# Patient Record
Sex: FEMALE | Race: WHITE | NOT HISPANIC OR LATINO | Employment: OTHER | URBAN - METROPOLITAN AREA
[De-identification: names, ages, dates, MRNs, and addresses within clinical notes are randomized per-mention and may not be internally consistent; named-entity substitution may affect disease eponyms.]

---

## 2023-06-03 ENCOUNTER — HOSPITAL ENCOUNTER (OUTPATIENT)
Dept: DATA CONVERSION | Facility: HOSPITAL | Age: 78
Discharge: HOME | End: 2023-06-03
Attending: STUDENT IN AN ORGANIZED HEALTH CARE EDUCATION/TRAINING PROGRAM

## 2023-06-03 DIAGNOSIS — S09.90XA UNSPECIFIED INJURY OF HEAD, INITIAL ENCOUNTER: Primary | ICD-10-CM

## 2023-06-03 LAB
ALBUMIN SERPL-MCNC: 4.4 GM/DL (ref 3.5–5)
ALBUMIN/GLOB SERPL: 1.5 RATIO (ref 1.5–3)
ALP BLD-CCNC: 99 U/L (ref 35–125)
ALT SERPL-CCNC: 6 U/L (ref 5–40)
ANION GAP SERPL CALCULATED.3IONS-SCNC: 12 MMOL/L (ref 0–19)
ANTICOAGULANT: NORMAL
ANTICOAGULANT: NORMAL
APTT PPP: 30.1 SEC (ref 22–32.5)
AST SERPL-CCNC: 11 U/L (ref 5–40)
BASOPHILS # BLD AUTO: 0.06 K/UL (ref 0–0.22)
BASOPHILS NFR BLD AUTO: 0.7 % (ref 0–1)
BILIRUB SERPL-MCNC: 0.2 MG/DL (ref 0.1–1.2)
BUN SERPL-MCNC: 18 MG/DL (ref 8–25)
BUN/CREAT SERPL: 13.8 RATIO (ref 8–21)
CALCIUM SERPL-MCNC: 8.9 MG/DL (ref 8.5–10.4)
CHLORIDE SERPL-SCNC: 101 MMOL/L (ref 97–107)
CO2 SERPL-SCNC: 24 MMOL/L (ref 24–31)
CREAT SERPL-MCNC: 1.3 MG/DL (ref 0.4–1.6)
DEPRECATED RDW RBC AUTO: 45.7 FL (ref 37–54)
DIFFERENTIAL METHOD BLD: ABNORMAL
EOSINOPHIL # BLD AUTO: 0.27 K/UL (ref 0–0.45)
EOSINOPHIL NFR BLD: 3.1 % (ref 0–3)
ERYTHROCYTE [DISTWIDTH] IN BLOOD BY AUTOMATED COUNT: 14 % (ref 11.7–15)
ETHANOL SERPL-MCNC: <0.01 GM/DL (ref 0–0.01)
GFR SERPL CREATININE-BSD FRML MDRD: 42 ML/MIN/1.73 M2
GLOBULIN SER-MCNC: 2.9 G/DL (ref 1.9–3.7)
GLUCOSE SERPL-MCNC: 105 MG/DL (ref 65–99)
HCT VFR BLD AUTO: 35.8 % (ref 36–44)
HGB BLD-MCNC: 11.6 GM/DL (ref 12–15)
HS TROPONIN T DELTA: ABNORMAL (ref 0–4)
IMM GRANULOCYTES # BLD AUTO: 0.04 K/UL (ref 0–0.1)
INR PPP: 1 (ref 0.86–1.16)
LYMPHOCYTES # BLD AUTO: 1.96 K/UL (ref 1.2–3.2)
LYMPHOCYTES NFR BLD MANUAL: 22.3 % (ref 20–40)
MCH RBC QN AUTO: 29.2 PG (ref 26–34)
MCHC RBC AUTO-ENTMCNC: 32.4 % (ref 31–37)
MCV RBC AUTO: 90.2 FL (ref 80–100)
MONOCYTES # BLD AUTO: 0.78 K/UL (ref 0–0.8)
MONOCYTES NFR BLD MANUAL: 8.9 % (ref 0–8)
NEUTROPHILS # BLD AUTO: 5.67 K/UL
NEUTROPHILS # BLD AUTO: 5.67 K/UL (ref 1.8–7.7)
NEUTROPHILS.IMMATURE NFR BLD: 0.5 % (ref 0–1)
NEUTS SEG NFR BLD: 64.5 % (ref 50–70)
NRBC BLD-RTO: 0 /100 WBC
PLATELET # BLD AUTO: 323 K/UL (ref 150–450)
PMV BLD AUTO: 9.7 CU (ref 7–12.6)
POTASSIUM SERPL-SCNC: 4.3 MMOL/L (ref 3.4–5.1)
PROT SERPL-MCNC: 7.3 G/DL (ref 5.9–7.9)
PROTHROMBIN TIME: 10.8 SEC (ref 9.3–12.7)
RBC # BLD AUTO: 3.97 M/UL (ref 4–4.9)
SODIUM SERPL-SCNC: 137 MMOL/L (ref 133–145)
TROPONIN T SERPL-MCNC: 18 NG/L
WBC # BLD AUTO: 8.8 K/UL (ref 4.5–11)

## 2023-07-26 ENCOUNTER — HOSPITAL ENCOUNTER (OUTPATIENT)
Dept: DATA CONVERSION | Facility: HOSPITAL | Age: 78
Discharge: HOME | End: 2023-07-26
Attending: STUDENT IN AN ORGANIZED HEALTH CARE EDUCATION/TRAINING PROGRAM

## 2023-07-26 DIAGNOSIS — K27.9 PEPTIC ULCER, SITE UNSPECIFIED, UNSPECIFIED AS ACUTE OR CHRONIC, WITHOUT HEMORRHAGE OR PERFORATION: ICD-10-CM

## 2023-07-26 DIAGNOSIS — Z91.041 RADIOGRAPHIC DYE ALLERGY STATUS: ICD-10-CM

## 2023-07-26 DIAGNOSIS — Z79.899 OTHER LONG TERM (CURRENT) DRUG THERAPY: ICD-10-CM

## 2023-07-26 DIAGNOSIS — Z88.2 ALLERGY STATUS TO SULFONAMIDES: ICD-10-CM

## 2023-07-26 DIAGNOSIS — Z88.0 ALLERGY STATUS TO PENICILLIN: ICD-10-CM

## 2023-07-26 DIAGNOSIS — R10.13 EPIGASTRIC PAIN: Primary | ICD-10-CM

## 2023-07-26 DIAGNOSIS — R11.0 NAUSEA: ICD-10-CM

## 2023-07-26 LAB
ALBUMIN SERPL-MCNC: 4.3 GM/DL (ref 3.5–5)
ALBUMIN/GLOB SERPL: 1.3 RATIO (ref 1.5–3)
ALP BLD-CCNC: 94 U/L (ref 35–125)
ALT SERPL-CCNC: 5 U/L (ref 5–40)
ANION GAP SERPL CALCULATED.3IONS-SCNC: 13 MMOL/L (ref 0–19)
AST SERPL-CCNC: 14 U/L (ref 5–40)
BACTERIA UR QL AUTO: NEGATIVE
BASOPHILS # BLD AUTO: 0.06 K/UL (ref 0–0.22)
BASOPHILS NFR BLD AUTO: 0.8 % (ref 0–1)
BILIRUB SERPL-MCNC: 0.2 MG/DL (ref 0.1–1.2)
BILIRUB UR QL STRIP.AUTO: NEGATIVE
BUN SERPL-MCNC: 15 MG/DL (ref 8–25)
BUN/CREAT SERPL: 10.7 RATIO (ref 8–21)
CALCIUM SERPL-MCNC: 9.6 MG/DL (ref 8.5–10.4)
CHLORIDE SERPL-SCNC: 98 MMOL/L (ref 97–107)
CLARITY UR: CLEAR
CO2 SERPL-SCNC: 21 MMOL/L (ref 24–31)
COLOR UR: ABNORMAL
CREAT SERPL-MCNC: 1.4 MG/DL (ref 0.4–1.6)
DEPRECATED RDW RBC AUTO: 43.5 FL (ref 37–54)
DIFFERENTIAL METHOD BLD: ABNORMAL
EOSINOPHIL # BLD AUTO: 0.38 K/UL (ref 0–0.45)
EOSINOPHIL NFR BLD: 4.9 % (ref 0–3)
ERYTHROCYTE [DISTWIDTH] IN BLOOD BY AUTOMATED COUNT: 13.2 % (ref 11.7–15)
GFR SERPL CREATININE-BSD FRML MDRD: 38 ML/MIN/1.73 M2
GLOBULIN SER-MCNC: 3.3 G/DL (ref 1.9–3.7)
GLUCOSE SERPL-MCNC: 100 MG/DL (ref 65–99)
GLUCOSE UR STRIP.AUTO-MCNC: NEGATIVE MG/DL
HCT VFR BLD AUTO: 35.4 % (ref 36–44)
HGB BLD-MCNC: 11.4 GM/DL (ref 12–15)
HGB UR QL STRIP.AUTO: 1 /HPF (ref 0–3)
HGB UR QL: NEGATIVE
HS TROPONIN T DELTA: 1 (ref 0–4)
HS TROPONIN T DELTA: ABNORMAL (ref 0–4)
HYALINE CASTS UR QL AUTO: 6 /LPF
IMM GRANULOCYTES # BLD AUTO: 0.02 K/UL (ref 0–0.1)
KETONES UR QL STRIP.AUTO: NEGATIVE
LEUKOCYTE ESTERASE UR QL STRIP.AUTO: NEGATIVE
LIPASE SERPL-CCNC: 19 U/L (ref 16–63)
LYMPHOCYTES # BLD AUTO: 2 K/UL (ref 1.2–3.2)
LYMPHOCYTES NFR BLD MANUAL: 25.5 % (ref 20–40)
MCH RBC QN AUTO: 28.4 PG (ref 26–34)
MCHC RBC AUTO-ENTMCNC: 32.2 % (ref 31–37)
MCV RBC AUTO: 88.3 FL (ref 80–100)
MICROSCOPIC (UA): ABNORMAL
MONOCYTES # BLD AUTO: 0.59 K/UL (ref 0–0.8)
MONOCYTES NFR BLD MANUAL: 7.5 % (ref 0–8)
NEUTROPHILS # BLD AUTO: 4.78 K/UL
NEUTROPHILS # BLD AUTO: 4.78 K/UL (ref 1.8–7.7)
NEUTROPHILS.IMMATURE NFR BLD: 0.3 % (ref 0–1)
NEUTS SEG NFR BLD: 61 % (ref 50–70)
NITRITE UR QL STRIP.AUTO: NEGATIVE
NRBC BLD-RTO: 0 /100 WBC
PH UR STRIP.AUTO: 5 [PH] (ref 4.6–8)
PLATELET # BLD AUTO: 369 K/UL (ref 150–450)
PMV BLD AUTO: 9.8 CU (ref 7–12.6)
POTASSIUM SERPL-SCNC: 4.6 MMOL/L (ref 3.4–5.1)
PROT SERPL-MCNC: 7.6 G/DL (ref 5.9–7.9)
PROT UR STRIP.AUTO-MCNC: ABNORMAL MG/DL
RBC # BLD AUTO: 4.01 M/UL (ref 4–4.9)
SODIUM SERPL-SCNC: 132 MMOL/L (ref 133–145)
SP GR UR STRIP.AUTO: 1.01 (ref 1–1.03)
SQUAMOUS UR QL AUTO: ABNORMAL /HPF
TROPONIN T SERPL-MCNC: 19 NG/L
TROPONIN T SERPL-MCNC: 20 NG/L
URINE CULTURE: ABNORMAL
UROBILINOGEN UR QL STRIP.AUTO: NORMAL MG/DL (ref 0–1)
WBC # BLD AUTO: 7.8 K/UL (ref 4.5–11)
WBC #/AREA URNS AUTO: 1 /HPF (ref 0–3)

## 2023-10-30 ENCOUNTER — APPOINTMENT (OUTPATIENT)
Dept: RADIOLOGY | Facility: HOSPITAL | Age: 78
End: 2023-10-30
Payer: MEDICARE

## 2023-10-31 ENCOUNTER — HOSPITAL ENCOUNTER (EMERGENCY)
Facility: HOSPITAL | Age: 78
Discharge: HOME | End: 2023-10-31
Attending: EMERGENCY MEDICINE
Payer: MEDICARE

## 2023-10-31 ENCOUNTER — APPOINTMENT (OUTPATIENT)
Dept: RADIOLOGY | Facility: HOSPITAL | Age: 78
End: 2023-10-31
Payer: MEDICARE

## 2023-10-31 VITALS
BODY MASS INDEX: 26.31 KG/M2 | HEIGHT: 60 IN | TEMPERATURE: 97.7 F | DIASTOLIC BLOOD PRESSURE: 101 MMHG | RESPIRATION RATE: 16 BRPM | OXYGEN SATURATION: 92 % | SYSTOLIC BLOOD PRESSURE: 123 MMHG | WEIGHT: 134 LBS | HEART RATE: 66 BPM

## 2023-10-31 DIAGNOSIS — N28.1 RENAL CYST: ICD-10-CM

## 2023-10-31 DIAGNOSIS — R10.84 GENERALIZED ABDOMINAL PAIN: Primary | ICD-10-CM

## 2023-10-31 DIAGNOSIS — N83.9 LESION OF OVARY: ICD-10-CM

## 2023-10-31 LAB
ALBUMIN SERPL-MCNC: 4.6 G/DL (ref 3.5–5)
ALP BLD-CCNC: 89 U/L (ref 35–125)
ALT SERPL-CCNC: 6 U/L (ref 5–40)
ANION GAP SERPL CALC-SCNC: 15 MMOL/L
AST SERPL-CCNC: 13 U/L (ref 5–40)
BILIRUB SERPL-MCNC: <0.2 MG/DL (ref 0.1–1.2)
BUN SERPL-MCNC: 14 MG/DL (ref 8–25)
CALCIUM SERPL-MCNC: 9.8 MG/DL (ref 8.5–10.4)
CHLORIDE SERPL-SCNC: 96 MMOL/L (ref 97–107)
CO2 SERPL-SCNC: 24 MMOL/L (ref 24–31)
CREAT SERPL-MCNC: 1.2 MG/DL (ref 0.4–1.6)
ERYTHROCYTE [DISTWIDTH] IN BLOOD BY AUTOMATED COUNT: 13.9 % (ref 11.5–14.5)
GFR SERPL CREATININE-BSD FRML MDRD: 46 ML/MIN/1.73M*2
GLUCOSE SERPL-MCNC: 106 MG/DL (ref 65–99)
HCT VFR BLD AUTO: 36.8 % (ref 36–46)
HGB BLD-MCNC: 11.6 G/DL (ref 12–16)
MCH RBC QN AUTO: 28 PG (ref 26–34)
MCHC RBC AUTO-ENTMCNC: 31.5 G/DL (ref 32–36)
MCV RBC AUTO: 89 FL (ref 80–100)
NRBC BLD-RTO: 0 /100 WBCS (ref 0–0)
PLATELET # BLD AUTO: 355 X10*3/UL (ref 150–450)
PMV BLD AUTO: 9.4 FL (ref 7.5–11.5)
POTASSIUM SERPL-SCNC: 4.3 MMOL/L (ref 3.4–5.1)
PROT SERPL-MCNC: 7.7 G/DL (ref 5.9–7.9)
RBC # BLD AUTO: 4.14 X10*6/UL (ref 4–5.2)
SODIUM SERPL-SCNC: 135 MMOL/L (ref 133–145)
TROPONIN T SERPL-MCNC: 15 NG/L
TROPONIN T SERPL-MCNC: 16 NG/L
WBC # BLD AUTO: 6.8 X10*3/UL (ref 4.4–11.3)

## 2023-10-31 PROCEDURE — 74176 CT ABD & PELVIS W/O CONTRAST: CPT | Mod: MG

## 2023-10-31 PROCEDURE — 36415 COLL VENOUS BLD VENIPUNCTURE: CPT | Performed by: NURSE PRACTITIONER

## 2023-10-31 PROCEDURE — 2500000001 HC RX 250 WO HCPCS SELF ADMINISTERED DRUGS (ALT 637 FOR MEDICARE OP): Performed by: EMERGENCY MEDICINE

## 2023-10-31 PROCEDURE — 84484 ASSAY OF TROPONIN QUANT: CPT | Performed by: NURSE PRACTITIONER

## 2023-10-31 PROCEDURE — 2500000001 HC RX 250 WO HCPCS SELF ADMINISTERED DRUGS (ALT 637 FOR MEDICARE OP): Performed by: NURSE PRACTITIONER

## 2023-10-31 PROCEDURE — 99285 EMERGENCY DEPT VISIT HI MDM: CPT | Mod: 25

## 2023-10-31 PROCEDURE — 99284 EMERGENCY DEPT VISIT MOD MDM: CPT | Mod: 25 | Performed by: EMERGENCY MEDICINE

## 2023-10-31 PROCEDURE — 80053 COMPREHEN METABOLIC PANEL: CPT | Performed by: NURSE PRACTITIONER

## 2023-10-31 PROCEDURE — 96360 HYDRATION IV INFUSION INIT: CPT

## 2023-10-31 PROCEDURE — 85027 COMPLETE CBC AUTOMATED: CPT | Performed by: NURSE PRACTITIONER

## 2023-10-31 PROCEDURE — 2500000004 HC RX 250 GENERAL PHARMACY W/ HCPCS (ALT 636 FOR OP/ED): Performed by: NURSE PRACTITIONER

## 2023-10-31 RX ORDER — OXYCODONE AND ACETAMINOPHEN 5; 325 MG/1; MG/1
1 TABLET ORAL ONCE
Status: COMPLETED | OUTPATIENT
Start: 2023-10-31 | End: 2023-10-31

## 2023-10-31 RX ORDER — DICYCLOMINE HYDROCHLORIDE 10 MG/1
20 CAPSULE ORAL ONCE
Status: COMPLETED | OUTPATIENT
Start: 2023-10-31 | End: 2023-10-31

## 2023-10-31 RX ADMIN — OXYCODONE AND ACETAMINOPHEN 1 TABLET: 5; 325 TABLET ORAL at 19:32

## 2023-10-31 RX ADMIN — DICYCLOMINE HYDROCHLORIDE 20 MG: 10 CAPSULE ORAL at 14:39

## 2023-10-31 RX ADMIN — SODIUM CHLORIDE 500 ML: 900 INJECTION, SOLUTION INTRAVENOUS at 14:31

## 2023-10-31 ASSESSMENT — PAIN SCALES - GENERAL
PAINLEVEL_OUTOF10: 10 - WORST POSSIBLE PAIN
PAINLEVEL_OUTOF10: 7

## 2023-10-31 ASSESSMENT — PAIN - FUNCTIONAL ASSESSMENT: PAIN_FUNCTIONAL_ASSESSMENT: 0-10

## 2023-10-31 ASSESSMENT — COLUMBIA-SUICIDE SEVERITY RATING SCALE - C-SSRS
2. HAVE YOU ACTUALLY HAD ANY THOUGHTS OF KILLING YOURSELF?: NO
1. IN THE PAST MONTH, HAVE YOU WISHED YOU WERE DEAD OR WISHED YOU COULD GO TO SLEEP AND NOT WAKE UP?: NO
6. HAVE YOU EVER DONE ANYTHING, STARTED TO DO ANYTHING, OR PREPARED TO DO ANYTHING TO END YOUR LIFE?: NO

## 2023-10-31 ASSESSMENT — LIFESTYLE VARIABLES
HAVE PEOPLE ANNOYED YOU BY CRITICIZING YOUR DRINKING: NO
REASON UNABLE TO ASSESS: NO
HAVE YOU EVER FELT YOU SHOULD CUT DOWN ON YOUR DRINKING: NO
EVER FELT BAD OR GUILTY ABOUT YOUR DRINKING: NO
EVER HAD A DRINK FIRST THING IN THE MORNING TO STEADY YOUR NERVES TO GET RID OF A HANGOVER: NO

## 2023-10-31 ASSESSMENT — PAIN DESCRIPTION - LOCATION: LOCATION: ABDOMEN

## 2023-10-31 ASSESSMENT — PAIN DESCRIPTION - PAIN TYPE: TYPE: ACUTE PAIN

## 2023-10-31 ASSESSMENT — PAIN DESCRIPTION - FREQUENCY: FREQUENCY: CONSTANT/CONTINUOUS

## 2023-10-31 ASSESSMENT — PAIN DESCRIPTION - DESCRIPTORS
DESCRIPTORS: SHARP
DESCRIPTORS: SHARP

## 2023-10-31 NOTE — ED PROVIDER NOTES
HPI   No chief complaint on file.      HPI  See my MDM                  No data recorded                Patient History   No past medical history on file.  No past surgical history on file.  No family history on file.  Social History     Tobacco Use    Smoking status: Not on file    Smokeless tobacco: Not on file   Substance Use Topics    Alcohol use: Not on file    Drug use: Not on file       Physical Exam   ED Triage Vitals   Temp Pulse Resp BP   -- -- -- --      SpO2 Temp src Heart Rate Source Patient Position   -- -- -- --      BP Location FiO2 (%)     -- --       Physical Exam  CONSTITUTIONAL: Vital signs reviewed as charted, well-developed and in no distress  Eyes: Extraocular muscles are intact. Pupils equal round and reactive to light. Conjunctiva are pink.    ENT: Mucous membranes are moist. Tongue in the midline. Pharynx was without erythema or exudates, uvula midline  LUNGS: Breath sounds equal and clear to auscultation. Good air exchange, no wheezes rales or retractions, pulse oximetry is charted.  HEART: Regular rate and rhythm without murmur thrill or rub, strong tones, auscultation is normal.  ABDOMEN: Tenderness throughout the abdomen no rebound or guarding noted.  Abdomen is soft.  Neuro: The patient is awake, alert and oriented ×3. Moving all 4 extremities and answering questions appropriately.   MUSCULOSKELETAL: The calves are nontender to palpation. Full gross active range of motion.   PSYCH: Awake alert oriented, normal mood and affect.  Skin:  Dry, normal color, warm to the touch, no rash present.      ED Course & MDM   Diagnoses as of 11/13/23 1516   Generalized abdominal pain   Renal cyst   Lesion of ovary       Medical Decision Making  History obtained from: patient    Vital signs, nursing notes, current medications, past medical history, Surgical history, allergies, social history, family History were reviewed.         HPI:  Patient is a 78-year-old female presenting emergency room today  complaining of upper abdominal pain ongoing for the last 2 or 3 days.  States she has had intermittent diarrhea and constipation.  No vomiting.  Has had some nausea.  States she has been told that her pancreas is shrinking.  She denies any cough or congestion.  Denies dizziness, chest pain, shortness of breath or lower extremity edema.  Denies urinary complaints.      10 point ROS was reviewed and negative except Noted above in HPI.  DDX: as listed above    CT scan of the abdomen pelvis interpreted by the radiologist showed:  Impression:    1.  No obstructive uropathy. No nephrolithiasis  2. Cysts in the left kidney. However, there is asymmetric density in  the superior pole left kidney measuring 1.5 cm incompletely  characterized may reflect underlying cyst. However, confirmation with  ultrasound recommended.  3. Right adnexal cystic hypodensity measuring 4.7 x 3.1 cm intervally  increased in size in the prior exam measured 4.1 cm. Correlate with  prior workup including ultrasound which is scheduled for 11/03/2023.  4. Cholelithiasis demonstrated.              Medications administered during this visit (name and route): Oral Bentyl, IV saline      MDM Summary/considerations:  I estimate there is a low risk for acute appendicitis, bowel extraction, cystitis, diverticulitis, incarcerated hernia, mesenteric ischemia, pancreatitis, or perforated bowel or ulcer, thus I considered the discharge disposition reasonable. There is no evidence of peritonitis, sepsis, or toxicity. I have discussed the diagnosis and risks, and we agreed with discharging home to follow-up with the primary care doctor. We also discussed returning to the emergency department immediately if new or worsening symptoms occur. Symptoms of most concern that we discussed are bloody stool, fever, changing or worsening pain, or vomiting that necessitates immediate return.      Patient's work-up here in the ED shows no acute pathology.  CT scan does show  some adnexal cystic lesions that were discussed with the patient.  All CT scan findings were discussed.  Patient with nonischemic EKG, troponin 1 above normal.  Was discharged home in stable condition to follow PCP 1 to 2 days for reevaluation.  Was discharged home in stable condition.        I saw this patient in conjunction with Dr. Charles, please see his supervision note.    All of the patient's questions were answered to the best of my ability.  Patient states understanding that they have been screened for an emergency today and we have not found any etiology of symptoms that requires emergent treatment or admission to the hospital at this point. They understand that they have not had definitive care day and require follow-up for treatment of their condition. They also state understanding that they may have an emergent condition that may potentially have not of detected at this visit and they must return to the emergency department if they develop any worsening of symptoms or new complaints.    Not warranted at this time prescriptions provided include: none    This chart was completed using voice recognition transcription software. Please excuse any errors of transcription including grammatical, punctuation, syntax and spelling errors.  Please contact me with any questions regarding this chart.    Procedure  Procedures     Kyle Jose, KINGS-VIVEK  11/13/23 1522

## 2023-11-01 NOTE — PROGRESS NOTES
IN BRIEF   78-year-old female who presents with complaint of upper abdominal pain for the past 2 to 3 days with intermittent bowel changes.  Some slight nausea but no vomiting.  She does have a do think like this previously and has had specifically no chest pain shortness of breath.    General: Appears well, no acute distress, alert  Head: Head atraumatic; normocephalic  Eyes: normal inspection; no icterus  ENT: mucosa moist without lesion  Neck: Normal inspection, no meningeal signs  Resp: Normal breath sounds, no wheeze or crackles; No respiratory distress  Chest Wall: no tenderness or deformity  Heart: Heart rate and rhythm regular; No Murmurs  Abdomen: Soft, minimal diffuse tenderness; No distention, guarding, rigidity, or rebound  MSK: Normal appearance; Moves all extremities; No Pedal edema  Neuro: Alert; no focal deficits, moves all extremities  Psych: Mood and Affect normal  Skin: Color appropriate; warm; Dry     EKG: Sinus rhythm with first-degree AV block ventricular rate 72  normal axis and intervals no acute ischemic changes    ED COURSE   Patient presents with complaints of nonspecific diffuse abdominal pain.  She does have minimal nonspecific tenderness on exam but there is no rebound or specific concerning findings for surgical abdomen.  Labwork initially showed a troponin of one-point above normal, this is nonspecific for a patient without dyspnea or chest pain and repeat troponin returned normal.  At the time evaluation the patient is requesting discharge and states that she is feeling greatly improved.  They are advised that there were no specific findings to be the cause of their pain though the renal cyst and ovarian lesion are discussed with him.  They are aware of the ovarian lesion and have a follow-up ultrasound this week.  They will discuss the renal cyst with their primary care.  They are agreeable with plan of discharge but advised that there was no cause for symptoms found and they  are to return should they have any change or any other concerns.  Discharged in stable condition.    I completed a structured, evidence-based clinical evaluation and testing for abdominal pain in this patient aged 60+. The workup included a CT unless the patient met a set of criteria or the patient declined the CT. The evidence indicates that the patient is very low risk for any acute abdominal emergency, and this is consistent with my clinical intuition. The risk of further imaging or hospitalization is likely higher than the risk of the patient having an acute emergent condition related to today’s symptoms. It is, therefore, in the patient’s best interest not to do additional emergent testing or be hospitalized.    I have discussed with the patient my clinical impression and the result of an evidence-based clinical evaluation to screen for an acute abdominal emergency, as well as the risk of further testing and hospitalization. The evidence shows that the risk for an acute condition related to today’s symptoms and signs is extremely low. Although the risk of progression or new symptoms cannot be excluded, the risks of further testing or hospitalization likely exceed the benefit, and the patient declines further emergent evaluation or hospitalization for evaluation of the abdominal pain.     I personally saw the patient and performed a substantive portion of the visit including all aspects of the medical decision making.   Parts of this chart were completed with dictation software, please excuse any errors in transcription.     Michelle Charles, DO  10:40 PM

## 2023-11-10 ENCOUNTER — HOSPITAL ENCOUNTER (OUTPATIENT)
Dept: RADIOLOGY | Facility: HOSPITAL | Age: 78
End: 2023-11-10
Payer: MEDICARE

## 2023-11-14 ENCOUNTER — APPOINTMENT (OUTPATIENT)
Dept: RADIOLOGY | Facility: HOSPITAL | Age: 78
End: 2023-11-14
Payer: MEDICARE

## 2023-11-17 ENCOUNTER — APPOINTMENT (OUTPATIENT)
Dept: RADIOLOGY | Facility: HOSPITAL | Age: 78
End: 2023-11-17
Payer: MEDICARE

## 2023-11-21 ENCOUNTER — APPOINTMENT (OUTPATIENT)
Dept: RADIOLOGY | Facility: HOSPITAL | Age: 78
End: 2023-11-21
Payer: MEDICARE

## 2023-12-27 ENCOUNTER — APPOINTMENT (OUTPATIENT)
Dept: CARDIOLOGY | Facility: HOSPITAL | Age: 78
DRG: 444 | End: 2023-12-27
Payer: MEDICARE

## 2023-12-27 ENCOUNTER — HOSPITAL ENCOUNTER (EMERGENCY)
Facility: HOSPITAL | Age: 78
Discharge: HOME | DRG: 444 | End: 2023-12-27
Attending: STUDENT IN AN ORGANIZED HEALTH CARE EDUCATION/TRAINING PROGRAM
Payer: MEDICARE

## 2023-12-27 ENCOUNTER — APPOINTMENT (OUTPATIENT)
Dept: RADIOLOGY | Facility: HOSPITAL | Age: 78
DRG: 444 | End: 2023-12-27
Payer: MEDICARE

## 2023-12-27 VITALS
RESPIRATION RATE: 20 BRPM | WEIGHT: 135 LBS | DIASTOLIC BLOOD PRESSURE: 78 MMHG | TEMPERATURE: 98.2 F | BODY MASS INDEX: 26.5 KG/M2 | OXYGEN SATURATION: 94 % | HEART RATE: 76 BPM | SYSTOLIC BLOOD PRESSURE: 165 MMHG | HEIGHT: 60 IN

## 2023-12-27 DIAGNOSIS — R10.9 ABDOMINAL PAIN, UNSPECIFIED ABDOMINAL LOCATION: ICD-10-CM

## 2023-12-27 DIAGNOSIS — K80.50 BILIARY COLIC: Primary | ICD-10-CM

## 2023-12-27 LAB
ALBUMIN SERPL-MCNC: 4.4 G/DL (ref 3.5–5)
ALP BLD-CCNC: 104 U/L (ref 35–125)
ALT SERPL-CCNC: 6 U/L (ref 5–40)
ANION GAP SERPL CALC-SCNC: 16 MMOL/L
APPEARANCE UR: CLEAR
AST SERPL-CCNC: 11 U/L (ref 5–40)
BILIRUB SERPL-MCNC: 0.3 MG/DL (ref 0.1–1.2)
BILIRUB UR STRIP.AUTO-MCNC: NEGATIVE MG/DL
BUN SERPL-MCNC: 18 MG/DL (ref 8–25)
CALCIUM SERPL-MCNC: 9.6 MG/DL (ref 8.5–10.4)
CHLORIDE SERPL-SCNC: 96 MMOL/L (ref 97–107)
CO2 SERPL-SCNC: 22 MMOL/L (ref 24–31)
COLOR UR: COLORLESS
CREAT SERPL-MCNC: 1.1 MG/DL (ref 0.4–1.6)
ERYTHROCYTE [DISTWIDTH] IN BLOOD BY AUTOMATED COUNT: 13.5 % (ref 11.5–14.5)
GFR SERPL CREATININE-BSD FRML MDRD: 51 ML/MIN/1.73M*2
GLUCOSE SERPL-MCNC: 116 MG/DL (ref 65–99)
GLUCOSE UR STRIP.AUTO-MCNC: NORMAL MG/DL
HCT VFR BLD AUTO: 33.5 % (ref 36–46)
HGB BLD-MCNC: 10.6 G/DL (ref 12–16)
KETONES UR STRIP.AUTO-MCNC: NEGATIVE MG/DL
LEUKOCYTE ESTERASE UR QL STRIP.AUTO: NEGATIVE
LIPASE SERPL-CCNC: 16 U/L (ref 16–63)
MCH RBC QN AUTO: 28 PG (ref 26–34)
MCHC RBC AUTO-ENTMCNC: 31.6 G/DL (ref 32–36)
MCV RBC AUTO: 88 FL (ref 80–100)
NITRITE UR QL STRIP.AUTO: NEGATIVE
NRBC BLD-RTO: 0 /100 WBCS (ref 0–0)
PH UR STRIP.AUTO: 6 [PH]
PLATELET # BLD AUTO: 345 X10*3/UL (ref 150–450)
POTASSIUM SERPL-SCNC: 4.7 MMOL/L (ref 3.4–5.1)
PROT SERPL-MCNC: 8 G/DL (ref 5.9–7.9)
PROT UR STRIP.AUTO-MCNC: ABNORMAL MG/DL
RBC # BLD AUTO: 3.79 X10*6/UL (ref 4–5.2)
RBC # UR STRIP.AUTO: ABNORMAL /UL
RBC #/AREA URNS AUTO: NORMAL /HPF
SODIUM SERPL-SCNC: 134 MMOL/L (ref 133–145)
SP GR UR STRIP.AUTO: 1.01
TROPONIN T SERPL-MCNC: 12 NG/L
UROBILINOGEN UR STRIP.AUTO-MCNC: NORMAL MG/DL
WBC # BLD AUTO: 9 X10*3/UL (ref 4.4–11.3)
WBC #/AREA URNS AUTO: NORMAL /HPF

## 2023-12-27 PROCEDURE — 36415 COLL VENOUS BLD VENIPUNCTURE: CPT | Performed by: STUDENT IN AN ORGANIZED HEALTH CARE EDUCATION/TRAINING PROGRAM

## 2023-12-27 PROCEDURE — 2500000004 HC RX 250 GENERAL PHARMACY W/ HCPCS (ALT 636 FOR OP/ED): Performed by: STUDENT IN AN ORGANIZED HEALTH CARE EDUCATION/TRAINING PROGRAM

## 2023-12-27 PROCEDURE — 99285 EMERGENCY DEPT VISIT HI MDM: CPT | Mod: 25

## 2023-12-27 PROCEDURE — 96374 THER/PROPH/DIAG INJ IV PUSH: CPT

## 2023-12-27 PROCEDURE — 74176 CT ABD & PELVIS W/O CONTRAST: CPT

## 2023-12-27 PROCEDURE — 93005 ELECTROCARDIOGRAM TRACING: CPT

## 2023-12-27 PROCEDURE — 2500000001 HC RX 250 WO HCPCS SELF ADMINISTERED DRUGS (ALT 637 FOR MEDICARE OP): Performed by: STUDENT IN AN ORGANIZED HEALTH CARE EDUCATION/TRAINING PROGRAM

## 2023-12-27 PROCEDURE — 83690 ASSAY OF LIPASE: CPT | Performed by: STUDENT IN AN ORGANIZED HEALTH CARE EDUCATION/TRAINING PROGRAM

## 2023-12-27 PROCEDURE — 84484 ASSAY OF TROPONIN QUANT: CPT | Performed by: STUDENT IN AN ORGANIZED HEALTH CARE EDUCATION/TRAINING PROGRAM

## 2023-12-27 PROCEDURE — 99284 EMERGENCY DEPT VISIT MOD MDM: CPT | Performed by: STUDENT IN AN ORGANIZED HEALTH CARE EDUCATION/TRAINING PROGRAM

## 2023-12-27 PROCEDURE — 80053 COMPREHEN METABOLIC PANEL: CPT | Performed by: STUDENT IN AN ORGANIZED HEALTH CARE EDUCATION/TRAINING PROGRAM

## 2023-12-27 PROCEDURE — 85027 COMPLETE CBC AUTOMATED: CPT | Performed by: STUDENT IN AN ORGANIZED HEALTH CARE EDUCATION/TRAINING PROGRAM

## 2023-12-27 PROCEDURE — 81001 URINALYSIS AUTO W/SCOPE: CPT | Performed by: STUDENT IN AN ORGANIZED HEALTH CARE EDUCATION/TRAINING PROGRAM

## 2023-12-27 PROCEDURE — 96375 TX/PRO/DX INJ NEW DRUG ADDON: CPT

## 2023-12-27 RX ORDER — FAMOTIDINE 10 MG/ML
20 INJECTION INTRAVENOUS ONCE
Status: COMPLETED | OUTPATIENT
Start: 2023-12-27 | End: 2023-12-27

## 2023-12-27 RX ORDER — ONDANSETRON HYDROCHLORIDE 2 MG/ML
4 INJECTION, SOLUTION INTRAVENOUS ONCE
Status: COMPLETED | OUTPATIENT
Start: 2023-12-27 | End: 2023-12-27

## 2023-12-27 RX ORDER — TRAMADOL HYDROCHLORIDE 50 MG/1
50 TABLET ORAL ONCE
Status: COMPLETED | OUTPATIENT
Start: 2023-12-27 | End: 2023-12-27

## 2023-12-27 RX ADMIN — FAMOTIDINE 20 MG: 10 INJECTION INTRAVENOUS at 10:58

## 2023-12-27 RX ADMIN — ONDANSETRON 4 MG: 2 INJECTION INTRAMUSCULAR; INTRAVENOUS at 10:58

## 2023-12-27 RX ADMIN — TRAMADOL HYDROCHLORIDE 50 MG: 50 TABLET ORAL at 13:32

## 2023-12-27 ASSESSMENT — PAIN SCALES - GENERAL
PAINLEVEL_OUTOF10: 10 - WORST POSSIBLE PAIN
PAINLEVEL_OUTOF10: 10 - WORST POSSIBLE PAIN

## 2023-12-27 ASSESSMENT — COLUMBIA-SUICIDE SEVERITY RATING SCALE - C-SSRS
1. IN THE PAST MONTH, HAVE YOU WISHED YOU WERE DEAD OR WISHED YOU COULD GO TO SLEEP AND NOT WAKE UP?: NO
2. HAVE YOU ACTUALLY HAD ANY THOUGHTS OF KILLING YOURSELF?: NO
6. HAVE YOU EVER DONE ANYTHING, STARTED TO DO ANYTHING, OR PREPARED TO DO ANYTHING TO END YOUR LIFE?: NO

## 2023-12-27 ASSESSMENT — LIFESTYLE VARIABLES
REASON UNABLE TO ASSESS: NO
HAVE PEOPLE ANNOYED YOU BY CRITICIZING YOUR DRINKING: NO
HAVE YOU EVER FELT YOU SHOULD CUT DOWN ON YOUR DRINKING: NO
EVER FELT BAD OR GUILTY ABOUT YOUR DRINKING: NO
EVER HAD A DRINK FIRST THING IN THE MORNING TO STEADY YOUR NERVES TO GET RID OF A HANGOVER: NO

## 2023-12-27 ASSESSMENT — PAIN DESCRIPTION - LOCATION: LOCATION: ABDOMEN

## 2023-12-27 ASSESSMENT — PAIN - FUNCTIONAL ASSESSMENT: PAIN_FUNCTIONAL_ASSESSMENT: 0-10

## 2023-12-27 ASSESSMENT — PAIN DESCRIPTION - ORIENTATION: ORIENTATION: LEFT

## 2023-12-27 NOTE — ED PROVIDER NOTES
HPI   Chief Complaint   Patient presents with    Abdominal Pain     Since 0100, epigastric and radiates left, abd is soft but tender, has nausea without vomiting and two regular bm's today       Patient presents with epigastric abdominal pain which started at about 1:00 today.  It is associated with nausea although she has not vomited.  She did have several bowel movements today already, not diarrhea however.  She took a Percocet and some Zofran at home already for the pain and nausea.                          [place] Coma Scale Score: 15                  Patient History   Past Medical History:   Diagnosis Date    Arthritis     Diabetes mellitus (CMS/HCC)     Hypertension      History reviewed. No pertinent surgical history.  No family history on file.  Social History     Tobacco Use    Smoking status: Never     Passive exposure: Never    Smokeless tobacco: Never   Substance Use Topics    Alcohol use: Not Currently    Drug use: Not on file       Physical Exam   ED Triage Vitals [12/27/23 1038]   Temp Heart Rate Resp BP   36.9 °C (98.4 °F) 89 15 (!) 195/98      SpO2 Temp src Heart Rate Source Patient Position   95 % -- -- --      BP Location FiO2 (%)     -- --       Physical Exam  HENT:      Head: Normocephalic.   Eyes:      General: No scleral icterus.  Cardiovascular:      Rate and Rhythm: Normal rate.   Pulmonary:      Effort: Pulmonary effort is normal.   Abdominal:      Comments: Diffuse tenderness to palpation, worst in the epigastric region.  No guarding.   Skin:     General: Skin is warm.   Neurological:      General: No focal deficit present.      Mental Status: She is alert.         ED Course & MDM   ED Course as of 12/27/23 1342   Wed Dec 27, 2023   1057 EKG interpreted by me: Normal sinus rhythm, rate 82.  Normal axis.  No ST or T wave abnormalities. [ML]      ED Course User Index  [ML] Deondre Garcia MD         Diagnoses as of 12/27/23 1342   Abdominal pain, unspecified abdominal location   Biliary colic        Medical Decision Making  Patient presents with epigastric pain.  Laboratory studies are unremarkable.  Urinalysis not consistent with urinary tract infection.  CAT scan reveals gallstones.  I have some suspicion that patient's symptoms are consistent with biliary colic in the setting, patient was advised to avoid fatty meals or large meals and to follow-up with general surgery.  No signs of cholecystitis seen on CAT scan.  Patient advised to follow-up with primary care physician and given return precautions for any worsening symptoms.  My suspicion for bowel obstruction, bowel ischemia, AAA, aortic dissection, perforation, appendicitis as etiology of symptoms is very low.  Parts of this chart were completed with dictation software, please excuse any errors in transcription.        Procedure  Procedures     Deondre Garcia MD  12/27/23 6475

## 2023-12-27 NOTE — ED NOTES
Abdominal Pain       Since 0100, epigastric and radiates left, abd is soft but tender, has nausea without vomiting and two regular bm's today          PMHX:  Past Medical History:   Diagnosis Date    Arthritis     Diabetes mellitus (CMS/HCC)     Hypertension         Allergies   Allergen Reactions    Gabapentin Anaphylaxis and Unknown    Levofloxacin Shortness of breath    Sulfa (Sulfonamide Antibiotics) Rash    Duloxetine Nausea And Vomiting    Metronidazole GI Upset and Nausea And Vomiting     Nausea and diarrhea    Pregabalin GI Upset    Protonix [Pantoprazole] Unknown    Adhesive Tape-Silicones Rash    Morphine Hives and Rash         LABS:   Latest Reference Range & Units 12/27/23 10:42   GLUCOSE 65 - 99 mg/dL 116 (H)   SODIUM 133 - 145 mmol/L 134   POTASSIUM 3.4 - 5.1 mmol/L 4.7   CHLORIDE 97 - 107 mmol/L 96 (L)   Bicarbonate 24 - 31 mmol/L 22 (L)   Anion Gap <=19 mmol/L 16   Blood Urea Nitrogen 8 - 25 mg/dL 18   Creatinine 0.40 - 1.60 mg/dL 1.10   EGFR >60 mL/min/1.73m*2 51 (L)   Calcium 8.5 - 10.4 mg/dL 9.6   Albumin 3.5 - 5.0 g/dL 4.4   Alkaline Phosphatase 35 - 125 U/L 104   ALT 5 - 40 U/L 6   AST 5 - 40 U/L 11   Bilirubin Total 0.1 - 1.2 mg/dL 0.3   (H): Data is abnormally high  (L): Data is abnormally low     Latest Reference Range & Units 12/27/23 12:53   WBC 4.4 - 11.3 x10*3/uL 9.0   nRBC 0.0 - 0.0 /100 WBCs 0.0   RBC 4.00 - 5.20 x10*6/uL 3.79 (L)   HEMOGLOBIN 12.0 - 16.0 g/dL 10.6 (L)   HEMATOCRIT 36.0 - 46.0 % 33.5 (L)   MCV 80 - 100 fL 88   MCH 26.0 - 34.0 pg 28.0   MCHC 32.0 - 36.0 g/dL 31.6 (L)   RED CELL DISTRIBUTION WIDTH 11.5 - 14.5 % 13.5   Platelets 150 - 450 x10*3/uL 345   (L): Data is abnormally low   Latest Reference Range & Units 12/27/23 10:43   Color, Urine Light-Yellow, Yellow, Dark-Yellow  Colorless !   Appearance, Urine Clear  Clear   Specific Gravity, Urine 1.005 - 1.035  1.012   pH, Urine 5.0, 5.5, 6.0, 6.5, 7.0, 7.5, 8.0  6.0   Protein, Urine NEGATIVE, 10 (TRACE), 20 (TRACE)  mg/dL 50 (1+) !   Glucose, Urine Normal mg/dL Normal   Blood, Urine NEGATIVE  0.03 (TRACE) !   Ketones, Urine NEGATIVE mg/dL NEGATIVE   Bilirubin, Urine NEGATIVE  NEGATIVE   Urobilinogen, Urine Normal mg/dL Normal   Nitrite, Urine NEGATIVE  NEGATIVE   Leukocyte Esterase, Urine NEGATIVE  NEGATIVE   !: Data is abnormal          PLAN:  Discharge, follow up with pcp                 Socorro Valencia, RN  12/27/23 5430

## 2023-12-27 NOTE — DISCHARGE INSTRUCTIONS
Your CAT scan revealed that you have gallstones.  You will need to follow-up with a surgeon regarding this finding.  You should try to avoid eating large meals or fatty meals as this may trigger your symptoms.  Return to the emergency department with any worsening symptoms.  It is important to remember that your care does not end here and you must continue to monitor your condition closely. Please return to the emergency department for any worsening or concerning signs or symptoms as directed by our conversations and the discharge instructions. If you do not have a doctor please contact the referral number on your discharge instructions. Please contact any physician specialists provided in your discharge notes as it is very important to follow up with them regarding your condition. If you are unable to reach the physicians provided, please come back to the Emergency Department at any time.    Return to emergency room without delay for ANY new or worsening pains or for any other symptoms or concerns.  Return with worsening pains, nausea, vomiting, trouble breathing, palpitations, shortness of breath, inability to pass stool or urine, loss of control of stool or urine, any numbness or tingling (that is not normal for you), uncontrolled fevers, the passing of blood or other material in stool or urine, rashes, pains or for any other symptoms or concerns you may have.  You are always welcome to return to the ER at any time for any reason or for any other concerns you may have.

## 2023-12-28 ENCOUNTER — APPOINTMENT (OUTPATIENT)
Dept: RADIOLOGY | Facility: HOSPITAL | Age: 78
DRG: 444 | End: 2023-12-28
Payer: MEDICARE

## 2023-12-28 ENCOUNTER — HOSPITAL ENCOUNTER (INPATIENT)
Facility: HOSPITAL | Age: 78
LOS: 13 days | Discharge: HOME | DRG: 444 | End: 2024-01-10
Attending: STUDENT IN AN ORGANIZED HEALTH CARE EDUCATION/TRAINING PROGRAM | Admitting: INTERNAL MEDICINE
Payer: MEDICARE

## 2023-12-28 DIAGNOSIS — R10.9 ABDOMINAL PAIN, UNSPECIFIED ABDOMINAL LOCATION: ICD-10-CM

## 2023-12-28 DIAGNOSIS — K80.10 CHRONIC CHOLECYSTITIS WITH CALCULUS: ICD-10-CM

## 2023-12-28 DIAGNOSIS — R11.10 ACUTE VOMITING: Primary | ICD-10-CM

## 2023-12-28 LAB
ALBUMIN SERPL-MCNC: 4.5 G/DL (ref 3.5–5)
ALP BLD-CCNC: 100 U/L (ref 35–125)
ALT SERPL-CCNC: <5 U/L (ref 5–40)
ANION GAP SERPL CALC-SCNC: 13 MMOL/L
AST SERPL-CCNC: 20 U/L (ref 5–40)
ATRIAL RATE: 82 BPM
BASOPHILS # BLD AUTO: 0.05 X10*3/UL (ref 0–0.1)
BASOPHILS NFR BLD AUTO: 0.4 %
BILIRUB SERPL-MCNC: 0.3 MG/DL (ref 0.1–1.2)
BUN SERPL-MCNC: 15 MG/DL (ref 8–25)
CALCIUM SERPL-MCNC: 9.5 MG/DL (ref 8.5–10.4)
CHLORIDE SERPL-SCNC: 92 MMOL/L (ref 97–107)
CO2 SERPL-SCNC: 25 MMOL/L (ref 24–31)
CREAT SERPL-MCNC: 1.2 MG/DL (ref 0.4–1.6)
EOSINOPHIL # BLD AUTO: 0.06 X10*3/UL (ref 0–0.4)
EOSINOPHIL NFR BLD AUTO: 0.5 %
ERYTHROCYTE [DISTWIDTH] IN BLOOD BY AUTOMATED COUNT: 13.5 % (ref 11.5–14.5)
GFR SERPL CREATININE-BSD FRML MDRD: 46 ML/MIN/1.73M*2
GLUCOSE SERPL-MCNC: 134 MG/DL (ref 65–99)
HCT VFR BLD AUTO: 36.8 % (ref 36–46)
HGB BLD-MCNC: 11.6 G/DL (ref 12–16)
HOLD SPECIMEN: NORMAL
IMM GRANULOCYTES # BLD AUTO: 0.03 X10*3/UL (ref 0–0.5)
IMM GRANULOCYTES NFR BLD AUTO: 0.3 % (ref 0–0.9)
LIPASE SERPL-CCNC: 16 U/L (ref 16–63)
LYMPHOCYTES # BLD AUTO: 1.86 X10*3/UL (ref 0.8–3)
LYMPHOCYTES NFR BLD AUTO: 15.7 %
MAGNESIUM SERPL-MCNC: 1.7 MG/DL (ref 1.6–3.1)
MCH RBC QN AUTO: 28.1 PG (ref 26–34)
MCHC RBC AUTO-ENTMCNC: 31.5 G/DL (ref 32–36)
MCV RBC AUTO: 89 FL (ref 80–100)
MONOCYTES # BLD AUTO: 0.93 X10*3/UL (ref 0.05–0.8)
MONOCYTES NFR BLD AUTO: 7.8 %
NEUTROPHILS # BLD AUTO: 8.92 X10*3/UL (ref 1.6–5.5)
NEUTROPHILS NFR BLD AUTO: 75.3 %
NRBC BLD-RTO: 0 /100 WBCS (ref 0–0)
P AXIS: 60 DEGREES
P OFFSET: 180 MS
P ONSET: 119 MS
PLATELET # BLD AUTO: 401 X10*3/UL (ref 150–450)
POTASSIUM SERPL-SCNC: 4.4 MMOL/L (ref 3.4–5.1)
PR INTERVAL: 206 MS
PROT SERPL-MCNC: 8 G/DL (ref 5.9–7.9)
Q ONSET: 222 MS
QRS COUNT: 14 BEATS
QRS DURATION: 90 MS
QT INTERVAL: 400 MS
QTC CALCULATION(BAZETT): 467 MS
QTC FREDERICIA: 444 MS
R AXIS: 10 DEGREES
RBC # BLD AUTO: 4.13 X10*6/UL (ref 4–5.2)
SODIUM SERPL-SCNC: 130 MMOL/L (ref 133–145)
T AXIS: 80 DEGREES
T OFFSET: 422 MS
VENTRICULAR RATE: 82 BPM
WBC # BLD AUTO: 11.9 X10*3/UL (ref 4.4–11.3)

## 2023-12-28 PROCEDURE — 1200000002 HC GENERAL ROOM WITH TELEMETRY DAILY

## 2023-12-28 PROCEDURE — 36415 COLL VENOUS BLD VENIPUNCTURE: CPT | Performed by: PHYSICIAN ASSISTANT

## 2023-12-28 PROCEDURE — 84075 ASSAY ALKALINE PHOSPHATASE: CPT | Performed by: PHYSICIAN ASSISTANT

## 2023-12-28 PROCEDURE — 83690 ASSAY OF LIPASE: CPT | Performed by: PHYSICIAN ASSISTANT

## 2023-12-28 PROCEDURE — 2500000004 HC RX 250 GENERAL PHARMACY W/ HCPCS (ALT 636 FOR OP/ED): Performed by: INTERNAL MEDICINE

## 2023-12-28 PROCEDURE — 2500000004 HC RX 250 GENERAL PHARMACY W/ HCPCS (ALT 636 FOR OP/ED): Performed by: PHYSICIAN ASSISTANT

## 2023-12-28 PROCEDURE — 85025 COMPLETE CBC W/AUTO DIFF WBC: CPT | Performed by: PHYSICIAN ASSISTANT

## 2023-12-28 PROCEDURE — 76705 ECHO EXAM OF ABDOMEN: CPT

## 2023-12-28 PROCEDURE — 99285 EMERGENCY DEPT VISIT HI MDM: CPT | Performed by: STUDENT IN AN ORGANIZED HEALTH CARE EDUCATION/TRAINING PROGRAM

## 2023-12-28 PROCEDURE — 83735 ASSAY OF MAGNESIUM: CPT | Performed by: PHYSICIAN ASSISTANT

## 2023-12-28 PROCEDURE — 96375 TX/PRO/DX INJ NEW DRUG ADDON: CPT

## 2023-12-28 PROCEDURE — 96374 THER/PROPH/DIAG INJ IV PUSH: CPT

## 2023-12-28 RX ORDER — SODIUM CHLORIDE 9 MG/ML
75 INJECTION, SOLUTION INTRAVENOUS CONTINUOUS
Status: DISCONTINUED | OUTPATIENT
Start: 2023-12-28 | End: 2024-01-03

## 2023-12-28 RX ORDER — OXYCODONE AND ACETAMINOPHEN 5; 325 MG/1; MG/1
1 TABLET ORAL EVERY 6 HOURS PRN
COMMUNITY

## 2023-12-28 RX ORDER — METOPROLOL TARTRATE 50 MG/1
50 TABLET ORAL 2 TIMES DAILY
COMMUNITY
Start: 2013-09-17

## 2023-12-28 RX ORDER — DEXTROSE MONOHYDRATE 100 MG/ML
0.3 INJECTION, SOLUTION INTRAVENOUS ONCE AS NEEDED
Status: DISCONTINUED | OUTPATIENT
Start: 2023-12-28 | End: 2024-01-10 | Stop reason: HOSPADM

## 2023-12-28 RX ORDER — ONDANSETRON HYDROCHLORIDE 2 MG/ML
4 INJECTION, SOLUTION INTRAVENOUS ONCE
Status: COMPLETED | OUTPATIENT
Start: 2023-12-28 | End: 2023-12-28

## 2023-12-28 RX ORDER — FENTANYL CITRATE 50 UG/ML
25 INJECTION, SOLUTION INTRAMUSCULAR; INTRAVENOUS ONCE
Status: COMPLETED | OUTPATIENT
Start: 2023-12-28 | End: 2023-12-28

## 2023-12-28 RX ORDER — DIPHENHYDRAMINE HCL 25 MG
25 TABLET ORAL 2 TIMES DAILY PRN
Status: DISCONTINUED | OUTPATIENT
Start: 2023-12-28 | End: 2024-01-10 | Stop reason: HOSPADM

## 2023-12-28 RX ORDER — DEXTROSE 50 % IN WATER (D50W) INTRAVENOUS SYRINGE
25
Status: DISCONTINUED | OUTPATIENT
Start: 2023-12-28 | End: 2024-01-10 | Stop reason: HOSPADM

## 2023-12-28 RX ORDER — HYDROMORPHONE HYDROCHLORIDE 1 MG/ML
1 INJECTION, SOLUTION INTRAMUSCULAR; INTRAVENOUS; SUBCUTANEOUS
Status: DISCONTINUED | OUTPATIENT
Start: 2023-12-28 | End: 2023-12-29

## 2023-12-28 RX ORDER — SERTRALINE HYDROCHLORIDE 50 MG/1
50 TABLET, FILM COATED ORAL DAILY
COMMUNITY
Start: 2022-03-24

## 2023-12-28 RX ORDER — TALC
6 POWDER (GRAM) TOPICAL NIGHTLY PRN
Status: DISCONTINUED | OUTPATIENT
Start: 2023-12-28 | End: 2024-01-10 | Stop reason: HOSPADM

## 2023-12-28 RX ORDER — POLYETHYLENE GLYCOL 3350 17 G/17G
17 POWDER, FOR SOLUTION ORAL DAILY
Status: DISCONTINUED | OUTPATIENT
Start: 2023-12-29 | End: 2024-01-10 | Stop reason: HOSPADM

## 2023-12-28 RX ORDER — METFORMIN HYDROCHLORIDE 500 MG/1
500 TABLET ORAL 2 TIMES DAILY
COMMUNITY
Start: 2022-03-24

## 2023-12-28 RX ORDER — INSULIN LISPRO 100 [IU]/ML
0-5 INJECTION, SOLUTION INTRAVENOUS; SUBCUTANEOUS
Status: DISCONTINUED | OUTPATIENT
Start: 2023-12-29 | End: 2024-01-04

## 2023-12-28 RX ORDER — ENOXAPARIN SODIUM 100 MG/ML
40 INJECTION SUBCUTANEOUS NIGHTLY
Status: DISCONTINUED | OUTPATIENT
Start: 2023-12-28 | End: 2024-01-10 | Stop reason: HOSPADM

## 2023-12-28 RX ORDER — ONDANSETRON HYDROCHLORIDE 2 MG/ML
4 INJECTION, SOLUTION INTRAVENOUS EVERY 4 HOURS PRN
Status: DISCONTINUED | OUTPATIENT
Start: 2023-12-28 | End: 2024-01-10 | Stop reason: HOSPADM

## 2023-12-28 RX ORDER — KETOROLAC TROMETHAMINE 30 MG/ML
15 INJECTION, SOLUTION INTRAMUSCULAR; INTRAVENOUS ONCE
Status: COMPLETED | OUTPATIENT
Start: 2023-12-28 | End: 2023-12-28

## 2023-12-28 RX ORDER — ALPRAZOLAM 0.5 MG/1
0.5 TABLET ORAL 2 TIMES DAILY PRN
Status: DISCONTINUED | OUTPATIENT
Start: 2023-12-28 | End: 2024-01-10 | Stop reason: HOSPADM

## 2023-12-28 RX ORDER — ACETAMINOPHEN 325 MG/1
650 TABLET ORAL EVERY 6 HOURS PRN
Status: DISCONTINUED | OUTPATIENT
Start: 2023-12-28 | End: 2024-01-10 | Stop reason: HOSPADM

## 2023-12-28 RX ORDER — GUAIFENESIN/DEXTROMETHORPHAN 100-10MG/5
5 SYRUP ORAL EVERY 4 HOURS PRN
Status: DISCONTINUED | OUTPATIENT
Start: 2023-12-28 | End: 2024-01-10 | Stop reason: HOSPADM

## 2023-12-28 RX ORDER — BISACODYL 5 MG
10 TABLET, DELAYED RELEASE (ENTERIC COATED) ORAL DAILY PRN
Status: DISCONTINUED | OUTPATIENT
Start: 2023-12-28 | End: 2024-01-10 | Stop reason: HOSPADM

## 2023-12-28 RX ORDER — IPRATROPIUM BROMIDE AND ALBUTEROL SULFATE 2.5; .5 MG/3ML; MG/3ML
3 SOLUTION RESPIRATORY (INHALATION)
Status: DISCONTINUED | OUTPATIENT
Start: 2023-12-29 | End: 2023-12-29

## 2023-12-28 RX ORDER — OMEPRAZOLE 20 MG/1
20 CAPSULE, DELAYED RELEASE ORAL
COMMUNITY
End: 2024-06-04 | Stop reason: ENTERED-IN-ERROR

## 2023-12-28 RX ADMIN — KETOROLAC TROMETHAMINE 15 MG: 30 INJECTION, SOLUTION INTRAMUSCULAR; INTRAVENOUS at 21:42

## 2023-12-28 RX ADMIN — ONDANSETRON 4 MG: 2 INJECTION INTRAMUSCULAR; INTRAVENOUS at 21:42

## 2023-12-28 RX ADMIN — SODIUM CHLORIDE 1000 ML: 900 INJECTION, SOLUTION INTRAVENOUS at 21:42

## 2023-12-28 RX ADMIN — HYDROMORPHONE HYDROCHLORIDE 1 MG: 1 INJECTION, SOLUTION INTRAMUSCULAR; INTRAVENOUS; SUBCUTANEOUS at 23:47

## 2023-12-28 RX ADMIN — FENTANYL CITRATE 25 MCG: 0.05 INJECTION, SOLUTION INTRAMUSCULAR; INTRAVENOUS at 22:53

## 2023-12-28 ASSESSMENT — LIFESTYLE VARIABLES
HAVE YOU EVER FELT YOU SHOULD CUT DOWN ON YOUR DRINKING: NO
EVER HAD A DRINK FIRST THING IN THE MORNING TO STEADY YOUR NERVES TO GET RID OF A HANGOVER: NO
REASON UNABLE TO ASSESS: NO
HAVE PEOPLE ANNOYED YOU BY CRITICIZING YOUR DRINKING: NO
EVER FELT BAD OR GUILTY ABOUT YOUR DRINKING: NO

## 2023-12-28 ASSESSMENT — PAIN - FUNCTIONAL ASSESSMENT
PAIN_FUNCTIONAL_ASSESSMENT: 0-10
PAIN_FUNCTIONAL_ASSESSMENT: 0-10

## 2023-12-28 ASSESSMENT — PAIN SCALES - GENERAL
PAINLEVEL_OUTOF10: 5 - MODERATE PAIN
PAINLEVEL_OUTOF10: 10 - WORST POSSIBLE PAIN

## 2023-12-28 ASSESSMENT — PAIN DESCRIPTION - LOCATION: LOCATION: ABDOMEN

## 2023-12-29 ENCOUNTER — APPOINTMENT (OUTPATIENT)
Dept: RADIOLOGY | Facility: HOSPITAL | Age: 78
DRG: 444 | End: 2023-12-29
Payer: MEDICARE

## 2023-12-29 LAB
ALBUMIN SERPL-MCNC: 3.9 G/DL (ref 3.5–5)
ALP BLD-CCNC: 86 U/L (ref 35–125)
ALT SERPL-CCNC: 6 U/L (ref 5–40)
ANION GAP SERPL CALC-SCNC: 12 MMOL/L
AST SERPL-CCNC: 12 U/L (ref 5–40)
BILIRUB SERPL-MCNC: 0.3 MG/DL (ref 0.1–1.2)
BUN SERPL-MCNC: 15 MG/DL (ref 8–25)
CALCIUM SERPL-MCNC: 8.8 MG/DL (ref 8.5–10.4)
CHLORIDE SERPL-SCNC: 98 MMOL/L (ref 97–107)
CO2 SERPL-SCNC: 23 MMOL/L (ref 24–31)
CREAT SERPL-MCNC: 1.1 MG/DL (ref 0.4–1.6)
ERYTHROCYTE [DISTWIDTH] IN BLOOD BY AUTOMATED COUNT: 13.4 % (ref 11.5–14.5)
FLUAV RNA RESP QL NAA+PROBE: NOT DETECTED
FLUBV RNA RESP QL NAA+PROBE: NOT DETECTED
GFR SERPL CREATININE-BSD FRML MDRD: 51 ML/MIN/1.73M*2
GLUCOSE BLD MANUAL STRIP-MCNC: 105 MG/DL (ref 74–99)
GLUCOSE SERPL-MCNC: 110 MG/DL (ref 65–99)
HCT VFR BLD AUTO: 33.5 % (ref 36–46)
HGB BLD-MCNC: 10.6 G/DL (ref 12–16)
MCH RBC QN AUTO: 28 PG (ref 26–34)
MCHC RBC AUTO-ENTMCNC: 31.6 G/DL (ref 32–36)
MCV RBC AUTO: 88 FL (ref 80–100)
NRBC BLD-RTO: 0 /100 WBCS (ref 0–0)
PLATELET # BLD AUTO: 344 X10*3/UL (ref 150–450)
POTASSIUM SERPL-SCNC: 3.9 MMOL/L (ref 3.4–5.1)
PROT SERPL-MCNC: 6.8 G/DL (ref 5.9–7.9)
RBC # BLD AUTO: 3.79 X10*6/UL (ref 4–5.2)
SARS-COV-2 RNA RESP QL NAA+PROBE: NOT DETECTED
SODIUM SERPL-SCNC: 133 MMOL/L (ref 133–145)
WBC # BLD AUTO: 10.5 X10*3/UL (ref 4.4–11.3)

## 2023-12-29 PROCEDURE — 1200000002 HC GENERAL ROOM WITH TELEMETRY DAILY

## 2023-12-29 PROCEDURE — 80053 COMPREHEN METABOLIC PANEL: CPT | Performed by: INTERNAL MEDICINE

## 2023-12-29 PROCEDURE — 2500000001 HC RX 250 WO HCPCS SELF ADMINISTERED DRUGS (ALT 637 FOR MEDICARE OP): Performed by: INTERNAL MEDICINE

## 2023-12-29 PROCEDURE — 87636 SARSCOV2 & INF A&B AMP PRB: CPT | Performed by: INTERNAL MEDICINE

## 2023-12-29 PROCEDURE — A9537 TC99M MEBROFENIN: HCPCS | Performed by: INTERNAL MEDICINE

## 2023-12-29 PROCEDURE — 2500000004 HC RX 250 GENERAL PHARMACY W/ HCPCS (ALT 636 FOR OP/ED): Performed by: INTERNAL MEDICINE

## 2023-12-29 PROCEDURE — 85027 COMPLETE CBC AUTOMATED: CPT | Performed by: INTERNAL MEDICINE

## 2023-12-29 PROCEDURE — 96372 THER/PROPH/DIAG INJ SC/IM: CPT | Performed by: INTERNAL MEDICINE

## 2023-12-29 PROCEDURE — 99223 1ST HOSP IP/OBS HIGH 75: CPT | Performed by: SURGERY

## 2023-12-29 PROCEDURE — 82947 ASSAY GLUCOSE BLOOD QUANT: CPT

## 2023-12-29 PROCEDURE — 36415 COLL VENOUS BLD VENIPUNCTURE: CPT | Performed by: INTERNAL MEDICINE

## 2023-12-29 PROCEDURE — 3430000001 HC RX 343 DIAGNOSTIC RADIOPHARMACEUTICALS: Performed by: INTERNAL MEDICINE

## 2023-12-29 PROCEDURE — 78227 HEPATOBIL SYST IMAGE W/DRUG: CPT

## 2023-12-29 RX ORDER — IPRATROPIUM BROMIDE AND ALBUTEROL SULFATE 2.5; .5 MG/3ML; MG/3ML
3 SOLUTION RESPIRATORY (INHALATION) EVERY 2 HOUR PRN
Status: DISCONTINUED | OUTPATIENT
Start: 2023-12-29 | End: 2024-01-10 | Stop reason: HOSPADM

## 2023-12-29 RX ORDER — SINCALIDE 5 UG/5ML
1.2 INJECTION, POWDER, LYOPHILIZED, FOR SOLUTION INTRAVENOUS
Status: COMPLETED | OUTPATIENT
Start: 2023-12-29 | End: 2023-12-29

## 2023-12-29 RX ORDER — HYDROMORPHONE HYDROCHLORIDE 1 MG/ML
0.6 INJECTION, SOLUTION INTRAMUSCULAR; INTRAVENOUS; SUBCUTANEOUS
Status: DISCONTINUED | OUTPATIENT
Start: 2023-12-29 | End: 2024-01-10 | Stop reason: HOSPADM

## 2023-12-29 RX ORDER — KIT FOR THE PREPARATION OF TECHNETIUM TC 99M MEBROFENIN 45 MG/10ML
5 INJECTION, POWDER, LYOPHILIZED, FOR SOLUTION INTRAVENOUS
Status: COMPLETED | OUTPATIENT
Start: 2023-12-29 | End: 2023-12-29

## 2023-12-29 RX ORDER — HYDRALAZINE HYDROCHLORIDE 20 MG/ML
10 INJECTION INTRAMUSCULAR; INTRAVENOUS EVERY 4 HOURS PRN
Status: DISCONTINUED | OUTPATIENT
Start: 2023-12-29 | End: 2024-01-10 | Stop reason: HOSPADM

## 2023-12-29 RX ORDER — OXYCODONE HYDROCHLORIDE 5 MG/1
5 TABLET ORAL EVERY 6 HOURS PRN
Status: DISCONTINUED | OUTPATIENT
Start: 2023-12-29 | End: 2024-01-10 | Stop reason: HOSPADM

## 2023-12-29 RX ADMIN — HYDROMORPHONE HYDROCHLORIDE 1 MG: 1 INJECTION, SOLUTION INTRAMUSCULAR; INTRAVENOUS; SUBCUTANEOUS at 19:42

## 2023-12-29 RX ADMIN — ONDANSETRON 4 MG: 2 INJECTION INTRAMUSCULAR; INTRAVENOUS at 08:20

## 2023-12-29 RX ADMIN — KIT FOR THE PREPARATION OF TECHNETIUM TC 99M MEBROFENIN 5 MILLICURIE: 45 INJECTION, POWDER, LYOPHILIZED, FOR SOLUTION INTRAVENOUS at 13:05

## 2023-12-29 RX ADMIN — HYDRALAZINE HYDROCHLORIDE 10 MG: 20 INJECTION INTRAMUSCULAR; INTRAVENOUS at 20:12

## 2023-12-29 RX ADMIN — ONDANSETRON 4 MG: 2 INJECTION INTRAMUSCULAR; INTRAVENOUS at 12:24

## 2023-12-29 RX ADMIN — ONDANSETRON 4 MG: 2 INJECTION INTRAMUSCULAR; INTRAVENOUS at 01:45

## 2023-12-29 RX ADMIN — SINCALIDE 1.2 MCG: 5 INJECTION, POWDER, LYOPHILIZED, FOR SOLUTION INTRAVENOUS at 14:25

## 2023-12-29 RX ADMIN — ACETAMINOPHEN 650 MG: 325 TABLET ORAL at 08:22

## 2023-12-29 RX ADMIN — OXYCODONE HYDROCHLORIDE 5 MG: 5 TABLET ORAL at 23:52

## 2023-12-29 RX ADMIN — SODIUM CHLORIDE 75 ML/HR: 900 INJECTION, SOLUTION INTRAVENOUS at 01:36

## 2023-12-29 RX ADMIN — HYDROMORPHONE HYDROCHLORIDE 1 MG: 1 INJECTION, SOLUTION INTRAMUSCULAR; INTRAVENOUS; SUBCUTANEOUS at 03:39

## 2023-12-29 RX ADMIN — ENOXAPARIN SODIUM 40 MG: 40 INJECTION SUBCUTANEOUS at 21:47

## 2023-12-29 RX ADMIN — HYDROMORPHONE HYDROCHLORIDE 1 MG: 1 INJECTION, SOLUTION INTRAMUSCULAR; INTRAVENOUS; SUBCUTANEOUS at 08:21

## 2023-12-29 RX ADMIN — HYDROMORPHONE HYDROCHLORIDE 1 MG: 1 INJECTION, SOLUTION INTRAMUSCULAR; INTRAVENOUS; SUBCUTANEOUS at 12:23

## 2023-12-29 RX ADMIN — ACETAMINOPHEN 650 MG: 325 TABLET ORAL at 21:47

## 2023-12-29 RX ADMIN — Medication 6 MG: at 01:56

## 2023-12-29 RX ADMIN — ONDANSETRON 4 MG: 2 INJECTION INTRAMUSCULAR; INTRAVENOUS at 19:43

## 2023-12-29 RX ADMIN — ALPRAZOLAM 0.5 MG: 0.5 TABLET ORAL at 23:52

## 2023-12-29 SDOH — SOCIAL STABILITY: SOCIAL INSECURITY: DOES ANYONE TRY TO KEEP YOU FROM HAVING/CONTACTING OTHER FRIENDS OR DOING THINGS OUTSIDE YOUR HOME?: NO

## 2023-12-29 SDOH — SOCIAL STABILITY: SOCIAL INSECURITY: DO YOU FEEL UNSAFE GOING BACK TO THE PLACE WHERE YOU ARE LIVING?: NO

## 2023-12-29 SDOH — SOCIAL STABILITY: SOCIAL INSECURITY: ABUSE: ADULT

## 2023-12-29 SDOH — SOCIAL STABILITY: SOCIAL INSECURITY: HAVE YOU HAD THOUGHTS OF HARMING ANYONE ELSE?: NO

## 2023-12-29 SDOH — SOCIAL STABILITY: SOCIAL INSECURITY: WERE YOU ABLE TO COMPLETE ALL THE BEHAVIORAL HEALTH SCREENINGS?: YES

## 2023-12-29 SDOH — SOCIAL STABILITY: SOCIAL INSECURITY: DO YOU FEEL ANYONE HAS EXPLOITED OR TAKEN ADVANTAGE OF YOU FINANCIALLY OR OF YOUR PERSONAL PROPERTY?: NO

## 2023-12-29 SDOH — SOCIAL STABILITY: SOCIAL INSECURITY: ARE THERE ANY APPARENT SIGNS OF INJURIES/BEHAVIORS THAT COULD BE RELATED TO ABUSE/NEGLECT?: NO

## 2023-12-29 SDOH — SOCIAL STABILITY: SOCIAL INSECURITY: ARE YOU OR HAVE YOU BEEN THREATENED OR ABUSED PHYSICALLY, EMOTIONALLY, OR SEXUALLY BY ANYONE?: NO

## 2023-12-29 SDOH — SOCIAL STABILITY: SOCIAL INSECURITY: HAS ANYONE EVER THREATENED TO HURT YOUR FAMILY OR YOUR PETS?: NO

## 2023-12-29 ASSESSMENT — ACTIVITIES OF DAILY LIVING (ADL)
FEEDING YOURSELF: INDEPENDENT
DRESSING YOURSELF: INDEPENDENT
JUDGMENT_ADEQUATE_SAFELY_COMPLETE_DAILY_ACTIVITIES: YES
GROOMING: INDEPENDENT
HEARING - RIGHT EAR: FUNCTIONAL
ADEQUATE_TO_COMPLETE_ADL: YES
BATHING: INDEPENDENT
PATIENT'S MEMORY ADEQUATE TO SAFELY COMPLETE DAILY ACTIVITIES?: YES
LACK_OF_TRANSPORTATION: NO
WALKS IN HOME: INDEPENDENT
HEARING - LEFT EAR: FUNCTIONAL
TOILETING: INDEPENDENT

## 2023-12-29 ASSESSMENT — LIFESTYLE VARIABLES
AUDIT-C TOTAL SCORE: 0
HOW OFTEN DO YOU HAVE 6 OR MORE DRINKS ON ONE OCCASION: NEVER
HOW OFTEN DO YOU HAVE A DRINK CONTAINING ALCOHOL: NEVER
AUDIT-C TOTAL SCORE: 0
SKIP TO QUESTIONS 9-10: 1
HOW MANY STANDARD DRINKS CONTAINING ALCOHOL DO YOU HAVE ON A TYPICAL DAY: PATIENT DOES NOT DRINK

## 2023-12-29 ASSESSMENT — PAIN DESCRIPTION - ORIENTATION
ORIENTATION: RIGHT

## 2023-12-29 ASSESSMENT — ENCOUNTER SYMPTOMS
EYE REDNESS: 0
VOMITING: 0
NERVOUS/ANXIOUS: 0
BACK PAIN: 0
DIARRHEA: 0
NAUSEA: 1
LIGHT-HEADEDNESS: 0
WEAKNESS: 0
ABDOMINAL PAIN: 1
SPEECH DIFFICULTY: 0
COLOR CHANGE: 0
ADENOPATHY: 0
BLOOD IN STOOL: 0
SHORTNESS OF BREATH: 0
BRUISES/BLEEDS EASILY: 0
NECK PAIN: 0
CONFUSION: 0
CHILLS: 0
CHEST TIGHTNESS: 0
ABDOMINAL DISTENTION: 0
TROUBLE SWALLOWING: 0
FEVER: 0
CONSTIPATION: 0
DIFFICULTY URINATING: 0
ANOREXIA: 1
WOUND: 0
SORE THROAT: 0
FACIAL SWELLING: 0

## 2023-12-29 ASSESSMENT — COGNITIVE AND FUNCTIONAL STATUS - GENERAL
MOBILITY SCORE: 24
PATIENT BASELINE BEDBOUND: NO
MOBILITY SCORE: 24
DAILY ACTIVITIY SCORE: 24

## 2023-12-29 ASSESSMENT — PATIENT HEALTH QUESTIONNAIRE - PHQ9
1. LITTLE INTEREST OR PLEASURE IN DOING THINGS: NOT AT ALL
2. FEELING DOWN, DEPRESSED OR HOPELESS: NOT AT ALL
SUM OF ALL RESPONSES TO PHQ9 QUESTIONS 1 & 2: 0

## 2023-12-29 ASSESSMENT — PAIN - FUNCTIONAL ASSESSMENT
PAIN_FUNCTIONAL_ASSESSMENT: FLACC (FACE, LEGS, ACTIVITY, CRY, CONSOLABILITY)
PAIN_FUNCTIONAL_ASSESSMENT: 0-10

## 2023-12-29 ASSESSMENT — PAIN DESCRIPTION - LOCATION
LOCATION: FACE
LOCATION: ABDOMEN
LOCATION: FACE
LOCATION: ABDOMEN

## 2023-12-29 ASSESSMENT — PAIN SCALES - GENERAL
PAINLEVEL_OUTOF10: 10 - WORST POSSIBLE PAIN
PAINLEVEL_OUTOF10: 0 - NO PAIN
PAINLEVEL_OUTOF10: 8
PAINLEVEL_OUTOF10: 10 - WORST POSSIBLE PAIN
PAINLEVEL_OUTOF10: 10 - WORST POSSIBLE PAIN

## 2023-12-29 ASSESSMENT — COLUMBIA-SUICIDE SEVERITY RATING SCALE - C-SSRS
2. HAVE YOU ACTUALLY HAD ANY THOUGHTS OF KILLING YOURSELF?: NO
6. HAVE YOU EVER DONE ANYTHING, STARTED TO DO ANYTHING, OR PREPARED TO DO ANYTHING TO END YOUR LIFE?: NO
1. IN THE PAST MONTH, HAVE YOU WISHED YOU WERE DEAD OR WISHED YOU COULD GO TO SLEEP AND NOT WAKE UP?: NO

## 2023-12-29 NOTE — H&P (VIEW-ONLY)
Assessment/Plan     Inpatient consult to Acute Care Surgery  Consult performed by: Tyrell Casas MD  Consult ordered by: Cuca Mullen MD  Reason for consult: Right upper quadrant pain and nausea  Assessment/Recommendations: Patient has normal liver function tests, she states that she feels well today.  Her HIDA scan reviewed by me is suspicious for biliary dyskinesia, but shows uptake and does not show acute cholecystitis.  Advised patient to stay on a bland diet grilled chicken, steamed vegetables.  We can plan for elective cholecystectomy at a mutually agreeable time.        Subjective     Abdominal Pain  This is a recurrent problem. The current episode started yesterday. The onset quality is sudden. The problem occurs intermittently. The most recent episode lasted 1 hour. The pain is located in the RUQ. The pain is at a severity of 0/10. The pain is mild. The quality of the pain is colicky. The abdominal pain does not radiate. Associated symptoms include anorexia and nausea. Pertinent negatives include no constipation, diarrhea, fever or vomiting.       Review of Systems  Review of Systems   Constitutional:  Negative for chills and fever.   HENT:  Negative for facial swelling, sore throat and trouble swallowing.    Eyes:  Negative for redness and visual disturbance.   Respiratory:  Negative for chest tightness and shortness of breath.    Cardiovascular:  Negative for chest pain and leg swelling.   Gastrointestinal:  Positive for abdominal pain, anorexia and nausea. Negative for abdominal distention, blood in stool, constipation, diarrhea and vomiting.   Endocrine: Negative for cold intolerance and heat intolerance.   Genitourinary:  Negative for difficulty urinating and enuresis.   Musculoskeletal:  Negative for back pain, gait problem and neck pain.   Skin:  Negative for color change, rash and wound.   Allergic/Immunologic: Negative for immunocompromised state.   Neurological:  Negative for speech  difficulty, weakness and light-headedness.   Hematological:  Negative for adenopathy. Does not bruise/bleed easily.   Psychiatric/Behavioral:  Negative for confusion. The patient is not nervous/anxious.        Objective     Vital signs for last 24 hours:  Temp:  [36.8 °C (98.2 °F)-36.9 °C (98.4 °F)] 36.8 °C (98.2 °F)  Heart Rate:  [66-77] 71  Resp:  [15-18] 16  BP: (150-210)/(53-85) 173/71    Intake/Output this shift:  No intake/output data recorded.    Physical Exam  Physical Exam  Constitutional:       General: She is not in acute distress.     Appearance: She is not toxic-appearing.   HENT:      Head: Normocephalic and atraumatic.      Mouth/Throat:      Mouth: Mucous membranes are moist.      Pharynx: Oropharynx is clear.   Eyes:      General: No scleral icterus.     Extraocular Movements: Extraocular movements intact.      Pupils: Pupils are equal, round, and reactive to light.   Cardiovascular:      Rate and Rhythm: Normal rate and regular rhythm.   Pulmonary:      Effort: Pulmonary effort is normal.      Breath sounds: Normal breath sounds.   Abdominal:      General: There is no distension.      Palpations: Abdomen is soft. There is no mass.      Tenderness: There is no abdominal tenderness. There is no guarding.      Hernia: No hernia is present.   Musculoskeletal:         General: No swelling. Normal range of motion.      Cervical back: Normal range of motion.   Skin:     General: Skin is warm and dry.      Coloration: Skin is not jaundiced.   Neurological:      General: No focal deficit present.      Mental Status: She is alert and oriented to person, place, and time.   Psychiatric:         Mood and Affect: Mood normal.         Behavior: Behavior normal.         Labs  CBC:   Lab Results   Component Value Date    WBC 10.5 12/29/2023    RBC 3.79 (L) 12/29/2023     Results for orders placed or performed during the hospital encounter of 12/28/23 (from the past 24 hour(s))   CBC and Auto Differential   Result  Value Ref Range    WBC 11.9 (H) 4.4 - 11.3 x10*3/uL    nRBC 0.0 0.0 - 0.0 /100 WBCs    RBC 4.13 4.00 - 5.20 x10*6/uL    Hemoglobin 11.6 (L) 12.0 - 16.0 g/dL    Hematocrit 36.8 36.0 - 46.0 %    MCV 89 80 - 100 fL    MCH 28.1 26.0 - 34.0 pg    MCHC 31.5 (L) 32.0 - 36.0 g/dL    RDW 13.5 11.5 - 14.5 %    Platelets 401 150 - 450 x10*3/uL    Neutrophils % 75.3 40.0 - 80.0 %    Immature Granulocytes %, Automated 0.3 0.0 - 0.9 %    Lymphocytes % 15.7 13.0 - 44.0 %    Monocytes % 7.8 2.0 - 10.0 %    Eosinophils % 0.5 0.0 - 6.0 %    Basophils % 0.4 0.0 - 2.0 %    Neutrophils Absolute 8.92 (H) 1.60 - 5.50 x10*3/uL    Immature Granulocytes Absolute, Automated 0.03 0.00 - 0.50 x10*3/uL    Lymphocytes Absolute 1.86 0.80 - 3.00 x10*3/uL    Monocytes Absolute 0.93 (H) 0.05 - 0.80 x10*3/uL    Eosinophils Absolute 0.06 0.00 - 0.40 x10*3/uL    Basophils Absolute 0.05 0.00 - 0.10 x10*3/uL   Comprehensive Metabolic Panel   Result Value Ref Range    Glucose 134 (H) 65 - 99 mg/dL    Sodium 130 (L) 133 - 145 mmol/L    Potassium 4.4 3.4 - 5.1 mmol/L    Chloride 92 (L) 97 - 107 mmol/L    Bicarbonate 25 24 - 31 mmol/L    Urea Nitrogen 15 8 - 25 mg/dL    Creatinine 1.20 0.40 - 1.60 mg/dL    eGFR 46 (L) >60 mL/min/1.73m*2    Calcium 9.5 8.5 - 10.4 mg/dL    Albumin 4.5 3.5 - 5.0 g/dL    Alkaline Phosphatase 100 35 - 125 U/L    Total Protein 8.0 (H) 5.9 - 7.9 g/dL    AST 20 5 - 40 U/L    Bilirubin, Total 0.3 0.1 - 1.2 mg/dL    ALT <5 (L) 5 - 40 U/L    Anion Gap 13 <=19 mmol/L   Lipase   Result Value Ref Range    Lipase 16 16 - 63 U/L   Magnesium   Result Value Ref Range    Magnesium 1.70 1.60 - 3.10 mg/dL   CBC   Result Value Ref Range    WBC 10.5 4.4 - 11.3 x10*3/uL    nRBC 0.0 0.0 - 0.0 /100 WBCs    RBC 3.79 (L) 4.00 - 5.20 x10*6/uL    Hemoglobin 10.6 (L) 12.0 - 16.0 g/dL    Hematocrit 33.5 (L) 36.0 - 46.0 %    MCV 88 80 - 100 fL    MCH 28.0 26.0 - 34.0 pg    MCHC 31.6 (L) 32.0 - 36.0 g/dL    RDW 13.4 11.5 - 14.5 %    Platelets 344 150 - 450  x10*3/uL   Comprehensive Metabolic Panel   Result Value Ref Range    Glucose 110 (H) 65 - 99 mg/dL    Sodium 133 133 - 145 mmol/L    Potassium 3.9 3.4 - 5.1 mmol/L    Chloride 98 97 - 107 mmol/L    Bicarbonate 23 (L) 24 - 31 mmol/L    Urea Nitrogen 15 8 - 25 mg/dL    Creatinine 1.10 0.40 - 1.60 mg/dL    eGFR 51 (L) >60 mL/min/1.73m*2    Calcium 8.8 8.5 - 10.4 mg/dL    Albumin 3.9 3.5 - 5.0 g/dL    Alkaline Phosphatase 86 35 - 125 U/L    Total Protein 6.8 5.9 - 7.9 g/dL    AST 12 5 - 40 U/L    Bilirubin, Total 0.3 0.1 - 1.2 mg/dL    ALT 6 5 - 40 U/L    Anion Gap 12 <=19 mmol/L   POCT GLUCOSE   Result Value Ref Range    POCT Glucose 105 (H) 74 - 99 mg/dL

## 2023-12-29 NOTE — PROGRESS NOTES
Marylou Ward is a 78 y.o. female on day 1 of admission presenting with Acute vomiting.      TCC met with patient at bedside.  An explanation of discharge planning was provided. Assessment completed.  Patient resides in a ranch style home with her nephew.  Patient is independent with all ADL's.  She does not require any assistive devices. Patient does not require oxygen or cpap.  Patient is able to help with cooking and cleaning.  Her nephew does the shopping.  Patient does not smoke or drink alcohol.  PCP is Dr. Mullen, pharmacy of choice is [facility] [address] Patient does not have a POA.  POA paperwork provided to patient .  Patient is declining SNF and HHC.                            Carina Littlejohn RN

## 2023-12-29 NOTE — ED NOTES
Patients BP high while in the ED. Spoke with Dr. Mullen and verbal order for 10MG Hydralazine given over the phone. Pulled medication to give patient but BP was 158/70. No Hydralazine given at this time.        Mercedes Jack RN  12/29/23 0117

## 2023-12-29 NOTE — ED PROVIDER NOTES
HPI   Chief Complaint   Patient presents with    Abdominal Pain     Pt seen yesterday, here, for abd pain which turned into Gallstones.  Today, pt here for the same reason and htn. Pt C/O abd pain radiating to her back. Pt stated she has palpable pain and is nauseated.        HPI  78 year old female here for evaluation of abd pain.   patient was seen here yesterday for the same, had labs and CT that reflected finding of gallstones, the patient ultimately was discharged home, complaining of increased abdominal pain, further decreased p.o. intake, nausea, and generalized pain in the abdomen.                  [place] Coma Scale Score: 15                  Patient History   Past Medical History:   Diagnosis Date    Arthritis     Diabetes mellitus (CMS/HCC)     Hypertension      No past surgical history on file.  No family history on file.  Social History     Tobacco Use    Smoking status: Never     Passive exposure: Never    Smokeless tobacco: Never   Substance Use Topics    Alcohol use: Not Currently    Drug use: Not on file       Physical Exam   ED Triage Vitals [12/28/23 2100]   Temp Heart Rate Resp BP   36.9 °C (98.4 °F) 66 18 (!) 195/81      SpO2 Temp Source Heart Rate Source Patient Position   97 % Oral -- --      BP Location FiO2 (%)     -- --       Physical Exam    PHYSICAL EXAMINATION    GENERAL APPEARANCE: Awake and alert.     VITAL SIGNS: As per the nurses' triage record.     HEENT: Normocephalic, atraumatic. Extraocular muscles are intact. Pupils equal round and reactive to light. Conjunctiva are pink. Negative scleral icterus. Mucous membranes are moist. Tongue in the midline. Pharynx was without erythema or exudates, uvula midline    NECK: Soft Nontender and supple, full gross ROM, no meningeal signs.    CHEST: Nontender to palpation. Clear to auscultation bilaterally. No rales, rhonchi, or wheezing.     HEART: S1, S2. Regular rate and rhythm. No murmurs, gallops or rubs.  Strong and equal pulses in the  extremities.     ABDOMEN: Soft,   Generalized abdominal pain no guarding or rebound, nondistended, positive bowel sounds, no palpable masses.    MUSCULOSKELETAL: The calves are nontender to palpation. Full gross active range of motion. Ambulating on own with no acute difficulties     NEUROLOGICAL: Awake, alert and oriented x 3. Power intact in the upper and lower extremities. Sensation is intact to light touch in the upper and lower extremities.     IMMUNOLOGICAL: No lymphatic streaking noted     DERM: No petechiae, rashes, or ecchymoses.  ED Course & MDM   Diagnoses as of 12/28/23 2327   Abdominal pain, unspecified abdominal location   Acute vomiting       Medical Decision Making  Parts of this chart have been completed using voice recognition software. Please excuse any errors of transcription.  My thought process and reason for plan has been formulated from the time that I saw the patient until the time of disposition and is not specific to one specific moment during their visit and furthermore my MDM encompasses this entire chart and not only this text box.      HPI: Detailed above.    Exam: A medically appropriate exam performed, outlined above, given the known history and presentation.    History obtained from:  the patient    Social Determinants of Health considered during this visit:  lives at home    Medications given during visit:  Medications   melatonin tablet 6 mg (has no administration in time range)   diphenhydrAMINE (BENADryl) capsule 25 mg (has no administration in time range)   bisacodyl (Dulcolax) EC tablet 10 mg (has no administration in time range)   ALPRAZolam (Xanax) tablet 0.5 mg (has no administration in time range)   dextromethorphan-guaifenesin (Robitussin DM)  mg/5 mL oral liquid 5 mL (has no administration in time range)   HYDROmorphone (Dilaudid) injection 1 mg (has no administration in time range)   acetaminophen (Tylenol) tablet 650 mg (has no administration in time range)    ondansetron (Zofran) injection 4 mg (has no administration in time range)   dextrose 50 % injection 25 g (has no administration in time range)   glucagon (Glucagen) injection 1 mg (has no administration in time range)   dextrose 10 % in water (D10W) infusion (has no administration in time range)   insulin lispro (HumaLOG) injection 0-5 Units (has no administration in time range)   ipratropium-albuteroL (Duo-Neb) 0.5-2.5 mg/3 mL nebulizer solution 3 mL (has no administration in time range)   enoxaparin (Lovenox) syringe 40 mg (has no administration in time range)   polyethylene glycol (Glycolax, Miralax) packet 17 g (has no administration in time range)   sodium chloride 0.9% infusion (has no administration in time range)   sodium chloride 0.9 % bolus 1,000 mL (1,000 mL intravenous New Bag 12/28/23 2142)   ondansetron (Zofran) injection 4 mg (4 mg intravenous Given 12/28/23 2142)   ketorolac (Toradol) injection 15 mg (15 mg intravenous Given 12/28/23 2142)   fentaNYL PF (Sublimaze) injection 25 mcg (25 mcg intravenous Given 12/28/23 2253)        Diagnostic/tests  Labs Reviewed   CBC WITH AUTO DIFFERENTIAL - Abnormal       Result Value    WBC 11.9 (*)     nRBC 0.0      RBC 4.13      Hemoglobin 11.6 (*)     Hematocrit 36.8      MCV 89      MCH 28.1      MCHC 31.5 (*)     RDW 13.5      Platelets 401      Neutrophils % 75.3      Immature Granulocytes %, Automated 0.3      Lymphocytes % 15.7      Monocytes % 7.8      Eosinophils % 0.5      Basophils % 0.4      Neutrophils Absolute 8.92 (*)     Immature Granulocytes Absolute, Automated 0.03      Lymphocytes Absolute 1.86      Monocytes Absolute 0.93 (*)     Eosinophils Absolute 0.06      Basophils Absolute 0.05     COMPREHENSIVE METABOLIC PANEL - Abnormal    Glucose 134 (*)     Sodium 130 (*)     Potassium 4.4      Chloride 92 (*)     Bicarbonate 25      Urea Nitrogen 15      Creatinine 1.20      eGFR 46 (*)     Calcium 9.5      Albumin 4.5      Alkaline Phosphatase 100       Total Protein 8.0 (*)     AST 20      Bilirubin, Total 0.3      ALT <5 (*)     Anion Gap 13     LIPASE - Normal    Lipase 16     MAGNESIUM - Normal    Magnesium 1.70     CBC   CBC   COMPREHENSIVE METABOLIC PANEL   COMPREHENSIVE METABOLIC PANEL      US gallbladder   Final Result   1. Echogenic shadowing non mobile material in the gallbladder   possibly non mobile gallstones or tumefactive sludge.   2. No gallbladder wall thickening or pericholecystic fluid   3. No biliary distention             MACRO:   None.        Signed by: Jaimie Hernandez 12/28/2023 10:42 PM   Dictation workstation:   [id number]           Considerations/further MDM:  I spoke with Dr. briggs . We thoroughly discussed the history, physical exam, laboratory and imaging studies, as well as, emergency department course. Based upon that discussion, we've decided to admit for further observation and evaluation of their symptoms.  As I have deemed necessary from their history, physical and studies, I have considered and evaluated for the following diagnoses: Acute appendicitis, bowel obstruction, cholecystitis, diverticulitis, hernia, incarcerated/strangulated hernia, mesenteric ischemia, pancreatitis  Acute appendicitis, bowel obstruction, cholecystitis, diverticulitis, hernia, incarcerated/strangulated hernia, mesenteric ischemia, also considered perforated bowel, ulcer, acute abdomen, surgical abdomen, splenic injury or internal bleeding, pancreatitis, also considered perforated bowel, ulcer, acute abdomen, surgical abdomen, splenic injury or internal bleeding.    Based on some biliary colic like symptoms I do not feel that the patient has an acute surgical abdomen, vital signs reviewed, some hypertension appreciated, and labs reviewed as well as yesterday's ER visit and CT imaging from yesterday as well as the ultrasound from today.  Spoke to the patient's primary care provider who has agreed to hospitalize under his service with consultation to  surgery.  Procedure  Procedures     Ventura Haas PA-C  12/28/23 3083

## 2023-12-29 NOTE — NURSING NOTE
Received report from ED Nurse Mercedes.  Awaiting for the patient on the floor.    0117H:  Patient arrived on the floor per stretcher from ED.  No distress or discomfort noted.  Oriented to room.  All needs attended.  Safety measures ensured and call light in reach.

## 2023-12-29 NOTE — PROGRESS NOTES
Occupational Therapy                 Therapy Communication Note    Patient Name: Marylou Ward  MRN: [id number]  Today's Date: 12/29/2023     Discipline: Occupational Therapy    Missed Visit Reason: Missed Visit Reason:  (OT eval received and chart reviewed. Pt admitted with c/o persistent n/v and abdominal pain. Pt awaiting testing and surgical consult for cholelithiasis)    Missed Time: Cancel

## 2023-12-29 NOTE — PROGRESS NOTES
Physical Therapy                 Therapy Communication Note    Patient Name: Marylou Ward  MRN: [id number]  Today's Date: 12/29/2023     Discipline: Physical Therapy    Missed Visit Reason: Missed Visit Reason: Cancel (pt is awaiting further testing and a surgical consult for cholelithiasis. pt admitted with persistent nausea and vomiting.)    Missed Time: Cancel

## 2023-12-29 NOTE — ED PROVIDER NOTES
Patient was seen by both myself and advanced practitioner.  I performed substantive portion of the visit including all aspects of the medical decision making.  Please refer to advanced practitioner's note further workup, evaluation.    Patient is an 78-year-old female that presents emergency room for evaluation of abdominal pain, nausea, vomiting, decreased oral intake.  Symptoms have been going on for the last several days and patient was seen in the emergency department yesterday and had blood work and a CT scan performed which ultimately showed gallstones however no evidence of infection.  Patient was discharged home at that time however patient's son and patient note that she has had increasing nausea, multiple episodes of nonbloody, nonbilious emesis as well as progressively worsening of her abdominal pain.  They are concerned because she has been unable to keep anything down at home.    On exam patient uncomfortable appearing but in no obvious distress.  She is awake, alert and oriented.  She does have diffuse abdominal tenderness palpation worse in the upper abdomen.  There is no rigidity and no evidence of peritonitis on exam.  Lungs are clear to auscultation bilaterally.  Regular rate and rhythm on auscultation of the heart.  Blood work ordered including CBC, CMP, lipase, magnesium along with ultrasound of the right upper quadrant.  Blood work shows mild leukocytosis of 11.9 however patient does have normal electrolytes, normal kidney function with a creatinine 1.2.  Ultrasound shows findings consistent with gallstones however no evidence of cholecystitis.  Given her decreased oral intake, dehydration and nausea and vomiting patient will be admitted for further evaluation.  Case was discussed with patient's primary care physician who accepted patient for admission.     Andrew Rivera, DO  12/28/23 2627

## 2023-12-29 NOTE — H&P
HISTORY AND PHYSICAL EXAMINATION    PATIENT NAME: Marylou Ward    MRN: [id number]  SERVICE DATE: 12/29/2023       PRIMARY CARE PHYSICIAN: Cuca Mullen MD          ASSESSMENT AND PLAN     # Abdominal pain/nausea/vomiting  -Pain control  -IV fluids    # Cholelithiasis  -No CT evidence of cholecystitis  -HIDA scan ordered  -Surgery consulted    # Hypertension  -Continue home meds    # Diabetes  -Insulin sliding scale    # History of PE  -On Eliquis        Discussed with nurses/case management team and the specialists involved in this patient's care. Reviewed the EMR and documentation from other care-givers.    SUBJECTIVE  CHIEF COMPLAINT:      HPI: This is a 78 y.o. female who has had multiple ER visits for abdominal pain, nausea, vomiting, no diarrhea.  Patient has had chronic pain in the epigastric and right upper quadrant areas, has undergone EGD and multiple radiological investigations without a conclusive diagnosis.  She is also being followed for pain management.  She lives alone.  Her her nephew is her primary caregiver.    ED NOTE : Patient is an 78-year-old female that presents emergency room for evaluation of abdominal pain, nausea, vomiting, decreased oral intake.  Symptoms have been going on for the last several days and patient was seen in the emergency department yesterday and had blood work and a CT scan performed which ultimately showed gallstones however no evidence of infection.  Patient was discharged home at that time however patient's son and patient note that she has had increasing nausea, multiple episodes of nonbloody, nonbilious emesis as well as progressively worsening of her abdominal pain.  They are concerned because she has been unable to keep anything down at home.     On exam patient uncomfortable appearing but in no obvious distress.  She is awake, alert and oriented.  She does have diffuse abdominal tenderness palpation worse in the upper abdomen.  There is no rigidity and no  evidence of peritonitis on exam.  Lungs are clear to auscultation bilaterally.  Regular rate and rhythm on auscultation of the heart.  Blood work ordered including CBC, CMP, lipase, magnesium along with ultrasound of the right upper quadrant.  Blood work shows mild leukocytosis of 11.9 however patient does have normal electrolytes, normal kidney function with a creatinine 1.2.  Ultrasound shows findings consistent with gallstones however no evidence of cholecystitis.  Given her decreased oral intake, dehydration and nausea and vomiting patient will be admitted for further evaluation.  Case was discussed with patient's primary care physician who accepted patient for admission.       PAST MEDICAL HISTORY:   Past Medical History:   Diagnosis Date    Arthritis     Diabetes mellitus (CMS/HCC)     Hypertension      PAST SURGICAL HISTORY: No past surgical history on file.  FAMILY HISTORY: No family history on file.  SOCIAL HISTORY:   Social History     Tobacco Use    Smoking status: Never     Passive exposure: Never    Smokeless tobacco: Never   Substance Use Topics    Alcohol use: Not Currently       MEDICATIONS: Prior to Admission Medications  Medications Prior to Admission   Medication Sig Dispense Refill Last Dose    apixaban (Eliquis) 5 mg tablet Take 1 tablet (5 mg) by mouth 2 times a day.       metFORMIN (Glucophage) 500 mg tablet Take 1 tablet (500 mg) by mouth twice a day.       metoprolol tartrate (Lopressor) 25 mg tablet Take 2 tablets (50 mg) by mouth 2 times a day.       sertraline (Zoloft) 50 mg tablet Take 1 tablet (50 mg) by mouth once daily.       omeprazole (PriLOSEC) 20 mg DR capsule Take 1 capsule (20 mg) by mouth once daily in the morning. Take before meals.       oxyCODONE-acetaminophen (Percocet) 5-325 mg tablet Take 1 tablet by mouth every 6 hours if needed for severe pain (7 - 10).         CURRENT ALLERGIES:   Allergies   Allergen Reactions    Gabapentin Anaphylaxis and Unknown    Levofloxacin  Shortness of breath    Sulfa (Sulfonamide Antibiotics) Rash    Duloxetine Nausea And Vomiting    Metronidazole GI Upset and Nausea And Vomiting     Nausea and diarrhea    Pregabalin GI Upset    Protonix [Pantoprazole] Unknown    Adhesive Tape-Silicones Rash    Morphine Hives and Rash       COMPLETE REVIEW OF SYSTEMS:      GENERAL: No fever, appetite stable.  HEENT: No epistaxis, no mouth ulcers  NECK: no neck pain  RESPIRATORY: No new resp symptoms.  CARDIOVASCULAR: No cp, no leg edema, No orthopnea  GI: No NVD, no GI Bleed  : No hematuria, no dysuria  MUSCULOSKELETAL: No new jt pains or swelling  SKIN: No rashes, no ulcers  PSYCH: Denies feeling anxious or depressed.   HEMATOLOGY/LYMPHOLOGY: No bruising, no hx of VTE  ENDOCRINE: No hx of DM  NEURO: No hx of seizures or syncope, No hx of CVA      OBJECTIVE  PHYSICAL EXAM:   Heart Rate:  [66-77]   Temp:  [36.9 °C (98.4 °F)]   Resp:  [15-18]   BP: (150-210)/(53-85)   Height:  [152.4 cm (5')]   Weight:  [61.2 kg (135 lb)]   SpO2:  [90 %-97 %]     Body mass index is 26.37 kg/m².    GENERAL: awake, alert, Ox3, cooperative resting comfortably  SKIN: Skin turgor normal. No rashes  HEENT: no epistaxis, Moist mucosa.  NECK: supple  BACK: spine nontender to palpation, No CVAT.  LUNGS: Vesicular breath sounds, with no wheeze, no crepitations.   CARDIAC: REGULAR. S1 and S2; no rubs, no murmur  ABDOMEN: Abdomen soft, non-tender. BS+  EXTREMITIES: No edema, Good capillary refill.   NEURO: Insight GOOD. Motor and sensory exam wnl. No invol movements. No ataxia.  WOUND:   MUSCULOSKELETAL: No acute inflammation       .  Current Facility-Administered Medications:     acetaminophen (Tylenol) tablet 650 mg, 650 mg, oral, q6h PRN, Cuca Mullen MD, 650 mg at 12/29/23 0822    ALPRAZolam (Xanax) tablet 0.5 mg, 0.5 mg, oral, BID PRN, Cuca Mullen MD    bisacodyl (Dulcolax) EC tablet 10 mg, 10 mg, oral, Daily PRN, Cuca Mullen MD    dextromethorphan-guaifenesin  (Robitussin DM)  mg/5 mL oral liquid 5 mL, 5 mL, oral, q4h PRN, Cuca Mullen MD    dextrose 10 % in water (D10W) infusion, 0.3 g/kg/hr, intravenous, Once PRN, Cuca Mullen MD    dextrose 50 % injection 25 g, 25 g, intravenous, q15 min PRN, Cuca Mullen MD    diphenhydrAMINE (Sominex) tablet 25 mg, 25 mg, oral, BID PRN, Cuca Mullen MD    enoxaparin (Lovenox) syringe 40 mg, 40 mg, subcutaneous, Nightly, Cuca Mullen MD    glucagon (Glucagen) injection 1 mg, 1 mg, intramuscular, q15 min PRN, Cuca Mullen MD    hydrALAZINE (Apresoline) injection 10 mg, 10 mg, intravenous, q4h PRN, Cuca Mullen MD    HYDROmorphone (Dilaudid) injection 1 mg, 1 mg, intravenous, q3h PRN, Cuca Mullen MD, 1 mg at 12/29/23 0821    insulin lispro (HumaLOG) injection 0-5 Units, 0-5 Units, subcutaneous, TID with meals, Cuca Mullen MD    ipratropium-albuteroL (Duo-Neb) 0.5-2.5 mg/3 mL nebulizer solution 3 mL, 3 mL, nebulization, q2h PRN, Cuca Mullen MD    melatonin tablet 6 mg, 6 mg, oral, Nightly PRN, Cuca Mullen MD, 6 mg at 12/29/23 0156    ondansetron (Zofran) injection 4 mg, 4 mg, intravenous, q4h PRN, Cuca Mullen MD, 4 mg at 12/29/23 0820    polyethylene glycol (Glycolax, Miralax) packet 17 g, 17 g, oral, Daily, Cuca Mullen MD    sodium chloride 0.9% infusion, 75 mL/hr, intravenous, Continuous, Cuca Mullen MD, Last Rate: 75 mL/hr at 12/29/23 0136, 75 mL/hr at 12/29/23 0136    DATA:   Diagnostic tests reviewed for today's visit:    Most recent labs  Admission on 12/28/2023   Component Date Value Ref Range Status    WBC 12/28/2023 11.9 (H)  4.4 - 11.3 x10*3/uL Final    nRBC 12/28/2023 0.0  0.0 - 0.0 /100 WBCs Final    RBC 12/28/2023 4.13  4.00 - 5.20 x10*6/uL Final    Hemoglobin 12/28/2023 11.6 (L)  12.0 - 16.0 g/dL Final    Hematocrit 12/28/2023 36.8  36.0 - 46.0 % Final    MCV 12/28/2023 89  80 - 100 fL Final    MCH 12/28/2023 28.1   26.0 - 34.0 pg Final    MCHC 12/28/2023 31.5 (L)  32.0 - 36.0 g/dL Final    RDW 12/28/2023 13.5  11.5 - 14.5 % Final    Platelets 12/28/2023 401  150 - 450 x10*3/uL Final    Neutrophils % 12/28/2023 75.3  40.0 - 80.0 % Final    Immature Granulocytes %, Automated 12/28/2023 0.3  0.0 - 0.9 % Final    Immature Granulocyte Count (IG) includes promyelocytes, myelocytes and metamyelocytes but does not include bands. Percent differential counts (%) should be interpreted in the context of the absolute cell counts (cells/UL).    Lymphocytes % 12/28/2023 15.7  13.0 - 44.0 % Final    Monocytes % 12/28/2023 7.8  2.0 - 10.0 % Final    Eosinophils % 12/28/2023 0.5  0.0 - 6.0 % Final    Basophils % 12/28/2023 0.4  0.0 - 2.0 % Final    Neutrophils Absolute 12/28/2023 8.92 (H)  1.60 - 5.50 x10*3/uL Final    Percent differential counts (%) should be interpreted in the context of the absolute cell counts (cells/uL).    Immature Granulocytes Absolute, Au* 12/28/2023 0.03  0.00 - 0.50 x10*3/uL Final    Lymphocytes Absolute 12/28/2023 1.86  0.80 - 3.00 x10*3/uL Final    Monocytes Absolute 12/28/2023 0.93 (H)  0.05 - 0.80 x10*3/uL Final    Eosinophils Absolute 12/28/2023 0.06  0.00 - 0.40 x10*3/uL Final    Basophils Absolute 12/28/2023 0.05  0.00 - 0.10 x10*3/uL Final    Glucose 12/28/2023 134 (H)  65 - 99 mg/dL Final    Sodium 12/28/2023 130 (L)  133 - 145 mmol/L Final    Potassium 12/28/2023 4.4  3.4 - 5.1 mmol/L Final    Chloride 12/28/2023 92 (L)  97 - 107 mmol/L Final    Bicarbonate 12/28/2023 25  24 - 31 mmol/L Final    Urea Nitrogen 12/28/2023 15  8 - 25 mg/dL Final    Creatinine 12/28/2023 1.20  0.40 - 1.60 mg/dL Final    eGFR 12/28/2023 46 (L)  >60 mL/min/1.73m*2 Final    Calculations of estimated GFR are performed using the 2021 CKD-EPI Study Refit equation without the race variable for the IDMS-Traceable creatinine methods.  https://jasn.asnjournals.org/content/early/2021/09/22/ASN.3478189433    Calcium 12/28/2023 9.5  8.5 -  10.4 mg/dL Final    Albumin 12/28/2023 4.5  3.5 - 5.0 g/dL Final    Alkaline Phosphatase 12/28/2023 100  35 - 125 U/L Final    Total Protein 12/28/2023 8.0 (H)  5.9 - 7.9 g/dL Final    AST 12/28/2023 20  5 - 40 U/L Final    Bilirubin, Total 12/28/2023 0.3  0.1 - 1.2 mg/dL Final    ALT 12/28/2023 <5 (L)  5 - 40 U/L Final    Result rechecked    Anion Gap 12/28/2023 13  <=19 mmol/L Final    Lipase 12/28/2023 16  16 - 63 U/L Final    Magnesium 12/28/2023 1.70  1.60 - 3.10 mg/dL Final    WBC 12/29/2023 10.5  4.4 - 11.3 x10*3/uL Final    nRBC 12/29/2023 0.0  0.0 - 0.0 /100 WBCs Final    RBC 12/29/2023 3.79 (L)  4.00 - 5.20 x10*6/uL Final    Hemoglobin 12/29/2023 10.6 (L)  12.0 - 16.0 g/dL Final    Hematocrit 12/29/2023 33.5 (L)  36.0 - 46.0 % Final    MCV 12/29/2023 88  80 - 100 fL Final    MCH 12/29/2023 28.0  26.0 - 34.0 pg Final    MCHC 12/29/2023 31.6 (L)  32.0 - 36.0 g/dL Final    RDW 12/29/2023 13.4  11.5 - 14.5 % Final    Platelets 12/29/2023 344  150 - 450 x10*3/uL Final    Glucose 12/29/2023 110 (H)  65 - 99 mg/dL Final    Sodium 12/29/2023 133  133 - 145 mmol/L Final    Potassium 12/29/2023 3.9  3.4 - 5.1 mmol/L Final    Chloride 12/29/2023 98  97 - 107 mmol/L Final    Bicarbonate 12/29/2023 23 (L)  24 - 31 mmol/L Final    Urea Nitrogen 12/29/2023 15  8 - 25 mg/dL Final    Creatinine 12/29/2023 1.10  0.40 - 1.60 mg/dL Final    eGFR 12/29/2023 51 (L)  >60 mL/min/1.73m*2 Final    Calculations of estimated GFR are performed using the 2021 CKD-EPI Study Refit equation without the race variable for the IDMS-Traceable creatinine methods.  https://jasn.asnjournals.org/content/early/2021/09/22/ASN.0263323785    Calcium 12/29/2023 8.8  8.5 - 10.4 mg/dL Final    Albumin 12/29/2023 3.9  3.5 - 5.0 g/dL Final    Alkaline Phosphatase 12/29/2023 86  35 - 125 U/L Final    Total Protein 12/29/2023 6.8  5.9 - 7.9 g/dL Final    AST 12/29/2023 12  5 - 40 U/L Final    Bilirubin, Total 12/29/2023 0.3  0.1 - 1.2 mg/dL Final    ALT  12/29/2023 6  5 - 40 U/L Final    Anion Gap 12/29/2023 12  <=19 mmol/L Final    POCT Glucose 12/29/2023 105 (H)  74 - 99 mg/dL Final   Admission on 12/27/2023, Discharged on 12/27/2023   Component Date Value Ref Range Status    Glucose 12/27/2023 116 (H)  65 - 99 mg/dL Final    Sodium 12/27/2023 134  133 - 145 mmol/L Final    Potassium 12/27/2023 4.7  3.4 - 5.1 mmol/L Final    Chloride 12/27/2023 96 (L)  97 - 107 mmol/L Final    Bicarbonate 12/27/2023 22 (L)  24 - 31 mmol/L Final    Urea Nitrogen 12/27/2023 18  8 - 25 mg/dL Final    Creatinine 12/27/2023 1.10  0.40 - 1.60 mg/dL Final    eGFR 12/27/2023 51 (L)  >60 mL/min/1.73m*2 Final    Calculations of estimated GFR are performed using the 2021 CKD-EPI Study Refit equation without the race variable for the IDMS-Traceable creatinine methods.  https://jasn.asnjournals.org/content/early/2021/09/22/ASN.0380267281    Calcium 12/27/2023 9.6  8.5 - 10.4 mg/dL Final    Albumin 12/27/2023 4.4  3.5 - 5.0 g/dL Final    Alkaline Phosphatase 12/27/2023 104  35 - 125 U/L Final    Total Protein 12/27/2023 8.0 (H)  5.9 - 7.9 g/dL Final    AST 12/27/2023 11  5 - 40 U/L Final    Bilirubin, Total 12/27/2023 0.3  0.1 - 1.2 mg/dL Final    ALT 12/27/2023 6  5 - 40 U/L Final    Anion Gap 12/27/2023 16  <=19 mmol/L Final    Lipase 12/27/2023 16  16 - 63 U/L Final    Color, Urine 12/27/2023 Colorless (N)  Light-Yellow, Yellow, Dark-Yellow Final    Appearance, Urine 12/27/2023 Clear  Clear Final    Specific Gravity, Urine 12/27/2023 1.012  1.005 - 1.035 Final    pH, Urine 12/27/2023 6.0  5.0, 5.5, 6.0, 6.5, 7.0, 7.5, 8.0 Final    Protein, Urine 12/27/2023 50 (1+) (A)  NEGATIVE, 10 (TRACE), 20 (TRACE) mg/dL Final    Glucose, Urine 12/27/2023 Normal  Normal mg/dL Final    Blood, Urine 12/27/2023 0.03 (TRACE) (A)  NEGATIVE Final    Ketones, Urine 12/27/2023 NEGATIVE  NEGATIVE mg/dL Final    Bilirubin, Urine 12/27/2023 NEGATIVE  NEGATIVE Final    Urobilinogen, Urine 12/27/2023 Normal  Normal  mg/dL Final    Nitrite, Urine 12/27/2023 NEGATIVE  NEGATIVE Final    Leukocyte Esterase, Urine 12/27/2023 NEGATIVE  NEGATIVE Final    Extra Tube 12/27/2023 Hold for add-ons.   Final    Auto resulted.    Ventricular Rate 12/27/2023 82  BPM Final    Atrial Rate 12/27/2023 82  BPM Final    VT Interval 12/27/2023 206  ms Final    QRS Duration 12/27/2023 90  ms Final    QT Interval 12/27/2023 400  ms Final    QTC Calculation(Bazett) 12/27/2023 467  ms Final    P Axis 12/27/2023 60  degrees Final    R Axis 12/27/2023 10  degrees Final    T [place] 12/27/2023 80  degrees Final    QRS Count 12/27/2023 14  beats Final    Q Onset 12/27/2023 222  ms Final    P Onset 12/27/2023 119  ms Final    P Offset 12/27/2023 180  ms Final    T Offset 12/27/2023 422  ms Final    QTC Fredericia 12/27/2023 444  ms Final    Troponin T, High Sensitivity 12/27/2023 12  <=15 ng/L Final    Sex Specific Reference Range:   Male (0-22)  Female (0-14)  **Change(Delta) >=5 is significant**     Troponin Baseline and Serial elevation for significant change(delta)should be interpreted in conjunction with clinical presentation, history, signs and symptoms, ECG and biomarker concentrations.     Troponin elevation can be seen in several other non-infarct conditions. Chronic troponin elevations can be detected in clinically stable patients with heart failure, cardiomyopathy, renal failure, diabetes, etc. Elevated troponin can also occur in myocarditis, heart contusion, PE, drug induced cardiotoxicity, etc.      Samples should NOT be taken from patients receiving high dose Biotin (> 5mg) doses until 8 hours following last Biotin administration      WBC 12/27/2023 9.0  4.4 - 11.3 x10*3/uL Final    nRBC 12/27/2023 0.0  0.0 - 0.0 /100 WBCs Final    RBC 12/27/2023 3.79 (L)  4.00 - 5.20 x10*6/uL Final    Hemoglobin 12/27/2023 10.6 (L)  12.0 - 16.0 g/dL Final    Hematocrit 12/27/2023 33.5 (L)  36.0 - 46.0 % Final    MCV 12/27/2023 88  80 - 100 fL Final    MCH  "12/27/2023 28.0  26.0 - 34.0 pg Final    MCHC 12/27/2023 31.6 (L)  32.0 - 36.0 g/dL Final    RDW 12/27/2023 13.5  11.5 - 14.5 % Final    Platelets 12/27/2023 345  150 - 450 x10*3/uL Final    WBC, Urine 12/27/2023 1-5  1-5, NONE /HPF Final    RBC, Urine 12/27/2023 1-2  NONE, 1-2, 3-5 /HPF Final       No results found for: \"GLU\"     US gallbladder  Narrative: Interpreted By:  Jaimie Hernandez,   STUDY:  US GALLBLADDER; 10:38 pm      INDICATION:  Signs/Symptoms:abd pain hx gall stones.      COMPARISON:  Right upper quadrant ultrasound 05/06/2023      ACCESSION NUMBER(S):  [id number]      ORDERING CLINICIAN:  KAILEY ZELAYA      TECHNIQUE:  Limited abdominal ultrasound of the right upper quadrant was  performed utilizing gray scale imaging.      FINDINGS:  Liver: The liver has poor penetration likely due to fatty  infiltration. This may obscure hepatic lesions. No intrahepatic  biliary distention. Gallbladder: There is no gallbladder wall  thickening or pericholecystic fluid. Echogenic material is noted in  the gallbladder that does not definitely move. Shadowing is noted.  These may represent non mobile gallstones or tumefactive sludge.  Sonographic Coulter's sign: Positive Pancreas: The pancreatic head and  body appear unremarkable.  The distal pancreatic body and tail are  obscured by overlying bowel gas. CBD: 3.8mm  Right kidney shows no hydronephrosis      Impression: 1. Echogenic shadowing non mobile material in the gallbladder  possibly non mobile gallstones or tumefactive sludge.  2. No gallbladder wall thickening or pericholecystic fluid  3. No biliary distention          MACRO:  None.      Signed by: Jaimie Hernandez 12/28/2023 10:42 PM  Dictation workstation:   [id number]  ECG 12 lead  Normal sinus rhythm with 1st degree AV block  Normal ECG  When compared with ECG of 01-MAY-2022 21:31,  No significant change was found  Confirmed by Marty Lin ([id number]) on 12/28/2023 8:37:36 AM        EKG:   Tele: "     SIGNATURE: Cuca Mullen MD  DATE: December 29, 2023  TIME: 10:16 AM

## 2023-12-30 LAB
ALBUMIN SERPL-MCNC: 4 G/DL (ref 3.5–5)
ALP BLD-CCNC: 90 U/L (ref 35–125)
ALT SERPL-CCNC: 5 U/L (ref 5–40)
ANION GAP SERPL CALC-SCNC: 14 MMOL/L
AST SERPL-CCNC: 12 U/L (ref 5–40)
BILIRUB SERPL-MCNC: 0.4 MG/DL (ref 0.1–1.2)
BUN SERPL-MCNC: 11 MG/DL (ref 8–25)
CALCIUM SERPL-MCNC: 9 MG/DL (ref 8.5–10.4)
CHLORIDE SERPL-SCNC: 97 MMOL/L (ref 97–107)
CO2 SERPL-SCNC: 22 MMOL/L (ref 24–31)
CREAT SERPL-MCNC: 1 MG/DL (ref 0.4–1.6)
ERYTHROCYTE [DISTWIDTH] IN BLOOD BY AUTOMATED COUNT: 13.4 % (ref 11.5–14.5)
GFR SERPL CREATININE-BSD FRML MDRD: 57 ML/MIN/1.73M*2
GLUCOSE BLD MANUAL STRIP-MCNC: 103 MG/DL (ref 74–99)
GLUCOSE SERPL-MCNC: 110 MG/DL (ref 65–99)
HCT VFR BLD AUTO: 35.4 % (ref 36–46)
HGB BLD-MCNC: 11.3 G/DL (ref 12–16)
MCH RBC QN AUTO: 27.9 PG (ref 26–34)
MCHC RBC AUTO-ENTMCNC: 31.9 G/DL (ref 32–36)
MCV RBC AUTO: 87 FL (ref 80–100)
NRBC BLD-RTO: 0 /100 WBCS (ref 0–0)
PLATELET # BLD AUTO: 370 X10*3/UL (ref 150–450)
POTASSIUM SERPL-SCNC: 3.5 MMOL/L (ref 3.4–5.1)
PROT SERPL-MCNC: 6.9 G/DL (ref 5.9–7.9)
RBC # BLD AUTO: 4.05 X10*6/UL (ref 4–5.2)
SODIUM SERPL-SCNC: 133 MMOL/L (ref 133–145)
WBC # BLD AUTO: 10.9 X10*3/UL (ref 4.4–11.3)

## 2023-12-30 PROCEDURE — 36415 COLL VENOUS BLD VENIPUNCTURE: CPT | Performed by: INTERNAL MEDICINE

## 2023-12-30 PROCEDURE — 84075 ASSAY ALKALINE PHOSPHATASE: CPT | Performed by: INTERNAL MEDICINE

## 2023-12-30 PROCEDURE — 2500000001 HC RX 250 WO HCPCS SELF ADMINISTERED DRUGS (ALT 637 FOR MEDICARE OP): Performed by: INTERNAL MEDICINE

## 2023-12-30 PROCEDURE — 85027 COMPLETE CBC AUTOMATED: CPT | Performed by: INTERNAL MEDICINE

## 2023-12-30 PROCEDURE — 99232 SBSQ HOSP IP/OBS MODERATE 35: CPT | Performed by: SURGERY

## 2023-12-30 PROCEDURE — 1200000002 HC GENERAL ROOM WITH TELEMETRY DAILY

## 2023-12-30 PROCEDURE — 2500000004 HC RX 250 GENERAL PHARMACY W/ HCPCS (ALT 636 FOR OP/ED): Performed by: INTERNAL MEDICINE

## 2023-12-30 PROCEDURE — 96372 THER/PROPH/DIAG INJ SC/IM: CPT | Performed by: INTERNAL MEDICINE

## 2023-12-30 PROCEDURE — 82947 ASSAY GLUCOSE BLOOD QUANT: CPT

## 2023-12-30 RX ADMIN — ONDANSETRON 4 MG: 2 INJECTION INTRAMUSCULAR; INTRAVENOUS at 17:11

## 2023-12-30 RX ADMIN — ENOXAPARIN SODIUM 40 MG: 40 INJECTION SUBCUTANEOUS at 21:09

## 2023-12-30 RX ADMIN — HYDROMORPHONE HYDROCHLORIDE 0.6 MG: 1 INJECTION, SOLUTION INTRAMUSCULAR; INTRAVENOUS; SUBCUTANEOUS at 17:11

## 2023-12-30 RX ADMIN — HYDROMORPHONE HYDROCHLORIDE 0.6 MG: 1 INJECTION, SOLUTION INTRAMUSCULAR; INTRAVENOUS; SUBCUTANEOUS at 09:20

## 2023-12-30 RX ADMIN — HYDRALAZINE HYDROCHLORIDE 10 MG: 20 INJECTION INTRAMUSCULAR; INTRAVENOUS at 14:10

## 2023-12-30 RX ADMIN — HYDROMORPHONE HYDROCHLORIDE 0.6 MG: 1 INJECTION, SOLUTION INTRAMUSCULAR; INTRAVENOUS; SUBCUTANEOUS at 06:00

## 2023-12-30 RX ADMIN — ACETAMINOPHEN 650 MG: 325 TABLET ORAL at 23:52

## 2023-12-30 RX ADMIN — POLYETHYLENE GLYCOL 3350 17 G: 17 POWDER, FOR SOLUTION ORAL at 09:53

## 2023-12-30 RX ADMIN — ONDANSETRON 4 MG: 2 INJECTION INTRAMUSCULAR; INTRAVENOUS at 21:09

## 2023-12-30 RX ADMIN — OXYCODONE HYDROCHLORIDE 5 MG: 5 TABLET ORAL at 23:52

## 2023-12-30 RX ADMIN — HYDRALAZINE HYDROCHLORIDE 10 MG: 20 INJECTION INTRAMUSCULAR; INTRAVENOUS at 18:08

## 2023-12-30 RX ADMIN — HYDROMORPHONE HYDROCHLORIDE 0.6 MG: 1 INJECTION, SOLUTION INTRAMUSCULAR; INTRAVENOUS; SUBCUTANEOUS at 13:24

## 2023-12-30 RX ADMIN — ONDANSETRON 4 MG: 2 INJECTION INTRAMUSCULAR; INTRAVENOUS at 09:21

## 2023-12-30 ASSESSMENT — PAIN DESCRIPTION - LOCATION
LOCATION: ABDOMEN

## 2023-12-30 ASSESSMENT — PAIN SCALES - GENERAL
PAINLEVEL_OUTOF10: 9
PAINLEVEL_OUTOF10: 0 - NO PAIN
PAINLEVEL_OUTOF10: 0 - NO PAIN
PAINLEVEL_OUTOF10: 3
PAINLEVEL_OUTOF10: 0 - NO PAIN
PAINLEVEL_OUTOF10: 10 - WORST POSSIBLE PAIN
PAINLEVEL_OUTOF10: 0 - NO PAIN
PAINLEVEL_OUTOF10: 9
PAINLEVEL_OUTOF10: 10 - WORST POSSIBLE PAIN

## 2023-12-30 ASSESSMENT — COGNITIVE AND FUNCTIONAL STATUS - GENERAL
MOBILITY SCORE: 24
DAILY ACTIVITIY SCORE: 24

## 2023-12-30 ASSESSMENT — PAIN DESCRIPTION - ORIENTATION: ORIENTATION: LEFT;UPPER

## 2023-12-30 ASSESSMENT — PAIN - FUNCTIONAL ASSESSMENT
PAIN_FUNCTIONAL_ASSESSMENT: 0-10

## 2023-12-30 NOTE — PROGRESS NOTES
Physical Therapy                 Therapy Communication Note    Patient Name: Marylou Ward  MRN: [id number]  Today's Date: 12/30/2023     Discipline: Physical Therapy    Missed Visit Reason: Missed Visit Reason: Patient refused (pt declined therapy evaluation; education and encouragement provided.)    Missed Time: Attempt

## 2023-12-30 NOTE — PROGRESS NOTES
Patient is resting comfortably, she ate all of her breakfast including eggs.  She refused physical therapy yesterday.  Patient denies fevers or chills. Patient denies chest pain or shortness of breath. Patient denies nausea, vomiting, diarrhea, or constipation. Patient denies melena or hematochezia. Patient denies dysuria or hematuria. Patient denies back pain or joint pain.    Patient does not have any pertinent updates to past medical history, family history, or social history.  /63 (BP Location: Left arm, Patient Position: Lying)   Pulse 90   Temp 37 °C (98.6 °F) (Oral)   Resp 17   Ht 1.524 m (5')   Wt 61.2 kg (135 lb)   SpO2 91%   BMI 26.37 kg/m²   On physical exam patient is awake alert and oriented to person, place, and time. Non-focal exam.  Patient head is normocephalic and atraumatic. Mucous Membranes are moist.  Patient's extraocular motions are intact.   The trachea is midline and there is no jugular venous distention.   There is equal chest rise bilaterally and the heart rate is regular.   The abdomen is soft nontender nondistended without any rebound or guarding. Patient moves all extremities.   There is no clubbing, cyanosis, or edema.  No injury or deformity of the extremities.  Skin is clean, dry, intact.  A/P chronic cholecystitis  No further emesis, patient is tolerating a regular diet.  Recommend that she may be discharged with outpatient follow-up for laparoscopic cholecystectomy if her symptoms persist.

## 2023-12-30 NOTE — PROGRESS NOTES
Occupational Therapy                 Therapy Communication Note    Patient Name: Marylou Ward  MRN: [id number]  Today's Date: 12/30/2023     Discipline: Occupational Therapy    Missed Visit Reason: Patient refused (Pt declining to participate in both PT/OT evaluations regardless of education/encouragement.)

## 2023-12-30 NOTE — CARE PLAN
Problem: Safety  Goal: Patient will be injury free during hospitalization  Outcome: Progressing  Goal: I will remain free of falls  Outcome: Progressing     Problem: Skin  Goal: Participates in plan/prevention/treatment measures  Outcome: Progressing  Goal: Prevent/minimize sheer/friction injuries  Outcome: Progressing  Goal: Promote/optimize nutrition  Outcome: Progressing     Problem: Pain  Goal: Takes deep breaths with improved pain control throughout the shift  Outcome: Progressing  Goal: Turns in bed with improved pain control throughout the shift  Outcome: Progressing  Goal: Walks with improved pain control throughout the shift  Outcome: Progressing  Goal: Performs ADL's with improved pain control throughout shift  Outcome: Progressing  Goal: Participates in PT with improved pain control throughout the shift  Outcome: Progressing  Goal: Free from opioid side effects throughout the shift  Outcome: Progressing  Goal: Free from acute confusion related to pain meds throughout the shift  Outcome: Progressing   The patient's goals for the shift include      The clinical goals for the shift include No pain, adequate rest

## 2023-12-30 NOTE — PROGRESS NOTES
Spiritual Care Visit    Clinical Encounter Type  Visited With: Patient  Routine Visit: Introduction  Continue Visiting: Yes         Values/Beliefs  Spiritual Requests During Hospitalization: Anointed today    Sacramental Encounters  Sacrament of Sick-Anointing: Anointed     Gerard Bagley

## 2023-12-30 NOTE — NURSING NOTE
Blood Pressure elevated at this time 197/80. IV Hydralazine requested from pharmacy at this time as this nurse just became aware of patients high blood pressure. IV Hydralazine given to covering RN to push at this time.

## 2023-12-30 NOTE — NURSING NOTE
Assumed care of patient at this time. Patient asleep in bed during BSSR. No s/s pain or discomfort noted. Bed locked in lowest position, bed alarm on and functioning, call light within reach.

## 2023-12-30 NOTE — PROGRESS NOTES
Marylou Ward is a 78 y.o. female on day 2 of admission presenting with Acute vomiting.      Subjective   Patient still has nausea and vomiting  Per staff her blood pressure is little higher with dose episodes  Surgery input appreciated    Objective     Last Recorded Vitals  BP (!) 194/83 (BP Location: Left arm, Patient Position: Lying)   Pulse 93   Temp 36 °C (96.8 °F) (Oral)   Resp 17   Wt 61.2 kg (135 lb)   SpO2 100%   Intake/Output last 3 Shifts:    Intake/Output Summary (Last 24 hours) at 12/30/2023 1224  Last data filed at 12/29/2023 2353  Gross per 24 hour   Intake 220 ml   Output --   Net 220 ml       Admission Weight  Weight: 61.2 kg (135 lb) (12/28/23 2100)    Daily Weight  12/28/23 : 61.2 kg (135 lb)    Image Results  NM hepatobiliary w cholecystokinin  Narrative: Interpreted By:  Molly Loyola,   STUDY:  NM HEPATOBILIARY W CHOLECYSTOKININ;  12/29/2023 3:32 pm      INDICATION:  Signs/Symptoms:Ch abd pain, R UQ tenderness/pain, Nausea.      COMPARISON:  None.      ACCESSION NUMBER(S):  [id number]      ORDERING CLINICIAN:  LASHONDA MAY      TECHNIQUE:  DIVISION OF NUCLEAR MEDICINE  HEPATOBILIARY SCAN (HIDA), QUANTITATIVE      The patient received an intravenous dose of  5.0 mCi of Tc-99m  mebrofenin (Choletec).  Sequential images of the upper abdomen were  then acquired over the next 60 minutes.  An intravenous infusion of  the cholecystokinin (CCK) analogue, Sincalide; was then administered  followed by an additional period of imaging.  Computer quantification  of gallbladder emptying was then performed.      FINDINGS:  There is normal uptake of radiotracer by the liver with excretion  into the biliary system. Gallbladder activity is demonstrated  following 45 minutes. Small bowel activity is demonstrated following  90 minutes. There is persistent visualization of the common bile duct  throughout the examination. Correlate with concern for dysfunction at  the level of the sphincter of Oddi.       Gallbladder ejection fraction is calculated at 14% (normal greater  than 35%).          Impression: 1. Decreased gallbladder ejection fraction of 14%. Correlate with  patient's history including concern for chronic cholecystitis.      2. Mild delayed visualization of the small bowel activity  nonspecific. However, there is persistent visualization of activity  within the common bile duct. This may reflect component of  dysfunction at the level of the sphincter of Oddi.                      MACRO:  None      Signed by: Molly Loyola 12/29/2023 4:06 PM  Dictation workstation:   [id number]      Physical Exam  GENERAL: awake, alert, Ox3, cooperative resting comfortably  SKIN: Skin turgor normal. No rashes  HEENT: no epistaxis, Moist mucosa.  NECK: supple  BACK: spine nontender to palpation, No CVAT.  LUNGS: Vesicular breath sounds, with no wheeze, no crepitations.   CARDIAC: REGULAR. S1 and S2; no rubs, no murmur  ABDOMEN: Abdomen soft, right upper quadrant and epigastric tenderness  EXTREMITIES: No edema, Good capillary refill.   NEURO: Insight GOOD. Motor and sensory exam wnl. No invol movements. No ataxia.  WOUND:   MUSCULOSKELETAL: No acute inflammation    Relevant Results  Results for orders placed or performed during the hospital encounter of 12/28/23 (from the past 24 hour(s))   CBC   Result Value Ref Range    WBC 10.9 4.4 - 11.3 x10*3/uL    nRBC 0.0 0.0 - 0.0 /100 WBCs    RBC 4.05 4.00 - 5.20 x10*6/uL    Hemoglobin 11.3 (L) 12.0 - 16.0 g/dL    Hematocrit 35.4 (L) 36.0 - 46.0 %    MCV 87 80 - 100 fL    MCH 27.9 26.0 - 34.0 pg    MCHC 31.9 (L) 32.0 - 36.0 g/dL    RDW 13.4 11.5 - 14.5 %    Platelets 370 150 - 450 x10*3/uL   Comprehensive Metabolic Panel   Result Value Ref Range    Glucose 110 (H) 65 - 99 mg/dL    Sodium 133 133 - 145 mmol/L    Potassium 3.5 3.4 - 5.1 mmol/L    Chloride 97 97 - 107 mmol/L    Bicarbonate 22 (L) 24 - 31 mmol/L    Urea Nitrogen 11 8 - 25 mg/dL    Creatinine 1.00 0.40 - 1.60 mg/dL     eGFR 57 (L) >60 mL/min/1.73m*2    Calcium 9.0 8.5 - 10.4 mg/dL    Albumin 4.0 3.5 - 5.0 g/dL    Alkaline Phosphatase 90 35 - 125 U/L    Total Protein 6.9 5.9 - 7.9 g/dL    AST 12 5 - 40 U/L    Bilirubin, Total 0.4 0.1 - 1.2 mg/dL    ALT 5 5 - 40 U/L    Anion Gap 14 <=19 mmol/L   POCT GLUCOSE   Result Value Ref Range    POCT Glucose 103 (H) 74 - 99 mg/dL    Scheduled medications  enoxaparin, 40 mg, subcutaneous, Nightly  insulin lispro, 0-5 Units, subcutaneous, TID with meals  polyethylene glycol, 17 g, oral, Daily      Continuous medications  sodium chloride 0.9%, 75 mL/hr, Last Rate: 75 mL/hr (12/29/23 0136)      PRN medications  PRN medications: acetaminophen, ALPRAZolam, bisacodyl, dextromethorphan-guaifenesin, dextrose 10 % in water (D10W), dextrose, diphenhydrAMINE, glucagon, hydrALAZINE, HYDROmorphone, ipratropium-albuteroL, melatonin, ondansetron, oxyCODONE         Assessment/Plan   # Chronic cholecystitis   - abdominal pain/nausea/vomiting  -Pain control  -IV fluids  -Will need cholecystectomy, timing to be decided by general surgery     # Cholelithiasis  -No CT evidence of cholecystitis  -HIDA scan ordered  -Surgery consulted     # Hypertension  -Continue home meds     # Diabetes  -Insulin sliding scale     # History of PE  -On Eliquis       12/30-patient continues to have recurrent vomiting and abdominal pain surgery input discussed with patient and family.  They do not want patient discharged, prefer cholecystectomy to be done as inpatient, will communicate same   General surgery.    uCca Mullen MD

## 2023-12-31 PROBLEM — K80.10 CHRONIC CHOLECYSTITIS WITH CALCULUS: Status: ACTIVE | Noted: 2023-12-28

## 2023-12-31 LAB
ALBUMIN SERPL-MCNC: 3.8 G/DL (ref 3.5–5)
ALP BLD-CCNC: 83 U/L (ref 35–125)
ALT SERPL-CCNC: 5 U/L (ref 5–40)
ANION GAP SERPL CALC-SCNC: 14 MMOL/L
AST SERPL-CCNC: 12 U/L (ref 5–40)
BILIRUB SERPL-MCNC: 0.3 MG/DL (ref 0.1–1.2)
BUN SERPL-MCNC: 9 MG/DL (ref 8–25)
CALCIUM SERPL-MCNC: 9.1 MG/DL (ref 8.5–10.4)
CHLORIDE SERPL-SCNC: 95 MMOL/L (ref 97–107)
CO2 SERPL-SCNC: 23 MMOL/L (ref 24–31)
CREAT SERPL-MCNC: 0.9 MG/DL (ref 0.4–1.6)
ERYTHROCYTE [DISTWIDTH] IN BLOOD BY AUTOMATED COUNT: 13.4 % (ref 11.5–14.5)
GFR SERPL CREATININE-BSD FRML MDRD: 65 ML/MIN/1.73M*2
GLUCOSE BLD MANUAL STRIP-MCNC: 104 MG/DL (ref 74–99)
GLUCOSE BLD MANUAL STRIP-MCNC: 110 MG/DL (ref 74–99)
GLUCOSE BLD MANUAL STRIP-MCNC: 114 MG/DL (ref 74–99)
GLUCOSE BLD MANUAL STRIP-MCNC: 120 MG/DL (ref 74–99)
GLUCOSE SERPL-MCNC: 121 MG/DL (ref 65–99)
HCT VFR BLD AUTO: 33.2 % (ref 36–46)
HGB BLD-MCNC: 11 G/DL (ref 12–16)
MCH RBC QN AUTO: 27.6 PG (ref 26–34)
MCHC RBC AUTO-ENTMCNC: 33.1 G/DL (ref 32–36)
MCV RBC AUTO: 83 FL (ref 80–100)
NRBC BLD-RTO: 0 /100 WBCS (ref 0–0)
PLATELET # BLD AUTO: 374 X10*3/UL (ref 150–450)
POTASSIUM SERPL-SCNC: 3.8 MMOL/L (ref 3.4–5.1)
PROT SERPL-MCNC: 6.7 G/DL (ref 5.9–7.9)
RBC # BLD AUTO: 3.98 X10*6/UL (ref 4–5.2)
SODIUM SERPL-SCNC: 132 MMOL/L (ref 133–145)
WBC # BLD AUTO: 11.8 X10*3/UL (ref 4.4–11.3)

## 2023-12-31 PROCEDURE — 2500000004 HC RX 250 GENERAL PHARMACY W/ HCPCS (ALT 636 FOR OP/ED): Performed by: SURGERY

## 2023-12-31 PROCEDURE — 99233 SBSQ HOSP IP/OBS HIGH 50: CPT | Performed by: SURGERY

## 2023-12-31 PROCEDURE — 82947 ASSAY GLUCOSE BLOOD QUANT: CPT

## 2023-12-31 PROCEDURE — 85027 COMPLETE CBC AUTOMATED: CPT | Performed by: INTERNAL MEDICINE

## 2023-12-31 PROCEDURE — 97161 PT EVAL LOW COMPLEX 20 MIN: CPT | Mod: GP

## 2023-12-31 PROCEDURE — 1200000002 HC GENERAL ROOM WITH TELEMETRY DAILY

## 2023-12-31 PROCEDURE — 36415 COLL VENOUS BLD VENIPUNCTURE: CPT | Performed by: INTERNAL MEDICINE

## 2023-12-31 PROCEDURE — 96372 THER/PROPH/DIAG INJ SC/IM: CPT | Performed by: INTERNAL MEDICINE

## 2023-12-31 PROCEDURE — 2500000004 HC RX 250 GENERAL PHARMACY W/ HCPCS (ALT 636 FOR OP/ED): Performed by: INTERNAL MEDICINE

## 2023-12-31 PROCEDURE — 97116 GAIT TRAINING THERAPY: CPT | Mod: GP

## 2023-12-31 PROCEDURE — 2500000001 HC RX 250 WO HCPCS SELF ADMINISTERED DRUGS (ALT 637 FOR MEDICARE OP): Performed by: INTERNAL MEDICINE

## 2023-12-31 PROCEDURE — 80053 COMPREHEN METABOLIC PANEL: CPT | Performed by: INTERNAL MEDICINE

## 2023-12-31 RX ADMIN — SODIUM CHLORIDE 75 ML/HR: 900 INJECTION, SOLUTION INTRAVENOUS at 19:59

## 2023-12-31 RX ADMIN — OXYCODONE HYDROCHLORIDE 5 MG: 5 TABLET ORAL at 01:20

## 2023-12-31 RX ADMIN — ENOXAPARIN SODIUM 40 MG: 40 INJECTION SUBCUTANEOUS at 20:00

## 2023-12-31 RX ADMIN — ACETAMINOPHEN 650 MG: 325 TABLET ORAL at 20:01

## 2023-12-31 RX ADMIN — ALPRAZOLAM 0.5 MG: 0.5 TABLET ORAL at 01:20

## 2023-12-31 RX ADMIN — POLYETHYLENE GLYCOL 3350 17 G: 17 POWDER, FOR SOLUTION ORAL at 08:55

## 2023-12-31 RX ADMIN — OXYCODONE HYDROCHLORIDE 5 MG: 5 TABLET ORAL at 18:14

## 2023-12-31 RX ADMIN — SODIUM CHLORIDE 75 ML/HR: 900 INJECTION, SOLUTION INTRAVENOUS at 05:04

## 2023-12-31 RX ADMIN — CEFOXITIN 2 G: 2 INJECTION, POWDER, FOR SOLUTION INTRAVENOUS at 18:14

## 2023-12-31 RX ADMIN — ONDANSETRON 4 MG: 2 INJECTION INTRAMUSCULAR; INTRAVENOUS at 22:19

## 2023-12-31 RX ADMIN — CEFOXITIN 2 G: 2 INJECTION, POWDER, FOR SOLUTION INTRAVENOUS at 13:25

## 2023-12-31 RX ADMIN — OXYCODONE HYDROCHLORIDE 5 MG: 5 TABLET ORAL at 12:12

## 2023-12-31 ASSESSMENT — PAIN SCALES - GENERAL
PAINLEVEL_OUTOF10: 8
PAINLEVEL_OUTOF10: 8
PAINLEVEL_OUTOF10: 3
PAINLEVEL_OUTOF10: 7
PAINLEVEL_OUTOF10: 7
PAINLEVEL_OUTOF10: 5 - MODERATE PAIN
PAINLEVEL_OUTOF10: 3
PAINLEVEL_OUTOF10: 10 - WORST POSSIBLE PAIN
PAINLEVEL_OUTOF10: 8

## 2023-12-31 ASSESSMENT — COGNITIVE AND FUNCTIONAL STATUS - GENERAL
TURNING FROM BACK TO SIDE WHILE IN FLAT BAD: A LITTLE
DAILY ACTIVITIY SCORE: 24
WALKING IN HOSPITAL ROOM: A LITTLE
MOBILITY SCORE: 19
DAILY ACTIVITIY SCORE: 24
STANDING UP FROM CHAIR USING ARMS: A LITTLE
MOBILITY SCORE: 24
MOVING TO AND FROM BED TO CHAIR: A LITTLE
MOBILITY SCORE: 23
CLIMB 3 TO 5 STEPS WITH RAILING: A LITTLE
CLIMB 3 TO 5 STEPS WITH RAILING: A LITTLE

## 2023-12-31 ASSESSMENT — PAIN DESCRIPTION - ORIENTATION
ORIENTATION: LEFT
ORIENTATION: LEFT;UPPER
ORIENTATION: LEFT

## 2023-12-31 ASSESSMENT — PAIN - FUNCTIONAL ASSESSMENT
PAIN_FUNCTIONAL_ASSESSMENT: 0-10
PAIN_FUNCTIONAL_ASSESSMENT: FLACC (FACE, LEGS, ACTIVITY, CRY, CONSOLABILITY)

## 2023-12-31 ASSESSMENT — PAIN DESCRIPTION - LOCATION
LOCATION: OTHER (COMMENT)
LOCATION: ABDOMEN
LOCATION: OTHER (COMMENT)

## 2023-12-31 NOTE — PROGRESS NOTES
Patient still has nausea and abdominal pain.  But she states her abdominal pain is left-sided not right-sided.  Her HIDA scan showed biliary dyskinesia without acute cholecystitis.  However the patient is refusing to go home without surgery and her primary care doctor is complying with this.    /74 (BP Location: Left arm, Patient Position: Lying)   Pulse 102   Temp 37 °C (98.6 °F) (Oral)   Resp 17   Ht 1.524 m (5')   Wt 61.2 kg (135 lb)   SpO2 95%   BMI 26.37 kg/m²     On physical exam patient is awake alert and oriented to person, place, and time. Non-focal exam.  Patient head is normocephalic and atraumatic. Mucous Membranes are moist.  Patient's extraocular motions are intact.   The trachea is midline and there is no jugular venous distention.   There is equal chest rise bilaterally and the heart rate is regular.   The abdomen is soft nontender nondistended without any rebound or guarding. Patient moves all extremities.   There is no clubbing, cyanosis, or edema.  No injury or deformity of the extremities.  Skin is clean, dry, intact.    A/P Chronic Cholecystitis    Patient now has a slightly elevated WBC count, normal LFTs.    The procedure of laparoscopic cholecystectomy was discussed with the patient, including the traditional four-port laparoscopic approach with clipping of the cystic duct and artery, and removal of the gallbladder through the umbilicus.    Risks, benefits, and alternatives laparoscopic cholecystectomy with intraoperative cholangiography were described. Risks including but not limited to bleeding, infection, bile leak or retained stone, need for endoscopic retrograde cholangiopancreatography, common duct injury, need for secondary procedures and related procedures. We discussed the risks of bile leak, retained stone, and potential need for ERCP to be less than 2%. Patient understands risks and agrees to proceed.

## 2023-12-31 NOTE — CARE PLAN
The patient's goals for the shift include      The clinical goals for the shift include bowel movement      Problem: Safety  Goal: Patient will be injury free during hospitalization  Outcome: Progressing  Goal: I will remain free of falls  Outcome: Progressing     Problem: Skin  Goal: Participates in plan/prevention/treatment measures  Outcome: Progressing  Flowsheets (Taken 12/31/2023 1741)  Participates in plan/prevention/treatment measures:   Discuss with provider PT/OT consult   Increase activity/out of bed for meals   Elevate heels  Goal: Prevent/minimize sheer/friction injuries  Outcome: Progressing  Flowsheets (Taken 12/31/2023 1741)  Prevent/minimize sheer/friction injuries:   HOB 30 degrees or less   Complete micro-shifts as needed if patient unable. Adjust patient position to relieve pressure points, not a full turn   Increase activity/out of bed for meals   Turn/reposition every 2 hours/use positioning/transfer devices   Use pull sheet   Utilize specialty bed per algorithm  Goal: Promote/optimize nutrition  Outcome: Progressing  Flowsheets (Taken 12/31/2023 1741)  Promote/optimize nutrition:   Assist with feeding   Discuss with provider if NPO > 2 days   Offer water/supplements/favorite foods   Consume > 50% meals/supplements   Monitor/record intake including meals   Reassess MST if dietician not consulted     Problem: Pain  Goal: Takes deep breaths with improved pain control throughout the shift  Outcome: Progressing  Goal: Turns in bed with improved pain control throughout the shift  Outcome: Progressing  Goal: Walks with improved pain control throughout the shift  Outcome: Progressing  Goal: Performs ADL's with improved pain control throughout shift  Outcome: Progressing  Goal: Participates in PT with improved pain control throughout the shift  Outcome: Progressing  Goal: Free from opioid side effects throughout the shift  Outcome: Progressing  Goal: Free from acute confusion related to pain meds  throughout the shift  Outcome: Progressing     Problem: Pain - Adult  Goal: Verbalizes/displays adequate comfort level or baseline comfort level  Outcome: Progressing     Problem: Safety - Adult  Goal: Free from fall injury  Outcome: Progressing     Problem: Discharge Planning  Goal: Discharge to home or other facility with appropriate resources  Outcome: Progressing     Problem: Chronic Conditions and Co-morbidities  Goal: Patient's chronic conditions and co-morbidity symptoms are monitored and maintained or improved  Outcome: Progressing

## 2023-12-31 NOTE — NURSING NOTE
Attempted to call Dr. Mullen, but he didn't answer. Reassessed patient and vial signs were normal and patient woke up. Will continue to monitor.

## 2023-12-31 NOTE — PROGRESS NOTES
Marylou Ward is a 78 y.o. female on day 3 of admission presenting with Acute vomiting.      Subjective   Patient still has nausea and vomiting  Per staff her blood pressure is little higher with dose episodes  Surgery input appreciated    Objective     Last Recorded Vitals  BP (!) 184/79 (BP Location: Right arm, Patient Position: Lying)   Pulse 94   Temp 36.9 °C (98.4 °F) (Oral)   Resp 17   Wt 61.2 kg (135 lb)   SpO2 96%   Intake/Output last 3 Shifts:    Intake/Output Summary (Last 24 hours) at 12/31/2023 1756  Last data filed at 12/31/2023 1551  Gross per 24 hour   Intake 115 ml   Output --   Net 115 ml         Admission Weight  Weight: 61.2 kg (135 lb) (12/28/23 2100)    Daily Weight  12/28/23 : 61.2 kg (135 lb)    Image Results  NM hepatobiliary w cholecystokinin  Narrative: Interpreted By:  Molly Loyola,   STUDY:  NM HEPATOBILIARY W CHOLECYSTOKININ;  12/29/2023 3:32 pm      INDICATION:  Signs/Symptoms:Ch abd pain, R UQ tenderness/pain, Nausea.      COMPARISON:  None.      ACCESSION NUMBER(S):  [id number]      ORDERING CLINICIAN:  LASHONDA MAY      TECHNIQUE:  DIVISION OF NUCLEAR MEDICINE  HEPATOBILIARY SCAN (HIDA), QUANTITATIVE      The patient received an intravenous dose of  5.0 mCi of Tc-99m  mebrofenin (Choletec).  Sequential images of the upper abdomen were  then acquired over the next 60 minutes.  An intravenous infusion of  the cholecystokinin (CCK) analogue, Sincalide; was then administered  followed by an additional period of imaging.  Computer quantification  of gallbladder emptying was then performed.      FINDINGS:  There is normal uptake of radiotracer by the liver with excretion  into the biliary system. Gallbladder activity is demonstrated  following 45 minutes. Small bowel activity is demonstrated following  90 minutes. There is persistent visualization of the common bile duct  throughout the examination. Correlate with concern for dysfunction at  the level of the sphincter of  Oddi.      Gallbladder ejection fraction is calculated at 14% (normal greater  than 35%).          Impression: 1. Decreased gallbladder ejection fraction of 14%. Correlate with  patient's history including concern for chronic cholecystitis.      2. Mild delayed visualization of the small bowel activity  nonspecific. However, there is persistent visualization of activity  within the common bile duct. This may reflect component of  dysfunction at the level of the sphincter of Oddi.                      MACRO:  None      Signed by: Molly Loyola 12/29/2023 4:06 PM  Dictation workstation:   [id number]      Physical Exam  GENERAL: awake, alert, Ox3, cooperative resting comfortably  SKIN: Skin turgor normal. No rashes  HEENT: no epistaxis, Moist mucosa.  NECK: supple  BACK: spine nontender to palpation, No CVAT.  LUNGS: Vesicular breath sounds, with no wheeze, no crepitations.   CARDIAC: REGULAR. S1 and S2; no rubs, no murmur  ABDOMEN: Abdomen soft, right upper quadrant and epigastric tenderness  EXTREMITIES: No edema, Good capillary refill.   NEURO: [facility] GOOD. Motor and sensory exam wnl. No invol movements. No ataxia.  WOUND:   MUSCULOSKELETAL: No acute inflammation    Relevant Results  Results for orders placed or performed during the hospital encounter of 12/28/23 (from the past 24 hour(s))   CBC   Result Value Ref Range    WBC 11.8 (H) 4.4 - 11.3 x10*3/uL    nRBC 0.0 0.0 - 0.0 /100 WBCs    RBC 3.98 (L) 4.00 - 5.20 x10*6/uL    Hemoglobin 11.0 (L) 12.0 - 16.0 g/dL    Hematocrit 33.2 (L) 36.0 - 46.0 %    MCV 83 80 - 100 fL    MCH 27.6 26.0 - 34.0 pg    MCHC 33.1 32.0 - 36.0 g/dL    RDW 13.4 11.5 - 14.5 %    Platelets 374 150 - 450 x10*3/uL   Comprehensive Metabolic Panel   Result Value Ref Range    Glucose 121 (H) 65 - 99 mg/dL    Sodium 132 (L) 133 - 145 mmol/L    Potassium 3.8 3.4 - 5.1 mmol/L    Chloride 95 (L) 97 - 107 mmol/L    Bicarbonate 23 (L) 24 - 31 mmol/L    Urea Nitrogen 9 8 - 25 mg/dL    Creatinine  0.90 0.40 - 1.60 mg/dL    eGFR 65 >60 mL/min/1.73m*2    Calcium 9.1 8.5 - 10.4 mg/dL    Albumin 3.8 3.5 - 5.0 g/dL    Alkaline Phosphatase 83 35 - 125 U/L    Total Protein 6.7 5.9 - 7.9 g/dL    AST 12 5 - 40 U/L    Bilirubin, Total 0.3 0.1 - 1.2 mg/dL    ALT 5 5 - 40 U/L    Anion Gap 14 <=19 mmol/L   POCT GLUCOSE   Result Value Ref Range    POCT Glucose 114 (H) 74 - 99 mg/dL   POCT GLUCOSE   Result Value Ref Range    POCT Glucose 104 (H) 74 - 99 mg/dL   POCT GLUCOSE   Result Value Ref Range    POCT Glucose 120 (H) 74 - 99 mg/dL    Scheduled medications  cefOXitin, 2 g, intravenous, q6h  enoxaparin, 40 mg, subcutaneous, Nightly  insulin lispro, 0-5 Units, subcutaneous, TID with meals  polyethylene glycol, 17 g, oral, Daily      Continuous medications  sodium chloride 0.9%, 75 mL/hr, Last Rate: 75 mL/hr (12/31/23 0504)      PRN medications  PRN medications: acetaminophen, ALPRAZolam, bisacodyl, dextromethorphan-guaifenesin, dextrose 10 % in water (D10W), dextrose, diphenhydrAMINE, glucagon, hydrALAZINE, HYDROmorphone, ipratropium-albuteroL, melatonin, ondansetron, oxyCODONE         Assessment/Plan   # Chronic cholecystitis   - abdominal pain/nausea/vomiting  -Pain control  -IV fluids  -Will need cholecystectomy, timing to be decided by general surgery     # Cholelithiasis  -No CT evidence of cholecystitis  -HIDA scan ordered  -Surgery consulted     # Hypertension  -Continue home meds     # Diabetes  -Insulin sliding scale     # History of PE  -On Eliquis       12/30-patient continues to have recurrent vomiting and abdominal pain surgery input discussed with patient and family.  They do not want patient discharged, prefer cholecystectomy to be done as inpatient, will communicate same   General surgery.    12/31-surgery input noted, surgery being planned for early next week, discussed with patient's POA yesterday, he also prefers surgery to be done.  Patient continues to have pain, today her pain is left upper quadrant  also.  She has long history of chronic pain, is being followed by pain management.    Cuca Mullen MD

## 2023-12-31 NOTE — NURSING NOTE
Assumed care of patient. Patient is asleep in bed with NS running at 75ml/h. Call light is in place will continue to monitor.

## 2023-12-31 NOTE — NURSING NOTE
Attempted to call Dr. Ramirez at [phone] to notify him that prn oxycodone was given at 2352 and 0120. There was no answer and secured chat sent to notify him.

## 2023-12-31 NOTE — NURSING NOTE
Assumed care. Patient report given. Patient is awake visiting with grandson. Call light within reach. Bed alarm on.

## 2023-12-31 NOTE — CARE PLAN
Problem: Pain - Adult  Goal: Verbalizes/displays adequate comfort level or baseline comfort level  12/31/2023 1742 by Patricia Pritchard RN  Outcome: Progressing  12/31/2023 1741 by Patricia Pritchard RN  Outcome: Progressing     Problem: Safety - Adult  Goal: Free from fall injury  12/31/2023 1742 by Patricia Pritchard RN  Outcome: Progressing  12/31/2023 1741 by Patricia Pritchard RN  Outcome: Progressing     Problem: Discharge Planning  Goal: Discharge to home or other facility with appropriate resources  12/31/2023 1742 by Patricia Pritchard RN  Outcome: Progressing  12/31/2023 1741 by Patricia Pritchard RN  Outcome: Progressing     Problem: Chronic Conditions and Co-morbidities  Goal: Patient's chronic conditions and co-morbidity symptoms are monitored and maintained or improved  12/31/2023 1742 by Patricia Pritchard RN  Outcome: Progressing  12/31/2023 1741 by Patricia Pritchard RN  Outcome: Progressing   The patient's goals for the shift include      The clinical goals for the shift include bowel movement

## 2023-12-31 NOTE — PROGRESS NOTES
Physical Therapy    Physical Therapy Evaluation    Patient Name: Marylou Ward  MRN: [id number]  Today's Date: 12/31/2023   Time Calculation  Start Time: 0905  Stop Time: 0930  Time Calculation (min): 25 min    Assessment/Plan   PT Assessment  PT Assessment Results: Decreased strength, Decreased endurance, Impaired balance, Decreased mobility, Decreased coordination, Decreased cognition, Impaired judgement, Decreased safety awareness, Pain  Rehab Prognosis: Good  Evaluation/Treatment Tolerance: Patient tolerated treatment well  Medical Staff Made Aware: Yes  Barriers to Participation:  (Cognition)  End of Session Communication: Bedside nurse  Assessment Comment: Gil presents with impaired functional mobility/activity tolerance with mild general weakness.  End of Session Patient Position: Bed, 3 rail up, Alarm on  IP OR SWING BED PT PLAN  Inpatient or Swing Bed: Inpatient  PT Plan  Treatment/Interventions: Bed mobility, Transfer training, Gait training, Stair training, Balance training, Neuromuscular re-education, Neurodevelopmental intervention, Strengthening, Endurance training, Range of motion, Therapeutic exercise, Therapeutic activity, Positioning, Postural re-education  PT Plan: Skilled PT  PT Frequency: 4 times per week  PT Discharge Recommendations: Low intensity level of continued care  Equipment Recommended upon Discharge: Wheeled walker  PT Recommended Transfer Status: Assist x1, Contact guard  PT - OK to Discharge: Yes      Subjective   General Visit Information:  General  Reason for Referral: Impaired mobility  Referred By: Dr. Mullen  Past Medical History Relevant to Rehab: 79 y/o F admitted with cc of intractable nausea and vomiting. Hepatobiliary studies revealning decreased EF of gallbladder correlated with chronic cholecystitis/gallstones.  Past medical hx includes: PE, cholelisthiasis, HTN, DM  Family/Caregiver Present: No  Prior to Session Communication: Bedside nurse  Patient Position Received:  Bed, 3 rail up, Alarm on  General Comment: Patient agreeable with encouragement.  Pleasantly confused, alert to self.  Follows commands.  RN cleared for PT evaluation.  Home Living:  Home Living  Type of Home: House  Lives With: Other (Comment) (Patien reports she lives with nephew)  Home Adaptive Equipment: None  Home Layout: One level  Home Access: Stairs to enter with rails  Entrance Stairs-Rails: Right  Entrance Stairs-Number of Steps: 3  Bathroom Shower/Tub: Tub/shower unit (Sponge bathes)  Home Living Comments: Poor historian.  Question accuracy of provided informatoin  Prior Level of Function:  Prior Function Per Pt/Caregiver Report  Level of Marcella: Independent with ADLs and functional transfers, Independent with homemaking with ambulation  Receives Help From:  (Nephew for IADL's)  Ambulatory Assistance: Independent  Precautions:   Falls, may benefit from temporary use of 2WW  Vital Signs:   VSS    Objective   Pain:  Pain Assessment  Pain Assessment: 0-10  Pain Score: 5 - Moderate pain (L flank)  Pain Type: Acute pain  Pain Location: Abdomen  Pain Orientation: Left  Pain Interventions: Medication (See MAR) (RN to medicate after evaluation)  Cognition:  Cognition  Overall Cognitive Status: Impaired  Orientation Level: Other (Comment), Disoriented to time, Disoriented to situation    General Assessments:     Activity Tolerance  Endurance: Tolerates less than 10 min exercise with changes in vital signs  Activity Tolerance Comments: Limited by L abdominal pain    Sensation  Light Touch: No apparent deficits    Strength  Strength Comments: 3+ to 4-/5 B/L LE strength  Strength  Strength Comments: 3+ to 4-/5 B/L LE strength    Perception  Inattention/Neglect: Appears intact      Coordination  Movements are Fluid and Coordinated: Yes    Postural Control  Postural Control: Within Functional Limits  Trunk Control: WFL    Static Sitting Balance  Static Sitting-Level of Assistance: Independent    Static Standing  Balance  Static Standing-Level of Assistance: Close supervision  Dynamic Standing Balance  Dynamic Standing-Balance Support: No upper extremity supported  Dynamic Standing-Balance: Compliant surfaces  Dynamic Standing-Comments: CGA for ambulation, pt. unsteady  Functional Assessments:  Bed Mobility  Bed Mobility: Yes  Bed Mobility 1  Bed Mobility 1: Supine to sitting, Sitting to supine  Level of Assistance 1: Distant supervision  Bed Mobility Comments 1: Requires increased time    Transfers  Transfer: Yes  Transfer 1  Transfer From 1: Bed to, Sit to, Stand to, Toilet to  Transfer to 1: Bed  Technique 1: Sit to stand, Stand to sit  Transfer Level of Assistance 1: Contact guard, Close supervision  Trials/Comments 1: Patient in supine on arrival.  Distant S bed mobility.  CGA for sit to stand and ambulation to/form bathroom 15' x 2 after toileting.  CGA for transfer on/off commode.  IND with hygiene.  Returned to supine with bed alarm donned at end of session.    Ambulation/Gait Training  Ambulation/Gait Training Performed: Yes  Ambulation/Gait Training 1  Surface 1: Level tile  Device 1: No device  Assistance 1: Contact guard  Quality of Gait 1: Decreased step length (Bradykinetic)  Comments/Distance (ft) 1: To/from Bathroom 15' x 2 with shuffling steps.  Therapist kathryn IV.  Unsteady.  Mild falls risk.  Pleasantly confused.  Extremity/Trunk Assessments:  RLE   RLE : Within Functional Limits  LLE   LLE : Within Functional Limits  Outcome Measures:  [facility] Basic Mobility  Turning from your back to your side while in a flat bed without using bedrails: None  Moving from lying on your back to sitting on the side of a flat bed without using bedrails: A little  Moving to and from bed to chair (including a wheelchair): A little  Standing up from a chair using your arms (e.g. wheelchair or bedside chair): A little  To walk in hospital room: A little  Climbing 3-5 steps with railing: A little  Basic Mobility - Total Score:  19    Encounter Problems       Encounter Problems (Active)       Mobility       LTG - Patient will ambulate household distance of 60' x 1 at independent to mod I level with use of least restrictive device for improved mobility and reduced risk of falls.       Start:  12/31/23    Expected End:  01/14/24            LTG - Patient will navigate 4-6 steps with rails/device x 1 and close supervision for ability to safely enter/exit home.       Start:  12/31/23    Expected End:  01/14/24               Pain - Adult          Transfers       STG - Transfer from bed to chair at independent to modified independent level.        Start:  12/31/23    Expected End:  01/14/24            STG - Patient will perform bed mobility with complete independence.       Start:  12/31/23    Expected End:  01/14/24            STG - Patient will complete sit to stand transfers with independent to modified independence with use of L.R.D. for safe mobility within the hospital.        Start:  12/31/23    Expected End:  01/14/24                   Education Documentation  Precautions, taught by Vladimir Donald, PT at 12/31/2023  9:50 AM.  Learner: Patient  Readiness: Acceptance  Method: Explanation, Demonstration  Response: Needs Reinforcement    Body Mechanics, taught by Vladimir Donald, PT at 12/31/2023  9:50 AM.  Learner: Patient  Readiness: Acceptance  Method: Explanation, Demonstration  Response: Needs Reinforcement    Mobility Training, taught by Vladimir Donald, PT at 12/31/2023  9:50 AM.  Learner: Patient  Readiness: Acceptance  Method: Explanation, Demonstration  Response: Needs Reinforcement    Education Comments  No comments found.

## 2023-12-31 NOTE — CARE PLAN
The patient's goals for the shift include  have a bowel movement   The clinical goals for the shift include bowel improvement

## 2024-01-01 LAB
ALBUMIN SERPL-MCNC: 3.7 G/DL (ref 3.5–5)
ALP BLD-CCNC: 85 U/L (ref 35–125)
ALT SERPL-CCNC: 6 U/L (ref 5–40)
ANION GAP SERPL CALC-SCNC: 14 MMOL/L
AST SERPL-CCNC: 13 U/L (ref 5–40)
BILIRUB SERPL-MCNC: 0.4 MG/DL (ref 0.1–1.2)
BUN SERPL-MCNC: 9 MG/DL (ref 8–25)
CALCIUM SERPL-MCNC: 8.7 MG/DL (ref 8.5–10.4)
CHLORIDE SERPL-SCNC: 98 MMOL/L (ref 97–107)
CO2 SERPL-SCNC: 22 MMOL/L (ref 24–31)
CREAT SERPL-MCNC: 1 MG/DL (ref 0.4–1.6)
ERYTHROCYTE [DISTWIDTH] IN BLOOD BY AUTOMATED COUNT: 13.8 % (ref 11.5–14.5)
GFR SERPL CREATININE-BSD FRML MDRD: 57 ML/MIN/1.73M*2
GLUCOSE BLD MANUAL STRIP-MCNC: 100 MG/DL (ref 74–99)
GLUCOSE BLD MANUAL STRIP-MCNC: 112 MG/DL (ref 74–99)
GLUCOSE BLD MANUAL STRIP-MCNC: 172 MG/DL (ref 74–99)
GLUCOSE BLD MANUAL STRIP-MCNC: 99 MG/DL (ref 74–99)
GLUCOSE SERPL-MCNC: 109 MG/DL (ref 65–99)
HCT VFR BLD AUTO: 36.7 % (ref 36–46)
HGB BLD-MCNC: 11.4 G/DL (ref 12–16)
MCH RBC QN AUTO: 27.5 PG (ref 26–34)
MCHC RBC AUTO-ENTMCNC: 31.1 G/DL (ref 32–36)
MCV RBC AUTO: 89 FL (ref 80–100)
NRBC BLD-RTO: 0 /100 WBCS (ref 0–0)
PLATELET # BLD AUTO: 375 X10*3/UL (ref 150–450)
POTASSIUM SERPL-SCNC: 3 MMOL/L (ref 3.4–5.1)
PROT SERPL-MCNC: 6.3 G/DL (ref 5.9–7.9)
RBC # BLD AUTO: 4.14 X10*6/UL (ref 4–5.2)
SODIUM SERPL-SCNC: 134 MMOL/L (ref 133–145)
WBC # BLD AUTO: 10.6 X10*3/UL (ref 4.4–11.3)

## 2024-01-01 PROCEDURE — 2500000002 HC RX 250 W HCPCS SELF ADMINISTERED DRUGS (ALT 637 FOR MEDICARE OP, ALT 636 FOR OP/ED): Performed by: INTERNAL MEDICINE

## 2024-01-01 PROCEDURE — 2500000001 HC RX 250 WO HCPCS SELF ADMINISTERED DRUGS (ALT 637 FOR MEDICARE OP): Performed by: INTERNAL MEDICINE

## 2024-01-01 PROCEDURE — 36415 COLL VENOUS BLD VENIPUNCTURE: CPT | Performed by: INTERNAL MEDICINE

## 2024-01-01 PROCEDURE — 2500000004 HC RX 250 GENERAL PHARMACY W/ HCPCS (ALT 636 FOR OP/ED): Performed by: INTERNAL MEDICINE

## 2024-01-01 PROCEDURE — 2500000004 HC RX 250 GENERAL PHARMACY W/ HCPCS (ALT 636 FOR OP/ED): Performed by: SURGERY

## 2024-01-01 PROCEDURE — 85027 COMPLETE CBC AUTOMATED: CPT | Performed by: INTERNAL MEDICINE

## 2024-01-01 PROCEDURE — 99232 SBSQ HOSP IP/OBS MODERATE 35: CPT | Performed by: SURGERY

## 2024-01-01 PROCEDURE — 1200000002 HC GENERAL ROOM WITH TELEMETRY DAILY

## 2024-01-01 PROCEDURE — 80053 COMPREHEN METABOLIC PANEL: CPT | Performed by: INTERNAL MEDICINE

## 2024-01-01 PROCEDURE — 82947 ASSAY GLUCOSE BLOOD QUANT: CPT

## 2024-01-01 RX ORDER — POTASSIUM CHLORIDE 14.9 MG/ML
20 INJECTION INTRAVENOUS
Status: COMPLETED | OUTPATIENT
Start: 2024-01-01 | End: 2024-01-01

## 2024-01-01 RX ADMIN — OXYCODONE HYDROCHLORIDE 5 MG: 5 TABLET ORAL at 10:59

## 2024-01-01 RX ADMIN — IPRATROPIUM BROMIDE AND ALBUTEROL SULFATE 3 ML: 2.5; .5 SOLUTION RESPIRATORY (INHALATION) at 22:28

## 2024-01-01 RX ADMIN — POTASSIUM CHLORIDE 20 MEQ: 200 INJECTION, SOLUTION INTRAVENOUS at 15:34

## 2024-01-01 RX ADMIN — CEFOXITIN 2 G: 2 INJECTION, POWDER, FOR SOLUTION INTRAVENOUS at 12:00

## 2024-01-01 RX ADMIN — ACETAMINOPHEN 650 MG: 325 TABLET ORAL at 04:29

## 2024-01-01 RX ADMIN — ACETAMINOPHEN 650 MG: 325 TABLET ORAL at 16:18

## 2024-01-01 RX ADMIN — OXYCODONE HYDROCHLORIDE 5 MG: 5 TABLET ORAL at 18:00

## 2024-01-01 RX ADMIN — OXYCODONE HYDROCHLORIDE 5 MG: 5 TABLET ORAL at 00:53

## 2024-01-01 RX ADMIN — POTASSIUM CHLORIDE 20 MEQ: 200 INJECTION, SOLUTION INTRAVENOUS at 18:04

## 2024-01-01 RX ADMIN — CEFOXITIN 2 G: 2 INJECTION, POWDER, FOR SOLUTION INTRAVENOUS at 00:18

## 2024-01-01 RX ADMIN — HYDRALAZINE HYDROCHLORIDE 10 MG: 20 INJECTION INTRAMUSCULAR; INTRAVENOUS at 04:29

## 2024-01-01 RX ADMIN — ONDANSETRON 4 MG: 2 INJECTION INTRAMUSCULAR; INTRAVENOUS at 04:29

## 2024-01-01 RX ADMIN — CEFOXITIN 2 G: 2 INJECTION, POWDER, FOR SOLUTION INTRAVENOUS at 06:05

## 2024-01-01 ASSESSMENT — PAIN DESCRIPTION - ORIENTATION
ORIENTATION: LEFT;MID
ORIENTATION: UPPER

## 2024-01-01 ASSESSMENT — PAIN - FUNCTIONAL ASSESSMENT
PAIN_FUNCTIONAL_ASSESSMENT: 0-10

## 2024-01-01 ASSESSMENT — COGNITIVE AND FUNCTIONAL STATUS - GENERAL
WALKING IN HOSPITAL ROOM: A LITTLE
DAILY ACTIVITIY SCORE: 24
MOVING TO AND FROM BED TO CHAIR: A LITTLE
MOBILITY SCORE: 21
CLIMB 3 TO 5 STEPS WITH RAILING: A LITTLE

## 2024-01-01 ASSESSMENT — PAIN SCALES - GENERAL
PAINLEVEL_OUTOF10: 8
PAINLEVEL_OUTOF10: 8
PAINLEVEL_OUTOF10: 2
PAINLEVEL_OUTOF10: 0 - NO PAIN
PAINLEVEL_OUTOF10: 4

## 2024-01-01 ASSESSMENT — PAIN DESCRIPTION - LOCATION
LOCATION: ABDOMEN
LOCATION: ABDOMEN

## 2024-01-01 NOTE — CARE PLAN
The patient's goals for the shift include      The clinical goals for the shift include Pain management      Problem: Safety  Goal: Patient will be injury free during hospitalization  Outcome: Progressing  Goal: I will remain free of falls  Outcome: Progressing     Problem: Skin  Goal: Participates in plan/prevention/treatment measures  Outcome: Progressing  Goal: Prevent/minimize sheer/friction injuries  Outcome: Progressing  Goal: Promote/optimize nutrition  Outcome: Progressing     Problem: Pain  Goal: Takes deep breaths with improved pain control throughout the shift  Outcome: Progressing  Goal: Turns in bed with improved pain control throughout the shift  Outcome: Progressing  Goal: Walks with improved pain control throughout the shift  Outcome: Progressing  Goal: Performs ADL's with improved pain control throughout shift  Outcome: Progressing  Goal: Participates in PT with improved pain control throughout the shift  Outcome: Progressing  Goal: Free from opioid side effects throughout the shift  Outcome: Progressing  Goal: Free from acute confusion related to pain meds throughout the shift  Outcome: Progressing     Problem: Pain - Adult  Goal: Verbalizes/displays adequate comfort level or baseline comfort level  Outcome: Progressing     Problem: Safety - Adult  Goal: Free from fall injury  Outcome: Progressing     Problem: Discharge Planning  Goal: Discharge to home or other facility with appropriate resources  Outcome: Progressing     Problem: Chronic Conditions and Co-morbidities  Goal: Patient's chronic conditions and co-morbidity symptoms are monitored and maintained or improved  Outcome: Progressing     Problem: Diabetes  Goal: Achieve decreasing blood glucose levels by end of shift  Outcome: Progressing  Goal: Increase stability of blood glucose readings by end of shift  Outcome: Progressing  Goal: Decrease in ketones present in urine by end of shift  Outcome: Progressing  Goal: Maintain electrolyte  levels within acceptable range throughout shift  Outcome: Progressing  Goal: Maintain glucose levels >70mg/dl to <250mg/dl throughout shift  Outcome: Progressing  Goal: No changes in neurological exam by end of shift  Outcome: Progressing  Goal: Learn about and adhere to nutrition recommendations by end of shift  Outcome: Progressing  Goal: Vital signs within normal range for age by end of shift  Outcome: Progressing  Goal: Increase self care and/or family involovement by end of shift  Outcome: Progressing  Goal: Receive DSME education by end of shift  Outcome: Progressing

## 2024-01-01 NOTE — PROGRESS NOTES
Occupational Therapy                 Therapy Communication Note    Patient Name: Marylou Ward  MRN: [id number]  Today's Date: 1/1/2024     Discipline: Occupational Therapy    Missed Visit Reason: Patient refused (Pt reporting increased abdominal pain today and is scheduled for a juan alberto tomorrow.)    Missed Time: Attempt

## 2024-01-01 NOTE — CARE PLAN
The patient's goals for the shift include  pain control    The clinical goals for the shift include Pain management

## 2024-01-01 NOTE — PROGRESS NOTES
Today, patient is complaining of right upper quadrant and epigastric pain again, rather than left-sided pain that she is complaining of yesterday.  No nausea or vomiting, but no appetite either.    Patient denies fevers or chills. Patient denies chest pain or shortness of breath. Patient denies nausea, vomiting, diarrhea, or constipation. Patient denies melena or hematochezia. Patient denies dysuria or hematuria. Patient denies back pain or joint pain.    Patient does not have any pertinent updates to past medical history, family history, or social history.    /60 (BP Location: Left arm, Patient Position: Lying)   Pulse 97   Temp 36.6 °C (97.9 °F) (Oral)   Resp 18   Ht 1.524 m (5')   Wt 61.2 kg (135 lb)   SpO2 95%   BMI 26.37 kg/m²     On physical exam patient is awake alert and oriented to person, place, and time. Non-focal exam.  Patient head is normocephalic and atraumatic. Mucous Membranes are moist.  Patient's extraocular motions are intact.   The trachea is midline and there is no jugular venous distention.   There is equal chest rise bilaterally and the heart rate is regular.   The abdomen is soft nontender nondistended without any rebound or guarding. Patient moves all extremities.   There is no clubbing, cyanosis, or edema.  No injury or deformity of the extremities.  Skin is clean, dry, intact.    Results for orders placed or performed during the hospital encounter of 12/28/23 (from the past 24 hour(s))   POCT GLUCOSE   Result Value Ref Range    POCT Glucose 104 (H) 74 - 99 mg/dL   POCT GLUCOSE   Result Value Ref Range    POCT Glucose 120 (H) 74 - 99 mg/dL   POCT GLUCOSE   Result Value Ref Range    POCT Glucose 110 (H) 74 - 99 mg/dL   CBC   Result Value Ref Range    WBC 10.6 4.4 - 11.3 x10*3/uL    nRBC 0.0 0.0 - 0.0 /100 WBCs    RBC 4.14 4.00 - 5.20 x10*6/uL    Hemoglobin 11.4 (L) 12.0 - 16.0 g/dL    Hematocrit 36.7 36.0 - 46.0 %    MCV 89 80 - 100 fL    MCH 27.5 26.0 - 34.0 pg    MCHC 31.1  (L) 32.0 - 36.0 g/dL    RDW 13.8 11.5 - 14.5 %    Platelets 375 150 - 450 x10*3/uL   Comprehensive Metabolic Panel   Result Value Ref Range    Glucose 109 (H) 65 - 99 mg/dL    Sodium 134 133 - 145 mmol/L    Potassium 3.0 (L) 3.4 - 5.1 mmol/L    Chloride 98 97 - 107 mmol/L    Bicarbonate 22 (L) 24 - 31 mmol/L    Urea Nitrogen 9 8 - 25 mg/dL    Creatinine 1.00 0.40 - 1.60 mg/dL    eGFR 57 (L) >60 mL/min/1.73m*2    Calcium 8.7 8.5 - 10.4 mg/dL    Albumin 3.7 3.5 - 5.0 g/dL    Alkaline Phosphatase 85 35 - 125 U/L    Total Protein 6.3 5.9 - 7.9 g/dL    AST 13 5 - 40 U/L    Bilirubin, Total 0.4 0.1 - 1.2 mg/dL    ALT 6 5 - 40 U/L    Anion Gap 14 <=19 mmol/L   POCT GLUCOSE   Result Value Ref Range    POCT Glucose 100 (H) 74 - 99 mg/dL     A/P chronic cholecystitis    Patient's white blood cell count has normalized again today, however she and her nephew are insistent on having her gallbladder out prior to discharge.  I do not have a time yet for tomorrow, but I am hopeful for early afternoon.  Nothing to eat or drink after midnight tonight.

## 2024-01-01 NOTE — PROGRESS NOTES
Marylou Ward is a 78 y.o. female on day 4 of admission presenting with Acute vomiting.      Subjective   Patient still has nausea and vomiting  Per staff her blood pressure is little higher with dose episodes  Surgery input appreciated    Objective     Last Recorded Vitals  /60 (BP Location: Left arm, Patient Position: Lying)   Pulse 97   Temp 36.6 °C (97.9 °F) (Oral)   Resp 18   Wt 61.2 kg (135 lb)   SpO2 95%   Intake/Output last 3 Shifts:    Intake/Output Summary (Last 24 hours) at 1/1/2024 1527  Last data filed at 1/1/2024 0605  Gross per 24 hour   Intake 1215 ml   Output --   Net 1215 ml         Admission Weight  Weight: 61.2 kg (135 lb) (12/28/23 2100)    Daily Weight  12/28/23 : 61.2 kg (135 lb)    Image Results  NM hepatobiliary w cholecystokinin  Narrative: Interpreted By:  Molly Loyola,   STUDY:  NM HEPATOBILIARY W CHOLECYSTOKININ;  12/29/2023 3:32 pm      INDICATION:  Signs/Symptoms:Ch abd pain, R UQ tenderness/pain, Nausea.      COMPARISON:  None.      ACCESSION NUMBER(S):  [id number]      ORDERING CLINICIAN:  LASHONDA MAY      TECHNIQUE:  DIVISION OF NUCLEAR MEDICINE  HEPATOBILIARY SCAN (HIDA), QUANTITATIVE      The patient received an intravenous dose of  5.0 mCi of Tc-99m  mebrofenin (Choletec).  Sequential images of the upper abdomen were  then acquired over the next 60 minutes.  An intravenous infusion of  the cholecystokinin (CCK) analogue, Sincalide; was then administered  followed by an additional period of imaging.  Computer quantification  of gallbladder emptying was then performed.      FINDINGS:  There is normal uptake of radiotracer by the liver with excretion  into the biliary system. Gallbladder activity is demonstrated  following 45 minutes. Small bowel activity is demonstrated following  90 minutes. There is persistent visualization of the common bile duct  throughout the examination. Correlate with concern for dysfunction at  the level of the sphincter of Oddi.       Gallbladder ejection fraction is calculated at 14% (normal greater  than 35%).          Impression: 1. Decreased gallbladder ejection fraction of 14%. Correlate with  patient's history including concern for chronic cholecystitis.      2. Mild delayed visualization of the small bowel activity  nonspecific. However, there is persistent visualization of activity  within the common bile duct. This may reflect component of  dysfunction at the level of the sphincter of Oddi.                      MACRO:  None      Signed by: Molly Loyola 12/29/2023 4:06 PM  Dictation workstation:   [id number]      Physical Exam  GENERAL: awake, alert, Ox3, cooperative resting comfortably  SKIN: Skin turgor normal. No rashes  HEENT: no epistaxis, Moist mucosa.  NECK: supple  BACK: spine nontender to palpation, No CVAT.  LUNGS: Vesicular breath sounds, with no wheeze, no crepitations.   CARDIAC: REGULAR. S1 and S2; no rubs, no murmur  ABDOMEN: Abdomen soft, right upper quadrant and epigastric tenderness  EXTREMITIES: No edema, Good capillary refill.   NEURO: Insight GOOD. Motor and sensory exam wnl. No invol movements. No ataxia.  WOUND:   MUSCULOSKELETAL: No acute inflammation    Relevant Results  Results for orders placed or performed during the hospital encounter of 12/28/23 (from the past 24 hour(s))   POCT GLUCOSE   Result Value Ref Range    POCT Glucose 120 (H) 74 - 99 mg/dL   POCT GLUCOSE   Result Value Ref Range    POCT Glucose 110 (H) 74 - 99 mg/dL   CBC   Result Value Ref Range    WBC 10.6 4.4 - 11.3 x10*3/uL    nRBC 0.0 0.0 - 0.0 /100 WBCs    RBC 4.14 4.00 - 5.20 x10*6/uL    Hemoglobin 11.4 (L) 12.0 - 16.0 g/dL    Hematocrit 36.7 36.0 - 46.0 %    MCV 89 80 - 100 fL    MCH 27.5 26.0 - 34.0 pg    MCHC 31.1 (L) 32.0 - 36.0 g/dL    RDW 13.8 11.5 - 14.5 %    Platelets 375 150 - 450 x10*3/uL   Comprehensive Metabolic Panel   Result Value Ref Range    Glucose 109 (H) 65 - 99 mg/dL    Sodium 134 133 - 145 mmol/L    Potassium 3.0  (L) 3.4 - 5.1 mmol/L    Chloride 98 97 - 107 mmol/L    Bicarbonate 22 (L) 24 - 31 mmol/L    Urea Nitrogen 9 8 - 25 mg/dL    Creatinine 1.00 0.40 - 1.60 mg/dL    eGFR 57 (L) >60 mL/min/1.73m*2    Calcium 8.7 8.5 - 10.4 mg/dL    Albumin 3.7 3.5 - 5.0 g/dL    Alkaline Phosphatase 85 35 - 125 U/L    Total Protein 6.3 5.9 - 7.9 g/dL    AST 13 5 - 40 U/L    Bilirubin, Total 0.4 0.1 - 1.2 mg/dL    ALT 6 5 - 40 U/L    Anion Gap 14 <=19 mmol/L   POCT GLUCOSE   Result Value Ref Range    POCT Glucose 100 (H) 74 - 99 mg/dL   POCT GLUCOSE   Result Value Ref Range    POCT Glucose 112 (H) 74 - 99 mg/dL    Scheduled medications  cefOXitin, 2 g, intravenous, q6h  enoxaparin, 40 mg, subcutaneous, Nightly  insulin lispro, 0-5 Units, subcutaneous, TID with meals  polyethylene glycol, 17 g, oral, Daily      Continuous medications  sodium chloride 0.9%, 75 mL/hr, Last Rate: 75 mL/hr (12/31/23 2250)      PRN medications  PRN medications: acetaminophen, ALPRAZolam, bisacodyl, dextromethorphan-guaifenesin, dextrose 10 % in water (D10W), dextrose, diphenhydrAMINE, glucagon, hydrALAZINE, HYDROmorphone, ipratropium-albuteroL, melatonin, ondansetron, oxyCODONE         Assessment/Plan   # Chronic cholecystitis   - abdominal pain/nausea/vomiting  -Pain control  -IV fluids  -Will need cholecystectomy, timing to be decided by general surgery     # Cholelithiasis  -No CT evidence of cholecystitis  -HIDA scan ordered  -Surgery consulted     # Hypertension  -Continue home meds     # Diabetes  -Insulin sliding scale     # History of PE  -On Eliquis       12/30-patient continues to have recurrent vomiting and abdominal pain surgery input discussed with patient and family.  They do not want patient discharged, prefer cholecystectomy to be done as inpatient, will communicate same   General surgery.    12/31-surgery input noted, surgery being planned for early next week, discussed with patient's POA yesterday, he also prefers surgery to be done.  Patient  continues to have pain, today her pain is left upper quadrant also.  She has long history of chronic pain, is being followed by pain management.    1/1-discussed with patient and POA in the room, patient to get laparoscopic cholecystectomy tomorrow, will need to evaluate for the right adnexal mass which is known for the last few months as an outpatient through GYN, for which patient had a appointment but was not able to keep up.     Cuca Mullen MD

## 2024-01-02 ENCOUNTER — ANESTHESIA (OUTPATIENT)
Dept: OPERATING ROOM | Facility: HOSPITAL | Age: 79
DRG: 444 | End: 2024-01-02
Payer: MEDICARE

## 2024-01-02 ENCOUNTER — APPOINTMENT (OUTPATIENT)
Dept: RADIOLOGY | Facility: HOSPITAL | Age: 79
DRG: 444 | End: 2024-01-02
Payer: MEDICARE

## 2024-01-02 ENCOUNTER — ANESTHESIA EVENT (OUTPATIENT)
Dept: OPERATING ROOM | Facility: HOSPITAL | Age: 79
DRG: 444 | End: 2024-01-02
Payer: MEDICARE

## 2024-01-02 PROBLEM — K21.9 GASTROESOPHAGEAL REFLUX DISEASE: Status: ACTIVE | Noted: 2024-01-02

## 2024-01-02 PROBLEM — J44.9 CHRONIC OBSTRUCTIVE PULMONARY DISEASE (MULTI): Status: ACTIVE | Noted: 2024-01-02

## 2024-01-02 PROBLEM — I10 HTN (HYPERTENSION): Status: ACTIVE | Noted: 2024-01-02

## 2024-01-02 PROBLEM — E11.9 DIABETES MELLITUS, TYPE 2 (MULTI): Status: ACTIVE | Noted: 2024-01-02

## 2024-01-02 LAB
ALBUMIN SERPL-MCNC: 3.6 G/DL (ref 3.5–5)
ALP BLD-CCNC: 132 U/L (ref 35–125)
ALT SERPL-CCNC: 70 U/L (ref 5–40)
ANION GAP SERPL CALC-SCNC: 13 MMOL/L
AST SERPL-CCNC: 92 U/L (ref 5–40)
BILIRUB SERPL-MCNC: 0.4 MG/DL (ref 0.1–1.2)
BUN SERPL-MCNC: 8 MG/DL (ref 8–25)
CALCIUM SERPL-MCNC: 8.7 MG/DL (ref 8.5–10.4)
CHLORIDE SERPL-SCNC: 99 MMOL/L (ref 97–107)
CO2 SERPL-SCNC: 22 MMOL/L (ref 24–31)
CREAT SERPL-MCNC: 1 MG/DL (ref 0.4–1.6)
ERYTHROCYTE [DISTWIDTH] IN BLOOD BY AUTOMATED COUNT: 13.8 % (ref 11.5–14.5)
GFR SERPL CREATININE-BSD FRML MDRD: 57 ML/MIN/1.73M*2
GLUCOSE BLD MANUAL STRIP-MCNC: 110 MG/DL (ref 74–99)
GLUCOSE BLD MANUAL STRIP-MCNC: 116 MG/DL (ref 74–99)
GLUCOSE BLD MANUAL STRIP-MCNC: 159 MG/DL (ref 74–99)
GLUCOSE BLD MANUAL STRIP-MCNC: 171 MG/DL (ref 74–99)
GLUCOSE BLD MANUAL STRIP-MCNC: 184 MG/DL (ref 74–99)
GLUCOSE SERPL-MCNC: 118 MG/DL (ref 65–99)
HCT VFR BLD AUTO: 32.4 % (ref 36–46)
HGB BLD-MCNC: 10.5 G/DL (ref 12–16)
MCH RBC QN AUTO: 27.5 PG (ref 26–34)
MCHC RBC AUTO-ENTMCNC: 32.4 G/DL (ref 32–36)
MCV RBC AUTO: 85 FL (ref 80–100)
NRBC BLD-RTO: 0 /100 WBCS (ref 0–0)
PLATELET # BLD AUTO: 229 X10*3/UL (ref 150–450)
POTASSIUM SERPL-SCNC: 3.4 MMOL/L (ref 3.4–5.1)
PROT SERPL-MCNC: 6.2 G/DL (ref 5.9–7.9)
RBC # BLD AUTO: 3.82 X10*6/UL (ref 4–5.2)
SODIUM SERPL-SCNC: 134 MMOL/L (ref 133–145)
WBC # BLD AUTO: 10.9 X10*3/UL (ref 4.4–11.3)

## 2024-01-02 PROCEDURE — 7100000002 HC RECOVERY ROOM TIME - EACH INCREMENTAL 1 MINUTE: Performed by: SURGERY

## 2024-01-02 PROCEDURE — 99100 ANES PT EXTEME AGE<1 YR&>70: CPT | Performed by: ANESTHESIOLOGY

## 2024-01-02 PROCEDURE — 2500000004 HC RX 250 GENERAL PHARMACY W/ HCPCS (ALT 636 FOR OP/ED): Performed by: SURGERY

## 2024-01-02 PROCEDURE — 0CJS8ZZ INSPECTION OF LARYNX, VIA NATURAL OR ARTIFICIAL OPENING ENDOSCOPIC: ICD-10-PCS | Performed by: OTOLARYNGOLOGY

## 2024-01-02 PROCEDURE — 71045 X-RAY EXAM CHEST 1 VIEW: CPT

## 2024-01-02 PROCEDURE — 2500000004 HC RX 250 GENERAL PHARMACY W/ HCPCS (ALT 636 FOR OP/ED): Performed by: INTERNAL MEDICINE

## 2024-01-02 PROCEDURE — 2020000001 HC ICU ROOM DAILY

## 2024-01-02 PROCEDURE — 2500000004 HC RX 250 GENERAL PHARMACY W/ HCPCS (ALT 636 FOR OP/ED): Performed by: STUDENT IN AN ORGANIZED HEALTH CARE EDUCATION/TRAINING PROGRAM

## 2024-01-02 PROCEDURE — 2500000005 HC RX 250 GENERAL PHARMACY W/O HCPCS: Performed by: STUDENT IN AN ORGANIZED HEALTH CARE EDUCATION/TRAINING PROGRAM

## 2024-01-02 PROCEDURE — 2500000002 HC RX 250 W HCPCS SELF ADMINISTERED DRUGS (ALT 637 FOR MEDICARE OP, ALT 636 FOR OP/ED): Performed by: NURSE ANESTHETIST, CERTIFIED REGISTERED

## 2024-01-02 PROCEDURE — 97165 OT EVAL LOW COMPLEX 30 MIN: CPT | Mod: GO

## 2024-01-02 PROCEDURE — 3600000008 HC OR TIME - EACH INCREMENTAL 1 MINUTE - PROCEDURE LEVEL THREE: Performed by: SURGERY

## 2024-01-02 PROCEDURE — 80053 COMPREHEN METABOLIC PANEL: CPT | Performed by: INTERNAL MEDICINE

## 2024-01-02 PROCEDURE — A4550 SURGICAL TRAYS: HCPCS | Performed by: SURGERY

## 2024-01-02 PROCEDURE — 82947 ASSAY GLUCOSE BLOOD QUANT: CPT

## 2024-01-02 PROCEDURE — 3700000002 HC GENERAL ANESTHESIA TIME - EACH INCREMENTAL 1 MINUTE: Performed by: SURGERY

## 2024-01-02 PROCEDURE — 2720000007 HC OR 272 NO HCPCS: Performed by: SURGERY

## 2024-01-02 PROCEDURE — 85027 COMPLETE CBC AUTOMATED: CPT | Performed by: INTERNAL MEDICINE

## 2024-01-02 PROCEDURE — 2780000003 HC OR 278 NO HCPCS

## 2024-01-02 PROCEDURE — 7100000001 HC RECOVERY ROOM TIME - INITIAL BASE CHARGE: Performed by: SURGERY

## 2024-01-02 PROCEDURE — 36569 INSJ PICC 5 YR+ W/O IMAGING: CPT

## 2024-01-02 PROCEDURE — 94761 N-INVAS EAR/PLS OXIMETRY MLT: CPT | Performed by: NURSE ANESTHETIST, CERTIFIED REGISTERED

## 2024-01-02 PROCEDURE — 99221 1ST HOSP IP/OBS SF/LOW 40: CPT | Performed by: OTOLARYNGOLOGY

## 2024-01-02 PROCEDURE — 3600000003 HC OR TIME - INITIAL BASE CHARGE - PROCEDURE LEVEL THREE: Performed by: SURGERY

## 2024-01-02 PROCEDURE — 3700000001 HC GENERAL ANESTHESIA TIME - INITIAL BASE CHARGE: Performed by: SURGERY

## 2024-01-02 PROCEDURE — 2500000004 HC RX 250 GENERAL PHARMACY W/ HCPCS (ALT 636 FOR OP/ED): Performed by: NURSE ANESTHETIST, CERTIFIED REGISTERED

## 2024-01-02 PROCEDURE — 2500000005 HC RX 250 GENERAL PHARMACY W/O HCPCS: Performed by: ANESTHESIOLOGY

## 2024-01-02 PROCEDURE — A47562 PR LAP,CHOLECYSTECTOMY: Performed by: NURSE ANESTHETIST, CERTIFIED REGISTERED

## 2024-01-02 PROCEDURE — 47563 LAPARO CHOLECYSTECTOMY/GRAPH: CPT | Performed by: SURGERY

## 2024-01-02 PROCEDURE — 5A09557 ASSISTANCE WITH RESPIRATORY VENTILATION, GREATER THAN 96 CONSECUTIVE HOURS, CONTINUOUS POSITIVE AIRWAY PRESSURE: ICD-10-PCS | Performed by: INTERNAL MEDICINE

## 2024-01-02 PROCEDURE — C1751 CATH, INF, PER/CENT/MIDLINE: HCPCS

## 2024-01-02 PROCEDURE — 36415 COLL VENOUS BLD VENIPUNCTURE: CPT | Performed by: INTERNAL MEDICINE

## 2024-01-02 PROCEDURE — 2500000001 HC RX 250 WO HCPCS SELF ADMINISTERED DRUGS (ALT 637 FOR MEDICARE OP): Performed by: INTERNAL MEDICINE

## 2024-01-02 PROCEDURE — A47562 PR LAP,CHOLECYSTECTOMY: Performed by: ANESTHESIOLOGY

## 2024-01-02 PROCEDURE — 2500000005 HC RX 250 GENERAL PHARMACY W/O HCPCS: Performed by: INTERNAL MEDICINE

## 2024-01-02 PROCEDURE — 94660 CPAP INITIATION&MGMT: CPT

## 2024-01-02 PROCEDURE — 99223 1ST HOSP IP/OBS HIGH 75: CPT | Performed by: OTOLARYNGOLOGY

## 2024-01-02 PROCEDURE — 2500000005 HC RX 250 GENERAL PHARMACY W/O HCPCS: Performed by: NURSE ANESTHETIST, CERTIFIED REGISTERED

## 2024-01-02 PROCEDURE — 96372 THER/PROPH/DIAG INJ SC/IM: CPT | Performed by: SURGERY

## 2024-01-02 RX ORDER — PROPOFOL 10 MG/ML
INJECTION, EMULSION INTRAVENOUS AS NEEDED
Status: DISCONTINUED | OUTPATIENT
Start: 2024-01-02 | End: 2024-01-02

## 2024-01-02 RX ORDER — FLUMAZENIL 0.1 MG/ML
INJECTION INTRAVENOUS AS NEEDED
Status: DISCONTINUED | OUTPATIENT
Start: 2024-01-02 | End: 2024-01-02

## 2024-01-02 RX ORDER — ALBUTEROL SULFATE 0.83 MG/ML
2.5 SOLUTION RESPIRATORY (INHALATION) EVERY 30 MIN PRN
Status: DISCONTINUED | OUTPATIENT
Start: 2024-01-02 | End: 2024-01-02 | Stop reason: HOSPADM

## 2024-01-02 RX ORDER — DEXAMETHASONE SODIUM PHOSPHATE 4 MG/ML
INJECTION, SOLUTION INTRA-ARTICULAR; INTRALESIONAL; INTRAMUSCULAR; INTRAVENOUS; SOFT TISSUE AS NEEDED
Status: DISCONTINUED | OUTPATIENT
Start: 2024-01-02 | End: 2024-01-02

## 2024-01-02 RX ORDER — ONDANSETRON HYDROCHLORIDE 2 MG/ML
4 INJECTION, SOLUTION INTRAVENOUS ONCE AS NEEDED
Status: DISCONTINUED | OUTPATIENT
Start: 2024-01-02 | End: 2024-01-02 | Stop reason: HOSPADM

## 2024-01-02 RX ORDER — MIDAZOLAM HYDROCHLORIDE 1 MG/ML
INJECTION, SOLUTION INTRAMUSCULAR; INTRAVENOUS AS NEEDED
Status: DISCONTINUED | OUTPATIENT
Start: 2024-01-02 | End: 2024-01-02

## 2024-01-02 RX ORDER — LIDOCAINE HYDROCHLORIDE 20 MG/ML
INJECTION, SOLUTION INFILTRATION; PERINEURAL AS NEEDED
Status: DISCONTINUED | OUTPATIENT
Start: 2024-01-02 | End: 2024-01-02

## 2024-01-02 RX ORDER — LABETALOL HYDROCHLORIDE 5 MG/ML
INJECTION, SOLUTION INTRAVENOUS AS NEEDED
Status: DISCONTINUED | OUTPATIENT
Start: 2024-01-02 | End: 2024-01-02

## 2024-01-02 RX ORDER — NALOXONE HYDROCHLORIDE 0.4 MG/ML
INJECTION, SOLUTION INTRAMUSCULAR; INTRAVENOUS; SUBCUTANEOUS AS NEEDED
Status: DISCONTINUED | OUTPATIENT
Start: 2024-01-02 | End: 2024-01-02

## 2024-01-02 RX ORDER — FENTANYL CITRATE 50 UG/ML
25 INJECTION, SOLUTION INTRAMUSCULAR; INTRAVENOUS EVERY 5 MIN PRN
Status: DISCONTINUED | OUTPATIENT
Start: 2024-01-02 | End: 2024-01-02 | Stop reason: HOSPADM

## 2024-01-02 RX ORDER — FENTANYL CITRATE 50 UG/ML
50 INJECTION, SOLUTION INTRAMUSCULAR; INTRAVENOUS EVERY 5 MIN PRN
Status: DISCONTINUED | OUTPATIENT
Start: 2024-01-02 | End: 2024-01-02 | Stop reason: HOSPADM

## 2024-01-02 RX ORDER — ALBUTEROL SULFATE 90 UG/1
AEROSOL, METERED RESPIRATORY (INHALATION) AS NEEDED
Status: DISCONTINUED | OUTPATIENT
Start: 2024-01-02 | End: 2024-01-02

## 2024-01-02 RX ORDER — SODIUM CHLORIDE, SODIUM LACTATE, POTASSIUM CHLORIDE, CALCIUM CHLORIDE 600; 310; 30; 20 MG/100ML; MG/100ML; MG/100ML; MG/100ML
40 INJECTION, SOLUTION INTRAVENOUS CONTINUOUS
Status: DISCONTINUED | OUTPATIENT
Start: 2024-01-02 | End: 2024-01-02 | Stop reason: HOSPADM

## 2024-01-02 RX ORDER — IPRATROPIUM BROMIDE 0.5 MG/2.5ML
500 SOLUTION RESPIRATORY (INHALATION) EVERY 30 MIN PRN
Status: DISCONTINUED | OUTPATIENT
Start: 2024-01-02 | End: 2024-01-02 | Stop reason: HOSPADM

## 2024-01-02 RX ORDER — LABETALOL HYDROCHLORIDE 5 MG/ML
5 INJECTION, SOLUTION INTRAVENOUS EVERY 5 MIN PRN
Status: DISCONTINUED | OUTPATIENT
Start: 2024-01-02 | End: 2024-01-02 | Stop reason: HOSPADM

## 2024-01-02 RX ORDER — FENTANYL CITRATE 50 UG/ML
INJECTION, SOLUTION INTRAMUSCULAR; INTRAVENOUS AS NEEDED
Status: DISCONTINUED | OUTPATIENT
Start: 2024-01-02 | End: 2024-01-02

## 2024-01-02 RX ORDER — LIDOCAINE HYDROCHLORIDE 10 MG/ML
5 INJECTION INFILTRATION; PERINEURAL ONCE
Status: COMPLETED | OUTPATIENT
Start: 2024-01-02 | End: 2024-01-02

## 2024-01-02 RX ADMIN — Medication 6 MG: at 00:53

## 2024-01-02 RX ADMIN — ALBUTEROL SULFATE 2 PUFF: 90 AEROSOL, METERED RESPIRATORY (INHALATION) at 13:27

## 2024-01-02 RX ADMIN — HYDROMORPHONE HYDROCHLORIDE 0.6 MG: 1 INJECTION, SOLUTION INTRAMUSCULAR; INTRAVENOUS; SUBCUTANEOUS at 18:15

## 2024-01-02 RX ADMIN — LABETALOL HYDROCHLORIDE 5 MG: 5 INJECTION, SOLUTION INTRAVENOUS at 14:06

## 2024-01-02 RX ADMIN — FENTANYL CITRATE 50 MCG: 0.05 INJECTION, SOLUTION INTRAMUSCULAR; INTRAVENOUS at 12:36

## 2024-01-02 RX ADMIN — SODIUM CHLORIDE 75 ML/HR: 900 INJECTION, SOLUTION INTRAVENOUS at 17:15

## 2024-01-02 RX ADMIN — Medication: at 13:45

## 2024-01-02 RX ADMIN — DIPHENHYDRAMINE HYDROCHLORIDE AND LIDOCAINE HYDROCHLORIDE AND ALUMINUM HYDROXIDE AND MAGNESIUM HYDRO 10 ML: KIT at 21:48

## 2024-01-02 RX ADMIN — LABETALOL HYDROCHLORIDE 7.5 MG: 5 INJECTION, SOLUTION INTRAVENOUS at 12:57

## 2024-01-02 RX ADMIN — OXYCODONE HYDROCHLORIDE 5 MG: 5 TABLET ORAL at 08:23

## 2024-01-02 RX ADMIN — OXYCODONE HYDROCHLORIDE 5 MG: 5 TABLET ORAL at 00:03

## 2024-01-02 RX ADMIN — NALOXONE HYDROCHLORIDE 0.4 MG: 0.4 INJECTION, SOLUTION INTRAMUSCULAR; INTRAVENOUS; SUBCUTANEOUS at 13:09

## 2024-01-02 RX ADMIN — HYDRALAZINE HYDROCHLORIDE 10 MG: 20 INJECTION INTRAMUSCULAR; INTRAVENOUS at 14:23

## 2024-01-02 RX ADMIN — DEXAMETHASONE SODIUM PHOSPHATE 12 MG: 4 INJECTION, SOLUTION INTRA-ARTICULAR; INTRALESIONAL; INTRAMUSCULAR; INTRAVENOUS; SOFT TISSUE at 12:51

## 2024-01-02 RX ADMIN — ONDANSETRON 4 MG: 2 INJECTION INTRAMUSCULAR; INTRAVENOUS at 00:48

## 2024-01-02 RX ADMIN — HYDROMORPHONE HYDROCHLORIDE 0.6 MG: 1 INJECTION, SOLUTION INTRAMUSCULAR; INTRAVENOUS; SUBCUTANEOUS at 00:46

## 2024-01-02 RX ADMIN — LIDOCAINE HYDROCHLORIDE 2 ML: 20 INJECTION, SOLUTION INFILTRATION; PERINEURAL at 12:36

## 2024-01-02 RX ADMIN — FLUMAZENIL 0.2 MG: 0.1 INJECTION, SOLUTION INTRAVENOUS at 13:25

## 2024-01-02 RX ADMIN — HYDRALAZINE HYDROCHLORIDE 10 MG: 20 INJECTION INTRAMUSCULAR; INTRAVENOUS at 19:52

## 2024-01-02 RX ADMIN — HYDROMORPHONE HYDROCHLORIDE 0.6 MG: 1 INJECTION, SOLUTION INTRAMUSCULAR; INTRAVENOUS; SUBCUTANEOUS at 21:19

## 2024-01-02 RX ADMIN — LABETALOL HYDROCHLORIDE 5 MG: 5 INJECTION, SOLUTION INTRAVENOUS at 13:47

## 2024-01-02 RX ADMIN — MIDAZOLAM 2 MG: 1 INJECTION INTRAMUSCULAR; INTRAVENOUS at 12:45

## 2024-01-02 RX ADMIN — LABETALOL HYDROCHLORIDE 7.5 MG: 5 INJECTION, SOLUTION INTRAVENOUS at 13:07

## 2024-01-02 RX ADMIN — CEFOXITIN 2 G: 2 INJECTION, POWDER, FOR SOLUTION INTRAVENOUS at 14:30

## 2024-01-02 RX ADMIN — ONDANSETRON 4 MG: 2 INJECTION INTRAMUSCULAR; INTRAVENOUS at 08:23

## 2024-01-02 RX ADMIN — CEFOXITIN 2 G: 2 INJECTION, POWDER, FOR SOLUTION INTRAVENOUS at 06:23

## 2024-01-02 RX ADMIN — SODIUM CHLORIDE, POTASSIUM CHLORIDE, SODIUM LACTATE AND CALCIUM CHLORIDE: 600; 310; 30; 20 INJECTION, SOLUTION INTRAVENOUS at 12:31

## 2024-01-02 RX ADMIN — LIDOCAINE HYDROCHLORIDE 50 MG: 10 INJECTION, SOLUTION INFILTRATION; PERINEURAL at 16:15

## 2024-01-02 RX ADMIN — CEFOXITIN 2 G: 2 INJECTION, POWDER, FOR SOLUTION INTRAVENOUS at 19:48

## 2024-01-02 RX ADMIN — PROPOFOL 150 MG: 10 INJECTION, EMULSION INTRAVENOUS at 12:36

## 2024-01-02 RX ADMIN — CEFOXITIN 2 G: 2 INJECTION, POWDER, FOR SOLUTION INTRAVENOUS at 00:58

## 2024-01-02 RX ADMIN — ENOXAPARIN SODIUM 40 MG: 40 INJECTION SUBCUTANEOUS at 20:02

## 2024-01-02 SDOH — HEALTH STABILITY: MENTAL HEALTH: CURRENT SMOKER: 0

## 2024-01-02 ASSESSMENT — COGNITIVE AND FUNCTIONAL STATUS - GENERAL
PERSONAL GROOMING: A LITTLE
TOILETING: A LITTLE
HELP NEEDED FOR BATHING: A LITTLE
TOILETING: A LITTLE
MOBILITY SCORE: 21
DRESSING REGULAR UPPER BODY CLOTHING: A LITTLE
DRESSING REGULAR LOWER BODY CLOTHING: A LITTLE
WALKING IN HOSPITAL ROOM: A LITTLE
EATING MEALS: A LITTLE
DAILY ACTIVITIY SCORE: 18
MOVING TO AND FROM BED TO CHAIR: A LITTLE
DRESSING REGULAR UPPER BODY CLOTHING: A LITTLE
CLIMB 3 TO 5 STEPS WITH RAILING: A LITTLE
EATING MEALS: A LITTLE
DAILY ACTIVITIY SCORE: 18
PERSONAL GROOMING: A LITTLE
HELP NEEDED FOR BATHING: A LITTLE
DRESSING REGULAR LOWER BODY CLOTHING: A LITTLE

## 2024-01-02 ASSESSMENT — PAIN SCALES - GENERAL
PAINLEVEL_OUTOF10: 8
PAINLEVEL_OUTOF10: 10 - WORST POSSIBLE PAIN
PAINLEVEL_OUTOF10: 3
PAINLEVEL_OUTOF10: 8
PAINLEVEL_OUTOF10: 3
PAINLEVEL_OUTOF10: 9
PAINLEVEL_OUTOF10: 10 - WORST POSSIBLE PAIN
PAINLEVEL_OUTOF10: 5 - MODERATE PAIN
PAINLEVEL_OUTOF10: 9
PAINLEVEL_OUTOF10: 10 - WORST POSSIBLE PAIN
PAINLEVEL_OUTOF10: 4
PAINLEVEL_OUTOF10: 3
PAINLEVEL_OUTOF10: 9

## 2024-01-02 ASSESSMENT — PAIN DESCRIPTION - DESCRIPTORS
DESCRIPTORS: THROBBING

## 2024-01-02 ASSESSMENT — PAIN - FUNCTIONAL ASSESSMENT
PAIN_FUNCTIONAL_ASSESSMENT: 0-10
PAIN_FUNCTIONAL_ASSESSMENT: FLACC (FACE, LEGS, ACTIVITY, CRY, CONSOLABILITY)
PAIN_FUNCTIONAL_ASSESSMENT: 0-10
PAIN_FUNCTIONAL_ASSESSMENT: 0-10
PAIN_FUNCTIONAL_ASSESSMENT: FLACC (FACE, LEGS, ACTIVITY, CRY, CONSOLABILITY)
PAIN_FUNCTIONAL_ASSESSMENT: FLACC (FACE, LEGS, ACTIVITY, CRY, CONSOLABILITY)
PAIN_FUNCTIONAL_ASSESSMENT: 0-10
PAIN_FUNCTIONAL_ASSESSMENT: 0-10

## 2024-01-02 ASSESSMENT — ACTIVITIES OF DAILY LIVING (ADL)
BATHING_ASSISTANCE: MINIMAL
ADL_ASSISTANCE: INDEPENDENT
EFFECT OF PAIN ON DAILY ACTIVITIES: PAIN WITH SWALLOWING
EFFECT OF PAIN ON DAILY ACTIVITIES: SWALLOWING

## 2024-01-02 ASSESSMENT — PAIN DESCRIPTION - ORIENTATION
ORIENTATION: MID
ORIENTATION: OTHER (COMMENT)

## 2024-01-02 ASSESSMENT — PAIN DESCRIPTION - LOCATION: LOCATION: NECK

## 2024-01-02 NOTE — CARE PLAN
Problem: Safety  Goal: Patient will be injury free during hospitalization  Outcome: Progressing  Goal: I will remain free of falls  Outcome: Progressing     Problem: Skin  Goal: Participates in plan/prevention/treatment measures  Outcome: Progressing  Goal: Prevent/minimize sheer/friction injuries  Outcome: Progressing  Goal: Promote/optimize nutrition  Outcome: Progressing     Problem: Pain  Goal: Takes deep breaths with improved pain control throughout the shift  Outcome: Progressing  Goal: Turns in bed with improved pain control throughout the shift  Outcome: Progressing  Goal: Walks with improved pain control throughout the shift  Outcome: Progressing  Goal: Performs ADL's with improved pain control throughout shift  Outcome: Progressing  Goal: Participates in PT with improved pain control throughout the shift  Outcome: Progressing  Goal: Free from opioid side effects throughout the shift  Outcome: Progressing  Goal: Free from acute confusion related to pain meds throughout the shift  Outcome: Progressing     Problem: Pain - Adult  Goal: Verbalizes/displays adequate comfort level or baseline comfort level  Outcome: Progressing     Problem: Safety - Adult  Goal: Free from fall injury  Outcome: Progressing     Problem: Discharge Planning  Goal: Discharge to home or other facility with appropriate resources  Outcome: Progressing     Problem: Chronic Conditions and Co-morbidities  Goal: Patient's chronic conditions and co-morbidity symptoms are monitored and maintained or improved  Outcome: Progressing     Problem: Diabetes  Goal: Achieve decreasing blood glucose levels by end of shift  Outcome: Progressing  Goal: Increase stability of blood glucose readings by end of shift  Outcome: Progressing  Goal: Decrease in ketones present in urine by end of shift  Outcome: Progressing  Goal: Maintain electrolyte levels within acceptable range throughout shift  Outcome: Progressing  Goal: Maintain glucose levels >70mg/dl to  <250mg/dl throughout shift  Outcome: Progressing  Goal: No changes in neurological exam by end of shift  Outcome: Progressing  Goal: Learn about and adhere to nutrition recommendations by end of shift  Outcome: Progressing  Goal: Vital signs within normal range for age by end of shift  Outcome: Progressing  Goal: Increase self care and/or family involovement by end of shift  Outcome: Progressing  Goal: Receive DSME education by end of shift  Outcome: Progressing   The patient's goals for the shift include      The clinical goals for the shift include pain control    Over the shift, the patient did not make progress toward the following goals. Barriers to progression include weakness, pain. Recommendations to address these barriers include pain relief, therapy.

## 2024-01-02 NOTE — ANESTHESIA POSTPROCEDURE EVALUATION
Patient: Marylou Ward    Procedure Summary       Date: 01/02/24 Room / Location: [facility] OR 11 / [facility]    Anesthesia Start: 1231 Anesthesia Stop: 1357    Procedure: Cholecystectomy Laparoscopy with Cholangiogram Diagnosis:       Chronic cholecystitis with calculus      (Chronic cholecystitis with calculus [K80.10])    Surgeons: Tyrell Casas MD Responsible Provider: Guido Lomeli MD    Anesthesia Type: general ASA Status: 3            Anesthesia Type: general    Vitals Value Taken Time   /119 01/02/24 1401   Temp 36 °C (96.8 °F) 01/02/24 1355   Pulse 95 01/02/24 1355   Resp 16 01/02/24 1355   SpO2 100 % 01/02/24 1355       Anesthesia Post Evaluation    Patient location during evaluation: ICU  Patient participation: complete - patient participated  Level of consciousness: awake  Pain management: adequate  Airway patency: patent  Cardiovascular status: blood pressure returned to baseline  Respiratory status: BIPAP  Hydration status: acceptable  Postoperative Nausea and Vomiting: none      Encounter Notable Events   Notable Event Outcome Phase Comment   Difficult mask airway Resolved in Room Intraprocedure unable to give PPV after induction   Difficult to intubate - unexpected  Intraprocedure    Unable to intubate Resolved in Room Intraprocedure Return to Spontaneous ventilation, case cancelled.

## 2024-01-02 NOTE — PROGRESS NOTES
Marylou Ward is a 78 y.o. female on day 5 of admission presenting with Acute vomiting.    PT recommendation is for low intensity rehab.  Declining HHC.  Patient resides with her nephew.  Plan is to return home with nephew when medically cleared for discharge.  A copy of POA paperwork provided to patient.                              Carina Littlejohn RN

## 2024-01-02 NOTE — CONSULTS
Reason For Consult  Airway obstruction    History Of Present Illness  Marylou Ward is a 78 y.o. female presenting in the operating room for cholecystectomy with Dr. Casas.  After induction of anesthesia there was difficulty with intubation.  I was called emergently to the operating room to evaluate her.  She has a very small mouth and visualization was limited due to limited mandibular excursion a large tongue and small mouth.  I placed a nasal trumpet.  Anesthesia was able to maintain airway with good saturations and we proceeded to emerging her from anesthesia and reversing any sedation with Narcan.  She was never given a paralyzing dose.  I stayed with her in the operating room until her airway was stable enough.  She was breathing spontaneously.  She was placed on BiPAP and was transported to the PACU and eventually to the ICU.  I am now evaluating her in the ICU at 1730.  She is on BiPAP.  She is alert and able to communicate.     Past Medical History  She has a past medical history of Arthritis, Diabetes mellitus (CMS/HCC), and Hypertension.    Surgical History  She has no past surgical history on file.     Social History  She reports that she has never smoked. She has never been exposed to tobacco smoke. She has never used smokeless tobacco. She reports that she does not currently use alcohol. No history on file for drug use.    Family History  No family history on file.     Allergies  Gabapentin, Levofloxacin, Sulfa (sulfonamide antibiotics), Duloxetine, Metronidazole, Pregabalin, Protonix [pantoprazole], Adhesive tape-silicones, and Morphine    Review of Systems  Noncontributory     Physical Exam  I examined her with Dr. Lea from pulmonology.  We took her off of BiPAP.  I remove the nasal trumpet.  She is able to speak and maintain her oxygen saturations.  She does complain of sore throat.  Her voice sounds normal.  She has limited opening of the mouth as a baseline.  She has a large tongue which makes it  difficult to evaluate the soft palate.  Flexible laryngoscopy was carried out through the left nares.  The nasopharynx and oropharynx appear normal.  The base of tongue is large.  The epiglottis is normal in appearance.  The vocal cords are normal in appearance and mobility and the airway is widely patent.  The neck is supple without adenopathy.  The previous concern regarding potential swelling in the submandibular triangles does not appear to be an active issue.  The submandibular triangle feels normal bilaterally.  It is slightly tender.  Cranial nerve exam is normal.     Last Recorded Vitals  Blood pressure 163/81, pulse 104, temperature 36 °C (96.8 °F), resp. rate 17, height 1.524 m (5'), weight 61.2 kg (135 lb), SpO2 100 %.    Relevant Results       Assessment/Plan     She is a set up for airway obstruction given her small mouth and limited mandibular excursion.  At this point she is maintaining her airway and I will defer further oxygenation and ventilation requirements to pulmonary.  When she is rescheduled for her cholecystectomy she may need an awake fiberoptic nasal intubation.  I will remain available should other issues arise    I spent 60 minutes in the professional and overall care of this patient combined between the operating room earlier today and this bedside consultation in the ICU.      Dick Montilla MD

## 2024-01-02 NOTE — CONSULTS
Vascular Access Team  Consult     Visit Date: 1/2/2024      Patient Name: Marylou Ward         MRN: [id number]                Reason for Consult: Picc line insertion            Assessment: Reviewed pt's chart for any contraindications, none noted           Plan: Plan for picc placement today        Mili Berumen RN  1/2/2024  5:39 PM

## 2024-01-02 NOTE — ANESTHESIA PROCEDURE NOTES
Airway  Date/Time: 1/2/2024 12:41 PM  Urgency: elective    Difficult airway (Unable to ventilate, unable to intubate)    Staffing  Performed: attending and CRNA   Authorized by: Guido Lomeli MD    Performed by: KINGS Sandhu-CRNA  Patient location during procedure: OR    Indications and Patient Condition  Indications for airway management: anesthesia  Spontaneous ventilation: present  Sedation level: deep  Preoxygenated: yes  Patient position: sniffing  MILS maintained throughout  Mask difficulty assessment: 4 - unable to mask vent +/- NMBA    Final Airway Details  Final airway type: mask (Glide 3)         Number of attempts at approach: 1  Ventilation between attempts: supraglottic airway and 2 hand mask  Number of other approaches attempted: 1    Other Attempts  Unsuccessful attempted airways: bag valve mask and SGA  Unsuccessful attempted endotracheal techniques: video laryngoscopy    Additional Comments  Pt. Induced with lidocaine, propofol and fentanyl. Unable to BMV patient with 80mm OA. No paralytic given. Size 3 LMA placed with inability to ventilate. Glide 3 inserted with difficulty into airway(small mouth, large tongue, <3cm TMD. Grade 3 view. Size 7ett with glidescope stylet inserted with difficulty into Oral Cavity. Unable to pass ET through larynx.  Very anterior and limited neck flexion.  Patient obtained spontaneous ventilation shortly thereafter. ENT consulted for concern for submandibular swelling and potential need for surgical intervention given the unable to ventilate unable to intubate situation.   Outpatient consult requested.  No emergent concerns.

## 2024-01-02 NOTE — ANESTHESIA PREPROCEDURE EVALUATION
Patient: Marylou Ward    Procedure Information       Date: 01/02/24    Procedure: Cholecystectomy Laparoscopy with Cholangiogram    Location: [facility]    Surgeons: Tyrell Casas MD            Relevant Problems   Anesthesia (within normal limits)      Cardiovascular   (+) HTN (hypertension)      Endocrine   (+) Diabetes mellitus, type 2 (CMS/HCC)      GI   (+) Gastroesophageal reflux disease      Pulmonary  Hx of PE   (+) Chronic obstructive pulmonary disease (CMS/HCC)      GI/Hepatic   (+) Chronic cholecystitis with calculus       Clinical information reviewed:    Allergies  Meds               NPO Detail:  No data recorded     Physical Exam    Airway  Mallampati: III  TM distance: >3 FB  Neck ROM: full     Cardiovascular   Comments: Deferred   Dental   Comments: No loose teeth   Pulmonary   Comments: Deferred   Abdominal     Comments: Deferred           Anesthesia Plan    ASA 3     general   (Small mouth opening.  Will plan on intubation with glidescope)  The patient is not a current smoker.  Patient was not previously instructed to abstain from smoking on day of procedure.  Patient did not smoke on day of procedure.  Education provided regarding risk of obstructive sleep apnea.  intravenous induction   Postoperative administration of opioids is intended.  Anesthetic plan and risks discussed with patient.  Use of blood products discussed with patient who consented to blood products.    Plan discussed with CRNA and CAA.

## 2024-01-02 NOTE — OP NOTE
Cholecystectomy Laparoscopy with Cholangiogram Operative Note     Date: 2024  OR Location: [facility]    Name: Marylou Ward, : 1945, Age: 78 y.o., MRN: [id number], Sex: female    Diagnosis  Pre-op Diagnosis     * Chronic cholecystitis with calculus [K80.10] Post-op Diagnosis     * Chronic cholecystitis with calculus [K80.10]     Procedures  Cholecystectomy Laparoscopy with Cholangiogram  20144 - [facility] LAPS SURG CHOLECYSTECTOMY W/CHOLANGIOGRAPHY  ABORTED DUE TO DIFFICULT AIRWAY    Surgeons      * Tyrell Casas - Primary    Resident/Fellow/Other Assistant:  Surgeon(s) and Role:    Procedure Summary  Anesthesia: General  ASA: III  Anesthesia Staff: Anesthesiologist: Guido Lomeli MD  CRNA: KINGS Sandhu-CRNA  Estimated Blood Loss: 5mL  Intra-op Medications: * No intraprocedure medications in log *           Anesthesia Record               Intraprocedure I/O Totals       None           Specimen: No specimens collected     Staff:   Circulator: Alice Page RN  Scrub Person: Dionne Noel         Drains and/or Catheters: * None in log *    Tourniquet Times:         Implants:     Findings: DIFFICULT AIRWAY, UNSUCCESSFUL GLIDESCOPE DUE TO SMALL MOUTH OPENING    Indications: Marylou Ward is an 78 y.o. female who is having surgery for Chronic cholecystitis with calculus [K80.10].     The patient was seen in the preoperative area. The risks, benefits, complications, treatment options, non-operative alternatives, expected recovery and outcomes were discussed with the patient. The possibilities of reaction to medication, pulmonary aspiration, injury to surrounding structures, bleeding, recurrent infection, the need for additional procedures, failure to diagnose a condition, and creating a complication requiring transfusion or operation were discussed with the patient. The patient concurred with the proposed plan, giving informed consent.  The site of surgery was properly noted/marked if necessary per policy. The  patient has been actively warmed in preoperative area. Preoperative antibiotics have been ordered and given within 1 hours of incision. Venous thrombosis prophylaxis have been ordered including bilateral sequential compression devices    Procedure Details: Patient was brought into the operating room suite.  All supplies and instruments for laparoscopic cholecystectomy had been opened, however we were not able to start the surgical portion of the procedure due to difficult airway.  After induction of anesthesia, attempts with a #3 glide scope and #2 glide scope were unable to adequately visualize the airway.  Attempts to pass the endotracheal tube resulted in bleeding and even more impairment of visualization.  Dr. Montilla from ENT was called in in preparation for a potential cricothyrotomy or tracheostomy, but the patient was able to arouse and breathe on her own.  She was transported to PACU for BiPAP.  She will go to the ICU tonight and have swallow evaluation tomorrow.  Plan for outpatient evaluation by ENT and eventual surgery at [facility].  Complications:  None; patient tolerated the procedure well.    Disposition: PACU - hemodynamically stable.  Condition: stable         Additional Details:     Attending Attestation: I was present for the entire procedure.    Tyrell Casas  Phone Number: [phone]

## 2024-01-02 NOTE — PROGRESS NOTES
Marylou Ward is a 78 y.o. female on day 5 of admission presenting with Acute vomiting.      Subjective   Patient still has nausea and vomiting  Per staff her blood pressure is little higher with dose episodes  Surgery input appreciated    Objective     Last Recorded Vitals  /75 (BP Location: Right arm, Patient Position: Lying)   Pulse 105   Temp 37.1 °C (98.8 °F) (Oral)   Resp 18   Wt 61.2 kg (135 lb)   SpO2 93%   Intake/Output last 3 Shifts:    Intake/Output Summary (Last 24 hours) at 1/2/2024 1025  Last data filed at 1/1/2024 1627  Gross per 24 hour   Intake 1000 ml   Output --   Net 1000 ml         Admission Weight  Weight: 61.2 kg (135 lb) (12/28/23 2100)    Daily Weight  12/28/23 : 61.2 kg (135 lb)    Image Results  NM hepatobiliary w cholecystokinin  Narrative: Interpreted By:  Molly Loyola,   STUDY:  NM HEPATOBILIARY W CHOLECYSTOKININ;  12/29/2023 3:32 pm      INDICATION:  Signs/Symptoms:Ch abd pain, R UQ tenderness/pain, Nausea.      COMPARISON:  None.      ACCESSION NUMBER(S):  [id number]      ORDERING CLINICIAN:  LASHONDA MAY      TECHNIQUE:  DIVISION OF NUCLEAR MEDICINE  HEPATOBILIARY SCAN (HIDA), QUANTITATIVE      The patient received an intravenous dose of  5.0 mCi of Tc-99m  mebrofenin (Choletec).  Sequential images of the upper abdomen were  then acquired over the next 60 minutes.  An intravenous infusion of  the cholecystokinin (CCK) analogue, Sincalide; was then administered  followed by an additional period of imaging.  Computer quantification  of gallbladder emptying was then performed.      FINDINGS:  There is normal uptake of radiotracer by the liver with excretion  into the biliary system. Gallbladder activity is demonstrated  following 45 minutes. Small bowel activity is demonstrated following  90 minutes. There is persistent visualization of the common bile duct  throughout the examination. Correlate with concern for dysfunction at  the level of the sphincter of Oddi.       Gallbladder ejection fraction is calculated at 14% (normal greater  than 35%).          Impression: 1. Decreased gallbladder ejection fraction of 14%. Correlate with  patient's history including concern for chronic cholecystitis.      2. Mild delayed visualization of the small bowel activity  nonspecific. However, there is persistent visualization of activity  within the common bile duct. This may reflect component of  dysfunction at the level of the sphincter of Oddi.                      MACRO:  None      Signed by: Molly Loyola 12/29/2023 4:06 PM  Dictation workstation:   [id number]      Physical Exam  GENERAL: awake, alert, Ox3, cooperative resting comfortably  SKIN: Skin turgor normal. No rashes  HEENT: no epistaxis, Moist mucosa.  NECK: supple  BACK: spine nontender to palpation, No CVAT.  LUNGS: Vesicular breath sounds, with no wheeze, no crepitations.   CARDIAC: REGULAR. S1 and S2; no rubs, no murmur  ABDOMEN: Abdomen soft, right upper quadrant and epigastric tenderness  EXTREMITIES: No edema, Good capillary refill.   NEURO: Insight GOOD. Motor and sensory exam wnl. No invol movements. No ataxia.  WOUND:   MUSCULOSKELETAL: No acute inflammation    Relevant Results  Results for orders placed or performed during the hospital encounter of 12/28/23 (from the past 24 hour(s))   POCT GLUCOSE   Result Value Ref Range    POCT Glucose 112 (H) 74 - 99 mg/dL   POCT GLUCOSE   Result Value Ref Range    POCT Glucose 99 74 - 99 mg/dL   POCT GLUCOSE   Result Value Ref Range    POCT Glucose 172 (H) 74 - 99 mg/dL   CBC   Result Value Ref Range    WBC 10.9 4.4 - 11.3 x10*3/uL    nRBC 0.0 0.0 - 0.0 /100 WBCs    RBC 3.82 (L) 4.00 - 5.20 x10*6/uL    Hemoglobin 10.5 (L) 12.0 - 16.0 g/dL    Hematocrit 32.4 (L) 36.0 - 46.0 %    MCV 85 80 - 100 fL    MCH 27.5 26.0 - 34.0 pg    MCHC 32.4 32.0 - 36.0 g/dL    RDW 13.8 11.5 - 14.5 %    Platelets 229 150 - 450 x10*3/uL   Comprehensive Metabolic Panel   Result Value Ref Range     Glucose 118 (H) 65 - 99 mg/dL    Sodium 134 133 - 145 mmol/L    Potassium 3.4 3.4 - 5.1 mmol/L    Chloride 99 97 - 107 mmol/L    Bicarbonate 22 (L) 24 - 31 mmol/L    Urea Nitrogen 8 8 - 25 mg/dL    Creatinine 1.00 0.40 - 1.60 mg/dL    eGFR 57 (L) >60 mL/min/1.73m*2    Calcium 8.7 8.5 - 10.4 mg/dL    Albumin 3.6 3.5 - 5.0 g/dL    Alkaline Phosphatase 132 (H) 35 - 125 U/L    Total Protein 6.2 5.9 - 7.9 g/dL    AST 92 (H) 5 - 40 U/L    Bilirubin, Total 0.4 0.1 - 1.2 mg/dL    ALT 70 (H) 5 - 40 U/L    Anion Gap 13 <=19 mmol/L   POCT GLUCOSE   Result Value Ref Range    POCT Glucose 116 (H) 74 - 99 mg/dL    Scheduled medications  cefOXitin, 2 g, intravenous, q6h  enoxaparin, 40 mg, subcutaneous, Nightly  insulin lispro, 0-5 Units, subcutaneous, TID with meals  polyethylene glycol, 17 g, oral, Daily      Continuous medications  sodium chloride 0.9%, 75 mL/hr, Last Rate: 75 mL/hr (01/01/24 1627)      PRN medications  PRN medications: acetaminophen, ALPRAZolam, bisacodyl, dextromethorphan-guaifenesin, dextrose 10 % in water (D10W), dextrose, diphenhydrAMINE, glucagon, hydrALAZINE, HYDROmorphone, ipratropium-albuteroL, melatonin, ondansetron, oxyCODONE         Assessment/Plan   # Chronic cholecystitis   - abdominal pain/nausea/vomiting  -Pain control  -IV fluids  -Will need cholecystectomy, timing to be decided by general surgery     # Cholelithiasis  -No CT evidence of cholecystitis  -HIDA scan ordered  -Surgery consulted     # Hypertension  -Continue home meds     # Diabetes  -Insulin sliding scale     # History of PE  -On Eliquis       12/30-patient continues to have recurrent vomiting and abdominal pain surgery input discussed with patient and family.  They do not want patient discharged, prefer cholecystectomy to be done as inpatient, will communicate same   General surgery.    12/31-surgery input noted, surgery being planned for early next week, discussed with patient's POA yesterday, he also prefers surgery to be done.   Patient continues to have pain, today her pain is left upper quadrant also.  She has long history of chronic pain, is being followed by pain management.    1/1-discussed with patient and POA in the room, patient to get laparoscopic cholecystectomy tomorrow, will need to evaluate for the right adnexal mass which is known for the last few months as an outpatient through GYN, for which patient had a appointment but was not able to keep up.     Cuca Mullen MD

## 2024-01-02 NOTE — PROGRESS NOTES
Physical Therapy                 Therapy Communication Note    Patient Name: Marylou Ward  MRN: [id number]  Today's Date: 1/2/2024     Discipline: Physical Therapy    Missed Visit Reason: Patient in a medical procedure   (Pt off of the floor for lap. juan alberto. this date. Will hold follow-up.)    Missed Time: Cancel    Comment:

## 2024-01-02 NOTE — NURSING NOTE
Received this pt from PACU. Pt transported to ICU bed 5 on continuous bipap. Pt has nasal trumpet present to Lt nostril. Pt moaning, unable to speak clearly. Neck swollen, bruised, and tender to touch. Measurement taken: 29cm. Pt nephew/MAILE Marquez at bedside. All questions answered. Dr. Lea notified of consult. Emergent orders received for PICC line placement. Pt ST on the monitor. HOB elevated. Bed low and brakes on. Will continue to closely  monitor

## 2024-01-02 NOTE — PROGRESS NOTES
Occupational Therapy    Evaluation    Patient Name: Marylou Ward  MRN: [id number]  Today's Date: 1/2/2024  Time Calculation  Start Time: 0759  Stop Time: 0810  Time Calculation (min): 11 min    Assessment  IP OT Assessment  OT Assessment: Patient is an 78 year old female admittted with acute vomiting. Patient is presenting with a decline in strength, balance, activity tolerance, and function, resulting in an increaesd need for assistance with ADL/IADL tasks. Skilled OT to address the above deficits and increase patient's safety and independence with daily tasks.  Prognosis: Good  Evaluation/Treatment Tolerance: Patient limited by pain  End of Session Communication: Bedside nurse  End of Session Patient Position: Bed, 3 rail up, Alarm on  Plan:  Treatment Interventions: ADL retraining, UE strengthening/ROM, Functional transfer training, Endurance training, Patient/family training, Neuromuscular reeducation, Compensatory technique education  OT Frequency: 3 times per week  OT Discharge Recommendations: Low intensity level of continued care, 24 hr supervision due to cognition  OT Recommended Transfer Status: Stand by assist, Assist of 1  OT - OK to Discharge: Yes    Subjective   Current Problem:  1. Acute vomiting        2. Abdominal pain, unspecified abdominal location        3. Chronic cholecystitis with calculus  Case Request Operating Room: Cholecystectomy Laparoscopy with Cholangiogram    Case Request Operating Room: Cholecystectomy Laparoscopy with Cholangiogram        General:  General  Reason for Referral: decline in ADLs  Referred By: Dr. Mullen  Past Medical History Relevant to Rehab: Patient is an 78 year old female admitted with c/o of intractable nausea and vomiting. Hepatobiliary studies revealning decreased EF of gallbladder correlated with chronic cholecystitis/gallstones.  PMH: PE, cholelisthiasis, HTN, DM  Prior to Session Communication: Bedside nurse  Patient Position Received: Bed, 3 rail up  Preferred  Learning Style: verbal  General Comment: Patient cleared by nursing for therapy. Patient in bed upon arrival and agreeable to participate  Precautions:  Medical Precautions: Fall precautions  Pain:  Pain Assessment  Pain Assessment: 0-10  Pain Score: 10 - Worst possible pain  Pain Type: Acute pain  Pain Location: Generalized  Pain Interventions: Repositioned, Ambulation/increased activity    Objective   Cognition:  Overall Cognitive Status: Impaired (questionable)  Orientation Level: Oriented X4  Insight: Mild  Impulsive: Mildly     Home Living:  Type of Home: House  Lives With: Other (Comment) (Nephew)  Home Adaptive Equipment: None  Home Layout: One level  Home Access: Stairs to enter with rails  Entrance Stairs-Rails: Right  Entrance Stairs-Number of Steps: 3  Bathroom Shower/Tub: Tub/shower unit (patient sponge bathes)  Bathroom Toilet: Standard   Prior Function:  Level of [place]: Independent with ADLs and functional transfers, Needs assistance with homemaking  Receives Help From: Family (Nephew)  ADL Assistance: Independent  Homemaking Assistance: Needs assistance  Driving/Transportation: Family/Friend  Shopping:  (nephew completes)  Ambulatory Assistance: Independent  IADL History:  Homemaking Responsibilities: Yes  IADL Comments: patient completes light household tasks  ADL:  Eating Assistance: Stand by  Eating Deficit: Setup (seated with items within reach, per clinical judgement)  Grooming Assistance: Stand by  Grooming Deficit: Steadying, Supervision/safety (per clinical judgement)  Bathing Assistance: Minimal  Bathing Deficit: Left lower leg including foot, Right lower leg including foot (per clinical judgement)  UE Dressing Assistance: Stand by  UE Dressing Deficit: Setup (per clinical judgement)  LE Dressing Assistance: Stand by  LE Dressing Deficit: Setup, Supervision/safety, Increased time to complete, Steadying (per clinical judgement)  Toileting Assistance with Device: Stand by  Toileting  Deficit: Steadying, Supervison/safety, Increased time to complete (per clinical judgement)  Activity Tolerance:  Endurance: Decreased tolerance for upright activites  Bed Mobility/Transfers: Bed Mobility  Bed Mobility: Yes  Bed Mobility 1  Bed Mobility 1: Supine to sitting  Level of Assistance 1: Close supervision  Bed Mobility Comments 1: head of bed slightly elevated  Bed Mobility 2  Bed Mobility  2: Sitting to supine  Level of Assistance 2: Close supervision  Bed Mobility Comments 2: effortful to raise B LE    Transfers  Transfer: Yes  Transfer 1  Transfer From 1: Bed to  Transfer to 1: Stand  Technique 1: Sit to stand  Transfer Device 1:  (no AD)  Transfer Level of Assistance 1: Contact guard  Trials/Comments 1: sit to stand without AD. patient then completes ~3 lateral side steps towards the head of the bed    IADL's:   Homemaking Responsibilities: Yes  IADL Comments: patient completes light household tasks  Sensation:  Sensation Comment: patient denies numbness/tingling  Strength:  Strength Comments: B UE at least >/= 3/5 grossly. observed functionally  Extremities: RUE   RUE : Within Functional Limits (observed functionally) and LUE   LUE: Within Functional Limits (observed functionally)    Outcome Measures: [facility] Daily Activity  Putting on and taking off regular lower body clothing: A little  Bathing (including washing, rinsing, drying): A little  Putting on and taking off regular upper body clothing: A little  Toileting, which includes using toilet, bedpan or urinal: A little  Taking care of personal grooming such as brushing teeth: A little  Eating Meals: A little  Daily Activity - Total Score: 18    Education Documentation  Body Mechanics, taught by Jesika Timmons OT at 1/2/2024  9:48 AM.  Learner: Patient  Readiness: Acceptance  Method: Explanation, Demonstration  Response: Needs Reinforcement    Precautions, taught by Jesika Timmons OT at 1/2/2024  9:48 AM.  Learner: Patient  Readiness:  Acceptance  Method: Explanation, Demonstration  Response: Needs Reinforcement    Education Comments  No comments found.      Goals:   Encounter Problems       Encounter Problems (Active)       OT Goals       ADLs (Progressing)       Start:  01/02/24    Expected End:  01/31/24       Patient will complete ADL tasks with [place] in order to safely return to PLOF.         Functional Transfers (Progressing)       Start:  01/02/24    Expected End:  01/31/24       Patient will complete functional transfers with [place] in order to increase safety and independence with daily tasks.         B UE Strengthening (Progressing)       Start:  01/02/24    Expected End:  01/31/24       Patient will increase B UE strength to 4+/5 for functional transfers.         Functional Mobility (Progressing)       Start:  01/02/24    Expected End:  01/31/24       Patient will demonstrate the ability to complete item retrieval and functional mobility with [place] in order to increase safety and independence with daily tasks.

## 2024-01-03 ENCOUNTER — APPOINTMENT (OUTPATIENT)
Dept: RADIOLOGY | Facility: HOSPITAL | Age: 79
DRG: 444 | End: 2024-01-03
Payer: MEDICARE

## 2024-01-03 LAB
ALBUMIN SERPL-MCNC: 3.6 G/DL (ref 3.5–5)
ALP BLD-CCNC: 122 U/L (ref 35–125)
ALT SERPL-CCNC: 45 U/L (ref 5–40)
ANION GAP SERPL CALC-SCNC: 15 MMOL/L
AST SERPL-CCNC: 28 U/L (ref 5–40)
BILIRUB SERPL-MCNC: 0.3 MG/DL (ref 0.1–1.2)
BUN SERPL-MCNC: 9 MG/DL (ref 8–25)
CALCIUM SERPL-MCNC: 8.7 MG/DL (ref 8.5–10.4)
CHLORIDE SERPL-SCNC: 96 MMOL/L (ref 97–107)
CO2 SERPL-SCNC: 23 MMOL/L (ref 24–31)
CREAT SERPL-MCNC: 0.8 MG/DL (ref 0.4–1.6)
ERYTHROCYTE [DISTWIDTH] IN BLOOD BY AUTOMATED COUNT: 14.1 % (ref 11.5–14.5)
GFR SERPL CREATININE-BSD FRML MDRD: 75 ML/MIN/1.73M*2
GLUCOSE BLD MANUAL STRIP-MCNC: 142 MG/DL (ref 74–99)
GLUCOSE BLD MANUAL STRIP-MCNC: 154 MG/DL (ref 74–99)
GLUCOSE BLD MANUAL STRIP-MCNC: 156 MG/DL (ref 74–99)
GLUCOSE BLD MANUAL STRIP-MCNC: 177 MG/DL (ref 74–99)
GLUCOSE BLD MANUAL STRIP-MCNC: 198 MG/DL (ref 74–99)
GLUCOSE SERPL-MCNC: 181 MG/DL (ref 65–99)
HCT VFR BLD AUTO: 34 % (ref 36–46)
HGB BLD-MCNC: 11 G/DL (ref 12–16)
MCH RBC QN AUTO: 28.4 PG (ref 26–34)
MCHC RBC AUTO-ENTMCNC: 32.4 G/DL (ref 32–36)
MCV RBC AUTO: 88 FL (ref 80–100)
NRBC BLD-RTO: 0 /100 WBCS (ref 0–0)
PLATELET # BLD AUTO: 345 X10*3/UL (ref 150–450)
POTASSIUM SERPL-SCNC: 3.5 MMOL/L (ref 3.4–5.1)
PROT SERPL-MCNC: 6.6 G/DL (ref 5.9–7.9)
RBC # BLD AUTO: 3.87 X10*6/UL (ref 4–5.2)
SODIUM SERPL-SCNC: 134 MMOL/L (ref 133–145)
WBC # BLD AUTO: 26.2 X10*3/UL (ref 4.4–11.3)

## 2024-01-03 PROCEDURE — 85027 COMPLETE CBC AUTOMATED: CPT | Performed by: SURGERY

## 2024-01-03 PROCEDURE — 2500000004 HC RX 250 GENERAL PHARMACY W/ HCPCS (ALT 636 FOR OP/ED): Performed by: INTERNAL MEDICINE

## 2024-01-03 PROCEDURE — 94640 AIRWAY INHALATION TREATMENT: CPT

## 2024-01-03 PROCEDURE — 2500000001 HC RX 250 WO HCPCS SELF ADMINISTERED DRUGS (ALT 637 FOR MEDICARE OP): Performed by: SURGERY

## 2024-01-03 PROCEDURE — 2500000004 HC RX 250 GENERAL PHARMACY W/ HCPCS (ALT 636 FOR OP/ED): Performed by: SURGERY

## 2024-01-03 PROCEDURE — 80053 COMPREHEN METABOLIC PANEL: CPT | Performed by: SURGERY

## 2024-01-03 PROCEDURE — 2500000002 HC RX 250 W HCPCS SELF ADMINISTERED DRUGS (ALT 637 FOR MEDICARE OP, ALT 636 FOR OP/ED): Performed by: INTERNAL MEDICINE

## 2024-01-03 PROCEDURE — 82947 ASSAY GLUCOSE BLOOD QUANT: CPT

## 2024-01-03 PROCEDURE — 2020000001 HC ICU ROOM DAILY

## 2024-01-03 PROCEDURE — 96372 THER/PROPH/DIAG INJ SC/IM: CPT | Performed by: SURGERY

## 2024-01-03 PROCEDURE — 36591 DRAW BLOOD OFF VENOUS DEVICE: CPT | Performed by: SURGERY

## 2024-01-03 PROCEDURE — 2500000001 HC RX 250 WO HCPCS SELF ADMINISTERED DRUGS (ALT 637 FOR MEDICARE OP)

## 2024-01-03 PROCEDURE — 92610 EVALUATE SWALLOWING FUNCTION: CPT | Mod: GN | Performed by: SPEECH-LANGUAGE PATHOLOGIST

## 2024-01-03 PROCEDURE — 99024 POSTOP FOLLOW-UP VISIT: CPT | Performed by: SURGERY

## 2024-01-03 PROCEDURE — 71045 X-RAY EXAM CHEST 1 VIEW: CPT

## 2024-01-03 RX ORDER — FUROSEMIDE 10 MG/ML
20 INJECTION INTRAMUSCULAR; INTRAVENOUS ONCE
Status: COMPLETED | OUTPATIENT
Start: 2024-01-03 | End: 2024-01-03

## 2024-01-03 RX ORDER — IPRATROPIUM BROMIDE AND ALBUTEROL SULFATE 2.5; .5 MG/3ML; MG/3ML
3 SOLUTION RESPIRATORY (INHALATION)
Status: DISCONTINUED | OUTPATIENT
Start: 2024-01-03 | End: 2024-01-05

## 2024-01-03 RX ADMIN — CEFOXITIN 2 G: 2 INJECTION, POWDER, FOR SOLUTION INTRAVENOUS at 18:06

## 2024-01-03 RX ADMIN — SODIUM CHLORIDE 75 ML/HR: 900 INJECTION, SOLUTION INTRAVENOUS at 06:46

## 2024-01-03 RX ADMIN — HYDROMORPHONE HYDROCHLORIDE 0.6 MG: 1 INJECTION, SOLUTION INTRAMUSCULAR; INTRAVENOUS; SUBCUTANEOUS at 10:04

## 2024-01-03 RX ADMIN — ENOXAPARIN SODIUM 40 MG: 40 INJECTION SUBCUTANEOUS at 21:28

## 2024-01-03 RX ADMIN — CEFOXITIN 2 G: 2 INJECTION, POWDER, FOR SOLUTION INTRAVENOUS at 06:41

## 2024-01-03 RX ADMIN — CEFOXITIN 2 G: 2 INJECTION, POWDER, FOR SOLUTION INTRAVENOUS at 12:23

## 2024-01-03 RX ADMIN — CEFOXITIN 2 G: 2 INJECTION, POWDER, FOR SOLUTION INTRAVENOUS at 01:20

## 2024-01-03 RX ADMIN — HYDROMORPHONE HYDROCHLORIDE 0.6 MG: 1 INJECTION, SOLUTION INTRAMUSCULAR; INTRAVENOUS; SUBCUTANEOUS at 15:40

## 2024-01-03 RX ADMIN — HYDROMORPHONE HYDROCHLORIDE 0.6 MG: 1 INJECTION, SOLUTION INTRAMUSCULAR; INTRAVENOUS; SUBCUTANEOUS at 05:37

## 2024-01-03 RX ADMIN — BENZOCAINE AND MENTHOL 1 LOZENGE: 15; 3.6 LOZENGE ORAL at 15:27

## 2024-01-03 RX ADMIN — ONDANSETRON 4 MG: 2 INJECTION INTRAMUSCULAR; INTRAVENOUS at 21:30

## 2024-01-03 RX ADMIN — OXYCODONE HYDROCHLORIDE 5 MG: 5 TABLET ORAL at 12:28

## 2024-01-03 RX ADMIN — HYDROMORPHONE HYDROCHLORIDE 0.6 MG: 1 INJECTION, SOLUTION INTRAMUSCULAR; INTRAVENOUS; SUBCUTANEOUS at 01:20

## 2024-01-03 RX ADMIN — BENZOCAINE AND MENTHOL 1 LOZENGE: 15; 3.6 LOZENGE ORAL at 13:07

## 2024-01-03 RX ADMIN — IPRATROPIUM BROMIDE AND ALBUTEROL SULFATE 3 ML: 2.5; .5 SOLUTION RESPIRATORY (INHALATION) at 15:29

## 2024-01-03 RX ADMIN — OXYCODONE HYDROCHLORIDE 5 MG: 5 TABLET ORAL at 21:29

## 2024-01-03 RX ADMIN — SODIUM CHLORIDE 75 ML/HR: 900 INJECTION, SOLUTION INTRAVENOUS at 14:22

## 2024-01-03 RX ADMIN — Medication 6 MG: at 21:30

## 2024-01-03 RX ADMIN — FUROSEMIDE 20 MG: 10 INJECTION, SOLUTION INTRAMUSCULAR; INTRAVENOUS at 14:22

## 2024-01-03 ASSESSMENT — PAIN - FUNCTIONAL ASSESSMENT
PAIN_FUNCTIONAL_ASSESSMENT: FLACC (FACE, LEGS, ACTIVITY, CRY, CONSOLABILITY)
PAIN_FUNCTIONAL_ASSESSMENT: 0-10
PAIN_FUNCTIONAL_ASSESSMENT: FLACC (FACE, LEGS, ACTIVITY, CRY, CONSOLABILITY)
PAIN_FUNCTIONAL_ASSESSMENT: 0-10
PAIN_FUNCTIONAL_ASSESSMENT: FLACC (FACE, LEGS, ACTIVITY, CRY, CONSOLABILITY)
PAIN_FUNCTIONAL_ASSESSMENT: 0-10
PAIN_FUNCTIONAL_ASSESSMENT: FLACC (FACE, LEGS, ACTIVITY, CRY, CONSOLABILITY)
PAIN_FUNCTIONAL_ASSESSMENT: 0-10

## 2024-01-03 ASSESSMENT — PAIN SCALES - GENERAL
PAINLEVEL_OUTOF10: 10 - WORST POSSIBLE PAIN
PAINLEVEL_OUTOF10: 7
PAINLEVEL_OUTOF10: 8
PAINLEVEL_OUTOF10: 10 - WORST POSSIBLE PAIN
PAINLEVEL_OUTOF10: 4
PAINLEVEL_OUTOF10: 7
PAINLEVEL_OUTOF10: 0 - NO PAIN
PAINLEVEL_OUTOF10: 3
PAINLEVEL_OUTOF10: 4
PAINLEVEL_OUTOF10: 8
PAINLEVEL_OUTOF10: 8

## 2024-01-03 ASSESSMENT — PAIN DESCRIPTION - LOCATION
LOCATION: ABDOMEN
LOCATION: HEAD
LOCATION: HEAD

## 2024-01-03 ASSESSMENT — PAIN DESCRIPTION - DESCRIPTORS
DESCRIPTORS: ACHING
DESCRIPTORS: THROBBING

## 2024-01-03 ASSESSMENT — ACTIVITIES OF DAILY LIVING (ADL)
EFFECT OF PAIN ON DAILY ACTIVITIES: DISCOMFORT

## 2024-01-03 ASSESSMENT — PAIN DESCRIPTION - ORIENTATION: ORIENTATION: LEFT;LOWER

## 2024-01-03 NOTE — CONSULTS
Nutrition Assessment Note  Attempted cholecystectomy 1/2, unable to perform d/t difficulties with intubation. Consult for diet order recommendations. Spoke with MD, recommend low fat, CCD 60 diet.     Nutrition Assessment    Reason for Assessment: Provider consult order    Reason for Hospital Admission:  Marylou Ward is a 78 y.o. female who is admitted for abdominal pain, vomiting    Nutrition History:  Food and Nutrient History: NPO     Food Allergies/Intolerances:  None  GI Symptoms: None  Oral Problems: None    Anthropometrics:  Ht: 152.4 cm (5'), Wt: 62.2 kg (137 lb 2 oz), BMI: 26.78  IBW/kg (Dietitian Calculated): 45.45 kg  Percent of IBW: 137 %     Weight Change:     Nutrition Focused Physical Exam Findings:   Subcutaneous Fat Loss  Orbital Fat Pads: Defer  Buccal Fat Pads: Defer  Triceps: Defer  Ribs: Defer    Muscle Wasting  Temporalis: Defer  Pectoralis (Clavicular Region): Defer  Deltoid/Trapezius: Defer  Interosseous: Defer  Trapezius/Infraspinatus/Supraspinatus (Scapular Region): Defer  Quadriceps: Defer  Gastrocnemius: Defer    Nutrition Significant Labs:  Lab Results   Component Value Date    WBC 26.2 (H) 01/03/2024    HGB 11.0 (L) 01/03/2024    HCT 34.0 (L) 01/03/2024     01/03/2024    CHOL 294 (H) 09/22/2020    TRIG 331 (H) 09/22/2020    HDL 43.5 09/22/2020    ALT 45 (H) 01/03/2024    AST 28 01/03/2024     01/03/2024    K 3.5 01/03/2024    CL 96 (L) 01/03/2024    CREATININE 0.80 01/03/2024    BUN 9 01/03/2024    CO2 23 (L) 01/03/2024    TSH 1.16 08/10/2022    INR 1.0 06/03/2023    HGBA1C 5.9 08/10/2022       Current Facility-Administered Medications:     acetaminophen (Tylenol) tablet 650 mg, 650 mg, oral, q6h PRN, Tyrell Casas MD, 650 mg at 01/01/24 1618    ALPRAZolam (Xanax) tablet 0.5 mg, 0.5 mg, oral, BID PRN, Tyrell Casas MD, 0.5 mg at 12/31/23 0120    alteplase (Cathflo Activase) injection 2 mg, 2 mg, intra-catheter, PRN, Porfirio Lea MD    benzocaine-menthol (Cepastat Sore  Throat) 15-3.6 mg lozenge 1 lozenge, 1 lozenge, Mouth/Throat, q2h PRN, Nehal Jara, KINGS-CNP, 1 lozenge at 01/03/24 1307    bisacodyl (Dulcolax) EC tablet 10 mg, 10 mg, oral, Daily PRN, Tyrell Casas MD    ceFOXitin (Mefoxin) in dextrose 5% 50 mL IV 2 g, 2 g, intravenous, q6h, Tyrell Casas MD, 2 g at 01/03/24 1223    dextromethorphan-guaifenesin (Robitussin DM)  mg/5 mL oral liquid 5 mL, 5 mL, oral, q4h PRN, Tyrell Casas MD    dextrose 10 % in water (D10W) infusion, 0.3 g/kg/hr, intravenous, Once PRN, Tyrell Casas MD    dextrose 50 % injection 25 g, 25 g, intravenous, q15 min PRN, Tyrell Casas MD    diphenhydrAMINE (Sominex) tablet 25 mg, 25 mg, oral, BID PRN, Tyrell Casas MD    enoxaparin (Lovenox) syringe 40 mg, 40 mg, subcutaneous, Nightly, Tyrell Casas MD, 40 mg at 01/02/24 2002    furosemide (Lasix) injection 20 mg, 20 mg, intravenous, Once, Porfirio Lea MD    glucagon (Glucagen) injection 1 mg, 1 mg, intramuscular, q15 min PRN, Tyrell Casas MD    hydrALAZINE (Apresoline) injection 10 mg, 10 mg, intravenous, q4h PRN, Tyrell Casas MD, 10 mg at 01/02/24 1952    HYDROmorphone (Dilaudid) injection 0.6 mg, 0.6 mg, intravenous, q3h PRN, Tyrell Casas MD, 0.6 mg at 01/03/24 1004    insulin lispro (HumaLOG) injection 0-5 Units, 0-5 Units, subcutaneous, TID with meals, Tyrell Casas MD    ipratropium-albuteroL (Duo-Neb) 0.5-2.5 mg/3 mL nebulizer solution 3 mL, 3 mL, nebulization, q2h PRN, Tyrell Casas MD, 3 mL at 01/01/24 2228    ipratropium-albuteroL (Duo-Neb) 0.5-2.5 mg/3 mL nebulizer solution 3 mL, 3 mL, nebulization, q6h, Porfirio Lea MD    lido-diphen-Maalox 1:1:1 Magic Mouthwash, 10 mL, Swish & Spit, q4h PRN, Guido Lomeli MD, 10 mL at 01/02/24 2148    melatonin tablet 6 mg, 6 mg, oral, Nightly PRN, Tyrell Casas MD, 6 mg at 01/02/24 0053    ondansetron (Zofran) injection 4 mg, 4 mg, intravenous, q4h PRN, Tyrell Casas MD, 4 mg at 01/02/24 0823    oxyCODONE  (Roxicodone) immediate release tablet 5 mg, 5 mg, oral, q6h PRN, Tyrell Casas MD, 5 mg at 01/03/24 1228    oxygen (O2) therapy, , inhalation, Continuous PRN - O2/gases, Tyrell Casas MD    polyethylene glycol (Glycolax, Miralax) packet 17 g, 17 g, oral, Daily, Tyrell Casas MD, 17 g at 12/31/23 0855    sodium chloride 0.9% infusion, 75 mL/hr, intravenous, Continuous, Tyrell Casas MD, Last Rate: 75 mL/hr at 01/03/24 1326, 75 mL/hr at 01/03/24 1326    Dietary Orders (From admission, onward)       Start     Ordered    01/03/24 1109  Adult diet Carb Controlled; 60 gram carb/meal, 30 gram Carb evening snack; 40 gm fat  Diet effective now        Question Answer Comment   Diet type Carb Controlled    Carb diet selection: 60 gram carb/meal, 30 gram Carb evening snack    Other restriction(s): 40 gm fat        01/03/24 1109                     Estimated Needs:   Estimated Energy Needs  Total Energy Estimated Needs (kCal): 1550 kCal  Total Estimated Energy Need per Day (kCal/kg): 25 kCal/kg  Method for Estimating Needs: Actual Wt    Estimated Protein Needs  Total Protein Estimated Needs (g): 62 g  Total Protein Estimated Needs (g/kg): 1 g/kg  Method for Estimating Needs: Actual Wt    Estimated Fluid Needs  Total Fluid Estimated Needs (mL): 1550 mL  Method for Estimating Needs: 1 mL/kcal      Nutrition Diagnosis   Nutrition Diagnosis:  Malnutrition Diagnosis  Patient has Malnutrition Diagnosis: No    Nutrition Diagnosis  Patient has Nutrition Diagnosis: Yes  Diagnosis Status (1): New  Nutrition Diagnosis 1: Inadequate energy intake  Related to (1): Decreased ability to consume sufficient energy  As Evidenced by (1): NPO     Nutrition Interventions/Recommendations   Nutrition Interventions and Recommendations:    Nutrition Prescription:  Individualized Nutrition Prescription Provided for : 1550 calories, 62 gm protein via low fat/CCD 60 when diet advances    Nutrition Interventions:   Food and/or Nutrient Delivery  Interventions  Interventions: Meals and snacks  Meals and Snacks: Carbohydrate-modified diet, Fat-modified diet  Goal: Advance as able    Education Documentation  No documentation found.         Nutrition Monitoring and Evaluation   Monitoring/Evaluation:   Food/Nutrient Related History Monitoring  Monitoring and Evaluation Plan: Energy intake  Energy Intake: Estimated energy intake  Criteria: Monitoring for diet initiation       Time Spent/Follow-up:   Follow Up  Time Spent (min): 30 minutes  Last Date of Nutrition Visit: 01/03/24  Nutrition Follow-Up Needed?: 5-7 days  Follow up Comment: 1/8/24

## 2024-01-03 NOTE — PROGRESS NOTES
Patient still has neck pain and neck swelling.  She has passed a swallow eval.  We are starting her on a high fat low-carb diet.  She has been hyperglycemic and has a white blood cell count elevation, but she was given high-dose steroids yesterday for neck swelling.    BP (!) 181/87   Pulse 108   Temp 36.6 °C (97.9 °F) (Temporal)   Resp 14   Ht 1.524 m (5')   Wt 62.2 kg (137 lb 2 oz)   SpO2 97%   BMI 26.78 kg/m²     No bleeding from her mouth, sore neck.  On physical exam patient is awake alert and oriented to person, place, and time. Non-focal exam.  Patient head is normocephalic and atraumatic. Mucous Membranes are moist.  Patient's extraocular motions are intact.   The trachea is midline and there is no jugular venous distention.   There is equal chest rise bilaterally and the heart rate is regular.   The abdomen is soft nontender nondistended without any rebound or guarding. Patient moves all extremities.   There is no clubbing, cyanosis, or edema.  No injury or deformity of the extremities.  Skin is clean, dry, intact.    Results for orders placed or performed during the hospital encounter of 12/28/23 (from the past 24 hour(s))   POCT GLUCOSE   Result Value Ref Range    POCT Glucose 184 (H) 74 - 99 mg/dL   POCT GLUCOSE   Result Value Ref Range    POCT Glucose 159 (H) 74 - 99 mg/dL   POCT GLUCOSE   Result Value Ref Range    POCT Glucose 171 (H) 74 - 99 mg/dL   Comprehensive Metabolic Panel   Result Value Ref Range    Glucose 181 (H) 65 - 99 mg/dL    Sodium 134 133 - 145 mmol/L    Potassium 3.5 3.4 - 5.1 mmol/L    Chloride 96 (L) 97 - 107 mmol/L    Bicarbonate 23 (L) 24 - 31 mmol/L    Urea Nitrogen 9 8 - 25 mg/dL    Creatinine 0.80 0.40 - 1.60 mg/dL    eGFR 75 >60 mL/min/1.73m*2    Calcium 8.7 8.5 - 10.4 mg/dL    Albumin 3.6 3.5 - 5.0 g/dL    Alkaline Phosphatase 122 35 - 125 U/L    Total Protein 6.6 5.9 - 7.9 g/dL    AST 28 5 - 40 U/L    Bilirubin, Total 0.3 0.1 - 1.2 mg/dL    ALT 45 (H) 5 - 40 U/L     Anion Gap 15 <=19 mmol/L   CBC   Result Value Ref Range    WBC 26.2 (H) 4.4 - 11.3 x10*3/uL    nRBC 0.0 0.0 - 0.0 /100 WBCs    RBC 3.87 (L) 4.00 - 5.20 x10*6/uL    Hemoglobin 11.0 (L) 12.0 - 16.0 g/dL    Hematocrit 34.0 (L) 36.0 - 46.0 %    MCV 88 80 - 100 fL    MCH 28.4 26.0 - 34.0 pg    MCHC 32.4 32.0 - 36.0 g/dL    RDW 14.1 11.5 - 14.5 %    Platelets 345 150 - 450 x10*3/uL   POCT GLUCOSE   Result Value Ref Range    POCT Glucose 177 (H) 74 - 99 mg/dL   POCT GLUCOSE   Result Value Ref Range    POCT Glucose 154 (H) 74 - 99 mg/dL   POCT GLUCOSE   Result Value Ref Range    POCT Glucose 142 (H) 74 - 99 mg/dL     A/P chronic cholecystitis, unsuccessful intubation attempt prior to surgery.    Discussions with ENT and anesthesiology is that this patient should recover from the intubation attempt and swelling.  Should be evaluated outpatient by ENT prior to repeat surgery and that our anesthesiology team would be most comfortable with her having surgery at a tertiary care center when the time is right.

## 2024-01-03 NOTE — PROGRESS NOTES
Critical Care Progress Note    Marylou Ward is a 78 y.o. female on day 6 of admission presenting with Acute vomiting.    Subjective   On 6 L this morning.  O2 sat 98%.  Complains of oropharyngeal pain and discomfort.  Complains of chest discomfort with coughing.  Objective   Vital Signs      1/3/2024     4:30 AM 1/3/2024     5:00 AM 1/3/2024     5:30 AM 1/3/2024     5:41 AM 1/3/2024     6:00 AM 1/3/2024     7:00 AM 1/3/2024     8:00 AM   Vitals   Systolic 160 151 156  137 159 171   Diastolic 76 70 70  64 69 145   Heart Rate 100 100 103  107 105 109   Temp       36.4 °C (97.5 °F)   Resp 11 11 13  14 11 14   Weight (lb)    137.13      BMI    26.78 kg/m2      BSA (m2)    1.62 m2          Oxygen Therapy  SpO2: 96 %  Medical Gas Therapy: Supplemental oxygen  O2 Delivery Method: Nasal cannula    FiO2 (%):  [100 %] 100 %  S RR:  [16] 16    Intake/Output previous 24 hours:    Intake/Output Summary (Last 24 hours) at 1/3/2024 0854  Last data filed at 1/3/2024 0641  Gross per 24 hour   Intake 2608.75 ml   Output 1500 ml   Net 1108.75 ml       Physical Exam  Constitutional:       Appearance: Normal appearance.   HENT:      Head: Normocephalic.   Eyes:      Extraocular Movements: Extraocular movements intact.      Pupils: Pupils are equal, round, and reactive to light.   Cardiovascular:      Rate and Rhythm: Normal rate and regular rhythm.      Pulses: Normal pulses.      Heart sounds: Normal heart sounds.   Pulmonary:      Effort: Pulmonary effort is normal.      Breath sounds: Wheezing present.   Abdominal:      General: Bowel sounds are normal.      Palpations: Abdomen is soft.   Musculoskeletal:         General: Normal range of motion.      Cervical back: Normal range of motion.   Skin:     General: Skin is warm.   Neurological:      General: No focal deficit present.      Mental Status: She is alert and oriented to person, place, and time.         Lines and Tubes:  Peripheral IV 01/02/24 22 G Left;Ventral Wrist (Active)    Placement Date/Time: 01/02/24 0040   Hand Hygiene Completed: Yes  Size (Gauge): 22 G  Orientation: Left;Ventral  Location: Wrist  Technique: Anatomical landmarks  Patient Tolerance: Tolerated well   Number of days: 1       PICC - Adult 01/02/24 Triple lumen Right Brachial vein (Active)   Placement Date/Time: 01/02/24 1715   Hand Hygiene Completed: Yes  Catheter Time Out Checklist Completed: Yes  Size (Fr): 5  Lumen Type: Triple lumen  Catheter to Vein Ratio Less Than 50%: Yes  Total Length (cm): 27 cm  External Length (cm): 1 cm  Orie...   Number of days: 0       Non-Surgical Airway  (Active)   Placement Date/Time: 01/02/24 (c) 1241   Airway Device: (c)   Mask Ventilation: Unable to mask vent +/- NMBA  Airway Insertion Attempts: 1   Number of days: 0       External Urinary Catheter Female (Active)   Placement Date/Time: 01/02/24 1818   External Catheter Type: Female   Number of days: 0         Scheduled medications  cefOXitin, 2 g, intravenous, q6h  enoxaparin, 40 mg, subcutaneous, Nightly  insulin lispro, 0-5 Units, subcutaneous, TID with meals  polyethylene glycol, 17 g, oral, Daily      Continuous medications  sodium chloride 0.9%, 75 mL/hr, Last Rate: 75 mL/hr (01/03/24 0646)      PRN medications  PRN medications: acetaminophen, ALPRAZolam, alteplase, bisacodyl, dextromethorphan-guaifenesin, dextrose 10 % in water (D10W), dextrose, diphenhydrAMINE, glucagon, hydrALAZINE, HYDROmorphone, ipratropium-albuteroL, lidocaine-diphenhydraMINE-Maalox 1:1:1, melatonin, ondansetron, oxyCODONE, oxygen    Relevant Results  Results from last 7 days   Lab Units 01/03/24  0343 01/02/24  0641 01/01/24  0539   WBC AUTO x10*3/uL 26.2* 10.9 10.6   HEMOGLOBIN g/dL 11.0* 10.5* 11.4*   HEMATOCRIT % 34.0* 32.4* 36.7   PLATELETS AUTO x10*3/uL 345 229 375      Results from last 7 days   Lab Units 01/03/24  0343 01/02/24  0641 01/01/24  0539   SODIUM mmol/L 134 134 134   POTASSIUM mmol/L 3.5 3.4 3.0*   CHLORIDE mmol/L 96* 99 98   CO2  mmol/L 23* 22* 22*   BUN mg/dL 9 8 9   CREATININE mg/dL 0.80 1.00 1.00   GLUCOSE mg/dL 181* 118* 109*   CALCIUM mg/dL 8.7 8.7 8.7          XR chest 1 view 01/02/2024    Narrative  Interpreted By:  Chriss Roberson,  STUDY:  XR CHEST 1 VIEW; 1/2/2024 2:12 pm    INDICATION:  Signs/Symptoms:difficult airway, possible aspiration (already  performed).    COMPARISON:  07/26/2023    ACCESSION NUMBER(S):  [id number]    ORDERING CLINICIAN:  YAZMIN ULLOA    TECHNIQUE:  1 view of the chest was performed.    FINDINGS:  The lungs are adequately inflated. Mild nonspecific ground-glass  opacity without focal consolidation. Findings could be related to  mild pulmonary edema, inflammatory, or potentially atypical  infection. No pleural effusion. No pneumothorax.  The  cardiomediastinal silhouette is within normal limits.    Impression  Mild nonspecific ground-glass opacity, without focal consolidation.  Findings can be seen with mild pulmonary edema, inflammatory, or  potentially atypical infectious etiologies.    Signed by: Chriss Roberson 1/2/2024 2:37 PM  Dictation workstation:   [id number]      Patient Active Problem List   Diagnosis    Acute vomiting    Chronic cholecystitis with calculus    HTN (hypertension)    Gastroesophageal reflux disease    Diabetes mellitus, type 2 (CMS/HCC)    Chronic obstructive pulmonary disease (CMS/HCC)     Assessment/Plan     HTN (hypertension)    Gastroesophageal reflux disease    Diabetes mellitus, type 2 (CMS/HCC)    Chronic obstructive pulmonary disease (CMS/HCC)    Chronic cholecystitis with calculus    Acute respiratory insufficiency.  Difficult intubation    Wean oxygen today.  Pulse ox 90% on 6 L this morning.  Complains of cough and chest discomfort.  This may be all secondary to events of yesterday.  However white blood cell count is increased significantly overnight.  Chest x-ray this morning.  May need CT of the chest.  Aerosol treatments  Continue current antibiotic  coverage.  May need to broaden coverage.  Again wait for chest x-ray results.  This may all be reactive considering the events of yesterday.  Bedside swallow eval  Restart metoprolol if she has to study.      Porfirio Lea MD  [facility]       Disclaimer: Parts of this chart were dictated with voice recognition software. Please excuse any errors of grammar, spelling, or transcription which are not corrected. Please contact me with any questions regarding documentation.

## 2024-01-03 NOTE — NURSING NOTE
Assumed care. Initial assessment done. Patient is alert and oriented. Noted with hoarse voice. She's complaining of 9/10 throbbing pain over her throat, pt. Just received Dilaudid from AM shift. Anesthesia came in seen patient, ordered to Magic mouthwash to relieve the pain. She's on 6L NC saturating well with 98% SPO2. Lungs are clear on auscultation. Noted scattered bruising all over body. Safety measures in placed.

## 2024-01-03 NOTE — PROGRESS NOTES
Patient not medically clear. Patient was supposed to undergo lap juan alberto on 1/2/2024 but did not have the procedure secondary to being difficult to intubate. Patient then transferred to the ICU. Patient remains in the ICU. At the time of discharge, patient will return home with nephew. Patient declining SNF and [facility]. Will follow.     **PATIENT WITH A SAFE DISCHARGE PLAN      Marissa Roque RN

## 2024-01-03 NOTE — PROGRESS NOTES
Physical Therapy                 Therapy Communication Note    Patient Name: Marylou Ward  MRN: [id number]  Today's Date: 1/3/2024     Discipline: Physical Therapy    Missed Visit Reason: Missed Visit Reason: Patient refused (New PT orders received following aborted lap juan alberto. pt has been transferred to ICU to monitor airway. Current vitals: /76 (104),  bpm, 97%, RR 13.)    Missed Time: Attempt    Comment: RE-Evaluation attempted however pt declined this date. States she is not feeling well and needs a day to rest. RN aware.

## 2024-01-03 NOTE — CONSULTS
Inpatient consult to Intensivist  Consult performed by: Porfirio Lea MD  Consult ordered by: Tyrell Casas MD          Critical Care Consult    Reason For Consult    History Of Present Illness  Marylou Ward is a 78 y.o. female presenting for cholecystectomy.  Patient presented for acute vomiting 5 days ago.  She is taken the OR for cholecystectomy.  Despite several attempts by anesthesia unable to intubate her.  Several issues including her small mouth large tongue.  Anterior limited neck flexion.  ENT was consulted for concern for possible submandibular swelling.  She was seen in the ICU with ENT surgeon and myself at bedside     Past Medical History  She has a past medical history of Arthritis, Diabetes mellitus (CMS/HCC), and Hypertension.    Surgical History  She has no past surgical history on file.     Social History  She reports that she has never smoked. She has never been exposed to tobacco smoke. She has never used smokeless tobacco. She reports that she does not currently use alcohol. No history on file for drug use.    Family History  No family history on file.     Allergies  Gabapentin, Levofloxacin, Sulfa (sulfonamide antibiotics), Duloxetine, Metronidazole, Pregabalin, Protonix [pantoprazole], Adhesive tape-silicones, and Morphine    Review of Systems   All other systems reviewed and are negative.      Last Recorded Vitals  Blood pressure 151/76, pulse (!) 111, temperature 36.5 °C (97.7 °F), temperature source Temporal, resp. rate 18, height 1.524 m (5'), weight 61.2 kg (135 lb), SpO2 98 %.    Intake/Output    Intake/Output Summary (Last 24 hours) at 1/2/2024 2111  Last data filed at 1/2/2024 2033  Gross per 24 hour   Intake 1950 ml   Output 1200 ml   Net 750 ml       Physical Exam  HENT:      Head: Normocephalic and atraumatic.   Eyes:      Extraocular Movements: Extraocular movements intact.      Conjunctiva/sclera: Conjunctivae normal.      Pupils: Pupils are equal, round, and reactive to  light.   Neck:      Trachea: Phonation normal.   Cardiovascular:      Rate and Rhythm: Normal rate and regular rhythm.      Pulses: Normal pulses.      Heart sounds: Normal heart sounds.   Pulmonary:      Effort: Pulmonary effort is normal.      Breath sounds: Normal breath sounds.   Abdominal:      General: Bowel sounds are normal.      Palpations: Abdomen is soft.   Musculoskeletal:         General: Normal range of motion.      Cervical back: Normal range of motion.   Skin:     General: Skin is warm and dry.   Neurological:      General: No focal deficit present.      Mental Status: She is alert and oriented to person, place, and time.         Oxygen Therapy  SpO2: 98 %  Medical Gas Therapy: Supplemental oxygen  O2 Delivery Method: Nasal cannula  FiO2 (%):  [100 %] 100 %  S RR:  [16] 16    Lines and Tubes:  Peripheral IV 01/02/24 22 G Left;Ventral Wrist (Active)   Placement Date/Time: 01/02/24 0040   Hand Hygiene Completed: Yes  Size (Gauge): 22 G  Orientation: Left;Ventral  Location: Wrist  Technique: Anatomical landmarks  Patient Tolerance: Tolerated well   Number of days: 0       PICC - Adult 01/02/24 Triple lumen Right Brachial vein (Active)   Placement Date/Time: 01/02/24 1715   Hand Hygiene Completed: Yes  Catheter Time Out Checklist Completed: Yes  Size (Fr): 5  Lumen Type: Triple lumen  Catheter to Vein Ratio Less Than 50%: Yes  Total Length (cm): 27 cm  External Length (cm): 1 cm  Orie...   Number of days: 0       Non-Surgical Airway  (Active)   Placement Date/Time: 01/02/24 (c) 1241   Airway Device: (c)   Mask Ventilation: Unable to mask vent +/- NMBA  Airway Insertion Attempts: 1   Number of days: 0       External Urinary Catheter Female (Active)   Placement Date/Time: 01/02/24 1818   External Catheter Type: Female   Number of days: 0         Scheduled medications  cefOXitin, 2 g, intravenous, q6h  enoxaparin, 40 mg, subcutaneous, Nightly  insulin lispro, 0-5 Units, subcutaneous, TID with  meals  polyethylene glycol, 17 g, oral, Daily      Continuous medications  sodium chloride 0.9%, 75 mL/hr, Last Rate: 75 mL/hr (01/02/24 2033)      PRN medications  PRN medications: acetaminophen, ALPRAZolam, alteplase, bisacodyl, dextromethorphan-guaifenesin, dextrose 10 % in water (D10W), dextrose, diphenhydrAMINE, glucagon, hydrALAZINE, HYDROmorphone, ipratropium-albuteroL, lidocaine-diphenhydraMINE-Maalox 1:1:1, melatonin, ondansetron, oxyCODONE, oxygen    Relevant Results  Results from last 7 days   Lab Units 01/02/24  0641 01/01/24  0539 12/31/23  0658   WBC AUTO x10*3/uL 10.9 10.6 11.8*   HEMOGLOBIN g/dL 10.5* 11.4* 11.0*   HEMATOCRIT % 32.4* 36.7 33.2*   PLATELETS AUTO x10*3/uL 229 375 374      Results from last 7 days   Lab Units 01/02/24  0641 01/01/24  0539 12/31/23  0658   SODIUM mmol/L 134 134 132*   POTASSIUM mmol/L 3.4 3.0* 3.8   CHLORIDE mmol/L 99 98 95*   CO2 mmol/L 22* 22* 23*   BUN mg/dL 8 9 9   CREATININE mg/dL 1.00 1.00 0.90   GLUCOSE mg/dL 118* 109* 121*   CALCIUM mg/dL 8.7 8.7 9.1          XR chest 1 view 01/02/2024    Narrative  Interpreted By:  Chriss Roberson,  STUDY:  XR CHEST 1 VIEW; 1/2/2024 2:12 pm    INDICATION:  Signs/Symptoms:difficult airway, possible aspiration (already  performed).    COMPARISON:  07/26/2023    ACCESSION NUMBER(S):  [id number]    ORDERING CLINICIAN:  YAZMIN ULLOA    TECHNIQUE:  1 view of the chest was performed.    FINDINGS:  The lungs are adequately inflated. Mild nonspecific ground-glass  opacity without focal consolidation. Findings could be related to  mild pulmonary edema, inflammatory, or potentially atypical  infection. No pleural effusion. No pneumothorax.  The  cardiomediastinal silhouette is within normal limits.    Impression  Mild nonspecific ground-glass opacity, without focal consolidation.  Findings can be seen with mild pulmonary edema, inflammatory, or  potentially atypical infectious etiologies.    Signed by: Chriss Roberson 1/2/2024 2:37  PM  Dictation workstation:   [id number]     Assessment/Plan   Principal Problem:    Acute vomiting  Active Problems:    HTN (hypertension)    Gastroesophageal reflux disease    Diabetes mellitus, type 2 (CMS/HCC)    Chronic obstructive pulmonary disease (CMS/HCC)    Chronic cholecystitis with calculus    Acute respiratory insufficiency.  She was taken off BiPAP.  Examined by ENT.  He was able to speak maintain oxygen saturation on nasal cannula.  Lung sounds are normal.  She does have a large tongue.  Possible laryngoscopy performed at bedside.  She had normal phonation and vocal cord function.  Epiglottis was normal as well.  Relative large tongue given the small mouth size.    Will monitor overnight.  Oxygen nasal cannula  BiPAP as needed.  Aerosol treatments.    Will need fiberoptic nasal intubation for surgery when this is scheduled in the future.    Porfirio Lea MD  [facility]     Disclaimer: Parts of this chart were dictated with voice recognition software. Please excuse any errors of grammar, spelling, or transcription which are not corrected. Please contact me with any questions regarding documentation.

## 2024-01-03 NOTE — NURSING NOTE
Patient with Rt arm triple lumen picc, dressing D&I, 2 lumens flush easily and with positive blood return, curos caps applied, 1 lumen in use without difficulty.

## 2024-01-03 NOTE — PROGRESS NOTES
Speech-Language Pathology    Inpatient Clinical Swallow Evaluation    Patient Name: Marylou Ward  MRN: [id number]  Today's Date: 1/3/2024   Time Calculation  Start Time: 0945  Stop Time: 1007  Time Calculation (min): 22 min          Current Problem:   1. Acute vomiting        2. Abdominal pain, unspecified abdominal location        3. Chronic cholecystitis with calculus  Case Request Operating Room: Cholecystectomy Laparoscopy with Cholangiogram    Case Request Operating Room: Cholecystectomy Laparoscopy with Cholangiogram        This is a 78 y.o. female who has had multiple ER visits for abdominal pain, nausea, vomiting, no diarrhea.  Marylou Ward is a 78 y.o. female presenting in the operating room for cholecystectomy with ENT consult:.  After induction of anesthesia there was difficulty with intubation.  I was called emergently to the operating room to evaluate her.  She has a very small mouth and visualization was limited due to limited mandibular excursion a large tongue and small mouth.  I placed a nasal trumpet.      Recommendations:  Recommendations  Risk for Aspiration: No  Additional Recommendations: Dysphagia treatment  Solid Diet Recommendations : Regular (IDDSI Level 7)  Liquid Diet Recommendations: Thin (IDDSI Level 0) (easy to chew)  Compensatory Swallowing Strategies: Upright 90 degrees as possible for all oral intake, Small bites/sips  Medication Administration Recommendations: Whole, With Pureed  Follow up treatments: Diet tolerance monitoring, Patient/family education (PO trials)  Dysphagia Goals: Patient will tolerate recommended diet without observed clinical signs of aspiration, Patient will demonstrate appropriate strategies for swallowing safety, Patient will progress to advanced diet      Assessment:  Assessment  Assessment Results: swallow WFL, no s/s apiration, odynaphagia. MBS is not indicated @ this time,  Prognosis: Good  Treatment Provided: No  Treatment Tolerance: Patient tolerated  "treatment well  Medical Staff Made Aware: Yes  Strengths: Cognition, Motivation  Barriers: None      Plan:  Plan  Inpatient/Swing Bed or Outpatient: Inpatient  Treatment/Interventions: Bolus trials, Assess diet tolerance, Diet recommendations, Patient/family education  SLP Plan: Skilled SLP  SLP Frequency: 1x per week  Duration: Current admission  SLP Discharge Recommendations:  (TBD)  Diet Recommendations: Solid  Solid Consistency: Regular (IDDSI Level 7) (eas to chew)  Liquid Consistency: Thin (IDDSI Level 0)  Next Treatment Priority: diet yin, trials, education  Discussed POC: Patient, Nursing  Discussed Risks/Benefits: Yes, Patient, Nursing  Patient/Caregiver Agreeable: Yes    Dysphagia Goals: Patient will tolerate recommended diet without observed clinical signs of aspiration, Patient will demonstrate appropriate strategies for swallowing safety, Patient will progress to advanced diet    Subjective   Pt reports no difficulty with swallowing, pills sometimes \"sticK\" Pt awaiting dilaudid for pain, RN requesting swallow eval be completed before she give pain meds. Pt able to feed self    General Visit Information:  General Information  Reason for Referral: assess swallow d/t concern for aspiraiton, pt had difficult intubation prior to  surgery  Past Medical History Relevant to Rehab: Arthritis  Diabetes mellitus (CMS/HCC)   Hypertension  Prior to Session Communication: Bedside nurse (adrienne daniels RN)  Date of Onset: 01/02/24  Date of Order: 01/03/24  [employer] Diet: regular and thin liquids  Current Diet : NPO  Pain:  Pain Assessment  Pain Assessment: 0-10  Pain Score: 10 - Worst possible pain  Pain Type: Acute pain  Pain Location: Neck  Objective   Baseline Assessment:  Baseline Assessment  Respiratory Status: Oxygen via nasal cannula  Behavior/Cognition: Alert, Cooperative, Pleasant mood (oriented x 4)  Vision: Functional for self-feeding  Patient Positioning: Upright in Bed  Baseline Vocal Quality: " Dysphonic  Volitional Cough: Strong     Oral/Motor Assessment:  Oral/Motor Assessment  Oral Hygiene: oral mucosa moist, mild discoloration  Dentition: Some Missing Teeth  Oral Motor: Within Functional Limits  Vocal Quality: Exceptions to WFL  Vocal Quality Impairment: Hoarse  Intelligibility: Intelligible      Consistencies Trialed: ice chips x 2, approx 4 oz thin liquids via cup and straw, 2 oz applesauce 1/2 ruthie cracker     Clinical Observations:  Clinical Observations  Clinical Observation Comment: Pt demon adequate oral manipulation/mastication with regular solid, adequate oral clearance, swallow onset timely, laryngeal elevation present upon palpation, no overt s/s aspiration , pt c/o odynaphagia t/o evaluation with improvement    Inpatient:  Education Documentation  Pt/nursing education provided re: regarding role of ST, purpose of assessment, clinical impressions, recommendations, POC,safe swallow strategies, Pt/nurse verbalized understanding and agreement

## 2024-01-03 NOTE — PROGRESS NOTES
Occupational Therapy                 Therapy Communication Note    Patient Name: Marylou Ward  MRN: [id number]  Today's Date: 1/3/2024     Discipline: Occupational Therapy    Missed Visit Reason: Missed Visit Reason: Patient refused (Pt politely declined stating she did not feel well.)    Missed Time: Attempt    Comment:

## 2024-01-03 NOTE — CARE PLAN
The patient's goals for the shift include      The clinical goals for the shift include no signs of bleeding and respiratory distress    Over the shift, the patient did not make progress toward the following goals. Barriers to progression include . Recommendations to address these barriers include .

## 2024-01-04 LAB
ALBUMIN SERPL-MCNC: 3.4 G/DL (ref 3.5–5)
ALP BLD-CCNC: 101 U/L (ref 35–125)
ALT SERPL-CCNC: 24 U/L (ref 5–40)
ANION GAP SERPL CALC-SCNC: 10 MMOL/L
AST SERPL-CCNC: 13 U/L (ref 5–40)
BILIRUB SERPL-MCNC: 0.2 MG/DL (ref 0.1–1.2)
BUN SERPL-MCNC: 16 MG/DL (ref 8–25)
CALCIUM SERPL-MCNC: 8.5 MG/DL (ref 8.5–10.4)
CHLORIDE SERPL-SCNC: 99 MMOL/L (ref 97–107)
CO2 SERPL-SCNC: 29 MMOL/L (ref 24–31)
CREAT SERPL-MCNC: 0.9 MG/DL (ref 0.4–1.6)
ERYTHROCYTE [DISTWIDTH] IN BLOOD BY AUTOMATED COUNT: 14.1 % (ref 11.5–14.5)
GFR SERPL CREATININE-BSD FRML MDRD: 65 ML/MIN/1.73M*2
GLUCOSE BLD MANUAL STRIP-MCNC: 133 MG/DL (ref 74–99)
GLUCOSE BLD MANUAL STRIP-MCNC: 139 MG/DL (ref 74–99)
GLUCOSE BLD MANUAL STRIP-MCNC: 141 MG/DL (ref 74–99)
GLUCOSE BLD MANUAL STRIP-MCNC: 145 MG/DL (ref 74–99)
GLUCOSE BLD MANUAL STRIP-MCNC: 156 MG/DL (ref 74–99)
GLUCOSE BLD MANUAL STRIP-MCNC: 258 MG/DL (ref 74–99)
GLUCOSE BLD MANUAL STRIP-MCNC: 267 MG/DL (ref 74–99)
GLUCOSE SERPL-MCNC: 137 MG/DL (ref 65–99)
HCT VFR BLD AUTO: 29.4 % (ref 36–46)
HGB BLD-MCNC: 9.3 G/DL (ref 12–16)
MCH RBC QN AUTO: 28 PG (ref 26–34)
MCHC RBC AUTO-ENTMCNC: 31.6 G/DL (ref 32–36)
MCV RBC AUTO: 89 FL (ref 80–100)
NRBC BLD-RTO: 0 /100 WBCS (ref 0–0)
PLATELET # BLD AUTO: 310 X10*3/UL (ref 150–450)
POTASSIUM SERPL-SCNC: 3.6 MMOL/L (ref 3.4–5.1)
PROT SERPL-MCNC: 5.5 G/DL (ref 5.9–7.9)
RBC # BLD AUTO: 3.32 X10*6/UL (ref 4–5.2)
SODIUM SERPL-SCNC: 138 MMOL/L (ref 133–145)
WBC # BLD AUTO: 16 X10*3/UL (ref 4.4–11.3)

## 2024-01-04 PROCEDURE — 9420000001 HC RT PATIENT EDUCATION 5 MIN

## 2024-01-04 PROCEDURE — 2500000002 HC RX 250 W HCPCS SELF ADMINISTERED DRUGS (ALT 637 FOR MEDICARE OP, ALT 636 FOR OP/ED): Performed by: SURGERY

## 2024-01-04 PROCEDURE — 2500000002 HC RX 250 W HCPCS SELF ADMINISTERED DRUGS (ALT 637 FOR MEDICARE OP, ALT 636 FOR OP/ED): Performed by: INTERNAL MEDICINE

## 2024-01-04 PROCEDURE — 2500000004 HC RX 250 GENERAL PHARMACY W/ HCPCS (ALT 636 FOR OP/ED): Performed by: INTERNAL MEDICINE

## 2024-01-04 PROCEDURE — 80053 COMPREHEN METABOLIC PANEL: CPT | Performed by: SURGERY

## 2024-01-04 PROCEDURE — 2500000004 HC RX 250 GENERAL PHARMACY W/ HCPCS (ALT 636 FOR OP/ED): Performed by: SURGERY

## 2024-01-04 PROCEDURE — 99024 POSTOP FOLLOW-UP VISIT: CPT | Performed by: SURGERY

## 2024-01-04 PROCEDURE — 82947 ASSAY GLUCOSE BLOOD QUANT: CPT

## 2024-01-04 PROCEDURE — 94640 AIRWAY INHALATION TREATMENT: CPT

## 2024-01-04 PROCEDURE — 97164 PT RE-EVAL EST PLAN CARE: CPT | Mod: GP

## 2024-01-04 PROCEDURE — 2500000001 HC RX 250 WO HCPCS SELF ADMINISTERED DRUGS (ALT 637 FOR MEDICARE OP): Performed by: SURGERY

## 2024-01-04 PROCEDURE — 97530 THERAPEUTIC ACTIVITIES: CPT | Mod: GP

## 2024-01-04 PROCEDURE — 85027 COMPLETE CBC AUTOMATED: CPT | Performed by: SURGERY

## 2024-01-04 PROCEDURE — 37799 UNLISTED PX VASCULAR SURGERY: CPT | Performed by: SURGERY

## 2024-01-04 PROCEDURE — 92526 ORAL FUNCTION THERAPY: CPT | Mod: GN | Performed by: SPEECH-LANGUAGE PATHOLOGIST

## 2024-01-04 PROCEDURE — 97535 SELF CARE MNGMENT TRAINING: CPT | Mod: GO

## 2024-01-04 PROCEDURE — 2060000001 HC INTERMEDIATE ICU ROOM DAILY

## 2024-01-04 PROCEDURE — 97168 OT RE-EVAL EST PLAN CARE: CPT | Mod: GO

## 2024-01-04 PROCEDURE — 94660 CPAP INITIATION&MGMT: CPT

## 2024-01-04 RX ORDER — METOPROLOL TARTRATE 50 MG/1
50 TABLET ORAL 2 TIMES DAILY
Status: DISCONTINUED | OUTPATIENT
Start: 2024-01-04 | End: 2024-01-10 | Stop reason: HOSPADM

## 2024-01-04 RX ORDER — DEXAMETHASONE SODIUM PHOSPHATE 100 MG/10ML
10 INJECTION INTRAMUSCULAR; INTRAVENOUS EVERY 12 HOURS
Status: DISCONTINUED | OUTPATIENT
Start: 2024-01-04 | End: 2024-01-05

## 2024-01-04 RX ORDER — METFORMIN HYDROCHLORIDE 500 MG/1
500 TABLET ORAL
Status: DISCONTINUED | OUTPATIENT
Start: 2024-01-04 | End: 2024-01-10 | Stop reason: HOSPADM

## 2024-01-04 RX ORDER — PANTOPRAZOLE SODIUM 40 MG/1
40 TABLET, DELAYED RELEASE ORAL DAILY
Status: DISCONTINUED | OUTPATIENT
Start: 2024-01-04 | End: 2024-01-10 | Stop reason: HOSPADM

## 2024-01-04 RX ORDER — SERTRALINE HYDROCHLORIDE 50 MG/1
50 TABLET, FILM COATED ORAL DAILY
Status: DISCONTINUED | OUTPATIENT
Start: 2024-01-04 | End: 2024-01-10 | Stop reason: HOSPADM

## 2024-01-04 RX ORDER — OXYCODONE AND ACETAMINOPHEN 5; 325 MG/1; MG/1
1 TABLET ORAL EVERY 6 HOURS PRN
Status: DISCONTINUED | OUTPATIENT
Start: 2024-01-04 | End: 2024-01-10 | Stop reason: HOSPADM

## 2024-01-04 RX ORDER — INSULIN LISPRO 100 [IU]/ML
0-10 INJECTION, SOLUTION INTRAVENOUS; SUBCUTANEOUS
Status: DISCONTINUED | OUTPATIENT
Start: 2024-01-04 | End: 2024-01-10 | Stop reason: HOSPADM

## 2024-01-04 RX ADMIN — IPRATROPIUM BROMIDE AND ALBUTEROL SULFATE 3 ML: 2.5; .5 SOLUTION RESPIRATORY (INHALATION) at 20:41

## 2024-01-04 RX ADMIN — PANTOPRAZOLE SODIUM 40 MG: 40 TABLET, DELAYED RELEASE ORAL at 13:25

## 2024-01-04 RX ADMIN — CEFOXITIN 2 G: 2 INJECTION, POWDER, FOR SOLUTION INTRAVENOUS at 08:19

## 2024-01-04 RX ADMIN — METOPROLOL TARTRATE 50 MG: 50 TABLET ORAL at 13:25

## 2024-01-04 RX ADMIN — HYDROMORPHONE HYDROCHLORIDE 0.6 MG: 1 INJECTION, SOLUTION INTRAMUSCULAR; INTRAVENOUS; SUBCUTANEOUS at 03:25

## 2024-01-04 RX ADMIN — SERTRALINE 50 MG: 50 TABLET, FILM COATED ORAL at 13:25

## 2024-01-04 RX ADMIN — SODIUM CHLORIDE 3 G: 900 INJECTION INTRAVENOUS at 20:28

## 2024-01-04 RX ADMIN — ALPRAZOLAM 0.5 MG: 0.5 TABLET ORAL at 14:22

## 2024-01-04 RX ADMIN — IPRATROPIUM BROMIDE AND ALBUTEROL SULFATE 3 ML: 2.5; .5 SOLUTION RESPIRATORY (INHALATION) at 08:56

## 2024-01-04 RX ADMIN — HYDRALAZINE HYDROCHLORIDE 10 MG: 20 INJECTION INTRAMUSCULAR; INTRAVENOUS at 10:02

## 2024-01-04 RX ADMIN — SODIUM CHLORIDE 3 G: 900 INJECTION INTRAVENOUS at 13:50

## 2024-01-04 RX ADMIN — METOPROLOL TARTRATE 50 MG: 50 TABLET ORAL at 20:27

## 2024-01-04 RX ADMIN — DEXAMETHASONE SODIUM PHOSPHATE 10 MG: 10 INJECTION INTRAMUSCULAR; INTRAVENOUS at 21:15

## 2024-01-04 RX ADMIN — DEXAMETHASONE SODIUM PHOSPHATE 10 MG: 10 INJECTION INTRAMUSCULAR; INTRAVENOUS at 10:06

## 2024-01-04 RX ADMIN — HYDROMORPHONE HYDROCHLORIDE 0.6 MG: 1 INJECTION, SOLUTION INTRAMUSCULAR; INTRAVENOUS; SUBCUTANEOUS at 20:30

## 2024-01-04 RX ADMIN — IPRATROPIUM BROMIDE AND ALBUTEROL SULFATE 3 ML: 2.5; .5 SOLUTION RESPIRATORY (INHALATION) at 12:42

## 2024-01-04 RX ADMIN — INSULIN LISPRO 3 UNITS: 100 INJECTION, SOLUTION INTRAVENOUS; SUBCUTANEOUS at 13:37

## 2024-01-04 RX ADMIN — CEFOXITIN 2 G: 2 INJECTION, POWDER, FOR SOLUTION INTRAVENOUS at 01:54

## 2024-01-04 RX ADMIN — METFORMIN HYDROCHLORIDE 500 MG: 500 TABLET, FILM COATED ORAL at 19:03

## 2024-01-04 RX ADMIN — ENOXAPARIN SODIUM 40 MG: 40 INJECTION SUBCUTANEOUS at 20:28

## 2024-01-04 RX ADMIN — POLYETHYLENE GLYCOL 3350 17 G: 17 POWDER, FOR SOLUTION ORAL at 09:28

## 2024-01-04 RX ADMIN — OXYCODONE HYDROCHLORIDE 5 MG: 5 TABLET ORAL at 09:31

## 2024-01-04 RX ADMIN — INSULIN LISPRO 2 UNITS: 100 INJECTION, SOLUTION INTRAVENOUS; SUBCUTANEOUS at 18:49

## 2024-01-04 RX ADMIN — HYDROMORPHONE HYDROCHLORIDE 0.6 MG: 1 INJECTION, SOLUTION INTRAMUSCULAR; INTRAVENOUS; SUBCUTANEOUS at 13:34

## 2024-01-04 ASSESSMENT — PAIN SCALES - GENERAL
PAINLEVEL_OUTOF10: 8
PAINLEVEL_OUTOF10: 8
PAINLEVEL_OUTOF10: 10 - WORST POSSIBLE PAIN
PAINLEVEL_OUTOF10: 9
PAINLEVEL_OUTOF10: 10 - WORST POSSIBLE PAIN
PAINLEVEL_OUTOF10: 5 - MODERATE PAIN
PAINLEVEL_OUTOF10: 0 - NO PAIN
PAINLEVEL_OUTOF10: 9
PAINLEVEL_OUTOF10: 0 - NO PAIN
PAINLEVEL_OUTOF10: 4
PAINLEVEL_OUTOF10: 0 - NO PAIN

## 2024-01-04 ASSESSMENT — PAIN DESCRIPTION - LOCATION
LOCATION: OTHER (COMMENT)

## 2024-01-04 ASSESSMENT — ACTIVITIES OF DAILY LIVING (ADL)
ADL_ASSISTANCE: INDEPENDENT
ADL_ASSISTANCE: INDEPENDENT
ADLS_ADDRESSED: NO
HOME_MANAGEMENT_TIME_ENTRY: 8
BATHING_ASSISTANCE: MODERATE

## 2024-01-04 ASSESSMENT — COGNITIVE AND FUNCTIONAL STATUS - GENERAL
PERSONAL GROOMING: A LITTLE
DAILY ACTIVITIY SCORE: 15
DRESSING REGULAR LOWER BODY CLOTHING: A LOT
EATING MEALS: A LITTLE
TURNING FROM BACK TO SIDE WHILE IN FLAT BAD: A LITTLE
CLIMB 3 TO 5 STEPS WITH RAILING: A LOT
MOVING TO AND FROM BED TO CHAIR: A LITTLE
HELP NEEDED FOR BATHING: A LOT
TOILETING: A LOT
WALKING IN HOSPITAL ROOM: A LOT
MOBILITY SCORE: 16
MOVING FROM LYING ON BACK TO SITTING ON SIDE OF FLAT BED WITH BEDRAILS: A LITTLE
STANDING UP FROM CHAIR USING ARMS: A LITTLE
DRESSING REGULAR UPPER BODY CLOTHING: A LITTLE

## 2024-01-04 ASSESSMENT — PAIN - FUNCTIONAL ASSESSMENT
PAIN_FUNCTIONAL_ASSESSMENT: FLACC (FACE, LEGS, ACTIVITY, CRY, CONSOLABILITY)
PAIN_FUNCTIONAL_ASSESSMENT: 0-10
PAIN_FUNCTIONAL_ASSESSMENT: FLACC (FACE, LEGS, ACTIVITY, CRY, CONSOLABILITY)
PAIN_FUNCTIONAL_ASSESSMENT: 0-10
PAIN_FUNCTIONAL_ASSESSMENT: FLACC (FACE, LEGS, ACTIVITY, CRY, CONSOLABILITY)
PAIN_FUNCTIONAL_ASSESSMENT: FLACC (FACE, LEGS, ACTIVITY, CRY, CONSOLABILITY)
PAIN_FUNCTIONAL_ASSESSMENT: 0-10

## 2024-01-04 ASSESSMENT — PAIN DESCRIPTION - DESCRIPTORS
DESCRIPTORS: ACHING
DESCRIPTORS: ACHING

## 2024-01-04 ASSESSMENT — PAIN DESCRIPTION - ORIENTATION: ORIENTATION: LEFT

## 2024-01-04 NOTE — CARE PLAN
Problem: Fall/Injury  Goal: Use assistive devices by end of the shift  Outcome: Not Progressing - did not get out of bed     Problem: Safety  Goal: Patient will be injury free during hospitalization  Outcome: Progressing  Goal: I will remain free of falls  Outcome: Progressing  Flowsheets (Taken 1/4/2024 1817)  Resident will remain free of falls:   Apply bed/chair alarms as appropriate   Assist with toileting as orderd   Visual checks per facility policy   Maintain bed at position as ordered (chair height, low bed)   Consult with physical therapy as needed   Assess and monitor medications that may increase fall risk     Problem: Skin  Goal: Participates in plan/prevention/treatment measures  Outcome: Progressing  Flowsheets (Taken 1/4/2024 1817)  Participates in plan/prevention/treatment measures: Elevate heels  Goal: Prevent/minimize sheer/friction injuries  Outcome: Progressing  Flowsheets (Taken 1/4/2024 1817)  Prevent/minimize sheer/friction injuries:   HOB 30 degrees or less   Turn/reposition every 2 hours/use positioning/transfer devices   Use pull sheet   Complete micro-shifts as needed if patient unable. Adjust patient position to relieve pressure points, not a full turn  Goal: Promote/optimize nutrition  Outcome: Progressing  Flowsheets (Taken 1/4/2024 1817)  Promote/optimize nutrition:   Offer water/supplements/favorite foods   Consume > 50% meals/supplements   Monitor/record intake including meals     Problem: Pain  Goal: Takes deep breaths with improved pain control throughout the shift  Outcome: Progressing  Goal: Turns in bed with improved pain control throughout the shift  Outcome: Progressing  Goal: Walks with improved pain control throughout the shift  Outcome: Progressing  Goal: Performs ADL's with improved pain control throughout shift  Outcome: Progressing  Goal: Participates in PT with improved pain control throughout the shift  Outcome: Progressing  Goal: Free from opioid side effects  throughout the shift  Outcome: Progressing  Goal: Free from acute confusion related to pain meds throughout the shift  Outcome: Progressing     Problem: Pain - Adult  Goal: Verbalizes/displays adequate comfort level or baseline comfort level  Outcome: Progressing  Flowsheets (Taken 1/4/2024 1817)  Verbalizes/displays adequate comfort level or baseline comfort level:   Encourage patient to monitor pain and request assistance   Assess pain using appropriate pain scale   Administer analgesics based on type and severity of pain and evaluate response   Implement non-pharmacological measures as appropriate and evaluate response     Problem: Safety - Adult  Goal: Free from fall injury  Outcome: Progressing  Flowsheets (Taken 1/4/2024 1817)  Free from fall injury:   Instruct family/caregiver on patient safety   Based on caregiver fall risk screen, instruct family/caregiver to ask for assistance with transferring infant if caregiver noted to have fall risk factors     Problem: Discharge Planning  Goal: Discharge to home or other facility with appropriate resources  Outcome: Progressing  Flowsheets (Taken 1/4/2024 1817)  Discharge to home or other facility with appropriate resources: Identify barriers to discharge with patient and caregiver     Problem: Chronic Conditions and Co-morbidities  Goal: Patient's chronic conditions and co-morbidity symptoms are monitored and maintained or improved  Outcome: Progressing  Flowsheets (Taken 1/4/2024 1817)  Care Plan - Patient's Chronic Conditions and Co-Morbidity Symptoms are Monitored and Maintained or Improved:   Monitor and assess patient's chronic conditions and comorbid symptoms for stability, deterioration, or improvement   Collaborate with multidisciplinary team to address chronic and comorbid conditions and prevent exacerbation or deterioration   Update acute care plan with appropriate goals if chronic or comorbid symptoms are exacerbated and prevent overall improvement and  discharge     Problem: Diabetes  Goal: Achieve decreasing blood glucose levels by end of shift  Outcome: Progressing  Flowsheets (Taken 1/4/2024 1817)  Achieve decreasing blood glucose levels by end of shift: Med administration/monitoring of effect  Goal: Increase stability of blood glucose readings by end of shift  Outcome: Progressing  Flowsheets (Taken 1/4/2024 1817)  Increase stability of blood glucose readings by end of shift: Med administration/monitoring of effect  Goal: Decrease in ketones present in urine by end of shift  Outcome: Progressing  Flowsheets (Taken 1/4/2024 1817)  Decrease in ketones present in urine by end of shift:   Monitor urine output   Med administration/monitoring of effect  Goal: Maintain electrolyte levels within acceptable range throughout shift  Outcome: Progressing  Flowsheets (Taken 1/4/2024 1817)  Maintain electrolyte levels within acceptable range throughout shift:   Med administration/monitoring of effect   Monitor urine output  Goal: Maintain glucose levels >70mg/dl to <250mg/dl throughout shift  Outcome: Progressing  Flowsheets (Taken 1/4/2024 1817)  Maintain glucose levels >70mg/dl to <250mg/dl throughout shift: Med administration/monitoring of effect  Goal: No changes in neurological exam by end of shift  Outcome: Progressing  Goal: Learn about and adhere to nutrition recommendations by end of shift  Outcome: Progressing  Goal: Vital signs within normal range for age by end of shift  Outcome: Progressing  Flowsheets (Taken 1/4/2024 1817)  Vital signs within normal range for age by end of shift: Med administration/monitoring of effect  Goal: Increase self care and/or family involovement by end of shift  Outcome: Progressing  Goal: Receive DSME education by end of shift  Outcome: Progressing     Problem: Fall/Injury  Goal: Not fall by end of shift  Outcome: Progressing  Goal: Be free from injury by end of the shift  Outcome: Progressing  Goal: Verbalize understanding of  personal risk factors for fall in the hospital  Outcome: Progressing  Goal: Verbalize understanding of risk factor reduction measures to prevent injury from fall in the home  Outcome: Progressing  Goal: Pace activities to prevent fatigue by end of the shift  Outcome: Progressing   The patient's goals for the shift include Improve pain - prn pain meds effective, pain managed    The clinical goals for the shift include BP management - stablized

## 2024-01-04 NOTE — PROGRESS NOTES
Marylou Ward is a 78 y.o. female on day 7 of admission presenting with Acute vomiting.    Subjective   She is having a lot of left-sided abdominal pain at this time. RN at the bedside medicating for pain.     Objective     Vitals 24HR  Heart Rate:  []   Temp:  [36.4 °C (97.5 °F)-37 °C (98.6 °F)]   Resp:  [14-27]   BP: (129-201)/(57-97)   Weight:  [63.1 kg (139 lb 1.8 oz)]   SpO2:  [89 %-95 %]         Intake/Output last 3 Shifts:    Intake/Output Summary (Last 24 hours) at 1/4/2024 1331  Last data filed at 1/4/2024 0400  Gross per 24 hour   Intake 297.5 ml   Output 500 ml   Net -202.5 ml       Physical Exam  Vitals reviewed.   Constitutional:       Appearance: Normal appearance.   HENT:      Head: Normocephalic and atraumatic.   Cardiovascular:      Rate and Rhythm: Normal rate and regular rhythm.   Pulmonary:      Effort: No respiratory distress.      Breath sounds: Rhonchi present. No wheezing or rales.      Comments: Nasal cannula  Chest:      Chest wall: No tenderness.   Abdominal:      General: Abdomen is flat. Bowel sounds are normal. There is no distension.      Palpations: Abdomen is soft.      Tenderness: There is abdominal tenderness.   Musculoskeletal:         General: No swelling or tenderness.   Skin:     General: Skin is warm and dry.   Neurological:      Mental Status: She is alert and oriented to person, place, and time.         Relevant Results  Lab Results   Component Value Date    WBC 16.0 (H) 01/04/2024    HGB 9.3 (L) 01/04/2024    HCT 29.4 (L) 01/04/2024    MCV 89 01/04/2024     01/04/2024     Lab Results   Component Value Date    GLUCOSE 137 (H) 01/04/2024    CALCIUM 8.5 01/04/2024     01/04/2024    K 3.6 01/04/2024    CO2 29 01/04/2024    CL 99 01/04/2024    BUN 16 01/04/2024    CREATININE 0.90 01/04/2024       Assessment/Plan         Principal Problem:    Acute vomiting  Active Problems:    HTN (hypertension)    Gastroesophageal reflux disease    Diabetes mellitus, type 2  (CMS/HCC)    Chronic obstructive pulmonary disease (CMS/HCC)    Chronic cholecystitis with calculus    A/P chronic cholecystitis, unsuccessful intubation attempt prior to surgery.     1/4/24: Patient no longer ICU status, her O2 requirements are improving. We will sign off at this point. Patient needs to follow up with ENT post discharge prior to a repeat gallbladder surgery to assess for readiness for intubation and recommendations to have surgery at [place] or a tertiary care center.     1/3/24: Discussions with ENT and anesthesiology is that this patient should recover from the intubation attempt and swelling.  Should be evaluated outpatient by ENT prior to repeat surgery and that our anesthesiology team would be most comfortable with her having surgery at a tertiary care center when the time is right.    I spent 10 minutes in the professional and overall care of this patient.      Nehal Jara, APRN-CNP  Patient seen and evaluated.  A total of 20 minutes was spent in evaluation of this patient, discussion of patient care with the care team, and review of pertinent results.  Tyrell Casas MD

## 2024-01-04 NOTE — PROGRESS NOTES
Spiritual Care Visit    Clinical Encounter Type  Visited With: Patient  Routine Visit: Follow-up  Continue Visiting: Yes         Values/Beliefs  Spiritual Requests During Hospitalization: [place] today NPO     Gerard Bagley

## 2024-01-04 NOTE — PROGRESS NOTES
Physical Therapy    Physical Therapy Evaluation & Treatment    Patient Name: Marylou Ward  MRN: [id number]  Today's Date: 1/4/2024   Time Calculation  Start Time: 1029  Stop Time: 1055  Time Calculation (min): 26 min    Assessment/Plan   PT Assessment  PT Assessment Results: Decreased strength, Decreased endurance, Decreased mobility, Impaired balance, Decreased coordination, Decreased safety awareness  Rehab Prognosis: Good  Barriers to Discharge: none  Evaluation/Treatment Tolerance: Patient tolerated treatment well  Medical Staff Made Aware: Yes  Strengths: Ability to acquire knowledge, Premorbid level of function  Barriers to Participation: Comorbidities  End of Session Communication: Bedside nurse  Assessment Comment: pt with a decline in mobility from her previous level of independence. pt with B LE weakness, impaired tolerance to activity, and poor safety awareness. pt will benefit from continued skilled therapy services to improve funcitonal performance and maximize safety.  End of Session Patient Position: Bed, 3 rail up, Alarm on   IP OR SWING BED PT PLAN  Inpatient or Swing Bed: Inpatient  PT Plan  Treatment/Interventions: Bed mobility, Transfer training, Gait training, Balance training, Stair training, Strengthening, Endurance training, Therapeutic exercise, Therapeutic activity  PT Plan: Skilled PT  PT Frequency: 4 times per week  PT Discharge Recommendations: Moderate intensity level of continued care  Equipment Recommended upon Discharge: Wheeled walker  PT Recommended Transfer Status: Assist x1, Assistive device (rolling walker)  PT - OK to Discharge: Yes    Subjective     General Visit Information:  General  Reason for Referral: RE-EVALUATION following attempted intubation for planned surgical procedure.  Referred By: Tyrell Casas MD  Past Medical History Relevant to Rehab: PE, cholelisthiasis, HTN, DM  Family/Caregiver Present: No  Co-Treatment: OT  Co-Treatment Reason: need for second skilled  therapist for safe patient handling, limited pt endurance  Prior to Session Communication: Bedside nurse  Patient Position Received: Bed, 3 rail up  Preferred Learning Style: verbal  General Comment: Pt is an 79 yo woman admit initally for abdominal pain, N/V, was sent home and continued with symptoms, returned to [facility] and was scheduled for lap juan alberto. Pt was unable to be intubated for that procedure which was not able to be completed and transferred to ICU.  Home Living:  Home Living  Type of Home: House  Lives With: Other (Comment) (nephew)  Home Adaptive Equipment: None  Home Layout: One level  Home Access: Stairs to enter with rails  Entrance Stairs-Rails: Right  Entrance Stairs-Number of Steps: 3  Bathroom Shower/Tub: Tub/shower unit (pt sponge bathes)  Bathroom Toilet: Standard  Prior Level of Function:  Prior Function Per Pt/Caregiver Report  Level of [place]: Independent with ADLs and functional transfers, Needs assistance with homemaking  Receives Help From: Family (nephew)  ADL Assistance: Independent  Homemaking Assistance: Needs assistance  Ambulatory Assistance: Independent  Prior Function Comments: pt independent prior to admit  Precautions:  Precautions  Hearing/Visual Limitations: glasses for reading, hearing is age appropriate  Medical Precautions: Fall precautions, Oxygen therapy device and L/min (3L O2 via NC)  Vital Signs:  Vital Signs  Heart Rate: (!) 134 (up to 142)  Heart Rate Source: Monitor  Resp: 16  SpO2: 94 %  BP: 148/58  MAP (mmHg): 84  BP Location: Left arm  BP Method: Automatic  Patient Position: Lying    Objective   Pain:  Pain Assessment  Pain Assessment: 0-10  Pain Score: 9  Pain Type: Acute pain  Pain Location: Abdomen  Pain Orientation: Left  Pain Radiating Towards: back and abdomen  Pain Interventions: Repositioned  Cognition:  Cognition  Overall Cognitive Status: Within Functional Limits  Orientation Level: Oriented X4    General Assessments:  General  Observation  General Observation: pt supine in bed, agreeable to therapy, visible skin intact.    Activity Tolerance  Endurance: Decreased tolerance for upright activites  Activity Tolerance Comments: fair, tachycardic with limited activity    Sensation  Sensation Comment: intermittent numbness and tingling in feet    Strength  Strength Comments: B LE strength equal, 4-/5  Coordination  Movements are Fluid and Coordinated: Yes  Coordination Comment: grossly intact    Postural Control  Posture Comment: rounded shoulders, forward head posture    Static Sitting Balance  Static Sitting-Balance Support: Bilateral upper extremity supported, Feet supported  Static Sitting-Level of Assistance: Close supervision  Static Sitting-Comment/Number of Minutes: 10  Dynamic Sitting Balance  Dynamic Sitting-Comments: fair+ balance performing tasks while seated on EOB    Static Standing Balance  Static Standing-Balance Support: Bilateral upper extremity supported  Static Standing-Level of Assistance: Minimum assistance  Static Standing-Comment/Number of Minutes: ~30 seconds  Functional Assessments:  ADL  ADL's Addressed: No    Bed Mobility  Bed Mobility: Yes  Bed Mobility 1  Bed Mobility 1: Supine to sitting  Level of Assistance 1: Minimum assistance  Bed Mobility Comments 1: verbal cues for log rolling and sequencing, min A to complete moving B LEs off EOB and min A to elevate trunk, pt using the bedrail also to assist with trunk elevation  Bed Mobility 2  Bed Mobility  2: Sitting to supine  Level of Assistance 2: Minimum assistance  Bed Mobility Comments 2: verbal cues for sequencing to transfer to supine via log rolling, min A to guide trunk down and lift B LEs into the bed.    Transfers  Transfer:  (min A to guide trunk up to stand at the EOB and establish COG over WILLIAN.  verbal cues for hand placement and min A for eccentric control when sitting down.)    Ambulation/Gait Training  Ambulation/Gait Training Performed:  (Amb 4  lateral steps to the head of the bed with min A for balance and safety, pt sliding feet along the floor.)    Stairs  Stairs: No  Extremity/Trunk Assessments:  RLE   RLE : Within Functional Limits  LLE   LLE : Within Functional Limits  Treatments:  Therapeutic Activity  Therapeutic Activity Performed:  (pt able to reposition herself in the center of the bed. able to bridge hips in order to straighten linens. ROM B LEs performed in supine with good strength. Education provided regarding importance of mobility.)  Outcome Measures:  [facility] Basic Mobility  Turning from your back to your side while in a flat bed without using bedrails: A little  Moving from lying on your back to sitting on the side of a flat bed without using bedrails: A little  Moving to and from bed to chair (including a wheelchair): A little  Standing up from a chair using your arms (e.g. wheelchair or bedside chair): A little  To walk in hospital room: A lot  Climbing 3-5 steps with railing: A lot  Basic Mobility - Total Score: 16    Encounter Problems       Encounter Problems (Active)       Mobility       STG - Patient will ambulate 100' with rolling walker and modified independence (Progressing)       Start:  01/04/24    Expected End:  01/31/24            STG - Patient will ascend and descend three stairs with one rail and close supervision. (Not Progressing)       Start:  01/04/24    Expected End:  01/31/24               Pain - Adult          Transfers       STG - Transfer from bed to chair independently (Progressing)       Start:  01/04/24    Expected End:  01/31/24            STG - Patient to transfer to and from sit to supine via log rolling independently (Progressing)       Start:  01/04/24    Expected End:  01/31/24            STG - Patient will transfer sit to and from stand independently (Progressing)       Start:  01/04/24    Expected End:  01/31/24                   Education Documentation  No documentation found.  Education Comments  No  comments found.

## 2024-01-04 NOTE — NURSING NOTE
On rounding, R triple lumen PICC dressing is current, dry and intact. One line in use, infusing without difficulty. Remaining 2 lines flush easily with brisk blood return. Curos caps placed to lines not in use.

## 2024-01-04 NOTE — PROGRESS NOTES
Occupational Therapy    Re- Evaluation/Treatment    Patient Name: Marylou Ward  MRN: [id number]  : 1945  Today's Date: 24  Time Calculation  Start Time: 1028  Stop Time: 1055  Time Calculation (min): 27 min       Assessment:  OT Assessment: RE-eval orders received, chart reviewed, evaluation completed. Pt demonstrated impaired ADLs, aerobic capacity, and functional mobility. Pt would benefit from continued acute OT services.  Evaluation/Treatment Tolerance: Patient limited by pain, Patient limited by fatigue  Medical Staff Made Aware: Yes  End of Session Communication: Bedside nurse  End of Session Patient Position: Bed, 3 rail up, Alarm on  OT Assessment Results: Decreased ADL status, Decreased upper extremity strength, Decreased endurance, Decreased functional mobility, Decreased gross motor control, Decreased IADLs  Evaluation/Treatment Tolerance: Patient limited by pain, Patient limited by fatigue  Medical Staff Made Aware: Yes  Plan:  Treatment Interventions: ADL retraining, UE strengthening/ROM, Functional transfer training, Endurance training, Patient/family training, Equipment evaluation/education  OT Frequency: 3 times per week  OT Discharge Recommendations: Moderate intensity level of continued care  OT Recommended Transfer Status: Minimal assist  OT - OK to Discharge: Yes  Treatment Interventions: ADL retraining, UE strengthening/ROM, Functional transfer training, Endurance training, Patient/family training, Equipment evaluation/education    Subjective   Current Problem:    General:   OT Received On: 24  General  Reason for Referral: RE-evaluation due to change in medical status from inability to intubate for lap juan alberto-not completed.  Referred By: Dr. Yessenia GORE  Past Medical History Relevant to Rehab: PE, cholelisthiasis, HTN, DM  Co-Treatment: PT  Co-Treatment Reason: need for second skilled therapist for safe patient handling, limited pt endurance  Prior to Session Communication: Bedside  nurse  Patient Position Received: Bed, 3 rail up  Preferred Learning Style: verbal  General Comment: Pt is an 77 yo woman admit initally for abdominal pain, N/V, was sent home and continued with symptoms, returned to [facility] and was scheduled for lap juan alberto. Pt was unable to be intubated for that procedure which was not able to be completed and transferred to ICU.  Precautions:  Medical Precautions: Fall precautions  Vital Signs:  Heart Rate: (!) 129 (fluctuating 129-142, RN aware, reporting pt with pain and anxiety and she has been recently medicated per RN cleared for evaluation)  Heart Rate Source: Monitor  SpO2: 94 %  BP: 148/58  MAP (mmHg): 84  BP Location: Left arm  BP Method: Automatic  Patient Position: Lying  Pain:  Pain Assessment  Pain Assessment: 0-10  Pain Score: 9  Pain Type: Acute pain  Pain Location: Abdomen  Pain Orientation: Left  Pain Radiating Towards: back and abdomen    Objective   Cognition:  Overall Cognitive Status: Within Functional Limits     Home Living:  Type of Home: House  Lives With: Other (Comment) (NEPHEW)  Home Adaptive Equipment: None  Home Layout: One level  Home Access: Stairs to enter with rails  Entrance Stairs-Rails: Right  Entrance Stairs-Number of Steps: 3  Bathroom Shower/Tub:  (sponge bathes)  Bathroom Toilet: Standard  Prior Function:  Level of [place]: Independent with ADLs and functional transfers, Needs assistance with homemaking  Receives Help From:  (nephew)  ADL Assistance: Independent  Homemaking Assistance: Needs assistance    ADL:  Eating Deficit: Setup  Grooming Assistance: Moderate  Grooming Deficit: Wash/dry face, Brushing hair (Pt able to comb front of hair, required extensive assist and time to comb back of hair due to significant tangling, pt able to wash face with setup)  Bathing Assistance: Moderate  UE Dressing Assistance: Moderate  LE Dressing Assistance: Maximal  LE Dressing Deficit: Don/doff R sock, Don/doff L sock  Toileting Assistance with  Device: Total  Toileting Deficit:  (purewick)    Activity Tolerance:  Endurance: Decreased tolerance for upright activites (HR elevated throughout 1302-142 RN aware)    Bed Mobility/Transfers: Bed Mobility  Bed Mobility: Yes  Bed Mobility 1  Bed Mobility 1: Supine to sitting  Level of Assistance 1: Minimum assistance  Bed Mobility Comments 1: assist for log rolling due to pain, pt able to initiate. Min A for trunk up pt able to scoot to EOB.  Bed Mobility 2  Bed Mobility  2: Sitting to supine  Level of Assistance 2: Minimum assistance  Bed Mobility Comments 2: Assist for B LEs into supine pt able to bridge up for linen straightening remained with needs in reach.    Transfers  Transfer: Yes  Transfer 1  Transfer From 1: Bed to  Transfer to 1: Stand  Technique 1: Sit to stand  Transfer Level of Assistance 1: Minimum assistance  Trials/Comments 1: Pt stood from bed with min A arm in arm assist. Pt able to take several steps to HOB with min A. Distance limited due to elevated HR  Transfers 2  Transfer From 2: Stand to  Transfer to 2: Sit  Technique 2: Stand to sit  Transfer Level of Assistance 2: Minimum assistance  Trials/Comments 2: returned toEOB with min A    Vision:Vision - Basic Assessment  Current Vision: No visual deficits  Sensation:  Light Touch: No apparent deficits    Coordination:  Movements are Fluid and Coordinated: Yes   Hand Function:     Extremities: RUE   RUE : Within Functional Limits (observed functionally due to abd pain) and LUE   LUE: Within Functional Limits (observed functionally due to abd pain)    Outcome Measures: [facility] Daily Activity  Putting on and taking off regular lower body clothing: A lot  Bathing (including washing, rinsing, drying): A lot  Putting on and taking off regular upper body clothing: A little  Toileting, which includes using toilet, bedpan or urinal: A lot  Taking care of personal grooming such as brushing teeth: A little  Eating Meals: A little  Daily Activity - Total  Score: 15      Education Documentation  ADL Training, taught by [place] OT at 1/4/2024 11:27 AM.  Learner: Patient  Readiness: Acceptance  Method: Explanation  Response: Verbalizes Understanding  Comment: edu on reason for re-evaluation    Education Comments  No comments found.      Goals:  Encounter Problems          OT Goals       ADLs (Progressing)       Start:  01/02/24    Expected End:  01/26/24       Patient will complete ADL tasks with [place] in order to safely return to PLOF.         Functional Transfers (Progressing)       Start:  01/02/24    Expected End:  01/26/24       Patient will complete functional transfers with [place] in order to increase safety and independence with daily tasks.         B UE Strengthening (Progressing)       Start:  01/02/24    Expected End:  01/26/24       Patient will increase B UE strength to 4+/5 for functional transfers.         Functional Mobility (Progressing)       Start:  01/02/24    Expected End:  01/26/24       Patient will demonstrate the ability to complete item retrieval and functional mobility with [place] in order to increase safety and independence with daily tasks.

## 2024-01-04 NOTE — PROGRESS NOTES
Speech-Language Pathology    Inpatient  Speech-Language Pathology Treatment     Patient Name: Marylou Ward  MRN: [id number]  Today's Date: 1/4/2024  Time Calculation  Start Time: 1533  Stop Time: 1554  Time Calculation (min): 21 min         Current Problem:   1. Acute vomiting        2. Abdominal pain, unspecified abdominal location        3. Chronic cholecystitis with calculus  Case Request Operating Room: Cholecystectomy Laparoscopy with Cholangiogram    Case Request Operating Room: Cholecystectomy Laparoscopy with Cholangiogram            SLP Assessment:  SLP TX Intervention Outcome: No Progress Made  Prognosis: Good  Treatment Provided: Yes   Treatment Tolerance: Patient limited by pain (odynaphagia)  Medical Staff Made Aware: Yes  Strengths: Motivation, Family/Caregiver Suppport  Barriers: None     Plan:  Inpatient/Swing Bed or Outpatient: Inpatient  Treatment/Interventions:  (diet tolerance, PO trials, compensatory strategies)  SLP TX Plan: Continue Plan of Care  SLP Plan: Skilled SLP  SLP Frequency: 2x per week  Duration: Current admission  SLP Discharge Recommendations:  (TBD)  Next Treatment Priority: diet yin, PO trials, compensatory strategies  Discussed POC: Patient, Nursing  Discussed Risks/Benefits: Yes, Patient, Nursing  Patient/Caregiver Agreeable: Yes  olid Diet Recommendations: Regular (IDDSI Level 7) (easy to chew)  Liquid Diet Recommendations: Thin (IDDSI Level 0)  Subjective   Per RN pt only eating soft foods, still demon odynaphagia, pt also endorses, but states its improving, vocal quality still hoarse, no improvement perceptually. ? Stridor, Informed RN who checked with nisa, Sa02 95-97% pt to have breathing tx soon is also receiving steroids now  RN reports pt receiving a regular solid diet.    General Visit Information:   Prior to Session Communication: Bedside nurse (pina vaughn RN)    Pain Assessment:   Pain Assessment: 0-10  Pain Score: 0 - No pain    Objective   Therapeutic  Swallow:  Therapeutic Swallow Intervention : PO Trials, Compensatory Strategies  Solid Diet Recommendations: Regular (IDDSI Level 7) (easy to chew)  Liquid Diet Recommendations: Thin (IDDSI Level 0)  Swallow Comments: Pt fed self, demonstrated safe swallow strategies independently. accepted minimal trials d/t odynaphagia, reports pain starts becoming worse when she swallows something, Tolerated approx 2 oz thin liquids via cup and 2 oz applesauce without s/s aspiration, declined ruthie cracker d/t pain and fatigue    Compensatory Swallowing Strategies: Upright 90 degrees as possible for all oral intake, Small bites/sips   Dysphagia Goals: Patient will tolerate recommended diet without observed clinical signs of aspiration, Patient will demonstrate appropriate strategies for swallowing safety, Patient will progress to advanced diet     Inpatient:  Education Documentaiton:   Pt/nursing education provided re: regarding role of ST, purpose of treatment, clinical impressions, recommendations, POC,safe swallow strategies, Pt/nurse verbalized understanding and agreement

## 2024-01-05 ENCOUNTER — APPOINTMENT (OUTPATIENT)
Dept: RADIOLOGY | Facility: HOSPITAL | Age: 79
DRG: 444 | End: 2024-01-05
Payer: MEDICARE

## 2024-01-05 LAB
ALBUMIN SERPL-MCNC: 3.3 G/DL (ref 3.5–5)
ALP BLD-CCNC: 94 U/L (ref 35–125)
ALT SERPL-CCNC: 16 U/L (ref 5–40)
ANION GAP SERPL CALC-SCNC: 14 MMOL/L
AST SERPL-CCNC: 10 U/L (ref 5–40)
BILIRUB SERPL-MCNC: <0.2 MG/DL (ref 0.1–1.2)
BUN SERPL-MCNC: 17 MG/DL (ref 8–25)
CALCIUM SERPL-MCNC: 8.7 MG/DL (ref 8.5–10.4)
CHLORIDE SERPL-SCNC: 96 MMOL/L (ref 97–107)
CO2 SERPL-SCNC: 26 MMOL/L (ref 24–31)
CREAT SERPL-MCNC: 0.8 MG/DL (ref 0.4–1.6)
ERYTHROCYTE [DISTWIDTH] IN BLOOD BY AUTOMATED COUNT: 14 % (ref 11.5–14.5)
GFR SERPL CREATININE-BSD FRML MDRD: 75 ML/MIN/1.73M*2
GLUCOSE BLD MANUAL STRIP-MCNC: 132 MG/DL (ref 74–99)
GLUCOSE BLD MANUAL STRIP-MCNC: 159 MG/DL (ref 74–99)
GLUCOSE BLD MANUAL STRIP-MCNC: 201 MG/DL (ref 74–99)
GLUCOSE BLD MANUAL STRIP-MCNC: 205 MG/DL (ref 74–99)
GLUCOSE SERPL-MCNC: 157 MG/DL (ref 65–99)
HCT VFR BLD AUTO: 31.1 % (ref 36–46)
HGB BLD-MCNC: 9.8 G/DL (ref 12–16)
MCH RBC QN AUTO: 28 PG (ref 26–34)
MCHC RBC AUTO-ENTMCNC: 31.5 G/DL (ref 32–36)
MCV RBC AUTO: 89 FL (ref 80–100)
NRBC BLD-RTO: 0 /100 WBCS (ref 0–0)
PLATELET # BLD AUTO: 346 X10*3/UL (ref 150–450)
POTASSIUM SERPL-SCNC: 3.4 MMOL/L (ref 3.4–5.1)
PROT SERPL-MCNC: 6.4 G/DL (ref 5.9–7.9)
RBC # BLD AUTO: 3.5 X10*6/UL (ref 4–5.2)
SODIUM SERPL-SCNC: 136 MMOL/L (ref 133–145)
WBC # BLD AUTO: 14.7 X10*3/UL (ref 4.4–11.3)

## 2024-01-05 PROCEDURE — 85027 COMPLETE CBC AUTOMATED: CPT | Performed by: SURGERY

## 2024-01-05 PROCEDURE — 2500000004 HC RX 250 GENERAL PHARMACY W/ HCPCS (ALT 636 FOR OP/ED): Performed by: SURGERY

## 2024-01-05 PROCEDURE — 2500000001 HC RX 250 WO HCPCS SELF ADMINISTERED DRUGS (ALT 637 FOR MEDICARE OP): Performed by: SURGERY

## 2024-01-05 PROCEDURE — 82947 ASSAY GLUCOSE BLOOD QUANT: CPT

## 2024-01-05 PROCEDURE — 2500000002 HC RX 250 W HCPCS SELF ADMINISTERED DRUGS (ALT 637 FOR MEDICARE OP, ALT 636 FOR OP/ED): Performed by: INTERNAL MEDICINE

## 2024-01-05 PROCEDURE — 2060000001 HC INTERMEDIATE ICU ROOM DAILY

## 2024-01-05 PROCEDURE — 94640 AIRWAY INHALATION TREATMENT: CPT

## 2024-01-05 PROCEDURE — 9420000001 HC RT PATIENT EDUCATION 5 MIN

## 2024-01-05 PROCEDURE — 2500000004 HC RX 250 GENERAL PHARMACY W/ HCPCS (ALT 636 FOR OP/ED): Performed by: INTERNAL MEDICINE

## 2024-01-05 PROCEDURE — 71101 X-RAY EXAM UNILAT RIBS/CHEST: CPT | Mod: LT

## 2024-01-05 PROCEDURE — 2500000002 HC RX 250 W HCPCS SELF ADMINISTERED DRUGS (ALT 637 FOR MEDICARE OP, ALT 636 FOR OP/ED): Performed by: SURGERY

## 2024-01-05 PROCEDURE — 94760 N-INVAS EAR/PLS OXIMETRY 1: CPT

## 2024-01-05 PROCEDURE — 80053 COMPREHEN METABOLIC PANEL: CPT | Performed by: SURGERY

## 2024-01-05 RX ORDER — IPRATROPIUM BROMIDE AND ALBUTEROL SULFATE 2.5; .5 MG/3ML; MG/3ML
3 SOLUTION RESPIRATORY (INHALATION)
Status: DISCONTINUED | OUTPATIENT
Start: 2024-01-05 | End: 2024-01-10 | Stop reason: HOSPADM

## 2024-01-05 RX ORDER — DEXAMETHASONE 4 MG/1
4 TABLET ORAL EVERY 12 HOURS SCHEDULED
Status: DISCONTINUED | OUTPATIENT
Start: 2024-01-05 | End: 2024-01-10 | Stop reason: HOSPADM

## 2024-01-05 RX ADMIN — Medication 6 MG: at 20:21

## 2024-01-05 RX ADMIN — METFORMIN HYDROCHLORIDE 500 MG: 500 TABLET, FILM COATED ORAL at 10:17

## 2024-01-05 RX ADMIN — ONDANSETRON 4 MG: 2 INJECTION INTRAMUSCULAR; INTRAVENOUS at 21:16

## 2024-01-05 RX ADMIN — DEXAMETHASONE 4 MG: 4 TABLET ORAL at 20:19

## 2024-01-05 RX ADMIN — IPRATROPIUM BROMIDE AND ALBUTEROL SULFATE 3 ML: 2.5; .5 SOLUTION RESPIRATORY (INHALATION) at 19:40

## 2024-01-05 RX ADMIN — POLYETHYLENE GLYCOL 3350 17 G: 17 POWDER, FOR SOLUTION ORAL at 10:17

## 2024-01-05 RX ADMIN — SODIUM CHLORIDE 3 G: 900 INJECTION INTRAVENOUS at 02:29

## 2024-01-05 RX ADMIN — OXYCODONE AND ACETAMINOPHEN 1 TABLET: 5; 325 TABLET ORAL at 20:28

## 2024-01-05 RX ADMIN — ENOXAPARIN SODIUM 40 MG: 40 INJECTION SUBCUTANEOUS at 20:18

## 2024-01-05 RX ADMIN — HYDROMORPHONE HYDROCHLORIDE 0.6 MG: 1 INJECTION, SOLUTION INTRAMUSCULAR; INTRAVENOUS; SUBCUTANEOUS at 04:32

## 2024-01-05 RX ADMIN — IPRATROPIUM BROMIDE AND ALBUTEROL SULFATE 3 ML: 2.5; .5 SOLUTION RESPIRATORY (INHALATION) at 08:29

## 2024-01-05 RX ADMIN — INSULIN LISPRO 4 UNITS: 100 INJECTION, SOLUTION INTRAVENOUS; SUBCUTANEOUS at 12:38

## 2024-01-05 RX ADMIN — SODIUM CHLORIDE 3 G: 900 INJECTION INTRAVENOUS at 20:19

## 2024-01-05 RX ADMIN — SODIUM CHLORIDE 3 G: 900 INJECTION INTRAVENOUS at 13:28

## 2024-01-05 RX ADMIN — HYDRALAZINE HYDROCHLORIDE 10 MG: 20 INJECTION INTRAMUSCULAR; INTRAVENOUS at 05:54

## 2024-01-05 RX ADMIN — METOPROLOL TARTRATE 50 MG: 50 TABLET ORAL at 20:19

## 2024-01-05 RX ADMIN — HYDROMORPHONE HYDROCHLORIDE 0.6 MG: 1 INJECTION, SOLUTION INTRAMUSCULAR; INTRAVENOUS; SUBCUTANEOUS at 19:00

## 2024-01-05 RX ADMIN — IPRATROPIUM BROMIDE AND ALBUTEROL SULFATE 3 ML: 2.5; .5 SOLUTION RESPIRATORY (INHALATION) at 12:40

## 2024-01-05 RX ADMIN — OXYCODONE AND ACETAMINOPHEN 1 TABLET: 5; 325 TABLET ORAL at 07:22

## 2024-01-05 RX ADMIN — SODIUM CHLORIDE 3 G: 900 INJECTION INTRAVENOUS at 10:16

## 2024-01-05 RX ADMIN — DEXAMETHASONE 4 MG: 4 TABLET ORAL at 11:13

## 2024-01-05 RX ADMIN — OXYCODONE HYDROCHLORIDE 5 MG: 5 TABLET ORAL at 13:02

## 2024-01-05 RX ADMIN — SERTRALINE 50 MG: 50 TABLET, FILM COATED ORAL at 10:17

## 2024-01-05 RX ADMIN — METFORMIN HYDROCHLORIDE 500 MG: 500 TABLET, FILM COATED ORAL at 18:49

## 2024-01-05 RX ADMIN — HYDROMORPHONE HYDROCHLORIDE 0.6 MG: 1 INJECTION, SOLUTION INTRAMUSCULAR; INTRAVENOUS; SUBCUTANEOUS at 10:20

## 2024-01-05 RX ADMIN — INSULIN LISPRO 2 UNITS: 100 INJECTION, SOLUTION INTRAVENOUS; SUBCUTANEOUS at 18:58

## 2024-01-05 RX ADMIN — PANTOPRAZOLE SODIUM 40 MG: 40 TABLET, DELAYED RELEASE ORAL at 10:18

## 2024-01-05 RX ADMIN — METOPROLOL TARTRATE 50 MG: 50 TABLET ORAL at 10:18

## 2024-01-05 ASSESSMENT — PAIN SCALES - GENERAL
PAINLEVEL_OUTOF10: 8
PAINLEVEL_OUTOF10: 0 - NO PAIN
PAINLEVEL_OUTOF10: 2
PAINLEVEL_OUTOF10: 9
PAINLEVEL_OUTOF10: 10 - WORST POSSIBLE PAIN
PAINLEVEL_OUTOF10: 7
PAINLEVEL_OUTOF10: 9
PAINLEVEL_OUTOF10: 10 - WORST POSSIBLE PAIN
PAINLEVEL_OUTOF10: 10 - WORST POSSIBLE PAIN
PAINLEVEL_OUTOF10: 5 - MODERATE PAIN
PAINLEVEL_OUTOF10: 0 - NO PAIN
PAINLEVEL_OUTOF10: 6

## 2024-01-05 ASSESSMENT — COGNITIVE AND FUNCTIONAL STATUS - GENERAL
STANDING UP FROM CHAIR USING ARMS: A LITTLE
PERSONAL GROOMING: A LITTLE
TURNING FROM BACK TO SIDE WHILE IN FLAT BAD: A LITTLE
MOVING FROM LYING ON BACK TO SITTING ON SIDE OF FLAT BED WITH BEDRAILS: A LITTLE
DAILY ACTIVITIY SCORE: 15
MOBILITY SCORE: 16
CLIMB 3 TO 5 STEPS WITH RAILING: A LOT
EATING MEALS: A LITTLE
MOVING TO AND FROM BED TO CHAIR: A LITTLE
WALKING IN HOSPITAL ROOM: A LOT
DRESSING REGULAR UPPER BODY CLOTHING: A LITTLE
HELP NEEDED FOR BATHING: A LOT
DRESSING REGULAR LOWER BODY CLOTHING: A LOT
TOILETING: A LOT

## 2024-01-05 ASSESSMENT — PAIN DESCRIPTION - LOCATION
LOCATION: OTHER (COMMENT)
LOCATION: BACK

## 2024-01-05 ASSESSMENT — PAIN - FUNCTIONAL ASSESSMENT
PAIN_FUNCTIONAL_ASSESSMENT: 0-10

## 2024-01-05 ASSESSMENT — PAIN DESCRIPTION - DESCRIPTORS
DESCRIPTORS: SHARP
DESCRIPTORS: CRAMPING
DESCRIPTORS: SHARP

## 2024-01-05 ASSESSMENT — PAIN DESCRIPTION - ORIENTATION
ORIENTATION: MID
ORIENTATION: LEFT
ORIENTATION: LEFT

## 2024-01-05 NOTE — PROGRESS NOTES
Anticipate discharge soon. Patient step down status. Patient on 3 liters of oxygen. TCC met with patient to discuss discharge plans. Patient declining SNF and C stating that her nephew assists her. TCC attempted to reach her nephew, Jimmy, with no luck. At the time of discharge, patient will return home. Will follow.     **PATIENT WITH A SAFE DISCHARGE PLAN        Marissa Roque RN

## 2024-01-05 NOTE — CARE PLAN
Problem: Respiratory  Goal: Minimize anxiety/maximize coping throughout shift  Outcome: Progressing  Goal: Wean oxygen to maintain O2 saturation per order/standard this shift  Outcome: Progressing

## 2024-01-05 NOTE — PROGRESS NOTES
Critical Care Progress Note    Marylou Ward is a 78 y.o. female on day 7 of admission presenting with Acute vomiting.    Subjective   On 6 L this morning.  O2 sat 98%.  Complains of oropharyngeal pain and discomfort.  Complains of chest discomfort with coughing.  Objective   Vital Signs      1/4/2024    10:28 AM 1/4/2024    10:29 AM 1/4/2024    11:00 AM 1/4/2024    12:00 PM 1/4/2024     1:00 PM 1/4/2024     3:50 PM 1/4/2024     7:39 PM   Vitals   Systolic 148 148 142 129 160 150    Diastolic 58 58 61 59 79 74    Heart Rate 129 134 125 112 110 99 94   Temp   36.7 °C (98.1 °F)   36.9 °C (98.4 °F) 36.6 °C (97.9 °F)   Resp  16 17 14 16 20 15   Weight (lb)       139.11   BMI       27.17 kg/m2   BSA (m2)       1.63 m2       Oxygen Therapy  SpO2: 95 %  Medical Gas Therapy: Supplemental oxygen  O2 Delivery Method: Nasal cannula         Intake/Output previous 24 hours:    Intake/Output Summary (Last 24 hours) at 1/4/2024 1947  Last data filed at 1/4/2024 1350  Gross per 24 hour   Intake 250 ml   Output 300 ml   Net -50 ml         Physical Exam  Constitutional:       Appearance: Normal appearance.   HENT:      Head: Normocephalic.   Eyes:      Extraocular Movements: Extraocular movements intact.      Pupils: Pupils are equal, round, and reactive to light.   Cardiovascular:      Rate and Rhythm: Normal rate and regular rhythm.      Pulses: Normal pulses.      Heart sounds: Normal heart sounds.   Pulmonary:      Effort: Pulmonary effort is normal.      Breath sounds: Wheezing present.   Abdominal:      General: Bowel sounds are normal.      Palpations: Abdomen is soft.   Musculoskeletal:         General: Normal range of motion.      Cervical back: Normal range of motion.   Skin:     General: Skin is warm.   Neurological:      General: No focal deficit present.      Mental Status: She is alert and oriented to person, place, and time.         Lines and Tubes:  Peripheral IV 01/02/24 22 G Left;Ventral Wrist (Active)   Placement  Date/Time: 01/02/24 0040   Hand Hygiene Completed: Yes  Size (Gauge): 22 G  Orientation: Left;Ventral  Location: Wrist  Technique: Anatomical landmarks  Patient Tolerance: Tolerated well   Number of days: 1       PICC - Adult 01/02/24 Triple lumen Right Brachial vein (Active)   Placement Date/Time: 01/02/24 1715   Hand Hygiene Completed: Yes  Catheter Time Out Checklist Completed: Yes  Size (Fr): 5  Lumen Type: Triple lumen  Catheter to Vein Ratio Less Than 50%: Yes  Total Length (cm): 27 cm  External Length (cm): 1 cm  Orie...   Number of days: 0       Non-Surgical Airway  (Active)   Placement Date/Time: 01/02/24 (c) 1241   Airway Device: (c)   Mask Ventilation: Unable to mask vent +/- NMBA  Airway Insertion Attempts: 1   Number of days: 0       External Urinary Catheter Female (Active)   Placement Date/Time: 01/02/24 1818   External Catheter Type: Female   Number of days: 0         Scheduled medications  ampicillin-sulbactam, 3 g, intravenous, q6h  dexAMETHasone, 10 mg, intravenous, q12h  enoxaparin, 40 mg, subcutaneous, Nightly  insulin lispro, 0-10 Units, subcutaneous, TID with meals  ipratropium-albuteroL, 3 mL, nebulization, q6h  metFORMIN, 500 mg, oral, BID with meals  metoprolol tartrate, 50 mg, oral, BID  pantoprazole, 40 mg, oral, Daily  polyethylene glycol, 17 g, oral, Daily  sertraline, 50 mg, oral, Daily      Continuous medications       PRN medications  PRN medications: acetaminophen, ALPRAZolam, alteplase, benzocaine-menthol, bisacodyl, dextromethorphan-guaifenesin, dextrose 10 % in water (D10W), dextrose, diphenhydrAMINE, glucagon, hydrALAZINE, HYDROmorphone, ipratropium-albuteroL, lidocaine-diphenhydraMINE-Maalox 1:1:1, melatonin, ondansetron, oxyCODONE, oxyCODONE-acetaminophen, oxygen    Relevant Results  Results from last 7 days   Lab Units 01/04/24  0643 01/03/24  0343 01/02/24  0641   WBC AUTO x10*3/uL 16.0* 26.2* 10.9   HEMOGLOBIN g/dL 9.3* 11.0* 10.5*   HEMATOCRIT % 29.4* 34.0* 32.4*    PLATELETS AUTO x10*3/uL 310 345 229        Results from last 7 days   Lab Units 01/04/24  0643 01/03/24  0343 01/02/24  0641   SODIUM mmol/L 138 134 134   POTASSIUM mmol/L 3.6 3.5 3.4   CHLORIDE mmol/L 99 96* 99   CO2 mmol/L 29 23* 22*   BUN mg/dL 16 9 8   CREATININE mg/dL 0.90 0.80 1.00   GLUCOSE mg/dL 137* 181* 118*   CALCIUM mg/dL 8.5 8.7 8.7            XR chest 1 view 01/02/2024    Narrative  Interpreted By:  Chriss Roberson,  STUDY:  XR CHEST 1 VIEW; 1/2/2024 2:12 pm    INDICATION:  Signs/Symptoms:difficult airway, possible aspiration (already  performed).    COMPARISON:  07/26/2023    ACCESSION NUMBER(S):  [id number]    ORDERING CLINICIAN:  YAZMIN ULLOA    TECHNIQUE:  1 view of the chest was performed.    FINDINGS:  The lungs are adequately inflated. Mild nonspecific ground-glass  opacity without focal consolidation. Findings could be related to  mild pulmonary edema, inflammatory, or potentially atypical  infection. No pleural effusion. No pneumothorax.  The  cardiomediastinal silhouette is within normal limits.    Impression  Mild nonspecific ground-glass opacity, without focal consolidation.  Findings can be seen with mild pulmonary edema, inflammatory, or  potentially atypical infectious etiologies.    Signed by: Chriss Roberson 1/2/2024 2:37 PM  Dictation workstation:   [id number]      Patient Active Problem List   Diagnosis    Acute vomiting    Chronic cholecystitis with calculus    HTN (hypertension)    Gastroesophageal reflux disease    Diabetes mellitus, type 2 (CMS/HCC)    Chronic obstructive pulmonary disease (CMS/HCC)     Assessment/Plan     HTN (hypertension)    Gastroesophageal reflux disease    Diabetes mellitus, type 2 (CMS/HCC)    Chronic obstructive pulmonary disease (CMS/HCC)    Chronic cholecystitis with calculus    Acute respiratory insufficiency.  Difficult intubation    Wean oxygen today.    Improved symptoms of cough and chest discomfort.   White blood cell count is  improving  Aerosol treatments  Antibiotic coverage switched to unaysn.  Taper steroids in [facility]       Disclaimer: Parts of this chart were dictated with voice recognition software. Please excuse any errors of grammar, spelling, or transcription which are not corrected. Please contact me with any questions regarding documentation.

## 2024-01-05 NOTE — PROGRESS NOTES
Marylou Ward is a 78 y.o. female on day 8 of admission presenting with Acute vomiting.      Subjective   Patient still has nausea and vomiting  Per staff her blood pressure is little higher with dose episodes  Surgery input appreciated    Objective     Last Recorded Vitals  /69   Pulse 108   Temp 36.7 °C (98.1 °F) (Axillary)   Resp 16   Wt 63.1 kg (139 lb 1.8 oz)   SpO2 95%   Intake/Output last 3 Shifts:    Intake/Output Summary (Last 24 hours) at 1/5/2024 2310  Last data filed at 1/5/2024 2019  Gross per 24 hour   Intake 760 ml   Output 400 ml   Net 360 ml         Admission Weight  Weight: 61.2 kg (135 lb) (12/28/23 2100)    Daily Weight  01/05/24 : 63.1 kg (139 lb 1.8 oz)    Image Results  XR ribs 2 views left w chest pa or ap  Narrative: Interpreted By:  Kit King,   STUDY:  XR RIBS 2 VIEWS LEFT WITH CHEST PA OR AP; 1/5/2024 6:13 pm      INDICATION:  Signs/Symptoms:L rib tenderness      COMPARISON:  Chest radiograph from 01/03/2024.      ACCESSION NUMBER(S):  [id number]      ORDERING CLINICIAN:  LASHONDA MAY      FINDINGS:  AP view of the chest and additional two-view radiographs of the left  ribs were obtained.      Right PICC line remains stable in position.  The heart and mediastinum are within normal limits for the technique.  There is mild pulmonary vascular congestion.  No pneumothorax or confluent infiltrates are identified.  No pleural effusions are seen.  Degenerative changes involve the spine.  No rib fractures are identified.      Impression: No left rib fracture or pneumothorax. Other chronic findings as above.      Signed by: Kit King 1/5/2024 7:00 PM  Dictation workstation:   [id number]      Physical Exam  GENERAL: awake, alert, Ox3, cooperative resting comfortably  SKIN: Skin turgor normal. No rashes  HEENT: no epistaxis, Moist mucosa.  NECK: supple  BACK: spine nontender to palpation, No CVAT.  LUNGS: Vesicular breath sounds, with no wheeze, no crepitations.   CARDIAC:  REGULAR. S1 and S2; no rubs, no murmur  ABDOMEN: Abdomen soft, right upper quadrant and epigastric tenderness  EXTREMITIES: No edema, Good capillary refill.   NEURO: Insight GOOD. Motor and sensory exam wnl. No invol movements. No ataxia.  WOUND:   MUSCULOSKELETAL: No acute inflammation    Relevant Results  Results for orders placed or performed during the hospital encounter of 12/28/23 (from the past 24 hour(s))   CBC   Result Value Ref Range    WBC 14.7 (H) 4.4 - 11.3 x10*3/uL    nRBC 0.0 0.0 - 0.0 /100 WBCs    RBC 3.50 (L) 4.00 - 5.20 x10*6/uL    Hemoglobin 9.8 (L) 12.0 - 16.0 g/dL    Hematocrit 31.1 (L) 36.0 - 46.0 %    MCV 89 80 - 100 fL    MCH 28.0 26.0 - 34.0 pg    MCHC 31.5 (L) 32.0 - 36.0 g/dL    RDW 14.0 11.5 - 14.5 %    Platelets 346 150 - 450 x10*3/uL   Comprehensive Metabolic Panel   Result Value Ref Range    Glucose 157 (H) 65 - 99 mg/dL    Sodium 136 133 - 145 mmol/L    Potassium 3.4 3.4 - 5.1 mmol/L    Chloride 96 (L) 97 - 107 mmol/L    Bicarbonate 26 24 - 31 mmol/L    Urea Nitrogen 17 8 - 25 mg/dL    Creatinine 0.80 0.40 - 1.60 mg/dL    eGFR 75 >60 mL/min/1.73m*2    Calcium 8.7 8.5 - 10.4 mg/dL    Albumin 3.3 (L) 3.5 - 5.0 g/dL    Alkaline Phosphatase 94 35 - 125 U/L    Total Protein 6.4 5.9 - 7.9 g/dL    AST 10 5 - 40 U/L    Bilirubin, Total <0.2 0.1 - 1.2 mg/dL    ALT 16 5 - 40 U/L    Anion Gap 14 <=19 mmol/L   POCT GLUCOSE   Result Value Ref Range    POCT Glucose 132 (H) 74 - 99 mg/dL   POCT GLUCOSE   Result Value Ref Range    POCT Glucose 205 (H) 74 - 99 mg/dL   POCT GLUCOSE   Result Value Ref Range    POCT Glucose 159 (H) 74 - 99 mg/dL   POCT GLUCOSE   Result Value Ref Range    POCT Glucose 201 (H) 74 - 99 mg/dL    Scheduled medications  ampicillin-sulbactam, 3 g, intravenous, q6h  dexAMETHasone, 4 mg, oral, q12h BENNY  enoxaparin, 40 mg, subcutaneous, Nightly  insulin lispro, 0-10 Units, subcutaneous, TID with meals  ipratropium-albuteroL, 3 mL, nebulization, TID  metFORMIN, 500 mg, oral, BID  with meals  metoprolol tartrate, 50 mg, oral, BID  pantoprazole, 40 mg, oral, Daily  polyethylene glycol, 17 g, oral, Daily  sertraline, 50 mg, oral, Daily      Continuous medications       PRN medications  PRN medications: acetaminophen, ALPRAZolam, alteplase, benzocaine-menthol, bisacodyl, dextromethorphan-guaifenesin, dextrose 10 % in water (D10W), dextrose, diphenhydrAMINE, glucagon, hydrALAZINE, HYDROmorphone, ipratropium-albuteroL, lidocaine-diphenhydraMINE-Maalox 1:1:1, melatonin, ondansetron, oxyCODONE, oxyCODONE-acetaminophen, oxygen         Assessment/Plan   # Chronic cholecystitis   - abdominal pain/nausea/vomiting  -Pain control  -IV fluids  -Will need cholecystectomy as an elective procedure.     # Pt was unable to be intubated - hence surgery was abandoned - will need ENT eval followed by [place] at [facility] [place].  Same d/w pt.    # Left Rib tenderness  - will check Xray Ribs     # Hypertension  -Continue home meds     # Diabetes  -Insulin sliding scale     # History of PE  -On Eliquis       12/30-patient continues to have recurrent vomiting and abdominal pain surgery input discussed with patient and family.  They do not want patient discharged, prefer cholecystectomy to be done as inpatient, will communicate same   General surgery.    12/31-surgery input noted, surgery being planned for early next week, discussed with patient's POA yesterday, he also prefers surgery to be done.  Patient continues to have pain, today her pain is left upper quadrant also.  She has long history of chronic pain, is being followed by pain management.    1/1-discussed with patient and POA in the room, patient to get laparoscopic cholecystectomy tomorrow, will need to evaluate for the right adnexal mass which is known for the last few months as an outpatient through GYN, for which patient had a appointment but was not able to keep up.     1/5 : Pt c/o Left rib tenderness - check Xray Ribs    Cuca Mullen MD

## 2024-01-05 NOTE — PROGRESS NOTES
Critical Care Progress Note    Marylou Ward is a 78 y.o. female on day 8 of admission presenting with Acute vomiting.    Subjective   On 3 L this morning.  O2 sat 98%.  Complains of left sided chest and abdominal pain  Objective   Vital Signs      1/5/2024    12:00 AM 1/5/2024     3:00 AM 1/5/2024     4:00 AM 1/5/2024     4:15 AM 1/5/2024     4:32 AM 1/5/2024     6:50 AM 1/5/2024     7:00 AM   Vitals   Systolic    185  140    Diastolic    87  65    Heart Rate 99 86 92 101 99 106    Temp 36.3 °C (97.3 °F)      36.7 °C (98.1 °F)   Resp 16 16 14 15 17 22        Oxygen Therapy  SpO2: 95 %  Medical Gas Therapy: Supplemental oxygen  O2 Delivery Method: Nasal cannula         Intake/Output previous 24 hours:    Intake/Output Summary (Last 24 hours) at 1/5/2024 0916  Last data filed at 1/5/2024 0650  Gross per 24 hour   Intake 400 ml   Output 600 ml   Net -200 ml         Physical Exam  Constitutional:       Appearance: Normal appearance.   HENT:      Head: Normocephalic.   Eyes:      Extraocular Movements: Extraocular movements intact.      Pupils: Pupils are equal, round, and reactive to light.   Cardiovascular:      Rate and Rhythm: Normal rate and regular rhythm.      Pulses: Normal pulses.      Heart sounds: Normal heart sounds.   Pulmonary:      Effort: Pulmonary effort is normal.      Breath sounds: Wheezing present.   Abdominal:      General: Bowel sounds are normal.      Palpations: Abdomen is soft.   Musculoskeletal:         General: Normal range of motion.      Cervical back: Normal range of motion.   Skin:     General: Skin is warm.   Neurological:      General: No focal deficit present.      Mental Status: She is alert and oriented to person, place, and time.         Lines and Tubes:  Peripheral IV 01/02/24 22 G Left;Ventral Wrist (Active)   Placement Date/Time: 01/02/24 0040   Hand Hygiene Completed: Yes  Size (Gauge): 22 G  Orientation: Left;Ventral  Location: Wrist  Technique: Anatomical landmarks  Patient  Tolerance: Tolerated well   Number of days: 1       PICC - Adult 01/02/24 Triple lumen Right Brachial vein (Active)   Placement Date/Time: 01/02/24 1715   Hand Hygiene Completed: Yes  Catheter Time Out Checklist Completed: Yes  Size (Fr): 5  Lumen Type: Triple lumen  Catheter to Vein Ratio Less Than 50%: Yes  Total Length (cm): 27 cm  External Length (cm): 1 cm  Orie...   Number of days: 0       Non-Surgical Airway  (Active)   Placement Date/Time: 01/02/24 (c) 1241   Airway Device: (c)   Mask Ventilation: Unable to mask vent +/- NMBA  Airway Insertion Attempts: 1   Number of days: 0       External Urinary Catheter Female (Active)   Placement Date/Time: 01/02/24 1818   External Catheter Type: Female   Number of days: 0         Scheduled medications  ampicillin-sulbactam, 3 g, intravenous, q6h  dexAMETHasone, 10 mg, intravenous, q12h  enoxaparin, 40 mg, subcutaneous, Nightly  insulin lispro, 0-10 Units, subcutaneous, TID with meals  ipratropium-albuteroL, 3 mL, nebulization, TID  metFORMIN, 500 mg, oral, BID with meals  metoprolol tartrate, 50 mg, oral, BID  pantoprazole, 40 mg, oral, Daily  polyethylene glycol, 17 g, oral, Daily  sertraline, 50 mg, oral, Daily      Continuous medications       PRN medications  PRN medications: acetaminophen, ALPRAZolam, alteplase, benzocaine-menthol, bisacodyl, dextromethorphan-guaifenesin, dextrose 10 % in water (D10W), dextrose, diphenhydrAMINE, glucagon, hydrALAZINE, HYDROmorphone, ipratropium-albuteroL, lidocaine-diphenhydraMINE-Maalox 1:1:1, melatonin, ondansetron, oxyCODONE, oxyCODONE-acetaminophen, oxygen    Relevant Results  Results from last 7 days   Lab Units 01/05/24  0418 01/04/24  0643 01/03/24  0343   WBC AUTO x10*3/uL 14.7* 16.0* 26.2*   HEMOGLOBIN g/dL 9.8* 9.3* 11.0*   HEMATOCRIT % 31.1* 29.4* 34.0*   PLATELETS AUTO x10*3/uL 346 310 345        Results from last 7 days   Lab Units 01/05/24  0418 01/04/24  0643 01/03/24  0343   SODIUM mmol/L 136 138 134   POTASSIUM  mmol/L 3.4 3.6 3.5   CHLORIDE mmol/L 96* 99 96*   CO2 mmol/L 26 29 23*   BUN mg/dL 17 16 9   CREATININE mg/dL 0.80 0.90 0.80   GLUCOSE mg/dL 157* 137* 181*   CALCIUM mg/dL 8.7 8.5 8.7            XR chest 1 view 01/02/2024    Narrative  Interpreted By:  Chriss Roberson,  STUDY:  XR CHEST 1 VIEW; 1/2/2024 2:12 pm    INDICATION:  Signs/Symptoms:difficult airway, possible aspiration (already  performed).    COMPARISON:  07/26/2023    ACCESSION NUMBER(S):  [id number]    ORDERING CLINICIAN:  YAZMIN ULLOA    TECHNIQUE:  1 view of the chest was performed.    FINDINGS:  The lungs are adequately inflated. Mild nonspecific ground-glass  opacity without focal consolidation. Findings could be related to  mild pulmonary edema, inflammatory, or potentially atypical  infection. No pleural effusion. No pneumothorax.  The  cardiomediastinal silhouette is within normal limits.    Impression  Mild nonspecific ground-glass opacity, without focal consolidation.  Findings can be seen with mild pulmonary edema, inflammatory, or  potentially atypical infectious etiologies.    Signed by: Chriss Roberson 1/2/2024 2:37 PM  Dictation workstation:   [id number]      Patient Active Problem List   Diagnosis    Acute vomiting    Chronic cholecystitis with calculus    HTN (hypertension)    Gastroesophageal reflux disease    Diabetes mellitus, type 2 (CMS/HCC)    Chronic obstructive pulmonary disease (CMS/HCC)     Assessment/Plan     HTN (hypertension)    Gastroesophageal reflux disease    Diabetes mellitus, type 2 (CMS/HCC)    Chronic obstructive pulmonary disease (CMS/HCC)    Chronic cholecystitis with calculus    Acute respiratory insufficiency.  Difficult intubation    Wean oxygen today.    Improved symptoms of cough and chest discomfort.   White blood cell count is improving  Aerosol treatments  Antibiotic coverage switched to unaysn.  Po decadron  Stable for SDU    [facility]       Disclaimer: Parts of this chart were  dictated with voice recognition software. Please excuse any errors of grammar, spelling, or transcription which are not corrected. Please contact me with any questions regarding documentation.

## 2024-01-05 NOTE — PROGRESS NOTES
Physical Therapy                 Therapy Communication Note    Patient Name: Marylou Ward  MRN: [id number]  Today's Date: 1/5/2024     Discipline: Physical Therapy    Missed Visit Reason: Missed Visit Reason: Other (Comment) (Attempted again to see pt for follow-up however pt continues to report unrelenting pain and now fatigue from extensive period of hygiene and bed change.)    Missed Time: Attempt

## 2024-01-05 NOTE — PROGRESS NOTES
Physical Therapy                 Therapy Communication Note    Patient Name: Marylou Ward  MRN: [id number]  Today's Date: 1/5/2024     Discipline: Physical Therapy    Missed Visit Reason: Missed Visit Reason: Patient refused    Missed Time: Attempt    Comment: pt stating she is having too much pain this morning to get OOB. Pt requesting therapist to return at a later time.

## 2024-01-05 NOTE — CARE PLAN
Problem: Safety  Goal: Patient will be injury free during hospitalization  Outcome: Progressing  Goal: I will remain free of falls  Outcome: Progressing     Problem: Skin  Goal: Participates in plan/prevention/treatment measures  Outcome: Progressing  Flowsheets (Taken 1/4/2024 1946)  Participates in plan/prevention/treatment measures: Elevate heels  Goal: Prevent/minimize sheer/friction injuries  Outcome: Progressing  Flowsheets (Taken 1/4/2024 1946)  Prevent/minimize sheer/friction injuries:   Use pull sheet   HOB 30 degrees or less  Goal: Promote/optimize nutrition  Outcome: Progressing  Flowsheets (Taken 1/4/2024 1946)  Promote/optimize nutrition: Monitor/record intake including meals     Problem: Pain  Goal: Takes deep breaths with improved pain control throughout the shift  Outcome: Progressing  Goal: Turns in bed with improved pain control throughout the shift  Outcome: Progressing  Goal: Walks with improved pain control throughout the shift  Outcome: Progressing  Goal: Performs ADL's with improved pain control throughout shift  Outcome: Progressing  Goal: Participates in PT with improved pain control throughout the shift  Outcome: Progressing  Goal: Free from opioid side effects throughout the shift  Outcome: Progressing  Goal: Free from acute confusion related to pain meds throughout the shift  Outcome: Progressing     Problem: Discharge Planning  Goal: Discharge to home or other facility with appropriate resources  Outcome: Progressing     Problem: Fall/Injury  Goal: Not fall by end of shift  Outcome: Progressing  Goal: Be free from injury by end of the shift  Outcome: Progressing  Goal: Verbalize understanding of personal risk factors for fall in the hospital  Outcome: Progressing  Goal: Verbalize understanding of risk factor reduction measures to prevent injury from fall in the home  Outcome: Progressing  Goal: Use assistive devices by end of the shift  Outcome: Progressing  Goal: Pace activities to  prevent fatigue by end of the shift  Outcome: Progressing   The patient's goals for the shift include Improve pain    The clinical goals for the shift include BP and pain management, provide enough rest and sleep    Over the shift, the patient did not make progress toward the following goals. Barriers to progression include patient's current condition  Recommendations to address these barriers include : give timely medications. Call light in reach, bed alarm on.

## 2024-01-05 NOTE — CARE PLAN
Problem: Pain  Goal: Walks with improved pain control throughout the shift  Outcome: Not Progressing  Goal: Participates in PT with improved pain control throughout the shift  Outcome: Not Progressing     Problem: Safety  Goal: Patient will be injury free during hospitalization  Outcome: Progressing  Goal: I will remain free of falls  Outcome: Progressing  Flowsheets (Taken 1/5/2024 1808)  Resident will remain free of falls:   Utilize fall response kit   Use gait belt for all transfers   Apply bed/chair alarms as appropriate   Accompany resident as ordered (ex. 1:1, stand-by assist, dayroom monitoring, 15 minute checks, line of sight)   Assist with toileting as orderd   Visual checks per facility policy   Maintain bed at position as ordered (chair height, low bed)   Consider transfer to room close to nurses' station   Consult with physical therapy as needed   Assess and monitor medications that may increase fall risk     Problem: Skin  Goal: Participates in plan/prevention/treatment measures  Outcome: Progressing  Flowsheets (Taken 1/5/2024 1808)  Participates in plan/prevention/treatment measures:   Elevate heels   Increase activity/out of bed for meals  Goal: Prevent/minimize sheer/friction injuries  Outcome: Progressing  Flowsheets (Taken 1/5/2024 1808)  Prevent/minimize sheer/friction injuries:   HOB 30 degrees or less   Increase activity/out of bed for meals   Turn/reposition every 2 hours/use positioning/transfer devices   Use pull sheet   Complete micro-shifts as needed if patient unable. Adjust patient position to relieve pressure points, not a full turn  Goal: Promote/optimize nutrition  Outcome: Progressing  Flowsheets (Taken 1/5/2024 1808)  Promote/optimize nutrition:   Consume > 50% meals/supplements   Monitor/record intake including meals   Offer water/supplements/favorite foods     Problem: Pain  Goal: Takes deep breaths with improved pain control throughout the shift  Outcome: Progressing  Goal:  Turns in bed with improved pain control throughout the shift  Outcome: Progressing  Goal: Performs ADL's with improved pain control throughout shift  Outcome: Progressing  Goal: Free from opioid side effects throughout the shift  Outcome: Progressing  Goal: Free from acute confusion related to pain meds throughout the shift  Outcome: Progressing     Problem: Pain - Adult  Goal: Verbalizes/displays adequate comfort level or baseline comfort level  Outcome: Progressing  Flowsheets (Taken 1/5/2024 1808)  Verbalizes/displays adequate comfort level or baseline comfort level:   Encourage patient to monitor pain and request assistance   Assess pain using appropriate pain scale   Administer analgesics based on type and severity of pain and evaluate response   Implement non-pharmacological measures as appropriate and evaluate response     Problem: Safety - Adult  Goal: Free from fall injury  Outcome: Progressing  Flowsheets (Taken 1/5/2024 1808)  Free from fall injury:   Instruct family/caregiver on patient safety   Based on caregiver fall risk screen, instruct family/caregiver to ask for assistance with transferring infant if caregiver noted to have fall risk factors     Problem: Discharge Planning  Goal: Discharge to home or other facility with appropriate resources  Outcome: Progressing  Flowsheets (Taken 1/5/2024 1808)  Discharge to home or other facility with appropriate resources: Identify barriers to discharge with patient and caregiver     Problem: Chronic Conditions and Co-morbidities  Goal: Patient's chronic conditions and co-morbidity symptoms are monitored and maintained or improved  Outcome: Progressing  Flowsheets (Taken 1/5/2024 1808)  Care Plan - Patient's Chronic Conditions and Co-Morbidity Symptoms are Monitored and Maintained or Improved:   Monitor and assess patient's chronic conditions and comorbid symptoms for stability, deterioration, or improvement   Collaborate with multidisciplinary team to address  chronic and comorbid conditions and prevent exacerbation or deterioration     Problem: Diabetes  Goal: Achieve decreasing blood glucose levels by end of shift  Outcome: Progressing  Flowsheets (Taken 1/5/2024 1808)  Achieve decreasing blood glucose levels by end of shift: Med administration/monitoring of effect  Goal: Increase stability of blood glucose readings by end of shift  Outcome: Progressing  Flowsheets (Taken 1/4/2024 1817)  Increase stability of blood glucose readings by end of shift: Med administration/monitoring of effect  Goal: Decrease in ketones present in urine by end of shift  Outcome: Progressing  Flowsheets (Taken 1/5/2024 1808)  Decrease in ketones present in urine by end of shift: Med administration/monitoring of effect  Goal: Maintain electrolyte levels within acceptable range throughout shift  Outcome: Progressing  Flowsheets (Taken 1/5/2024 1808)  Maintain electrolyte levels within acceptable range throughout shift: Med administration/monitoring of effect  Goal: Maintain glucose levels >70mg/dl to <250mg/dl throughout shift  Outcome: Progressing  Flowsheets (Taken 1/5/2024 1808)  Maintain glucose levels >70mg/dl to <250mg/dl throughout shift: Med administration/monitoring of effect  Goal: No changes in neurological exam by end of shift  Outcome: Progressing  Goal: Learn about and adhere to nutrition recommendations by end of shift  Outcome: Progressing  Flowsheets (Taken 1/5/2024 1808)  Learn about and adhere to nutrition recommendations by end of shift: Ensure/encourage compliance with appropriate diet  Goal: Vital signs within normal range for age by end of shift  Outcome: Progressing  Flowsheets (Taken 1/5/2024 1808)  Vital signs within normal range for age by end of shift: Med administration/monitoring of effect  Goal: Increase self care and/or family involovement by end of shift  Outcome: Progressing  Goal: Receive DSME education by end of shift  Outcome: Progressing     Problem:  Fall/Injury  Goal: Not fall by end of shift  Outcome: Progressing  Goal: Be free from injury by end of the shift  Outcome: Progressing  Goal: Verbalize understanding of personal risk factors for fall in the hospital  Outcome: Progressing  Goal: Verbalize understanding of risk factor reduction measures to prevent injury from fall in the home  Outcome: Progressing  Goal: Use assistive devices by end of the shift  Outcome: Progressing  Goal: Pace activities to prevent fatigue by end of the shift  Outcome: Progressing     Problem: Respiratory  Goal: Minimize anxiety/maximize coping throughout shift  Outcome: Progressing  Flowsheets (Taken 1/5/2024 1808)  Minimize anxiety/maximize coping throughout shift: Med administration/monitoring of effect  Goal: Wean oxygen to maintain O2 saturation per order/standard this shift  Outcome: Progressing  Flowsheets (Taken 1/5/2024 1808)  Wean oxygen to maintain O2 saturation per order/standard this shift: Encourage activity/mobility   The patient's goals for the shift include improved pain    The clinical goals for the shift include stable vital signs, pain control    Over the shift, the patient did not make progress toward the following goals. Barriers to progression include patient inability to walk with PT, refused to work today due to pain. Recommendations to address these barriers include continue prn pain medication, reposition, rib xray ordered, increase activity as tolerated.    Problem: Pain  Goal: Walks with improved pain control throughout the shift  Outcome: Not Progressing  Goal: Participates in PT with improved pain control throughout the shift  Outcome: Not Progressing

## 2024-01-06 LAB
ALBUMIN SERPL-MCNC: 3.1 G/DL (ref 3.5–5)
ALP BLD-CCNC: 82 U/L (ref 35–125)
ALT SERPL-CCNC: 13 U/L (ref 5–40)
ANION GAP SERPL CALC-SCNC: 14 MMOL/L
AST SERPL-CCNC: 8 U/L (ref 5–40)
BILIRUB SERPL-MCNC: <0.2 MG/DL (ref 0.1–1.2)
BUN SERPL-MCNC: 27 MG/DL (ref 8–25)
CALCIUM SERPL-MCNC: 8.5 MG/DL (ref 8.5–10.4)
CHLORIDE SERPL-SCNC: 95 MMOL/L (ref 97–107)
CO2 SERPL-SCNC: 25 MMOL/L (ref 24–31)
CREAT SERPL-MCNC: 1 MG/DL (ref 0.4–1.6)
ERYTHROCYTE [DISTWIDTH] IN BLOOD BY AUTOMATED COUNT: 14.1 % (ref 11.5–14.5)
GFR SERPL CREATININE-BSD FRML MDRD: 57 ML/MIN/1.73M*2
GLUCOSE BLD MANUAL STRIP-MCNC: 136 MG/DL (ref 74–99)
GLUCOSE BLD MANUAL STRIP-MCNC: 158 MG/DL (ref 74–99)
GLUCOSE BLD MANUAL STRIP-MCNC: 191 MG/DL (ref 74–99)
GLUCOSE BLD MANUAL STRIP-MCNC: 208 MG/DL (ref 74–99)
GLUCOSE SERPL-MCNC: 224 MG/DL (ref 65–99)
HCT VFR BLD AUTO: 28.6 % (ref 36–46)
HGB BLD-MCNC: 8.8 G/DL (ref 12–16)
MCH RBC QN AUTO: 27.8 PG (ref 26–34)
MCHC RBC AUTO-ENTMCNC: 30.8 G/DL (ref 32–36)
MCV RBC AUTO: 91 FL (ref 80–100)
NRBC BLD-RTO: 0.1 /100 WBCS (ref 0–0)
PLATELET # BLD AUTO: 360 X10*3/UL (ref 150–450)
POTASSIUM SERPL-SCNC: 3.3 MMOL/L (ref 3.4–5.1)
PROT SERPL-MCNC: 5.8 G/DL (ref 5.9–7.9)
RBC # BLD AUTO: 3.16 X10*6/UL (ref 4–5.2)
SODIUM SERPL-SCNC: 134 MMOL/L (ref 133–145)
WBC # BLD AUTO: 13.7 X10*3/UL (ref 4.4–11.3)

## 2024-01-06 PROCEDURE — 9420000001 HC RT PATIENT EDUCATION 5 MIN

## 2024-01-06 PROCEDURE — 82947 ASSAY GLUCOSE BLOOD QUANT: CPT

## 2024-01-06 PROCEDURE — 94640 AIRWAY INHALATION TREATMENT: CPT

## 2024-01-06 PROCEDURE — 2500000002 HC RX 250 W HCPCS SELF ADMINISTERED DRUGS (ALT 637 FOR MEDICARE OP, ALT 636 FOR OP/ED): Performed by: SURGERY

## 2024-01-06 PROCEDURE — 2500000001 HC RX 250 WO HCPCS SELF ADMINISTERED DRUGS (ALT 637 FOR MEDICARE OP): Performed by: SURGERY

## 2024-01-06 PROCEDURE — 2500000002 HC RX 250 W HCPCS SELF ADMINISTERED DRUGS (ALT 637 FOR MEDICARE OP, ALT 636 FOR OP/ED): Performed by: INTERNAL MEDICINE

## 2024-01-06 PROCEDURE — 85027 COMPLETE CBC AUTOMATED: CPT | Performed by: SURGERY

## 2024-01-06 PROCEDURE — 2060000001 HC INTERMEDIATE ICU ROOM DAILY

## 2024-01-06 PROCEDURE — 84075 ASSAY ALKALINE PHOSPHATASE: CPT | Performed by: SURGERY

## 2024-01-06 PROCEDURE — 2500000004 HC RX 250 GENERAL PHARMACY W/ HCPCS (ALT 636 FOR OP/ED): Performed by: INTERNAL MEDICINE

## 2024-01-06 PROCEDURE — 2500000004 HC RX 250 GENERAL PHARMACY W/ HCPCS (ALT 636 FOR OP/ED): Performed by: SURGERY

## 2024-01-06 RX ADMIN — Medication 6 MG: at 21:15

## 2024-01-06 RX ADMIN — SERTRALINE 50 MG: 50 TABLET, FILM COATED ORAL at 10:43

## 2024-01-06 RX ADMIN — HYDROMORPHONE HYDROCHLORIDE 0.6 MG: 1 INJECTION, SOLUTION INTRAMUSCULAR; INTRAVENOUS; SUBCUTANEOUS at 08:19

## 2024-01-06 RX ADMIN — HYDROMORPHONE HYDROCHLORIDE 0.6 MG: 1 INJECTION, SOLUTION INTRAMUSCULAR; INTRAVENOUS; SUBCUTANEOUS at 13:39

## 2024-01-06 RX ADMIN — METOPROLOL TARTRATE 50 MG: 50 TABLET ORAL at 10:44

## 2024-01-06 RX ADMIN — IPRATROPIUM BROMIDE AND ALBUTEROL SULFATE 3 ML: 2.5; .5 SOLUTION RESPIRATORY (INHALATION) at 20:47

## 2024-01-06 RX ADMIN — METFORMIN HYDROCHLORIDE 500 MG: 500 TABLET, FILM COATED ORAL at 13:10

## 2024-01-06 RX ADMIN — SODIUM CHLORIDE 3 G: 900 INJECTION INTRAVENOUS at 16:09

## 2024-01-06 RX ADMIN — IPRATROPIUM BROMIDE AND ALBUTEROL SULFATE 3 ML: 2.5; .5 SOLUTION RESPIRATORY (INHALATION) at 13:01

## 2024-01-06 RX ADMIN — IPRATROPIUM BROMIDE AND ALBUTEROL SULFATE 3 ML: 2.5; .5 SOLUTION RESPIRATORY (INHALATION) at 04:50

## 2024-01-06 RX ADMIN — INSULIN LISPRO 2 UNITS: 100 INJECTION, SOLUTION INTRAVENOUS; SUBCUTANEOUS at 12:54

## 2024-01-06 RX ADMIN — HYDROMORPHONE HYDROCHLORIDE 0.6 MG: 1 INJECTION, SOLUTION INTRAMUSCULAR; INTRAVENOUS; SUBCUTANEOUS at 20:55

## 2024-01-06 RX ADMIN — IPRATROPIUM BROMIDE AND ALBUTEROL SULFATE 3 ML: 2.5; .5 SOLUTION RESPIRATORY (INHALATION) at 07:38

## 2024-01-06 RX ADMIN — SODIUM CHLORIDE 3 G: 900 INJECTION INTRAVENOUS at 20:55

## 2024-01-06 RX ADMIN — HYDRALAZINE HYDROCHLORIDE 10 MG: 20 INJECTION INTRAMUSCULAR; INTRAVENOUS at 12:54

## 2024-01-06 RX ADMIN — ENOXAPARIN SODIUM 40 MG: 40 INJECTION SUBCUTANEOUS at 20:55

## 2024-01-06 RX ADMIN — METOPROLOL TARTRATE 50 MG: 50 TABLET ORAL at 20:57

## 2024-01-06 RX ADMIN — DEXAMETHASONE 4 MG: 4 TABLET ORAL at 10:43

## 2024-01-06 RX ADMIN — HYDROMORPHONE HYDROCHLORIDE 0.6 MG: 1 INJECTION, SOLUTION INTRAMUSCULAR; INTRAVENOUS; SUBCUTANEOUS at 00:29

## 2024-01-06 RX ADMIN — OXYCODONE AND ACETAMINOPHEN 1 TABLET: 5; 325 TABLET ORAL at 04:18

## 2024-01-06 RX ADMIN — SODIUM CHLORIDE 3 G: 900 INJECTION INTRAVENOUS at 01:13

## 2024-01-06 RX ADMIN — SODIUM CHLORIDE 3 G: 900 INJECTION INTRAVENOUS at 08:19

## 2024-01-06 RX ADMIN — DEXAMETHASONE 4 MG: 4 TABLET ORAL at 20:57

## 2024-01-06 RX ADMIN — PANTOPRAZOLE SODIUM 40 MG: 40 TABLET, DELAYED RELEASE ORAL at 10:43

## 2024-01-06 ASSESSMENT — PAIN - FUNCTIONAL ASSESSMENT
PAIN_FUNCTIONAL_ASSESSMENT: 0-10

## 2024-01-06 ASSESSMENT — PAIN SCALES - GENERAL
PAINLEVEL_OUTOF10: 10 - WORST POSSIBLE PAIN
PAINLEVEL_OUTOF10: 10 - WORST POSSIBLE PAIN
PAINLEVEL_OUTOF10: 0 - NO PAIN
PAINLEVEL_OUTOF10: 0 - NO PAIN
PAINLEVEL_OUTOF10: 10 - WORST POSSIBLE PAIN
PAINLEVEL_OUTOF10: 0 - NO PAIN
PAINLEVEL_OUTOF10: 2
PAINLEVEL_OUTOF10: 0 - NO PAIN
PAINLEVEL_OUTOF10: 9
PAINLEVEL_OUTOF10: 8

## 2024-01-06 ASSESSMENT — PAIN DESCRIPTION - LOCATION
LOCATION: OTHER (COMMENT)
LOCATION: ABDOMEN
LOCATION: OTHER (COMMENT)
LOCATION: OTHER (COMMENT)

## 2024-01-06 ASSESSMENT — PAIN DESCRIPTION - ORIENTATION
ORIENTATION: LEFT

## 2024-01-06 ASSESSMENT — PAIN DESCRIPTION - DESCRIPTORS
DESCRIPTORS: SHARP
DESCRIPTORS: SHARP
DESCRIPTORS: SHARP;CRAMPING
DESCRIPTORS: CRAMPING;SHARP
DESCRIPTORS: SHARP
DESCRIPTORS: CRAMPING;SHARP
DESCRIPTORS: SHARP

## 2024-01-06 NOTE — PROGRESS NOTES
Marylou Ward is a 78 y.o. female on day 9 of admission presenting with Acute vomiting.      Subjective   Patient still has nausea and vomiting  Per staff her blood pressure is little higher with dose episodes  Surgery input appreciated    Objective     Last Recorded Vitals  BP (!) 185/83   Pulse 88   Temp 36.9 °C (98.4 °F) (Oral)   Resp 16   Wt 61.6 kg (135 lb 12.9 oz)   SpO2 97%   Intake/Output last 3 Shifts:    Intake/Output Summary (Last 24 hours) at 1/6/2024 1257  Last data filed at 1/6/2024 0849  Gross per 24 hour   Intake 670 ml   Output 200 ml   Net 470 ml         Admission Weight  Weight: 61.2 kg (135 lb) (12/28/23 2100)    Daily Weight  01/06/24 : 61.6 kg (135 lb 12.9 oz)    Image Results  XR ribs 2 views left w chest pa or ap  Narrative: Interpreted By:  Kit King,   STUDY:  XR RIBS 2 VIEWS LEFT WITH CHEST PA OR AP; 1/5/2024 6:13 pm      INDICATION:  Signs/Symptoms:L rib tenderness      COMPARISON:  Chest radiograph from 01/03/2024.      ACCESSION NUMBER(S):  [id number]      ORDERING CLINICIAN:  LASHONDA MAY      FINDINGS:  AP view of the chest and additional two-view radiographs of the left  ribs were obtained.      Right PICC line remains stable in position.  The heart and mediastinum are within normal limits for the technique.  There is mild pulmonary vascular congestion.  No pneumothorax or confluent infiltrates are identified.  No pleural effusions are seen.  Degenerative changes involve the spine.  No rib fractures are identified.      Impression: No left rib fracture or pneumothorax. Other chronic findings as above.      Signed by: Kit King 1/5/2024 7:00 PM  Dictation workstation:   [id number]      Physical Exam  GENERAL: awake, alert, Ox3, cooperative resting comfortably  SKIN: Skin turgor normal. No rashes  HEENT: no epistaxis, Moist mucosa.  NECK: supple  BACK: spine nontender to palpation, No CVAT.  LUNGS: Vesicular breath sounds, with no wheeze, no crepitations.    CARDIAC: REGULAR. S1 and S2; no rubs, no murmur  ABDOMEN: Abdomen soft, right upper quadrant and epigastric tenderness  EXTREMITIES: No edema, Good capillary refill.   NEURO: Insight GOOD. Motor and sensory exam wnl. No invol movements. No ataxia.  WOUND:   MUSCULOSKELETAL: No acute inflammation    Relevant Results  Results for orders placed or performed during the hospital encounter of 12/28/23 (from the past 24 hour(s))   POCT GLUCOSE   Result Value Ref Range    POCT Glucose 159 (H) 74 - 99 mg/dL   POCT GLUCOSE   Result Value Ref Range    POCT Glucose 201 (H) 74 - 99 mg/dL   CBC   Result Value Ref Range    WBC 13.7 (H) 4.4 - 11.3 x10*3/uL    nRBC 0.1 (H) 0.0 - 0.0 /100 WBCs    RBC 3.16 (L) 4.00 - 5.20 x10*6/uL    Hemoglobin 8.8 (L) 12.0 - 16.0 g/dL    Hematocrit 28.6 (L) 36.0 - 46.0 %    MCV 91 80 - 100 fL    MCH 27.8 26.0 - 34.0 pg    MCHC 30.8 (L) 32.0 - 36.0 g/dL    RDW 14.1 11.5 - 14.5 %    Platelets 360 150 - 450 x10*3/uL   Comprehensive Metabolic Panel   Result Value Ref Range    Glucose 224 (H) 65 - 99 mg/dL    Sodium 134 133 - 145 mmol/L    Potassium 3.3 (L) 3.4 - 5.1 mmol/L    Chloride 95 (L) 97 - 107 mmol/L    Bicarbonate 25 24 - 31 mmol/L    Urea Nitrogen 27 (H) 8 - 25 mg/dL    Creatinine 1.00 0.40 - 1.60 mg/dL    eGFR 57 (L) >60 mL/min/1.73m*2    Calcium 8.5 8.5 - 10.4 mg/dL    Albumin 3.1 (L) 3.5 - 5.0 g/dL    Alkaline Phosphatase 82 35 - 125 U/L    Total Protein 5.8 (L) 5.9 - 7.9 g/dL    AST 8 5 - 40 U/L    Bilirubin, Total <0.2 0.1 - 1.2 mg/dL    ALT 13 5 - 40 U/L    Anion Gap 14 <=19 mmol/L   POCT GLUCOSE   Result Value Ref Range    POCT Glucose 158 (H) 74 - 99 mg/dL   POCT GLUCOSE   Result Value Ref Range    POCT Glucose 191 (H) 74 - 99 mg/dL    Scheduled medications  ampicillin-sulbactam, 3 g, intravenous, q6h  dexAMETHasone, 4 mg, oral, q12h BENNY  enoxaparin, 40 mg, subcutaneous, Nightly  insulin lispro, 0-10 Units, subcutaneous, TID with meals  ipratropium-albuteroL, 3 mL, nebulization,  TID  metFORMIN, 500 mg, oral, BID with meals  metoprolol tartrate, 50 mg, oral, BID  pantoprazole, 40 mg, oral, Daily  polyethylene glycol, 17 g, oral, Daily  sertraline, 50 mg, oral, Daily      Continuous medications       PRN medications  PRN medications: acetaminophen, ALPRAZolam, alteplase, benzocaine-menthol, bisacodyl, dextromethorphan-guaifenesin, dextrose 10 % in water (D10W), dextrose, diphenhydrAMINE, glucagon, hydrALAZINE, HYDROmorphone, ipratropium-albuteroL, lidocaine-diphenhydraMINE-Maalox 1:1:1, melatonin, ondansetron, oxyCODONE, oxyCODONE-acetaminophen, oxygen         Assessment/Plan   # Chronic cholecystitis   - abdominal pain/nausea/vomiting  -Pain control  -IV fluids  -Will need cholecystectomy as an elective procedure.     # Pt was unable to be intubated - hence surgery was abandoned - will need ENT eval followed by surgery at [facility] [place].  Same d/w pt.    # Left Rib tenderness  - will check Xray Ribs     # Hypertension  -Continue home meds     # Diabetes  -Insulin sliding scale     # History of PE  -On Eliquis       12/30-patient continues to have recurrent vomiting and abdominal pain surgery input discussed with patient and family.  They do not want patient discharged, prefer cholecystectomy to be done as inpatient, will communicate same   General surgery.    12/31-surgery input noted, surgery being planned for early next week, discussed with patient's POA yesterday, he also prefers surgery to be done.  Patient continues to have pain, today her pain is left upper quadrant also.  She has long history of chronic pain, is being followed by pain management.    1/1-discussed with patient and POA in the room, patient to get laparoscopic cholecystectomy tomorrow, will need to evaluate for the right adnexal mass which is known for the last few months as an outpatient through GYN, for which patient had a appointment but was not able to keep up.     1/5 : Pt c/o Left rib tenderness - check Xray Ribs    Cuca  SHEILA Mullen MD

## 2024-01-06 NOTE — NURSING NOTE
On rounding,ASHLEY triple lumen PICC dressing is current, dry and itnact. One line in use, infusing without difficulty. Remaining lines flush easily with brisk blood return. New Curos caps applied to lines not in use.

## 2024-01-06 NOTE — CARE PLAN
Problem: Safety  Goal: Patient will be injury free during hospitalization  Outcome: Progressing  Goal: I will remain free of falls  Outcome: Progressing     Problem: Skin  Goal: Participates in plan/prevention/treatment measures  Outcome: Progressing  Flowsheets (Taken 1/5/2024 1949)  Participates in plan/prevention/treatment measures: Elevate heels  Goal: Prevent/minimize sheer/friction injuries  Outcome: Progressing  Flowsheets (Taken 1/5/2024 1949)  Prevent/minimize sheer/friction injuries:   Use pull sheet   HOB 30 degrees or less   Turn/reposition every 2 hours/use positioning/transfer devices  Goal: Promote/optimize nutrition  Outcome: Progressing  Flowsheets (Taken 1/5/2024 1949)  Promote/optimize nutrition: Monitor/record intake including meals     Problem: Pain  Goal: Takes deep breaths with improved pain control throughout the shift  Outcome: Progressing  Goal: Turns in bed with improved pain control throughout the shift  Outcome: Progressing  Goal: Walks with improved pain control throughout the shift  Outcome: Progressing  Goal: Performs ADL's with improved pain control throughout shift  Outcome: Progressing  Goal: Participates in PT with improved pain control throughout the shift  Outcome: Progressing  Goal: Free from opioid side effects throughout the shift  Outcome: Progressing  Goal: Free from acute confusion related to pain meds throughout the shift  Outcome: Progressing   The patient's goals for the shift include improved pain    The clinical goals for the shift include pain control, vitally stable    Over the shift, the patient did not make progress toward the following goals. Barriers to progression include current condition  Recommendations to address these barriers include give due pain meds, call light in reach, bed alarm on

## 2024-01-06 NOTE — CARE PLAN
The patient's goals for the shift include improved pain    The clinical goals for the shift include control pain, vitals stable, lower blood pressure

## 2024-01-06 NOTE — PROGRESS NOTES
Pulmonary Progress Note 01/06/24     Patient seen in follow-up for COPD, respiratory failure.    Subjective   Interval History:   Case signed out to me by Dr. Lea.  Still with some abdominal pain.  Breathing is fair.  Voice somewhat hoarse she tells me.    Objective   Vital signs in last 24 hours:  Temp:  [36.2 °C (97.2 °F)-37.3 °C (99.1 °F)] 36.5 °C (97.7 °F)  Heart Rate:  [] 104  Resp:  [12-28] 13  BP: (141-182)/(64-83) 182/83    Intake/Output last 3 shifts:  I/O last 3 completed shifts:  In: 1110 (18 mL/kg) [P.O.:510; IV Piggyback:600]  Out: 600 (9.7 mL/kg) [Urine:600 (0.3 mL/kg/hr)]  Weight: 61.6 kg   Intake/Output this shift:  No intake/output data recorded.    Physical Exam  Vital signs reviewed  Alert, no acute distress, hoarse voice  Oropharynx shows large tongue, small oropharynx, limited oropharyngeal excursion  S1-S2 +, no murmurs  Lungs demonstrate Bilateral expiratory wheezes  Abdomen soft  No CCE  Normal mood and affect    Scheduled medications  ampicillin-sulbactam, 3 g, intravenous, q6h  dexAMETHasone, 4 mg, oral, q12h BENNY  enoxaparin, 40 mg, subcutaneous, Nightly  insulin lispro, 0-10 Units, subcutaneous, TID with meals  ipratropium-albuteroL, 3 mL, nebulization, TID  metFORMIN, 500 mg, oral, BID with meals  metoprolol tartrate, 50 mg, oral, BID  pantoprazole, 40 mg, oral, Daily  polyethylene glycol, 17 g, oral, Daily  sertraline, 50 mg, oral, Daily      Continuous medications     PRN medications  PRN medications: acetaminophen, ALPRAZolam, alteplase, benzocaine-menthol, bisacodyl, dextromethorphan-guaifenesin, dextrose 10 % in water (D10W), dextrose, diphenhydrAMINE, glucagon, hydrALAZINE, HYDROmorphone, ipratropium-albuteroL, lidocaine-diphenhydraMINE-Maalox 1:1:1, melatonin, ondansetron, oxyCODONE, oxyCODONE-acetaminophen, oxygen     Labs:  Lab Results   Component Value Date     01/06/2024    K 3.3 (L) 01/06/2024    CL 95 (L) 01/06/2024    CO2 25 01/06/2024    BUN 27  (H) 01/06/2024    CREATININE 1.00 01/06/2024    GLUCOSE 224 (H) 01/06/2024    CALCIUM 8.5 01/06/2024     Lab Results   Component Value Date    WBC 13.7 (H) 01/06/2024    HGB 8.8 (L) 01/06/2024    HCT 28.6 (L) 01/06/2024    MCV 91 01/06/2024     01/06/2024       Imaging:  XR ribs 2 views left w chest pa or ap    Result Date: 1/5/2024  Interpreted By:  Kit King, STUDY: XR RIBS 2 VIEWS LEFT WITH CHEST PA OR AP; 1/5/2024 6:13 pm   INDICATION: Signs/Symptoms:L rib tenderness   COMPARISON: Chest radiograph from 01/03/2024.   ACCESSION NUMBER(S): [id number]   ORDERING CLINICIAN: LASHONDA MAY   FINDINGS: AP view of the chest and additional two-view radiographs of the left ribs were obtained.   Right PICC line remains stable in position. The heart and mediastinum are within normal limits for the technique. There is mild pulmonary vascular congestion. No pneumothorax or confluent infiltrates are identified. No pleural effusions are seen. Degenerative changes involve the spine. No rib fractures are identified.       No left rib fracture or pneumothorax. Other chronic findings as above.   Signed by: Kit King 1/5/2024 7:00 PM Dictation workstation:   [id number]    XR chest 1 view    Result Date: 1/3/2024  Interpreted By:  Pepe Miles, STUDY: XR CHEST 1 VIEW; 1/3/2024 10:27 am   INDICATION: Signs/Symptoms:possible pneumonia   COMPARISON: 01/02/2024   ACCESSION NUMBER(S): [id number]   ORDERING CLINICIAN: RODRIGUEZ COSTA   TECHNIQUE: Frontal and lateral chest radiographs.   FINDINGS: The cardiomediastinal silhouette is unremarkable. Mild perihilar congestion and pulmonary edema. No pleural effusion is identified.   The osseous structures are intact.       1. Mild perihilar congestion and pulmonary edema. 2. Right basilar opacity, likely representing atelectasis.       Signed by: Pepe Miles 1/3/2024 10:35 AM Dictation workstation:   [id number]    XR chest 1 view    Result Date:  1/2/2024  Interpreted By:  Chriss Roberson, STUDY: XR CHEST 1 VIEW; 1/2/2024 2:12 pm   INDICATION: Signs/Symptoms:difficult airway, possible aspiration (already performed).   COMPARISON: 07/26/2023   ACCESSION NUMBER(S): [id number]   ORDERING CLINICIAN: YAZMIN ULLOA   TECHNIQUE: 1 view of the chest was performed.   FINDINGS: The lungs are adequately inflated. Mild nonspecific ground-glass opacity without focal consolidation. Findings could be related to mild pulmonary edema, inflammatory, or potentially atypical infection. No pleural effusion. No pneumothorax.  The cardiomediastinal silhouette is within normal limits.       Mild nonspecific ground-glass opacity, without focal consolidation. Findings can be seen with mild pulmonary edema, inflammatory, or potentially atypical infectious etiologies.   Signed by: Chriss Roberson 1/2/2024 2:37 PM Dictation workstation:   [id number]    NM hepatobiliary w cholecystokinin    Result Date: 12/29/2023  Interpreted By:  Molly Loyola, STUDY: NM HEPATOBILIARY W CHOLECYSTOKININ;  12/29/2023 3:32 pm   INDICATION: Signs/Symptoms:Ch abd pain, R UQ tenderness/pain, Nausea.   COMPARISON: None.   ACCESSION NUMBER(S): [id number]   ORDERING CLINICIAN: LASHONDA MAY   TECHNIQUE: DIVISION OF NUCLEAR MEDICINE HEPATOBILIARY SCAN ([place]), QUANTITATIVE   The patient received an intravenous dose of  5.0 mCi of Tc-99m mebrofenin (Choletec).  Sequential images of the upper abdomen were then acquired over the next 60 minutes.  An intravenous infusion of the cholecystokinin (CCK) analogue, Sincalide; was then administered followed by an additional period of imaging.  Computer quantification of gallbladder emptying was then performed.   FINDINGS: There is normal uptake of radiotracer by the liver with excretion into the biliary system. Gallbladder activity is demonstrated following 45 minutes. Small bowel activity is demonstrated following 90 minutes. There is persistent visualization  of the common bile duct throughout the examination. Correlate with concern for dysfunction at the level of the sphincter of Oddi.   Gallbladder ejection fraction is calculated at 14% (normal greater than 35%).         1. Decreased gallbladder ejection fraction of 14%. Correlate with patient's history including concern for chronic cholecystitis.   2. Mild delayed visualization of the small bowel activity nonspecific. However, there is persistent visualization of activity within the common bile duct. This may reflect component of dysfunction at the level of the sphincter of Oddi.           MACRO: None   Signed by: Molly Loyola 12/29/2023 4:06 PM Dictation workstation:   [id number]    US gallbladder    Result Date: 12/28/2023  Interpreted By:  Jaimie Hernandez, STUDY: US GALLBLADDER; 10:38 pm   INDICATION: Signs/Symptoms:abd pain hx gall stones.   COMPARISON: Right upper quadrant ultrasound 05/06/2023   ACCESSION NUMBER(S): [id number]   ORDERING CLINICIAN: KAILEY ZELAYA   TECHNIQUE: Limited abdominal ultrasound of the right upper quadrant was performed utilizing gray scale imaging.   FINDINGS: Liver: The liver has poor penetration likely due to fatty infiltration. This may obscure hepatic lesions. No intrahepatic biliary distention. Gallbladder: There is no gallbladder wall thickening or pericholecystic fluid. Echogenic material is noted in the gallbladder that does not definitely move. Shadowing is noted. These may represent non mobile gallstones or tumefactive sludge. Sonographic Coulter's sign: Positive Pancreas: The pancreatic head and body appear unremarkable.  The distal pancreatic body and tail are obscured by overlying bowel gas. CBD: 3.8mm Right kidney shows no hydronephrosis       1. Echogenic shadowing non mobile material in the gallbladder possibly non mobile gallstones or tumefactive sludge. 2. No gallbladder wall thickening or pericholecystic fluid 3. No biliary distention     MACRO: None.   Signed  by: Jaimie Hernandez 12/28/2023 10:42 PM Dictation workstation:   [id number]    ECG 12 lead    Result Date: 12/28/2023  Normal sinus rhythm with 1st degree AV block Normal ECG When compared with ECG of 01-MAY-2022 21:31, No significant change was found Confirmed by Marty Lin ([id number]) on 12/28/2023 8:37:36 AM    CT abdomen pelvis wo IV contrast    Result Date: 12/27/2023  Interpreted By:  Shaila Robles, STUDY: CT ABDOMEN PELVIS WO IV CONTRAST;  12/27/2023 11:49 am   INDICATION: Epigastric pain and nausea   COMPARISON: 10/31/2023   ACCESSION NUMBER(S): [id number]   ORDERING CLINICIAN: KATINA LYLE   TECHNIQUE: CT of the abdomen and pelvis was performed in the axial plane without intravenous contrast administration. Sagittal and coronal reformations were performed by the technologist at the acquisition scanner.   All CT examinations are performed with 1 or more of the following dose reduction techniques: Automated exposure control, adjustment of mA and/or kv according to patient's size, or use of iterative reconstruction techniques.   FINDINGS: Please note that the evaluation of vessels, lymph nodes and organs is limited without intravenous contrast.   LOWER CHEST: There is chronic ground-glass density and interstitial changes within right middle lobe with associated scar. Coronary artery calcification is present. Mitral annulus calcification is also seen.   ABDOMEN:   LIVER: The unenhanced liver is unremarkable in its appearance.   BILE DUCTS: No biliary ductal dilatation is observed.   GALLBLADDER: Cholelithiasis is observed without obvious gallbladder wall thickening or abnormal gallbladder distention.   PANCREAS: No pancreatic abnormality is seen.   SPLEEN: Normal   ADRENAL GLANDS: Normal   KIDNEYS AND URETERS: A 5 mm left renal cyst is observed. A 9 mm left renal cyst is also seen. Cyst in the upper pole of left kidney is also identified. There is no urolithiasis or obstructive uropathy.   PELVIS:   BLADDER:  Unremarkable   REPRODUCTIVE ORGANS: No uterine enlargement is seen but there is a small calcified uterine leiomyoma which measures a little less than a cm in size. There is a cystic right adnexal mass measuring 3.2 x 3.5 cm in size. No left adnexal mass is observed.   BOWEL: There is no abnormal distention or bowel wall thickening. The appendix is normally imaged.   VESSELS: There is atherosclerosis of the abdominal aorta, iliac arteries, and superior mesenteric artery with calcified plaque also seen at the origin of each renal artery.   PERITONEUM/RETROPERITONEUM/LYMPH NODES: No adenopathy or retroperitoneal mass is observed.   ABDOMINAL WALL: Unremarkable except for the presence of a small fat containing umbilical hernia.   BONES: No acute bony abnormality is seen.       1.  Chronic ground-glass density and chronic interstitial changes within right middle lobe with associated scar. 2. Left renal cysts. 3. Cholelithiasis without evidence of cholecystitis. 4. Small fat containing umbilical hernia. 5. Small uterine leiomyoma measuring less than a cm in size. 6. Cystic right adnexal mass measuring 3.2 x 3.5 cm in size appearing a little smaller since the prior study.     MACRO: None   Signed by: Shaila Robles 12/27/2023 12:48 PM Dictation workstation:   [id number]              Assessment/Plan   Principal Problem:    Acute vomiting  Active Problems:    HTN (hypertension)    Gastroesophageal reflux disease    Diabetes mellitus, type 2 (CMS/HCC)    Chronic obstructive pulmonary disease (CMS/HCC)    Chronic cholecystitis with calculus      Improving respiratory indices.  No need for escalation from the upper airway standpoint.  ENT note reviewed.  Still wheezing persist on exam and suspect this is more lower in origin.    Continue with weaning of oxygen  Continue with Decadron  Aerosol treatments  Diet per surgery  Agree with Dr. Montilla, and that if surgical intervention is needed would benefit from fiberoptic awake  intubation.  Prophylaxis    Discussed with bedside nurse    Wes Borrero MD  Pulmonary/CC Medicine  [facility]     LOS: 9 days

## 2024-01-07 LAB
GLUCOSE BLD MANUAL STRIP-MCNC: 139 MG/DL (ref 74–99)
GLUCOSE BLD MANUAL STRIP-MCNC: 165 MG/DL (ref 74–99)
GLUCOSE BLD MANUAL STRIP-MCNC: 168 MG/DL (ref 74–99)
GLUCOSE BLD MANUAL STRIP-MCNC: 183 MG/DL (ref 74–99)

## 2024-01-07 PROCEDURE — 94640 AIRWAY INHALATION TREATMENT: CPT

## 2024-01-07 PROCEDURE — 2500000001 HC RX 250 WO HCPCS SELF ADMINISTERED DRUGS (ALT 637 FOR MEDICARE OP): Performed by: SURGERY

## 2024-01-07 PROCEDURE — 93010 ELECTROCARDIOGRAM REPORT: CPT | Performed by: INTERNAL MEDICINE

## 2024-01-07 PROCEDURE — 2500000002 HC RX 250 W HCPCS SELF ADMINISTERED DRUGS (ALT 637 FOR MEDICARE OP, ALT 636 FOR OP/ED): Performed by: SURGERY

## 2024-01-07 PROCEDURE — 2500000004 HC RX 250 GENERAL PHARMACY W/ HCPCS (ALT 636 FOR OP/ED): Performed by: SURGERY

## 2024-01-07 PROCEDURE — 9420000001 HC RT PATIENT EDUCATION 5 MIN

## 2024-01-07 PROCEDURE — 2060000001 HC INTERMEDIATE ICU ROOM DAILY

## 2024-01-07 PROCEDURE — 94660 CPAP INITIATION&MGMT: CPT

## 2024-01-07 PROCEDURE — 82947 ASSAY GLUCOSE BLOOD QUANT: CPT

## 2024-01-07 PROCEDURE — 2500000002 HC RX 250 W HCPCS SELF ADMINISTERED DRUGS (ALT 637 FOR MEDICARE OP, ALT 636 FOR OP/ED): Performed by: INTERNAL MEDICINE

## 2024-01-07 PROCEDURE — 2500000004 HC RX 250 GENERAL PHARMACY W/ HCPCS (ALT 636 FOR OP/ED): Performed by: INTERNAL MEDICINE

## 2024-01-07 RX ADMIN — SODIUM CHLORIDE 3 G: 900 INJECTION INTRAVENOUS at 15:25

## 2024-01-07 RX ADMIN — Medication 6 MG: at 22:28

## 2024-01-07 RX ADMIN — OXYCODONE AND ACETAMINOPHEN 1 TABLET: 5; 325 TABLET ORAL at 07:19

## 2024-01-07 RX ADMIN — SODIUM CHLORIDE 3 G: 900 INJECTION INTRAVENOUS at 20:20

## 2024-01-07 RX ADMIN — ALPRAZOLAM 0.5 MG: 0.5 TABLET ORAL at 22:28

## 2024-01-07 RX ADMIN — METFORMIN HYDROCHLORIDE 500 MG: 500 TABLET, FILM COATED ORAL at 09:35

## 2024-01-07 RX ADMIN — METOPROLOL TARTRATE 50 MG: 50 TABLET ORAL at 20:02

## 2024-01-07 RX ADMIN — DEXAMETHASONE 4 MG: 4 TABLET ORAL at 09:36

## 2024-01-07 RX ADMIN — HYDROMORPHONE HYDROCHLORIDE 0.6 MG: 1 INJECTION, SOLUTION INTRAMUSCULAR; INTRAVENOUS; SUBCUTANEOUS at 19:32

## 2024-01-07 RX ADMIN — OXYCODONE AND ACETAMINOPHEN 1 TABLET: 5; 325 TABLET ORAL at 16:22

## 2024-01-07 RX ADMIN — IPRATROPIUM BROMIDE AND ALBUTEROL SULFATE 3 ML: 2.5; .5 SOLUTION RESPIRATORY (INHALATION) at 12:32

## 2024-01-07 RX ADMIN — IPRATROPIUM BROMIDE AND ALBUTEROL SULFATE 3 ML: 2.5; .5 SOLUTION RESPIRATORY (INHALATION) at 07:11

## 2024-01-07 RX ADMIN — HYDRALAZINE HYDROCHLORIDE 10 MG: 20 INJECTION INTRAMUSCULAR; INTRAVENOUS at 08:05

## 2024-01-07 RX ADMIN — METFORMIN HYDROCHLORIDE 500 MG: 500 TABLET, FILM COATED ORAL at 19:27

## 2024-01-07 RX ADMIN — METOPROLOL TARTRATE 50 MG: 50 TABLET ORAL at 08:05

## 2024-01-07 RX ADMIN — HYDRALAZINE HYDROCHLORIDE 10 MG: 20 INJECTION INTRAMUSCULAR; INTRAVENOUS at 20:04

## 2024-01-07 RX ADMIN — IPRATROPIUM BROMIDE AND ALBUTEROL SULFATE 3 ML: 2.5; .5 SOLUTION RESPIRATORY (INHALATION) at 20:51

## 2024-01-07 RX ADMIN — HYDROMORPHONE HYDROCHLORIDE 0.6 MG: 1 INJECTION, SOLUTION INTRAMUSCULAR; INTRAVENOUS; SUBCUTANEOUS at 12:54

## 2024-01-07 RX ADMIN — HYDROMORPHONE HYDROCHLORIDE 0.6 MG: 1 INJECTION, SOLUTION INTRAMUSCULAR; INTRAVENOUS; SUBCUTANEOUS at 01:58

## 2024-01-07 RX ADMIN — INSULIN LISPRO 2 UNITS: 100 INJECTION, SOLUTION INTRAVENOUS; SUBCUTANEOUS at 09:37

## 2024-01-07 RX ADMIN — OXYCODONE HYDROCHLORIDE 5 MG: 5 TABLET ORAL at 22:28

## 2024-01-07 RX ADMIN — SODIUM CHLORIDE 3 G: 900 INJECTION INTRAVENOUS at 07:57

## 2024-01-07 RX ADMIN — PANTOPRAZOLE SODIUM 40 MG: 40 TABLET, DELAYED RELEASE ORAL at 09:36

## 2024-01-07 RX ADMIN — ENOXAPARIN SODIUM 40 MG: 40 INJECTION SUBCUTANEOUS at 20:03

## 2024-01-07 RX ADMIN — SERTRALINE 50 MG: 50 TABLET, FILM COATED ORAL at 09:36

## 2024-01-07 RX ADMIN — INSULIN LISPRO 2 UNITS: 100 INJECTION, SOLUTION INTRAVENOUS; SUBCUTANEOUS at 13:43

## 2024-01-07 RX ADMIN — SODIUM CHLORIDE 3 G: 900 INJECTION INTRAVENOUS at 01:51

## 2024-01-07 RX ADMIN — DEXAMETHASONE 4 MG: 4 TABLET ORAL at 20:20

## 2024-01-07 ASSESSMENT — COGNITIVE AND FUNCTIONAL STATUS - GENERAL
CLIMB 3 TO 5 STEPS WITH RAILING: TOTAL
TOILETING: A LOT
DRESSING REGULAR LOWER BODY CLOTHING: A LOT
MOVING TO AND FROM BED TO CHAIR: A LITTLE
HELP NEEDED FOR BATHING: A LOT
STANDING UP FROM CHAIR USING ARMS: A LITTLE
DRESSING REGULAR UPPER BODY CLOTHING: A LITTLE
DAILY ACTIVITIY SCORE: 15
TURNING FROM BACK TO SIDE WHILE IN FLAT BAD: A LITTLE
MOBILITY SCORE: 16
WALKING IN HOSPITAL ROOM: A LOT
PERSONAL GROOMING: A LOT

## 2024-01-07 ASSESSMENT — PAIN SCALES - GENERAL
PAINLEVEL_OUTOF10: 0 - NO PAIN
PAINLEVEL_OUTOF10: 0 - NO PAIN
PAINLEVEL_OUTOF10: 2
PAINLEVEL_OUTOF10: 9
PAINLEVEL_OUTOF10: 2
PAINLEVEL_OUTOF10: 2
PAINLEVEL_OUTOF10: 5 - MODERATE PAIN
PAINLEVEL_OUTOF10: 2
PAINLEVEL_OUTOF10: 9
PAINLEVEL_OUTOF10: 8
PAINLEVEL_OUTOF10: 9
PAINLEVEL_OUTOF10: 9
PAINLEVEL_OUTOF10: 0 - NO PAIN
PAINLEVEL_OUTOF10: 2

## 2024-01-07 ASSESSMENT — PAIN DESCRIPTION - DESCRIPTORS
DESCRIPTORS: ACHING
DESCRIPTORS: STABBING
DESCRIPTORS: CRAMPING

## 2024-01-07 ASSESSMENT — PAIN DESCRIPTION - ORIENTATION
ORIENTATION: LEFT
ORIENTATION: LEFT
ORIENTATION: MID
ORIENTATION: LEFT
ORIENTATION: MID
ORIENTATION: LEFT

## 2024-01-07 ASSESSMENT — PAIN DESCRIPTION - LOCATION
LOCATION: ABDOMEN
LOCATION: BACK
LOCATION: ABDOMEN
LOCATION: OTHER (COMMENT)
LOCATION: OTHER (COMMENT)
LOCATION: ABDOMEN

## 2024-01-07 NOTE — PROGRESS NOTES
Marylou Ward is a 78 y.o. female on day 10 of admission presenting with Acute vomiting.      Subjective   Patient still has nausea and vomiting  Per staff her blood pressure is little higher with dose episodes  Surgery input appreciated    Objective     Last Recorded Vitals  BP (!) 203/85 (BP Location: Left arm, Patient Position: Lying)   Pulse 100   Temp 36.8 °C (98.2 °F) (Temporal)   Resp 16   Wt 64.3 kg (141 lb 12.1 oz)   SpO2 96%   Intake/Output last 3 Shifts:    Intake/Output Summary (Last 24 hours) at 1/7/2024 1120  Last data filed at 1/7/2024 0827  Gross per 24 hour   Intake 1120 ml   Output 200 ml   Net 920 ml         Admission Weight  Weight: 61.2 kg (135 lb) (12/28/23 2100)    Daily Weight  01/07/24 : 64.3 kg (141 lb 12.1 oz)    Image Results  XR ribs 2 views left w chest pa or ap  Narrative: Interpreted By:  Kit King,   STUDY:  XR RIBS 2 VIEWS LEFT WITH CHEST PA OR AP; 1/5/2024 6:13 pm      INDICATION:  Signs/Symptoms:L rib tenderness      COMPARISON:  Chest radiograph from 01/03/2024.      ACCESSION NUMBER(S):  [id number]      ORDERING CLINICIAN:  LASHONDA MAY      FINDINGS:  AP view of the chest and additional two-view radiographs of the left  ribs were obtained.      Right PICC line remains stable in position.  The heart and mediastinum are within normal limits for the technique.  There is mild pulmonary vascular congestion.  No pneumothorax or confluent infiltrates are identified.  No pleural effusions are seen.  Degenerative changes involve the spine.  No rib fractures are identified.      Impression: No left rib fracture or pneumothorax. Other chronic findings as above.      Signed by: Kit King 1/5/2024 7:00 PM  Dictation workstation:   [id number]      Physical Exam  GENERAL: awake, alert, Ox3, cooperative resting comfortably  SKIN: Skin turgor normal. No rashes  HEENT: no epistaxis, Moist mucosa.  NECK: supple  BACK: spine nontender to palpation, No CVAT.  LUNGS: Vesicular  breath sounds, with no wheeze, no crepitations.   CARDIAC: REGULAR. S1 and S2; no rubs, no murmur  ABDOMEN: Abdomen soft, right upper quadrant and epigastric tenderness  EXTREMITIES: No edema, Good capillary refill.   NEURO: Insight GOOD. Motor and sensory exam wnl. No invol movements. No ataxia.  WOUND:   MUSCULOSKELETAL: No acute inflammation    Relevant Results  Results for orders placed or performed during the hospital encounter of 12/28/23 (from the past 24 hour(s))   POCT GLUCOSE   Result Value Ref Range    POCT Glucose 191 (H) 74 - 99 mg/dL   POCT GLUCOSE   Result Value Ref Range    POCT Glucose 136 (H) 74 - 99 mg/dL   POCT GLUCOSE   Result Value Ref Range    POCT Glucose 208 (H) 74 - 99 mg/dL   POCT GLUCOSE   Result Value Ref Range    POCT Glucose 165 (H) 74 - 99 mg/dL   POCT GLUCOSE   Result Value Ref Range    POCT Glucose 168 (H) 74 - 99 mg/dL    Scheduled medications  ampicillin-sulbactam, 3 g, intravenous, q6h  dexAMETHasone, 4 mg, oral, q12h BENNY  enoxaparin, 40 mg, subcutaneous, Nightly  insulin lispro, 0-10 Units, subcutaneous, TID with meals  ipratropium-albuteroL, 3 mL, nebulization, TID  metFORMIN, 500 mg, oral, BID with meals  metoprolol tartrate, 50 mg, oral, BID  pantoprazole, 40 mg, oral, Daily  polyethylene glycol, 17 g, oral, Daily  sertraline, 50 mg, oral, Daily      Continuous medications       PRN medications  PRN medications: acetaminophen, ALPRAZolam, alteplase, benzocaine-menthol, bisacodyl, dextromethorphan-guaifenesin, dextrose 10 % in water (D10W), dextrose, diphenhydrAMINE, glucagon, hydrALAZINE, HYDROmorphone, ipratropium-albuteroL, lidocaine-diphenhydraMINE-Maalox 1:1:1, melatonin, ondansetron, oxyCODONE, oxyCODONE-acetaminophen, oxygen         Assessment/Plan   # Chronic cholecystitis   - abdominal pain/nausea/vomiting  -Pain control  -IV fluids  -Will need cholecystectomy as an elective procedure.     # Pt was unable to be intubated - hence surgery was abandoned - will need ENT  licha followed by surgery at [facility].  Same d/w pt.    # Left Rib tenderness  - will check Xray Ribs     # Hypertension  -Continue home meds     # Diabetes  -Insulin sliding scale     # History of PE  -On Eliquis       12/30-patient continues to have recurrent vomiting and abdominal pain surgery input discussed with patient and family.  They do not want patient discharged, prefer cholecystectomy to be done as inpatient, will communicate same   General surgery.    12/31-surgery input noted, surgery being planned for early next week, discussed with patient's POA yesterday, he also prefers surgery to be done.  Patient continues to have pain, today her pain is left upper quadrant also.  She has long history of chronic pain, is being followed by pain management.    1/1-discussed with patient and POA in the room, patient to get laparoscopic cholecystectomy tomorrow, will need to evaluate for the right adnexal mass which is known for the last few months as an outpatient through GYN, for which patient had a appointment but was not able to keep up.     1/5 : Pt c/o Left rib tenderness - check Xray Ribs    1/7 : Pt still hypoxic - pulm note appreciated, L sided rib pain    Cuca Mullen MD

## 2024-01-07 NOTE — CARE PLAN
The patient's goals for the shift include improved pain    The clinical goals for the shift include control pain, vitally stable

## 2024-01-07 NOTE — CARE PLAN
Problem: Safety  Goal: Patient will be injury free during hospitalization  Outcome: Progressing  Goal: I will remain free of falls  Outcome: Progressing     Problem: Skin  Goal: Participates in plan/prevention/treatment measures  Outcome: Progressing  Flowsheets (Taken 1/6/2024 2003)  Participates in plan/prevention/treatment measures: Elevate heels  Goal: Prevent/minimize sheer/friction injuries  Outcome: Progressing  Flowsheets (Taken 1/6/2024 2003)  Prevent/minimize sheer/friction injuries:   Turn/reposition every 2 hours/use positioning/transfer devices   HOB 30 degrees or less   Use pull sheet  Goal: Promote/optimize nutrition  Outcome: Progressing  Flowsheets (Taken 1/6/2024 2003)  Promote/optimize nutrition: Monitor/record intake including meals     Problem: Pain  Goal: Takes deep breaths with improved pain control throughout the shift  Outcome: Progressing  Goal: Turns in bed with improved pain control throughout the shift  Outcome: Progressing  Goal: Walks with improved pain control throughout the shift  Outcome: Progressing  Goal: Performs ADL's with improved pain control throughout shift  Outcome: Progressing  Goal: Participates in PT with improved pain control throughout the shift  Outcome: Progressing  Goal: Free from opioid side effects throughout the shift  Outcome: Progressing  Goal: Free from acute confusion related to pain meds throughout the shift  Outcome: Progressing     Problem: Safety - Adult  Goal: Free from fall injury  Outcome: Progressing   The patient's goals for the shift include improved pain    The clinical goals for the shift include control pain, vitally stable    Over the shift, the patient did not make progress toward the following goals. Barriers to progression include: patient's current condition   Recommendations to address these barriers include : give due pain meds, call light and bed alarm on

## 2024-01-07 NOTE — PROGRESS NOTES
Pulmonary Progress Note 01/07/24     Patient seen in follow-up for COPD, respiratory failure.    Subjective   Interval History:   Case signed out to me by Dr. Lea.  Still with some abdominal pain.  Breathing is fair.  Voice somewhat hoarse she tells me.    Objective   Vital signs in last 24 hours:  Temp:  [36.8 °C (98.2 °F)-37.5 °C (99.5 °F)] 36.8 °C (98.2 °F)  Heart Rate:  [] 100  Resp:  [13-20] 16  BP: (168-203)/(78-85) 203/85    Intake/Output last 3 shifts:  I/O last 3 completed shifts:  In: 1350 (21 mL/kg) [P.O.:750; IV Piggyback:600]  Out: 400 (6.2 mL/kg) [Urine:400 (0.2 mL/kg/hr)]  Weight: 64.3 kg   Intake/Output this shift:  I/O this shift:  In: 340 [P.O.:240; IV Piggyback:100]  Out: -     Physical Exam  Vital signs reviewed  Alert, no acute distress, hoarse voice  Nasal cannula  S1-S2 +, no murmurs  Lungs are diminished, scattered expiratory wheezes.  Possibly improved compared with yesterday  Abdomen soft  No CCE  Normal mood and affect    Scheduled medications  ampicillin-sulbactam, 3 g, intravenous, q6h  dexAMETHasone, 4 mg, oral, q12h BENNY  enoxaparin, 40 mg, subcutaneous, Nightly  insulin lispro, 0-10 Units, subcutaneous, TID with meals  ipratropium-albuteroL, 3 mL, nebulization, TID  metFORMIN, 500 mg, oral, BID with meals  metoprolol tartrate, 50 mg, oral, BID  pantoprazole, 40 mg, oral, Daily  polyethylene glycol, 17 g, oral, Daily  sertraline, 50 mg, oral, Daily      Continuous medications     PRN medications  PRN medications: acetaminophen, ALPRAZolam, alteplase, benzocaine-menthol, bisacodyl, dextromethorphan-guaifenesin, dextrose 10 % in water (D10W), dextrose, diphenhydrAMINE, glucagon, hydrALAZINE, HYDROmorphone, ipratropium-albuteroL, lidocaine-diphenhydraMINE-Maalox 1:1:1, melatonin, ondansetron, oxyCODONE, oxyCODONE-acetaminophen, oxygen     Labs:  Lab Results   Component Value Date     01/06/2024    K 3.3 (L) 01/06/2024    CL 95 (L) 01/06/2024    CO2 25 01/06/2024     BUN 27 (H) 01/06/2024    CREATININE 1.00 01/06/2024    GLUCOSE 224 (H) 01/06/2024    CALCIUM 8.5 01/06/2024     Lab Results   Component Value Date    WBC 13.7 (H) 01/06/2024    HGB 8.8 (L) 01/06/2024    HCT 28.6 (L) 01/06/2024    MCV 91 01/06/2024     01/06/2024       Imaging:  XR ribs 2 views left w chest pa or ap    Result Date: 1/5/2024  Interpreted By:  Kit King, STUDY: XR RIBS 2 VIEWS LEFT WITH CHEST PA OR AP; 1/5/2024 6:13 pm   INDICATION: Signs/Symptoms:L rib tenderness   COMPARISON: Chest radiograph from 01/03/2024.   ACCESSION NUMBER(S): [id number]   ORDERING CLINICIAN: LASHONDA MAY   FINDINGS: AP view of the chest and additional two-view radiographs of the left ribs were obtained.   Right PICC line remains stable in position. The heart and mediastinum are within normal limits for the technique. There is mild pulmonary vascular congestion. No pneumothorax or confluent infiltrates are identified. No pleural effusions are seen. Degenerative changes involve the spine. No rib fractures are identified.       No left rib fracture or pneumothorax. Other chronic findings as above.   Signed by: Kit King 1/5/2024 7:00 PM Dictation workstation:   [id number]    XR chest 1 view    Result Date: 1/3/2024  Interpreted By:  Pepe Miles, STUDY: XR CHEST 1 VIEW; 1/3/2024 10:27 am   INDICATION: Signs/Symptoms:possible pneumonia   COMPARISON: 01/02/2024   ACCESSION NUMBER(S): [id number]   ORDERING CLINICIAN: RODRIGUEZ COSTA   TECHNIQUE: Frontal and lateral chest radiographs.   FINDINGS: The cardiomediastinal silhouette is unremarkable. Mild perihilar congestion and pulmonary edema. No pleural effusion is identified.   The osseous structures are intact.       1. Mild perihilar congestion and pulmonary edema. 2. Right basilar opacity, likely representing atelectasis.       Signed by: Pepe Miles 1/3/2024 10:35 AM Dictation workstation:   [id number]    XR chest 1 view    Result Date:  1/2/2024  Interpreted By:  Chriss Roberson, STUDY: XR CHEST 1 VIEW; 1/2/2024 2:12 pm   INDICATION: Signs/Symptoms:difficult airway, possible aspiration (already performed).   COMPARISON: 07/26/2023   ACCESSION NUMBER(S): [id number]   ORDERING CLINICIAN: YAZMIN ULLOA   TECHNIQUE: 1 view of the chest was performed.   FINDINGS: The lungs are adequately inflated. Mild nonspecific ground-glass opacity without focal consolidation. Findings could be related to mild pulmonary edema, inflammatory, or potentially atypical infection. No pleural effusion. No pneumothorax.  The cardiomediastinal silhouette is within normal limits.       Mild nonspecific ground-glass opacity, without focal consolidation. Findings can be seen with mild pulmonary edema, inflammatory, or potentially atypical infectious etiologies.   Signed by: Chriss Roberson 1/2/2024 2:37 PM Dictation workstation:   [id number]    NM hepatobiliary w cholecystokinin    Result Date: 12/29/2023  Interpreted By:  Molly Loyola, STUDY: NM HEPATOBILIARY W CHOLECYSTOKININ;  12/29/2023 3:32 pm   INDICATION: Signs/Symptoms:Ch abd pain, R UQ tenderness/pain, Nausea.   COMPARISON: None.   ACCESSION NUMBER(S): [id number]   ORDERING CLINICIAN: LASHONDA MAY   TECHNIQUE: DIVISION OF NUCLEAR MEDICINE HEPATOBILIARY SCAN (HIDA), QUANTITATIVE   The patient received an intravenous dose of  5.0 mCi of Tc-99m mebrofenin (Choletec).  Sequential images of the upper abdomen were then acquired over the next 60 minutes.  An intravenous infusion of the cholecystokinin (CCK) analogue, Sincalide; was then administered followed by an additional period of imaging.  Computer quantification of gallbladder emptying was then performed.   FINDINGS: There is normal uptake of radiotracer by the liver with excretion into the biliary system. Gallbladder activity is demonstrated following 45 minutes. Small bowel activity is demonstrated following 90 minutes. There is persistent visualization  of the common bile duct throughout the examination. Correlate with concern for dysfunction at the level of the sphincter of Oddi.   Gallbladder ejection fraction is calculated at 14% (normal greater than 35%).         1. Decreased gallbladder ejection fraction of 14%. Correlate with patient's history including concern for chronic cholecystitis.   2. Mild delayed visualization of the small bowel activity nonspecific. However, there is persistent visualization of activity within the common bile duct. This may reflect component of dysfunction at the level of the sphincter of Oddi.           MACRO: None   Signed by: Molly Loyola 12/29/2023 4:06 PM Dictation workstation:   [id number]    US gallbladder    Result Date: 12/28/2023  Interpreted By:  Jaimie Hernandez, STUDY: US GALLBLADDER; 10:38 pm   INDICATION: Signs/Symptoms:abd pain hx gall stones.   COMPARISON: Right upper quadrant ultrasound 05/06/2023   ACCESSION NUMBER(S): [id number]   ORDERING CLINICIAN: KAILEY ZELAYA   TECHNIQUE: Limited abdominal ultrasound of the right upper quadrant was performed utilizing gray scale imaging.   FINDINGS: Liver: The liver has poor penetration likely due to fatty infiltration. This may obscure hepatic lesions. No intrahepatic biliary distention. Gallbladder: There is no gallbladder wall thickening or pericholecystic fluid. Echogenic material is noted in the gallbladder that does not definitely move. Shadowing is noted. These may represent non mobile gallstones or tumefactive sludge. Sonographic Coulter's sign: Positive Pancreas: The pancreatic head and body appear unremarkable.  The distal pancreatic body and tail are obscured by overlying bowel gas. CBD: 3.8mm Right kidney shows no hydronephrosis       1. Echogenic shadowing non mobile material in the gallbladder possibly non mobile gallstones or tumefactive sludge. 2. No gallbladder wall thickening or pericholecystic fluid 3. No biliary distention     MACRO: None.   Signed  by: Jaimie Hernandez 12/28/2023 10:42 PM Dictation workstation:   [id number]    ECG 12 lead    Result Date: 12/28/2023  Normal sinus rhythm with 1st degree AV block Normal ECG When compared with ECG of 01-MAY-2022 21:31, No significant change was found Confirmed by Marty Lin ([id number]) on 12/28/2023 8:37:36 AM    CT abdomen pelvis wo IV contrast    Result Date: 12/27/2023  Interpreted By:  Shaila Robles, STUDY: CT ABDOMEN PELVIS WO IV CONTRAST;  12/27/2023 11:49 am   INDICATION: Epigastric pain and nausea   COMPARISON: 10/31/2023   ACCESSION NUMBER(S): [id number]   ORDERING CLINICIAN: KATINA LYLE   TECHNIQUE: CT of the abdomen and pelvis was performed in the axial plane without intravenous contrast administration. Sagittal and coronal reformations were performed by the technologist at the acquisition scanner.   All CT examinations are performed with 1 or more of the following dose reduction techniques: Automated exposure control, adjustment of mA and/or kv according to patient's size, or use of iterative reconstruction techniques.   FINDINGS: Please note that the evaluation of vessels, lymph nodes and organs is limited without intravenous contrast.   LOWER CHEST: There is chronic ground-glass density and interstitial changes within right middle lobe with associated scar. Coronary artery calcification is present. Mitral annulus calcification is also seen.   ABDOMEN:   LIVER: The unenhanced liver is unremarkable in its appearance.   BILE DUCTS: No biliary ductal dilatation is observed.   GALLBLADDER: Cholelithiasis is observed without obvious gallbladder wall thickening or abnormal gallbladder distention.   PANCREAS: No pancreatic abnormality is seen.   SPLEEN: Normal   ADRENAL GLANDS: Normal   KIDNEYS AND URETERS: A 5 mm left renal cyst is observed. A 9 mm left renal cyst is also seen. Cyst in the upper pole of left kidney is also identified. There is no urolithiasis or obstructive uropathy.   PELVIS:   BLADDER:  Unremarkable   REPRODUCTIVE ORGANS: No uterine enlargement is seen but there is a small calcified uterine leiomyoma which measures a little less than a cm in size. There is a cystic right adnexal mass measuring 3.2 x 3.5 cm in size. No left adnexal mass is observed.   BOWEL: There is no abnormal distention or bowel wall thickening. The appendix is normally imaged.   VESSELS: There is atherosclerosis of the abdominal aorta, iliac arteries, and superior mesenteric artery with calcified plaque also seen at the origin of each renal artery.   PERITONEUM/RETROPERITONEUM/LYMPH NODES: No adenopathy or retroperitoneal mass is observed.   ABDOMINAL WALL: Unremarkable except for the presence of a small fat containing umbilical hernia.   BONES: No acute bony abnormality is seen.       1.  Chronic ground-glass density and chronic interstitial changes within right middle lobe with associated scar. 2. Left renal cysts. 3. Cholelithiasis without evidence of cholecystitis. 4. Small fat containing umbilical hernia. 5. Small uterine leiomyoma measuring less than a cm in size. 6. Cystic right adnexal mass measuring 3.2 x 3.5 cm in size appearing a little smaller since the prior study.     MACRO: None   Signed by: Shaila Robles 12/27/2023 12:48 PM Dictation workstation:   [id number]              Assessment/Plan   Principal Problem:    Acute vomiting  Active Problems:    HTN (hypertension)    Gastroesophageal reflux disease    Diabetes mellitus, type 2 (CMS/HCC)    Chronic obstructive pulmonary disease (CMS/HCC)    Chronic cholecystitis with calculus      Improving air entry.  Still with scattered wheezing.    Continue with weaning of oxygen  Continue with Decadron  Aerosol treatments  Prophylaxis    Discussed with bedside nurse    Wes Borrero MD  Pulmonary/[facility] pulmonary [facility]     LOS: 10 days

## 2024-01-08 LAB
GLUCOSE BLD MANUAL STRIP-MCNC: 104 MG/DL (ref 74–99)
GLUCOSE BLD MANUAL STRIP-MCNC: 119 MG/DL (ref 74–99)
GLUCOSE BLD MANUAL STRIP-MCNC: 134 MG/DL (ref 74–99)
GLUCOSE BLD MANUAL STRIP-MCNC: 160 MG/DL (ref 74–99)

## 2024-01-08 PROCEDURE — 9420000001 HC RT PATIENT EDUCATION 5 MIN

## 2024-01-08 PROCEDURE — 2500000001 HC RX 250 WO HCPCS SELF ADMINISTERED DRUGS (ALT 637 FOR MEDICARE OP): Performed by: INTERNAL MEDICINE

## 2024-01-08 PROCEDURE — 2500000002 HC RX 250 W HCPCS SELF ADMINISTERED DRUGS (ALT 637 FOR MEDICARE OP, ALT 636 FOR OP/ED): Performed by: INTERNAL MEDICINE

## 2024-01-08 PROCEDURE — 2500000004 HC RX 250 GENERAL PHARMACY W/ HCPCS (ALT 636 FOR OP/ED): Performed by: INTERNAL MEDICINE

## 2024-01-08 PROCEDURE — 92526 ORAL FUNCTION THERAPY: CPT | Mod: GN

## 2024-01-08 PROCEDURE — 2500000001 HC RX 250 WO HCPCS SELF ADMINISTERED DRUGS (ALT 637 FOR MEDICARE OP): Performed by: SURGERY

## 2024-01-08 PROCEDURE — 1200000002 HC GENERAL ROOM WITH TELEMETRY DAILY

## 2024-01-08 PROCEDURE — 94760 N-INVAS EAR/PLS OXIMETRY 1: CPT

## 2024-01-08 PROCEDURE — 94640 AIRWAY INHALATION TREATMENT: CPT

## 2024-01-08 PROCEDURE — 82947 ASSAY GLUCOSE BLOOD QUANT: CPT

## 2024-01-08 PROCEDURE — 2500000004 HC RX 250 GENERAL PHARMACY W/ HCPCS (ALT 636 FOR OP/ED): Performed by: SURGERY

## 2024-01-08 PROCEDURE — 94664 DEMO&/EVAL PT USE INHALER: CPT

## 2024-01-08 RX ADMIN — ACETAMINOPHEN 650 MG: 325 TABLET ORAL at 15:27

## 2024-01-08 RX ADMIN — SERTRALINE 50 MG: 50 TABLET, FILM COATED ORAL at 11:55

## 2024-01-08 RX ADMIN — SODIUM CHLORIDE 3 G: 900 INJECTION INTRAVENOUS at 21:18

## 2024-01-08 RX ADMIN — OXYCODONE HYDROCHLORIDE 5 MG: 5 TABLET ORAL at 10:34

## 2024-01-08 RX ADMIN — ENOXAPARIN SODIUM 40 MG: 40 INJECTION SUBCUTANEOUS at 21:18

## 2024-01-08 RX ADMIN — SODIUM CHLORIDE 3 G: 900 INJECTION INTRAVENOUS at 14:28

## 2024-01-08 RX ADMIN — DEXAMETHASONE 4 MG: 4 TABLET ORAL at 11:55

## 2024-01-08 RX ADMIN — SODIUM CHLORIDE 3 G: 900 INJECTION INTRAVENOUS at 10:17

## 2024-01-08 RX ADMIN — ONDANSETRON 4 MG: 2 INJECTION INTRAMUSCULAR; INTRAVENOUS at 04:31

## 2024-01-08 RX ADMIN — METOPROLOL TARTRATE 50 MG: 50 TABLET ORAL at 11:55

## 2024-01-08 RX ADMIN — DEXAMETHASONE 4 MG: 4 TABLET ORAL at 21:18

## 2024-01-08 RX ADMIN — SODIUM CHLORIDE 3 G: 900 INJECTION INTRAVENOUS at 02:24

## 2024-01-08 RX ADMIN — IPRATROPIUM BROMIDE AND ALBUTEROL SULFATE 3 ML: 2.5; .5 SOLUTION RESPIRATORY (INHALATION) at 19:58

## 2024-01-08 RX ADMIN — METOPROLOL TARTRATE 50 MG: 50 TABLET ORAL at 21:18

## 2024-01-08 RX ADMIN — OXYCODONE HYDROCHLORIDE 5 MG: 5 TABLET ORAL at 18:50

## 2024-01-08 RX ADMIN — PANTOPRAZOLE SODIUM 40 MG: 40 TABLET, DELAYED RELEASE ORAL at 11:55

## 2024-01-08 RX ADMIN — HYDROMORPHONE HYDROCHLORIDE 0.6 MG: 1 INJECTION, SOLUTION INTRAMUSCULAR; INTRAVENOUS; SUBCUTANEOUS at 04:25

## 2024-01-08 RX ADMIN — HYDRALAZINE HYDROCHLORIDE 10 MG: 20 INJECTION INTRAMUSCULAR; INTRAVENOUS at 16:45

## 2024-01-08 ASSESSMENT — COGNITIVE AND FUNCTIONAL STATUS - GENERAL
PERSONAL GROOMING: A LOT
TOILETING: A LOT
MOVING TO AND FROM BED TO CHAIR: A LITTLE
HELP NEEDED FOR BATHING: A LOT
DRESSING REGULAR UPPER BODY CLOTHING: A LITTLE
WALKING IN HOSPITAL ROOM: A LOT
CLIMB 3 TO 5 STEPS WITH RAILING: TOTAL
DAILY ACTIVITIY SCORE: 15
TURNING FROM BACK TO SIDE WHILE IN FLAT BAD: A LITTLE
DRESSING REGULAR LOWER BODY CLOTHING: A LOT
MOBILITY SCORE: 16
STANDING UP FROM CHAIR USING ARMS: A LITTLE

## 2024-01-08 ASSESSMENT — PAIN - FUNCTIONAL ASSESSMENT
PAIN_FUNCTIONAL_ASSESSMENT: 0-10

## 2024-01-08 ASSESSMENT — PAIN SCALES - GENERAL
PAINLEVEL_OUTOF10: 10 - WORST POSSIBLE PAIN
PAINLEVEL_OUTOF10: 6
PAINLEVEL_OUTOF10: 8
PAINLEVEL_OUTOF10: 7
PAINLEVEL_OUTOF10: 8
PAINLEVEL_OUTOF10: 10 - WORST POSSIBLE PAIN

## 2024-01-08 ASSESSMENT — PAIN DESCRIPTION - LOCATION: LOCATION: ABDOMEN

## 2024-01-08 ASSESSMENT — PAIN DESCRIPTION - ORIENTATION: ORIENTATION: LEFT

## 2024-01-08 NOTE — CARE PLAN
Problem: Respiratory  Goal: Minimize anxiety/maximize coping throughout shift  Outcome: Progressing  Goal: Wean oxygen to maintain O2 saturation per order/standard this shift  Outcome: Progressing     Problem: Respiratory  Goal: Minimize anxiety/maximize coping throughout shift  Outcome: Progressing  Goal: Wean oxygen to maintain O2 saturation per order/standard this shift  Outcome: Progressing

## 2024-01-08 NOTE — CARE PLAN
Problem: Safety  Goal: Patient will be injury free during hospitalization  1/8/2024 1845 by Quita Gorman, RN  Outcome: Progressing  1/8/2024 1841 by Quita Gorman, RN  Outcome: Progressing  Goal: I will remain free of falls  1/8/2024 1845 by Quita Gorman, RN  Outcome: Progressing  1/8/2024 1841 by Quita Gorman, RN  Outcome: Progressing     Problem: Skin  Goal: Participates in plan/prevention/treatment measures  1/8/2024 1845 by Quita Gorman, RN  Outcome: Progressing  1/8/2024 1841 by Quita Gorman, RN  Outcome: Progressing  Goal: Prevent/minimize sheer/friction injuries  1/8/2024 1845 by Quita Gorman, RN  Outcome: Progressing  1/8/2024 1841 by Quita Gorman, RN  Outcome: Progressing  Goal: Promote/optimize nutrition  1/8/2024 1845 by Quita Gorman, RN  Outcome: Progressing  1/8/2024 1841 by Quita Gorman, RN  Outcome: Progressing     Problem: Pain  Goal: Takes deep breaths with improved pain control throughout the shift  1/8/2024 1845 by Quita Gorman, RN  Outcome: Progressing  1/8/2024 1841 by Quita Gorman, RN  Outcome: Progressing  Goal: Turns in bed with improved pain control throughout the shift  1/8/2024 1845 by Quita Gorman, RN  Outcome: Progressing  1/8/2024 1841 by uQita Gorman, RN  Outcome: Progressing  Goal: Walks with improved pain control throughout the shift  1/8/2024 1845 by Quita Gorman, RN  Outcome: Progressing  1/8/2024 1841 by Quita Gorman, RN  Outcome: Progressing  Goal: Performs ADL's with improved pain control throughout shift  1/8/2024 1845 by Quita Gorman, RN  Outcome: Progressing  1/8/2024 1841 by Quita Gorman, RN  Outcome: Progressing  Goal: Participates in PT with improved pain control throughout the shift  1/8/2024 1845 by Quita Gorman, RN  Outcome: Progressing  1/8/2024 1841 by Quita Gorman,  RN  Outcome: Progressing  Goal: Free from opioid side effects throughout the shift  1/8/2024 1845 by Quita Gorman, RN  Outcome: Progressing  1/8/2024 1841 by Quita Gorman, RN  Outcome: Progressing  Goal: Free from acute confusion related to pain meds throughout the shift  1/8/2024 1845 by Quita Gorman, RN  Outcome: Progressing  1/8/2024 1841 by Quita Gorman, RN  Outcome: Progressing     Problem: Pain - Adult  Goal: Verbalizes/displays adequate comfort level or baseline comfort level  1/8/2024 1845 by Quita Gorman, RN  Outcome: Progressing  1/8/2024 1841 by Quita Gorman, RN  Outcome: Progressing     Problem: Safety - Adult  Goal: Free from fall injury  1/8/2024 1845 by Quita Gorman, RN  Outcome: Progressing  1/8/2024 1841 by Quita Gorman, RN  Outcome: Progressing     Problem: Discharge Planning  Goal: Discharge to home or other facility with appropriate resources  1/8/2024 1845 by Quita Gorman, RN  Outcome: Progressing  1/8/2024 1841 by Quita Gorman RN  Outcome: Progressing     Problem: Chronic Conditions and Co-morbidities  Goal: Patient's chronic conditions and co-morbidity symptoms are monitored and maintained or improved  1/8/2024 1845 by Quita Gorman, RN  Outcome: Progressing  1/8/2024 1841 by Quita Gorman, RN  Outcome: Progressing     Problem: Diabetes  Goal: Achieve decreasing blood glucose levels by end of shift  1/8/2024 1845 by Quita Gorman, RN  Outcome: Progressing  1/8/2024 1841 by Quita Gorman RN  Outcome: Progressing  Goal: Increase stability of blood glucose readings by end of shift  1/8/2024 1845 by Quita Gorman, RN  Outcome: Progressing  1/8/2024 1841 by Quita Gorman RN  Outcome: Progressing  Goal: Decrease in ketones present in urine by end of shift  1/8/2024 1845 by Quita Gorman, RN  Outcome: Progressing  1/8/2024 1841 by  Quita Gorman, RN  Outcome: Progressing  Goal: Maintain electrolyte levels within acceptable range throughout shift  1/8/2024 1845 by Quita Gorman, RN  Outcome: Progressing  1/8/2024 1841 by Quita Gorman, RN  Outcome: Progressing  Goal: Maintain glucose levels >70mg/dl to <250mg/dl throughout shift  1/8/2024 1845 by Quita Gorman, RN  Outcome: Progressing  1/8/2024 1841 by Quita Gorman, RN  Outcome: Progressing  Goal: No changes in neurological exam by end of shift  1/8/2024 1845 by Quita Gorman, RN  Outcome: Progressing  1/8/2024 1841 by Quita Gorman, RN  Outcome: Progressing  Goal: Learn about and adhere to nutrition recommendations by end of shift  1/8/2024 1845 by Quita Gorman, RN  Outcome: Progressing  1/8/2024 1841 by Quita Gorman, RN  Outcome: Progressing  Goal: Vital signs within normal range for age by end of shift  1/8/2024 1845 by Quita Gorman, RN  Outcome: Progressing  1/8/2024 1841 by Quita Gorman, RN  Outcome: Progressing  Goal: Increase self care and/or family involovement by end of shift  1/8/2024 1845 by Quita Gorman, RN  Outcome: Progressing  1/8/2024 1841 by Quita Gorman, RN  Outcome: Progressing  Goal: Receive DSME education by end of shift  1/8/2024 1845 by Quita Gorman, RN  Outcome: Progressing  1/8/2024 1841 by Quita Gorman, RN  Outcome: Progressing     Problem: Fall/Injury  Goal: Not fall by end of shift  1/8/2024 1845 by Quita Gorman, RN  Outcome: Progressing  1/8/2024 1841 by Quita Gorman, RN  Outcome: Progressing  Goal: Be free from injury by end of the shift  1/8/2024 1845 by Quita Gorman, RN  Outcome: Progressing  1/8/2024 1841 by Quita Gorman, RN  Outcome: Progressing  Goal: Verbalize understanding of personal risk factors for fall in the hospital  1/8/2024 1845 by Quita Gorman, RN  Outcome:  Progressing  1/8/2024 1841 by Quita Gorman, RN  Outcome: Progressing  Goal: Verbalize understanding of risk factor reduction measures to prevent injury from fall in the home  1/8/2024 1845 by Quita Gorman, RN  Outcome: Progressing  1/8/2024 1841 by Quita Gorman, RN  Outcome: Progressing  Goal: Use assistive devices by end of the shift  1/8/2024 1845 by Quita Gorman, RN  Outcome: Progressing  1/8/2024 1841 by Quita Gorman, RN  Outcome: Progressing  Goal: Pace activities to prevent fatigue by end of the shift  1/8/2024 1845 by Quita Gorman, RN  Outcome: Progressing  1/8/2024 1841 by Quita Gorman, RN  Outcome: Progressing     Problem: Respiratory  Goal: Minimize anxiety/maximize coping throughout shift  1/8/2024 1845 by Quita Gorman, RN  Outcome: Progressing  1/8/2024 1841 by Quita Gorman, RN  Outcome: Progressing  Goal: Wean oxygen to maintain O2 saturation per order/standard this shift  1/8/2024 1845 by Quita Gorman, RN  Outcome: Progressing  1/8/2024 1841 by Quita Gorman RN  Outcome: Progressing   The patient's goals for the shift include improved pain    The clinical goals for the shift include remain resp distress      Problem: Safety  Goal: Patient will be injury free during hospitalization  1/8/2024 1845 by Quita Gorman, RN  Outcome: Progressing  1/8/2024 1841 by Quita Gorman, RN  Outcome: Progressing  Goal: I will remain free of falls  1/8/2024 1845 by Quita Gorman, RN  Outcome: Progressing  1/8/2024 1841 by Quita Gorman, RN  Outcome: Progressing     Problem: Skin  Goal: Participates in plan/prevention/treatment measures  1/8/2024 1845 by Quita Gorman, RN  Outcome: Progressing  1/8/2024 1841 by Quita Gorman, RN  Outcome: Progressing  Goal: Prevent/minimize sheer/friction injuries  1/8/2024 1845 by Quita Gorman, RN  Outcome:  Progressing  1/8/2024 1841 by Quita Gorman, RN  Outcome: Progressing  Goal: Promote/optimize nutrition  1/8/2024 1845 by Quita Gorman, RN  Outcome: Progressing  1/8/2024 1841 by Quita Gorman, RN  Outcome: Progressing     Problem: Pain  Goal: Takes deep breaths with improved pain control throughout the shift  1/8/2024 1845 by Quita Gorman, RN  Outcome: Progressing  1/8/2024 1841 by Quita Gorman, RN  Outcome: Progressing  Goal: Turns in bed with improved pain control throughout the shift  1/8/2024 1845 by Quita Gorman, RN  Outcome: Progressing  1/8/2024 1841 by Quita Gorman, RN  Outcome: Progressing  Goal: Walks with improved pain control throughout the shift  1/8/2024 1845 by Quita Gorman, RN  Outcome: Progressing  1/8/2024 1841 by Quita Gorman, RN  Outcome: Progressing  Goal: Performs ADL's with improved pain control throughout shift  1/8/2024 1845 by Quita Gorman, RN  Outcome: Progressing  1/8/2024 1841 by Quita Gorman, RN  Outcome: Progressing  Goal: Participates in PT with improved pain control throughout the shift  1/8/2024 1845 by Quita Gorman, RN  Outcome: Progressing  1/8/2024 1841 by Quita Gorman, RN  Outcome: Progressing  Goal: Free from opioid side effects throughout the shift  1/8/2024 1845 by Quita Gorman, RN  Outcome: Progressing  1/8/2024 1841 by Quita Gorman, RN  Outcome: Progressing  Goal: Free from acute confusion related to pain meds throughout the shift  1/8/2024 1845 by Quita Gorman, RN  Outcome: Progressing  1/8/2024 1841 by Quita Gorman, RN  Outcome: Progressing     Problem: Pain - Adult  Goal: Verbalizes/displays adequate comfort level or baseline comfort level  1/8/2024 1845 by Quita Gorman, RN  Outcome: Progressing  1/8/2024 1841 by Quita Gorman, RN  Outcome: Progressing     Problem: Safety - Adult  Goal:  Free from fall injury  1/8/2024 1845 by Quita Gorman RN  Outcome: Progressing  1/8/2024 1841 by Quita Gorman RN  Outcome: Progressing     Problem: Discharge Planning  Goal: Discharge to home or other facility with appropriate resources  1/8/2024 1845 by Quita Gorman, LEN  Outcome: Progressing  1/8/2024 1841 by Quita Gorman RN  Outcome: Progressing     Problem: Chronic Conditions and Co-morbidities  Goal: Patient's chronic conditions and co-morbidity symptoms are monitored and maintained or improved  1/8/2024 1845 by Quita Gorman, LEN  Outcome: Progressing  1/8/2024 1841 by Quita Gorman RN  Outcome: Progressing     Problem: Diabetes  Goal: Achieve decreasing blood glucose levels by end of shift  1/8/2024 1845 by Quita Gormna, LEN  Outcome: Progressing  1/8/2024 1841 by Quita Gorman RN  Outcome: Progressing  Goal: Increase stability of blood glucose readings by end of shift  1/8/2024 1845 by Quita Gorman, RN  Outcome: Progressing  1/8/2024 1841 by Quita Gorman RN  Outcome: Progressing  Goal: Decrease in ketones present in urine by end of shift  1/8/2024 1845 by Quita Gorman, RN  Outcome: Progressing  1/8/2024 1841 by Quita Gorman RN  Outcome: Progressing  Goal: Maintain electrolyte levels within acceptable range throughout shift  1/8/2024 1845 by Quita Gorman, RN  Outcome: Progressing  1/8/2024 1841 by Quita Gorman RN  Outcome: Progressing  Goal: Maintain glucose levels >70mg/dl to <250mg/dl throughout shift  1/8/2024 1845 by Quita Gorman, RN  Outcome: Progressing  1/8/2024 1841 by Quita Gorman RN  Outcome: Progressing  Goal: No changes in neurological exam by end of shift  1/8/2024 1845 by Quita Gorman, RN  Outcome: Progressing  1/8/2024 1841 by Quita Gorman RN  Outcome: Progressing  Goal: Learn about and adhere to nutrition  recommendations by end of shift  1/8/2024 1845 by Quita Gorman, RN  Outcome: Progressing  1/8/2024 1841 by Quita Gorman, RN  Outcome: Progressing  Goal: Vital signs within normal range for age by end of shift  1/8/2024 1845 by Quita Gorman, RN  Outcome: Progressing  1/8/2024 1841 by Quita Gorman, RN  Outcome: Progressing  Goal: Increase self care and/or family involovement by end of shift  1/8/2024 1845 by Quita Gorman, RN  Outcome: Progressing  1/8/2024 1841 by Quita Gorman, RN  Outcome: Progressing  Goal: Receive DSME education by end of shift  1/8/2024 1845 by Quita Gorman, RN  Outcome: Progressing  1/8/2024 1841 by Quita Gorman, RN  Outcome: Progressing     Problem: Fall/Injury  Goal: Not fall by end of shift  1/8/2024 1845 by Quita Gorman, RN  Outcome: Progressing  1/8/2024 1841 by Quita Gorman, RN  Outcome: Progressing  Goal: Be free from injury by end of the shift  1/8/2024 1845 by Quita Gorman, RN  Outcome: Progressing  1/8/2024 1841 by Quita Gorman, RN  Outcome: Progressing  Goal: Verbalize understanding of personal risk factors for fall in the hospital  1/8/2024 1845 by Quita Gorman, RN  Outcome: Progressing  1/8/2024 1841 by Quita Gorman, RN  Outcome: Progressing  Goal: Verbalize understanding of risk factor reduction measures to prevent injury from fall in the home  1/8/2024 1845 by Quita Gorman, RN  Outcome: Progressing  1/8/2024 1841 by Quita Gorman, RN  Outcome: Progressing  Goal: Use assistive devices by end of the shift  1/8/2024 1845 by Quita Gorman, RN  Outcome: Progressing  1/8/2024 1841 by Quita Gorman, RN  Outcome: Progressing  Goal: Pace activities to prevent fatigue by end of the shift  1/8/2024 1845 by Quita Gorman, RN  Outcome: Progressing  1/8/2024 1841 by Quita Gorman, RN  Outcome:  Progressing     Problem: Respiratory  Goal: Minimize anxiety/maximize coping throughout shift  1/8/2024 1845 by Quita Gorman RN  Outcome: Progressing  1/8/2024 1841 by Quita Gorman RN  Outcome: Progressing  Goal: Wean oxygen to maintain O2 saturation per order/standard this shift  1/8/2024 1845 by Quita Gorman RN  Outcome: Progressing  1/8/2024 1841 by Quita Gorman RN  Outcome: Progressing

## 2024-01-08 NOTE — NURSING NOTE
Upon rounding right upper arm triple lumen PICC with current CHG dressing dry and intact. All three lumens have brisk blood return and flush easily, Candyos caps itnact.

## 2024-01-08 NOTE — PROGRESS NOTES
Physical Therapy                 Therapy Communication Note    Patient Name: Marylou Ward  MRN: [id number]  Today's Date: 1/8/2024     Discipline: Physical Therapy    Missed Visit Reason: Missed Visit Reason: Patient refused (pt declining therapy today due to pain. Encouragement offered however pt still declined.)    Missed Time: Attempt

## 2024-01-08 NOTE — PROGRESS NOTES
Nutrition Follow up Note    Nutrition Assessment      Patient sleeping soundly at time of visit. Spoke with RN. PO intake is adequate, no nutritional concerns at this time. Patient will need elective cholecystectomy post discharge.     Nutrition History:     Energy Intake: Good > 75 %  Food Allergies/Intolerances:  None  GI Symptoms: Nausea  Oral Problems: None    Anthropometrics:  Ht: 152.4 cm (5'), Wt: 63.9 kg (140 lb 14 oz), BMI: 27.51  IBW/kg (Dietitian Calculated): 45.45 kg  Percent of IBW: 137 %     Weight Change:     Nutrition Focused Physical Exam Findings:   Subcutaneous Fat Loss  Orbital Fat Pads: Defer  Buccal Fat Pads: Defer  Triceps: Defer  Ribs: Defer    Muscle Wasting  Temporalis: Defer  Pectoralis (Clavicular Region): Defer  Deltoid/Trapezius: Defer  Interosseous: Defer  Trapezius/Infraspinatus/Supraspinatus (Scapular Region): Defer  Quadriceps: Defer  Gastrocnemius: Defer    Nutrition Significant Labs:  Lab Results   Component Value Date    WBC 13.7 (H) 01/06/2024    HGB 8.8 (L) 01/06/2024    HCT 28.6 (L) 01/06/2024     01/06/2024    CHOL 294 (H) 09/22/2020    TRIG 331 (H) 09/22/2020    HDL 43.5 09/22/2020    ALT 13 01/06/2024    AST 8 01/06/2024     01/06/2024    K 3.3 (L) 01/06/2024    CL 95 (L) 01/06/2024    CREATININE 1.00 01/06/2024    BUN 27 (H) 01/06/2024    CO2 25 01/06/2024    TSH 1.16 08/10/2022    INR 1.0 06/03/2023    HGBA1C 5.9 08/10/2022       Current Facility-Administered Medications:     acetaminophen (Tylenol) tablet 650 mg, 650 mg, oral, q6h PRN, Cuca Mullen MD, 650 mg at 01/01/24 1618    ALPRAZolam (Xanax) tablet 0.5 mg, 0.5 mg, oral, BID PRN, Cuca Mullen MD, 0.5 mg at 01/07/24 2228    alteplase (Cathflo Activase) injection 2 mg, 2 mg, intra-catheter, PRN, Cuca Mullen MD    ampicillin-sulbactam (Unasyn) in sodium chloride 0.9 % 100 mL 3 g, 3 g, intravenous, q6h, Cuca Mullen MD, Last Rate: 200 mL/hr at 01/08/24 1428, 3 g at 01/08/24  1428    benzocaine-menthol (Cepastat Sore Throat) 15-3.6 mg lozenge 1 lozenge, 1 lozenge, Mouth/Throat, q2h PRN, Cuca Mullen MD, 1 lozenge at 01/03/24 1527    bisacodyl (Dulcolax) EC tablet 10 mg, 10 mg, oral, Daily PRN, Cuca Mullen MD    dexAMETHasone (Decadron) tablet 4 mg, 4 mg, oral, q12h BENNY, Cuca Mullen MD, 4 mg at 01/08/24 1155    dextromethorphan-guaifenesin (Robitussin DM)  mg/5 mL oral liquid 5 mL, 5 mL, oral, q4h PRN, Cuca Mullen MD    dextrose 10 % in water (D10W) infusion, 0.3 g/kg/hr, intravenous, Once PRN, Cuca Mullen MD    dextrose 50 % injection 25 g, 25 g, intravenous, q15 min PRN, Cuca Mullen MD    diphenhydrAMINE (Sominex) tablet 25 mg, 25 mg, oral, BID PRN, Cuca uMllen MD    enoxaparin (Lovenox) syringe 40 mg, 40 mg, subcutaneous, Nightly, Cuca Mullen MD, 40 mg at 01/07/24 2003    glucagon (Glucagen) injection 1 mg, 1 mg, intramuscular, q15 min PRN, Cuca Mullen MD    hydrALAZINE (Apresoline) injection 10 mg, 10 mg, intravenous, q4h PRN, Cuca Mullen MD, 10 mg at 01/07/24 2004    HYDROmorphone (Dilaudid) injection 0.6 mg, 0.6 mg, intravenous, q3h PRN, Cuca Mullen MD, 0.6 mg at 01/08/24 0425    insulin lispro (HumaLOG) injection 0-10 Units, 0-10 Units, subcutaneous, TID with meals, Cuca Mullen MD, 2 Units at 01/07/24 1343    ipratropium-albuteroL (Duo-Neb) 0.5-2.5 mg/3 mL nebulizer solution 3 mL, 3 mL, nebulization, q2h PRN, Cuca Mullen MD, 3 mL at 01/06/24 0450    ipratropium-albuteroL (Duo-Neb) 0.5-2.5 mg/3 mL nebulizer solution 3 mL, 3 mL, nebulization, TID, Cuca Mullen MD, 3 mL at 01/07/24 2051    lido-diphen-Maalox 1:1:1 Magic Mouthwash, 10 mL, Swish & Spit, q4h PRN, Cuca Mullen MD, 10 mL at 01/02/24 2148    melatonin tablet 6 mg, 6 mg, oral, Nightly PRN, Cuca Mullen MD, 6 mg at 01/07/24 2228    metFORMIN (Glucophage) tablet 500 mg, 500 mg, oral, BID with meals,  Cuca Mullen MD, 500 mg at 01/07/24 1927    metoprolol tartrate (Lopressor) tablet 50 mg, 50 mg, oral, BID, Cuca Mullen MD, 50 mg at 01/08/24 1155    ondansetron (Zofran) injection 4 mg, 4 mg, intravenous, q4h PRN, Cuca Mullen MD, 4 mg at 01/08/24 0431    oxyCODONE (Roxicodone) immediate release tablet 5 mg, 5 mg, oral, q6h PRN, Cuca Mullen MD, 5 mg at 01/08/24 1034    oxyCODONE-acetaminophen (Percocet) 5-325 mg per tablet 1 tablet, 1 tablet, oral, q6h PRN, Cuca Mullen MD, 1 tablet at 01/07/24 1622    oxygen (O2) therapy, , inhalation, Continuous PRN - O2/gases, Cuca Mullen MD    pantoprazole (ProtoNix) EC tablet 40 mg, 40 mg, oral, Daily, Cuca Mullen MD, 40 mg at 01/08/24 1155    polyethylene glycol (Glycolax, Miralax) packet 17 g, 17 g, oral, Daily, Cuca Mullen MD, 17 g at 01/05/24 1017    sertraline (Zoloft) tablet 50 mg, 50 mg, oral, Daily, Cuca Mullen MD, 50 mg at 01/08/24 1155  Dietary Orders (From admission, onward)       Start     Ordered    01/07/24 1404  Adult diet Regular  Diet effective now        Question:  Diet type  Answer:  Regular    01/07/24 1404                   Estimated Needs:   Estimated Energy Needs  Total Energy Estimated Needs (kCal): 1550 kCal  Total Estimated Energy Need per Day (kCal/kg): 25 kCal/kg  Method for Estimating Needs: Actual Wt    Estimated Protein Needs  Total Protein Estimated Needs (g): 62 g  Total Protein Estimated Needs (g/kg): 1 g/kg  Method for Estimating Needs: Actual Wt    Estimated Fluid Needs  Total Fluid Estimated Needs (mL): 1550 mL  Method for Estimating Needs: 1 mL/kcal      Nutrition Diagnosis   Nutrition Diagnosis:  Malnutrition Diagnosis  Patient has Malnutrition Diagnosis: No    Nutrition Diagnosis  Patient has Nutrition Diagnosis: Yes  Diagnosis Status (1): Resolved  Nutrition Diagnosis 1: Inadequate energy intake  Related to (1): Decreased ability to consume sufficient energy  As  Evidenced by (1): NPO     Nutrition Interventions/Recommendations   Nutrition Interventions and Recommendations:    Nutrition Prescription:  Individualized Nutrition Prescription Provided for : 1550 calories, 62 gm protein via oral diet    Nutrition Interventions:   Food and/or Nutrient Delivery Interventions  Interventions: Meals and snacks  Meals and Snacks: General healthful diet  Goal: Provide as ordered    Education Documentation  No documentation found.         Nutrition Monitoring and Evaluation   Monitoring/Evaluation:   Food/Nutrient Related History Monitoring  Monitoring and Evaluation Plan: Energy intake  Energy Intake: Estimated energy intake  Criteria: Patient will consume =/> 75% of estimated needs       Time Spent/Follow-up:   Follow Up  Time Spent (min): 20 minutes  Last Date of Nutrition Visit: 01/08/24  Nutrition Follow-Up Needed?: 5-7 days  Follow up Comment: 1/11/24

## 2024-01-08 NOTE — CARE PLAN
The patient's goals for the shift include improved pain    The clinical goals for the shift include remain resp distress    Problem: Safety  Goal: Patient will be injury free during hospitalization  Outcome: Progressing  Goal: I will remain free of falls  Outcome: Progressing     Problem: Skin  Goal: Participates in plan/prevention/treatment measures  Outcome: Progressing  Goal: Prevent/minimize sheer/friction injuries  Outcome: Progressing  Goal: Promote/optimize nutrition  Outcome: Progressing     Problem: Pain  Goal: Takes deep breaths with improved pain control throughout the shift  Outcome: Progressing  Goal: Turns in bed with improved pain control throughout the shift  Outcome: Progressing  Goal: Walks with improved pain control throughout the shift  Outcome: Progressing  Goal: Performs ADL's with improved pain control throughout shift  Outcome: Progressing  Goal: Participates in PT with improved pain control throughout the shift  Outcome: Progressing  Goal: Free from opioid side effects throughout the shift  Outcome: Progressing  Goal: Free from acute confusion related to pain meds throughout the shift  Outcome: Progressing     Problem: Pain - Adult  Goal: Verbalizes/displays adequate comfort level or baseline comfort level  Outcome: Progressing     Problem: Safety - Adult  Goal: Free from fall injury  Outcome: Progressing     Problem: Discharge Planning  Goal: Discharge to home or other facility with appropriate resources  Outcome: Progressing     Problem: Chronic Conditions and Co-morbidities  Goal: Patient's chronic conditions and co-morbidity symptoms are monitored and maintained or improved  Outcome: Progressing     Problem: Diabetes  Goal: Achieve decreasing blood glucose levels by end of shift  Outcome: Progressing  Goal: Increase stability of blood glucose readings by end of shift  Outcome: Progressing  Goal: Decrease in ketones present in urine by end of shift  Outcome: Progressing  Goal: Maintain  electrolyte levels within acceptable range throughout shift  Outcome: Progressing  Goal: Maintain glucose levels >70mg/dl to <250mg/dl throughout shift  Outcome: Progressing  Goal: No changes in neurological exam by end of shift  Outcome: Progressing  Goal: Learn about and adhere to nutrition recommendations by end of shift  Outcome: Progressing  Goal: Vital signs within normal range for age by end of shift  Outcome: Progressing  Goal: Increase self care and/or family involovement by end of shift  Outcome: Progressing  Goal: Receive DSME education by end of shift  Outcome: Progressing     Problem: Fall/Injury  Goal: Not fall by end of shift  Outcome: Progressing  Goal: Be free from injury by end of the shift  Outcome: Progressing  Goal: Verbalize understanding of personal risk factors for fall in the hospital  Outcome: Progressing  Goal: Verbalize understanding of risk factor reduction measures to prevent injury from fall in the home  Outcome: Progressing  Goal: Use assistive devices by end of the shift  Outcome: Progressing  Goal: Pace activities to prevent fatigue by end of the shift  Outcome: Progressing     Problem: Respiratory  Goal: Minimize anxiety/maximize coping throughout shift  Outcome: Progressing  Goal: Wean oxygen to maintain O2 saturation per order/standard this shift  Outcome: Progressing

## 2024-01-08 NOTE — PROGRESS NOTES
Occupational Therapy                 Therapy Communication Note    Patient Name: Marylou Ward  MRN: [id number]  Today's Date: 1/8/2024     Discipline: Occupational Therapy    Missed Visit Reason: Missed Visit Reason: Patient refused (Pt declined participation in therapy session secondary to pain. Per RN pt medicated prior to tx attempt. Encouragement provided however pt continued to decline.)    Missed Time: Attempt    Comment: Attempt at 1040

## 2024-01-08 NOTE — PROGRESS NOTES
Inpatient Speech Language Pathology Treatment Note     Patient Name: Marylou Ward  MRN: [id number]  : 1945  Today's Date: 24  Time Calculation  Start Time: 1215  Stop Time: 1230  Time Calculation (min): 15 min         PLAN:  Discharge Speech Therapy. All goals have been met, patient is tolerating her diet and states her voice is at baseline.   SLP TX Plan: Discharge from Speech Therapy  SLP Frequency: 2x per week  Discussed POC: Patient, Nursing  Discussed Risks/Benefits: Patient, Nursing  Patient/Caregiver Agreeable: Yes       Recommendations:   Solid Diet Recommendations: Regular (IDDSI Level 7) (easy to chew)  Liquid Diet Recommendations: Thin (IDDSI Level 0)  Compensatory strategies: upright 90 degrees for intake   Medication administration: whole in liquid, puree    SLP Assessment:  Patient is tolerating current diet. SLP observed patient eating rice krispies with milk and juice. No overt s/s of aspiration at bedside. Vocal quality adequate for conversation and patient reports her voice is at baseline.    Subjective:  Pt. Seen at bedside for skilled dysphagia treatment. Sitting upright in bed eating lunch.  Prior to Session Communication: Bedside nurse (Surekha Reynolds RN)    Pain:  Pain Assessment  Pain Score: 8  Pain Type: Chronic pain  Pain Location: Abdomen  Pain Orientation: Left      Goals:   Patient will tolerate recommended diet without observed clinical signs of aspiration:  no overt s/s of aspiration with meal observed today. Goal met,  Patient will demonstrate appropriate strategies for swallowing safety: patient is independent with postural positions and bite size. Goal met  Patient will progress to advanced diet: regular easy to chew, thin liquids. Goal met    Treatment Outcome:  SLP TX Intervention Outcome: Making Progress Towards Goals    Treatment Tolerance: Patient tolerated treatment well   Prognosis: Good   Barriers: None   Medical Staff Made Aware: Yes     Education:  Pt. Given  skilled instruction on plan of care and to contact PCP, ENT if further needs arise with voice, swallow following discharge.  Pt. gave verbal understanding

## 2024-01-08 NOTE — PROGRESS NOTES
Marylou Ward is a 78 y.o. female on day 11 of admission presenting with Acute vomiting.    Patient continues to refuse HHC and SNF states her nephew will help take care of her at home     Tamara Mosley RN

## 2024-01-08 NOTE — NURSING NOTE
Assumed care of pt, pt is awake lying in bed, pt c/o chronic left rib pain, on 1LNC, HR is 94 SR on tele, bed locked and lowered, bedside shift report given by LEN Perez, call light w/in reach.

## 2024-01-09 ENCOUNTER — APPOINTMENT (OUTPATIENT)
Dept: CARDIOLOGY | Facility: HOSPITAL | Age: 79
DRG: 444 | End: 2024-01-09
Payer: MEDICARE

## 2024-01-09 LAB
EST. AVERAGE GLUCOSE BLD GHB EST-MCNC: 117 MG/DL
GLUCOSE BLD MANUAL STRIP-MCNC: 126 MG/DL (ref 74–99)
GLUCOSE BLD MANUAL STRIP-MCNC: 140 MG/DL (ref 74–99)
GLUCOSE BLD MANUAL STRIP-MCNC: 156 MG/DL (ref 74–99)
GLUCOSE BLD MANUAL STRIP-MCNC: 170 MG/DL (ref 74–99)
HBA1C MFR BLD: 5.7 %

## 2024-01-09 PROCEDURE — 82947 ASSAY GLUCOSE BLOOD QUANT: CPT

## 2024-01-09 PROCEDURE — 2500000004 HC RX 250 GENERAL PHARMACY W/ HCPCS (ALT 636 FOR OP/ED): Performed by: INTERNAL MEDICINE

## 2024-01-09 PROCEDURE — 93005 ELECTROCARDIOGRAM TRACING: CPT

## 2024-01-09 PROCEDURE — 94640 AIRWAY INHALATION TREATMENT: CPT

## 2024-01-09 PROCEDURE — 83036 HEMOGLOBIN GLYCOSYLATED A1C: CPT | Performed by: INTERNAL MEDICINE

## 2024-01-09 PROCEDURE — 2500000001 HC RX 250 WO HCPCS SELF ADMINISTERED DRUGS (ALT 637 FOR MEDICARE OP): Performed by: INTERNAL MEDICINE

## 2024-01-09 PROCEDURE — 97530 THERAPEUTIC ACTIVITIES: CPT | Mod: GP

## 2024-01-09 PROCEDURE — 94660 CPAP INITIATION&MGMT: CPT

## 2024-01-09 PROCEDURE — 97110 THERAPEUTIC EXERCISES: CPT | Mod: GO,CO

## 2024-01-09 PROCEDURE — 9420000001 HC RT PATIENT EDUCATION 5 MIN

## 2024-01-09 PROCEDURE — 97535 SELF CARE MNGMENT TRAINING: CPT | Mod: GO,CO

## 2024-01-09 PROCEDURE — 2500000002 HC RX 250 W HCPCS SELF ADMINISTERED DRUGS (ALT 637 FOR MEDICARE OP, ALT 636 FOR OP/ED): Performed by: INTERNAL MEDICINE

## 2024-01-09 PROCEDURE — 1200000002 HC GENERAL ROOM WITH TELEMETRY DAILY

## 2024-01-09 RX ADMIN — OXYCODONE HYDROCHLORIDE 5 MG: 5 TABLET ORAL at 19:53

## 2024-01-09 RX ADMIN — METOPROLOL TARTRATE 50 MG: 50 TABLET ORAL at 08:51

## 2024-01-09 RX ADMIN — IPRATROPIUM BROMIDE AND ALBUTEROL SULFATE 3 ML: 2.5; .5 SOLUTION RESPIRATORY (INHALATION) at 08:22

## 2024-01-09 RX ADMIN — OXYCODONE HYDROCHLORIDE 5 MG: 5 TABLET ORAL at 06:06

## 2024-01-09 RX ADMIN — SODIUM CHLORIDE 3 G: 900 INJECTION INTRAVENOUS at 19:53

## 2024-01-09 RX ADMIN — PANTOPRAZOLE SODIUM 40 MG: 40 TABLET, DELAYED RELEASE ORAL at 08:51

## 2024-01-09 RX ADMIN — ENOXAPARIN SODIUM 40 MG: 40 INJECTION SUBCUTANEOUS at 21:15

## 2024-01-09 RX ADMIN — IPRATROPIUM BROMIDE AND ALBUTEROL SULFATE 3 ML: 2.5; .5 SOLUTION RESPIRATORY (INHALATION) at 11:38

## 2024-01-09 RX ADMIN — HYDROMORPHONE HYDROCHLORIDE 0.6 MG: 1 INJECTION, SOLUTION INTRAMUSCULAR; INTRAVENOUS; SUBCUTANEOUS at 22:40

## 2024-01-09 RX ADMIN — IPRATROPIUM BROMIDE AND ALBUTEROL SULFATE 3 ML: 2.5; .5 SOLUTION RESPIRATORY (INHALATION) at 20:40

## 2024-01-09 RX ADMIN — METOPROLOL TARTRATE 50 MG: 50 TABLET ORAL at 21:15

## 2024-01-09 RX ADMIN — SODIUM CHLORIDE 3 G: 900 INJECTION INTRAVENOUS at 13:52

## 2024-01-09 RX ADMIN — INSULIN LISPRO 2 UNITS: 100 INJECTION, SOLUTION INTRAVENOUS; SUBCUTANEOUS at 13:52

## 2024-01-09 RX ADMIN — DEXAMETHASONE 4 MG: 4 TABLET ORAL at 21:15

## 2024-01-09 RX ADMIN — OXYCODONE HYDROCHLORIDE 5 MG: 5 TABLET ORAL at 00:40

## 2024-01-09 RX ADMIN — HYDRALAZINE HYDROCHLORIDE 10 MG: 20 INJECTION INTRAMUSCULAR; INTRAVENOUS at 00:40

## 2024-01-09 RX ADMIN — SODIUM CHLORIDE 3 G: 900 INJECTION INTRAVENOUS at 08:51

## 2024-01-09 RX ADMIN — OXYCODONE HYDROCHLORIDE 5 MG: 5 TABLET ORAL at 12:15

## 2024-01-09 RX ADMIN — SERTRALINE 50 MG: 50 TABLET, FILM COATED ORAL at 08:51

## 2024-01-09 RX ADMIN — METFORMIN HYDROCHLORIDE 500 MG: 500 TABLET, FILM COATED ORAL at 08:51

## 2024-01-09 RX ADMIN — METFORMIN HYDROCHLORIDE 500 MG: 500 TABLET, FILM COATED ORAL at 17:53

## 2024-01-09 RX ADMIN — SODIUM CHLORIDE 3 G: 900 INJECTION INTRAVENOUS at 03:08

## 2024-01-09 RX ADMIN — Medication 6 MG: at 22:34

## 2024-01-09 RX ADMIN — HYDROMORPHONE HYDROCHLORIDE 0.6 MG: 1 INJECTION, SOLUTION INTRAMUSCULAR; INTRAVENOUS; SUBCUTANEOUS at 15:12

## 2024-01-09 RX ADMIN — DEXAMETHASONE 4 MG: 4 TABLET ORAL at 08:51

## 2024-01-09 ASSESSMENT — PAIN - FUNCTIONAL ASSESSMENT
PAIN_FUNCTIONAL_ASSESSMENT: 0-10

## 2024-01-09 ASSESSMENT — COGNITIVE AND FUNCTIONAL STATUS - GENERAL
DRESSING REGULAR UPPER BODY CLOTHING: A LITTLE
MOBILITY SCORE: 19
WALKING IN HOSPITAL ROOM: A LOT
DAILY ACTIVITIY SCORE: 15
TURNING FROM BACK TO SIDE WHILE IN FLAT BAD: A LITTLE
TOILETING: A LOT
MOVING TO AND FROM BED TO CHAIR: A LITTLE
MOVING FROM LYING ON BACK TO SITTING ON SIDE OF FLAT BED WITH BEDRAILS: A LITTLE
MOVING TO AND FROM BED TO CHAIR: A LITTLE
PERSONAL GROOMING: A LITTLE
DRESSING REGULAR LOWER BODY CLOTHING: A LOT
HELP NEEDED FOR BATHING: A LOT
EATING MEALS: A LITTLE
CLIMB 3 TO 5 STEPS WITH RAILING: A LOT
TOILETING: A LOT
DRESSING REGULAR UPPER BODY CLOTHING: A LOT
CLIMB 3 TO 5 STEPS WITH RAILING: TOTAL
DAILY ACTIVITIY SCORE: 14
STANDING UP FROM CHAIR USING ARMS: A LITTLE
WALKING IN HOSPITAL ROOM: A LITTLE
STANDING UP FROM CHAIR USING ARMS: A LITTLE
HELP NEEDED FOR BATHING: A LOT
MOBILITY SCORE: 15
PERSONAL GROOMING: A LOT
DRESSING REGULAR LOWER BODY CLOTHING: A LOT

## 2024-01-09 ASSESSMENT — PAIN SCALES - GENERAL
PAINLEVEL_OUTOF10: 9
PAINLEVEL_OUTOF10: 0 - NO PAIN
PAINLEVEL_OUTOF10: 8
PAINLEVEL_OUTOF10: 5 - MODERATE PAIN
PAINLEVEL_OUTOF10: 3
PAINLEVEL_OUTOF10: 2
PAINLEVEL_OUTOF10: 6
PAINLEVEL_OUTOF10: 8
PAINLEVEL_OUTOF10: 0 - NO PAIN
PAINLEVEL_OUTOF10: 7
PAINLEVEL_OUTOF10: 9
PAINLEVEL_OUTOF10: 8
PAINLEVEL_OUTOF10: 9

## 2024-01-09 ASSESSMENT — PAIN DESCRIPTION - LOCATION: LOCATION: ABDOMEN

## 2024-01-09 ASSESSMENT — ACTIVITIES OF DAILY LIVING (ADL): HOME_MANAGEMENT_TIME_ENTRY: 14

## 2024-01-09 NOTE — NURSING NOTE
Patient with Rt arm triple lumen picc, dressing change done using sterile technique, ext catheter at 2 cm, no redness, drainage or swelling noted at site, all 3 blue caps changed blood return verified with all 3 lumens ,2 lumens curos caps applied, 1 lumen IV resumed.

## 2024-01-09 NOTE — PROGRESS NOTES
Marylou Ward is a 78 y.o. female on day 12 of admission presenting with Acute vomiting.      Subjective   Patient still has nausea and vomiting  Per staff her blood pressure is little higher with dose episodes  Surgery input appreciated    Objective     Last Recorded Vitals  /58 (BP Location: Left arm, Patient Position: Lying)   Pulse 90   Temp 37 °C (98.6 °F) (Temporal)   Resp 18   Wt 65.2 kg (143 lb 11.8 oz)   SpO2 94%   Intake/Output last 3 Shifts:    Intake/Output Summary (Last 24 hours) at 1/9/2024 1716  Last data filed at 1/9/2024 0800  Gross per 24 hour   Intake 570 ml   Output 800 ml   Net -230 ml         Admission Weight  Weight: 61.2 kg (135 lb) (12/28/23 2100)    Daily Weight  01/09/24 : 65.2 kg (143 lb 11.8 oz)    Image Results  XR ribs 2 views left w chest pa or ap  Narrative: Interpreted By:  Kit King,   STUDY:  XR RIBS 2 VIEWS LEFT WITH CHEST PA OR AP; 1/5/2024 6:13 pm      INDICATION:  Signs/Symptoms:L rib tenderness      COMPARISON:  Chest radiograph from 01/03/2024.      ACCESSION NUMBER(S):  [id number]      ORDERING CLINICIAN:  LASHONDA MAY      FINDINGS:  AP view of the chest and additional two-view radiographs of the left  ribs were obtained.      Right PICC line remains stable in position.  The heart and mediastinum are within normal limits for the technique.  There is mild pulmonary vascular congestion.  No pneumothorax or confluent infiltrates are identified.  No pleural effusions are seen.  Degenerative changes involve the spine.  No rib fractures are identified.      Impression: No left rib fracture or pneumothorax. Other chronic findings as above.      Signed by: Kit King 1/5/2024 7:00 PM  Dictation workstation:   [id number]      Physical Exam  GENERAL: awake, alert, Ox3, cooperative resting comfortably  SKIN: Skin turgor normal. No rashes  HEENT: no epistaxis, Moist mucosa.  NECK: supple  BACK: spine nontender to palpation, No CVAT.  LUNGS: Vesicular breath  sounds, with no wheeze, no crepitations.   CARDIAC: REGULAR. S1 and S2; no rubs, no murmur  ABDOMEN: Abdomen soft, right upper quadrant and epigastric tenderness  EXTREMITIES: No edema, Good capillary refill.   NEURO: Insight GOOD. Motor and sensory exam wnl. No invol movements. No ataxia.  WOUND:   MUSCULOSKELETAL: No acute inflammation    Relevant Results  Results for orders placed or performed during the hospital encounter of 12/28/23 (from the past 24 hour(s))   POCT GLUCOSE   Result Value Ref Range    POCT Glucose 160 (H) 74 - 99 mg/dL   Hemoglobin A1c   Result Value Ref Range    Hemoglobin A1C 5.7 (H) See below %    Estimated Average Glucose 117 Not Established mg/dL   POCT GLUCOSE   Result Value Ref Range    POCT Glucose 140 (H) 74 - 99 mg/dL   POCT GLUCOSE   Result Value Ref Range    POCT Glucose 170 (H) 74 - 99 mg/dL   POCT GLUCOSE   Result Value Ref Range    POCT Glucose 126 (H) 74 - 99 mg/dL    Scheduled medications  ampicillin-sulbactam, 3 g, intravenous, q6h  dexAMETHasone, 4 mg, oral, q12h BENNY  enoxaparin, 40 mg, subcutaneous, Nightly  insulin lispro, 0-10 Units, subcutaneous, TID with meals  ipratropium-albuteroL, 3 mL, nebulization, TID  metFORMIN, 500 mg, oral, BID with meals  metoprolol tartrate, 50 mg, oral, BID  pantoprazole, 40 mg, oral, Daily  polyethylene glycol, 17 g, oral, Daily  sertraline, 50 mg, oral, Daily      Continuous medications       PRN medications  PRN medications: acetaminophen, ALPRAZolam, alteplase, benzocaine-menthol, bisacodyl, dextromethorphan-guaifenesin, dextrose 10 % in water (D10W), dextrose, diphenhydrAMINE, glucagon, hydrALAZINE, HYDROmorphone, ipratropium-albuteroL, lidocaine-diphenhydraMINE-Maalox 1:1:1, melatonin, ondansetron, oxyCODONE, oxyCODONE-acetaminophen, oxygen         Assessment/Plan   # Chronic cholecystitis   - abdominal pain/nausea/vomiting  -Pain control  -IV fluids  -Will need cholecystectomy as an elective procedure.     # Pt was unable to be  intubated - hence surgery was abandoned - will need ENT eval [place] [facility] [place]  [place] d/w pt.    # Left Rib tenderness  - will check Xray Ribs     # Hypertension  -Continue home meds     # Diabetes  -Insulin sliding scale     # History of PE  -On Eliquis       12/30-patient continues to have recurrent vomiting and abdominal pain surgery input discussed with patient and family.  They do not want patient discharged, prefer cholecystectomy to be done as inpatient, will communicate same   General surgery.    12/31-surgery input noted, surgery being planned for early next week, discussed with patient's POA yesterday, he also prefers surgery to be done.  Patient continues to have pain, today her pain is left upper quadrant also.  She has long history of chronic pain, is being followed by pain management.    1/1-discussed with patient and POA in the room, patient to get laparoscopic cholecystectomy tomorrow, will need to evaluate for the right adnexal mass which is known for the last few months as an outpatient through GYN, for which patient had a appointment but was not able to keep up.     1/5 : Pt c/o Left rib tenderness - check Xray Ribs    1/7 : Pt still hypoxic - pulm note appreciated, L sided rib pain    1/8 : Pt still c/o pain L sided ribs - no abd pain - still hypoxic but weaning O2 down.    1/9 : pt is off O2 - feels ok - wants to be Dced in am.    Cuca Mullen MD

## 2024-01-09 NOTE — PROGRESS NOTES
"Occupational Therapy    OT Treatment    Patient Name: Marylou Ward  MRN: [id number]  Today's Date: 1/9/2024  Time Calculation  Start Time: 1335  Stop Time: 1359  Time Calculation (min): 24 min         Assessment:  OT Assessment: Gradual progress made towards OT goals. Continue with current OT POC to increase strength, balance and functional tolerance to maximize safety and independence during functional tasks.  Evaluation/Treatment Tolerance: Patient limited by pain, Patient limited by fatigue  End of Session Communication: Bedside nurse  End of Session Patient Position: Up in chair, Alarm off, not on at start of session  OT Assessment Results: Decreased ADL status, Decreased upper extremity strength, Decreased endurance, Decreased functional mobility, Decreased gross motor control, Decreased IADLs  Evaluation/Treatment Tolerance: Patient limited by pain, Patient limited by fatigue  Plan:  Treatment Interventions: ADL retraining, UE strengthening/ROM, Functional transfer training, Endurance training, Patient/family training, Equipment evaluation/education  OT Frequency: 3 times per week  OT Discharge Recommendations: Moderate intensity level of continued care  OT Recommended Transfer Status: Minimal assist  OT - OK to Discharge: Yes  Treatment Interventions: ADL retraining, UE strengthening/ROM, Functional transfer training, Endurance training, Patient/family training, Equipment evaluation/education    Subjective   Previous Visit Info:  OT Last Visit  OT Received On: 01/09/24  General:  General  Reason for Referral: impaired ADLs, PE, cholelisthiasis  Past Medical History Relevant to Rehab: PE, cholelisthiasis, HTN, DM  Missed Visit: Yes  Missed Visit Reason: Patient refused (Pt continues to decline participation in therapy session secondary to reports of increased pain. Education and encouragement provided however pt continued to decline stating \"I'll get up after I get something for pain\". Per RN at " 12:30)  Co-Treatment: PT  Co-Treatment Reason: need for second skilled therapist for safe patient handling, limited pt functional tolerance.  Prior to Session Communication: Bedside nurse  Patient Position Received: Bed, 3 rail up, Alarm off, not on at start of session  General Comment: Pt cleared for therapy session per nursing, cooperative throughout session with encouragement.  Precautions:  Medical Precautions: Fall precautions, Oxygen therapy device and L/min (1L O2 via NC)  Vital Signs:  Vital Signs  SpO2: 92 % (on room air- RN made aware)  Pain:  Pain Assessment  Pain Assessment: 0-10  Pain Score: 8  Pain Type: Acute pain  Pain Location: Abdomen  Pain Orientation: Left  Clinical Progression: Not changed  Pain Interventions: Repositioned, Ambulation/increased activity    Objective    Cognition:  Cognition  Overall Cognitive Status: Within Functional Limits  Insight: Mild  Coordination:  Movements are Fluid and Coordinated: Yes  Activities of Daily Living: Grooming  Grooming Comments: hand hygiene completed standing at sink with CGA    LE Bathing  LE Bathing Comments: pt soiled upon arrival requiring set-up and close supervision to complete LB bathing tasks using compensatory movements while long sitting in bed    LE Dressing  LE Dressing: Yes  Sock Level of Assistance: Close supervision  LE Dressing Where Assessed: Edge of bed  LE Dressing Comments: increased time and effort required for task completion.    Toileting  Toileting Comments: pt soiled upon arrival requiring set-up and close supervision to complete perineal hygiene in bed. Anterior hygiene tasks completed later in session on toilet with close supervision from seated position  Functional Standing Tolerance:     Bed Mobility/Transfers: Bed Mobility  Bed Mobility: Yes  Bed Mobility 1  Bed Mobility 1: Supine to sitting  Level of Assistance 1: Close supervision  Bed Mobility Comments 1: HOB minimally elevated to assist with task  completion    Transfers  Transfer: Yes  Transfer 1  Trials/Comments 1: sit<>stands completed from standard EOB, chair and toilet heights with FWW and Rebecca-  verbal/ tactile cues required for proper hand placement to increase safety and decrease risk of falls.    Toilet Transfers  Toilet Transfer From: Bed  Toilet Transfer Type: To and from  Toilet Transfer to: Standard toilet  Toilet Transfer Technique: Ambulating  Toilet Transfers: Contact guard (with FWW)  Toilet Transfers Comments: pt seated in bedside chair at end of session    Therapy/Activity: Therapeutic Exercise  Therapeutic Exercise Performed: Yes  Therapeutic Exercise Activity 1: BUE exercises completed seated in bedside chair against gravity 1x10 in all planes to increase strength and functional tolerance for safety and ease of ADL/ADL transfer completion. Verbal instruction and demonstration provided to ensure proper muscle recruitment.       Other Activity:  Other Activity Performed: Yes  Other Activity 1: Functional mobility completed at household distances with FWW and CGA to increase functional tolerance for safety and independence upon home-going.      Outcome Measures:[facility] Daily Activity  Putting on and taking off regular lower body clothing: A lot  Bathing (including washing, rinsing, drying): A lot  Putting on and taking off regular upper body clothing: A little  Toileting, which includes using toilet, bedpan or urinal: A lot  Taking care of personal grooming such as brushing teeth: A little  Eating Meals: A little  Daily Activity - Total Score: 15        Education Documentation  Body Mechanics, taught by BABAR Floyd at 1/9/2024  4:58 PM.  Learner: Patient  Readiness: Acceptance  Method: Explanation, Demonstration  Response: Needs Reinforcement    Precautions, taught by BABAR Floyd at 1/9/2024  4:58 PM.  Learner: Patient  Readiness: Acceptance  Method: Explanation, Demonstration  Response: Needs Reinforcement    Home  Exercise Program, taught by BABAR Floyd at 1/9/2024  4:58 PM.  Learner: Patient  Readiness: Acceptance  Method: Explanation, Demonstration  Response: Needs Reinforcement    ADL Training, taught by BABAR Floyd at 1/9/2024  4:58 PM.  Learner: Patient  Readiness: Acceptance  Method: Explanation, Demonstration  Response: Needs Reinforcement    Education Comments  Education reviewed on role of OT/ POC, importance of OT to maximize safety and independence during functional tasks and safety awareness throughout functional tasks/ transfers. Questions, comments and concerns addressed regarding OT.      Goals:  Encounter Problems       Encounter Problems (Active)       Mobility       STG - Patient will ambulate 100' with rolling walker and modified independence (Progressing)       Start:  01/04/24    Expected End:  01/31/24            STG - Patient will ascend and descend three stairs with one rail and close supervision. (Not Progressing)       Start:  01/04/24    Expected End:  01/31/24               OT Goals       ADLs (Progressing)       Start:  01/02/24    Expected End:  01/26/24       Patient will complete ADL tasks with Wilkinson in order to safely return to PLOF.         Functional Transfers (Progressing)       Start:  01/02/24    Expected End:  01/26/24       Patient will complete functional transfers with Wilkinson in order to increase safety and independence with daily tasks.         B UE Strengthening (Progressing)       Start:  01/02/24    Expected End:  01/26/24       Patient will increase B UE strength to 4+/5 for functional transfers.         Functional Mobility (Progressing)       Start:  01/02/24    Expected End:  01/26/24       Patient will demonstrate the ability to complete item retrieval and functional mobility with Wilkinson in order to increase safety and independence with daily tasks.              Transfers       STG - Transfer from bed to chair independently  (Progressing)       Start:  01/04/24    Expected End:  01/31/24            STG - Patient to transfer to and from sit to supine via log rolling independently (Progressing)       Start:  01/04/24    Expected End:  01/31/24            STG - Patient will transfer sit to and from stand independently (Progressing)       Start:  01/04/24    Expected End:  01/31/24                  Encounter Problems (Resolved)       Mobility       LTG - Patient will ambulate household distance of 60' x 1 at independent to mod I level with use of least restrictive device for improved mobility and reduced risk of falls. (Not met)       Start:  12/31/23    Expected End:  01/04/24    Resolved:  01/04/24         LTG - Patient will navigate 4-6 steps with rails/device x 1 and close supervision for ability to safely enter/exit home. (Not met)       Start:  12/31/23    Expected End:  01/04/24    Resolved:  01/04/24            Transfers       STG - Transfer from bed to chair at independent to modified independent level.  (Not met)       Start:  12/31/23    Expected End:  01/04/24    Resolved:  01/04/24         STG - Patient will perform bed mobility with complete independence. (Not met)       Start:  12/31/23    Expected End:  01/04/24    Resolved:  01/04/24         STG - Patient will complete sit to stand transfers with independent to modified independence with use of L.R.D. for safe mobility within the hospital.  (Not met)       Start:  12/31/23    Expected End:  01/04/24    Resolved:  01/04/24

## 2024-01-09 NOTE — CARE PLAN
The patient's goals for the shift include improved pain    The clinical goals for the shift include pain management      Problem: Safety  Goal: Patient will be injury free during hospitalization  Outcome: Progressing  Goal: I will remain free of falls  Outcome: Progressing     Problem: Skin  Goal: Participates in plan/prevention/treatment measures  Outcome: Progressing  Goal: Prevent/minimize sheer/friction injuries  Outcome: Progressing  Goal: Promote/optimize nutrition  Outcome: Progressing     Problem: Pain  Goal: Takes deep breaths with improved pain control throughout the shift  Outcome: Progressing  Goal: Turns in bed with improved pain control throughout the shift  Outcome: Progressing  Goal: Walks with improved pain control throughout the shift  Outcome: Progressing  Goal: Performs ADL's with improved pain control throughout shift  Outcome: Progressing  Goal: Participates in PT with improved pain control throughout the shift  Outcome: Progressing  Goal: Free from opioid side effects throughout the shift  Outcome: Progressing  Goal: Free from acute confusion related to pain meds throughout the shift  Outcome: Progressing     Problem: Pain - Adult  Goal: Verbalizes/displays adequate comfort level or baseline comfort level  Outcome: Progressing     Problem: Safety - Adult  Goal: Free from fall injury  Outcome: Progressing     Problem: Discharge Planning  Goal: Discharge to home or other facility with appropriate resources  Outcome: Progressing     Problem: Chronic Conditions and Co-morbidities  Goal: Patient's chronic conditions and co-morbidity symptoms are monitored and maintained or improved  Outcome: Progressing     Problem: Diabetes  Goal: Achieve decreasing blood glucose levels by end of shift  Outcome: Progressing  Goal: Increase stability of blood glucose readings by end of shift  Outcome: Progressing  Goal: Decrease in ketones present in urine by end of shift  Outcome: Progressing  Goal: Maintain  electrolyte levels within acceptable range throughout shift  Outcome: Progressing  Goal: Maintain glucose levels >70mg/dl to <250mg/dl throughout shift  Outcome: Progressing  Goal: No changes in neurological exam by end of shift  Outcome: Progressing  Goal: Learn about and adhere to nutrition recommendations by end of shift  Outcome: Progressing  Goal: Vital signs within normal range for age by end of shift  Outcome: Progressing  Goal: Increase self care and/or family involovement by end of shift  Outcome: Progressing  Goal: Receive DSME education by end of shift  Outcome: Progressing     Problem: Fall/Injury  Goal: Not fall by end of shift  Outcome: Progressing  Goal: Be free from injury by end of the shift  Outcome: Progressing  Goal: Verbalize understanding of personal risk factors for fall in the hospital  Outcome: Progressing  Goal: Verbalize understanding of risk factor reduction measures to prevent injury from fall in the home  Outcome: Progressing  Goal: Use assistive devices by end of the shift  Outcome: Progressing  Goal: Pace activities to prevent fatigue by end of the shift  Outcome: Progressing     Problem: Respiratory  Goal: Minimize anxiety/maximize coping throughout shift  Outcome: Progressing  Goal: Wean oxygen to maintain O2 saturation per order/standard this shift  Outcome: Progressing  Goal: Minimal/no exertional discomfort or dyspnea this shift  Outcome: Progressing  Goal: No signs of respiratory distress (eg. Use of accessory muscles. Peds grunting)  Outcome: Progressing  Goal: Verbalize decreased shortness of breath this shift  Outcome: Progressing

## 2024-01-09 NOTE — PROGRESS NOTES
Physical Therapy    Physical Therapy Treatment    Patient Name: Marylou Ward  MRN: [id number]  Today's Date: 1/9/2024  Time Calculation  Start Time: 1334  Stop Time: 1351  Time Calculation (min): 17 min    Assessment/Plan   PT Assessment  Evaluation/Treatment Tolerance: Patient tolerated treatment well  End of Session Communication: Bedside nurse  Assessment Comment: pt demonstrates balance deficits, B LE weakness, impaired tolerance to activity, difficulty ambulating, and decreased safety awareness. pt is a high risk for falls and will benefit form continued skilled therapy services at a moderate intensity level when discahrged.  End of Session Patient Position: Up in chair, Alarm off, not on at start of session  PT Plan  Treatment/Interventions: Bed mobility, Transfer training, Gait training, Balance training, Stair training, Strengthening, Endurance training, Therapeutic exercise, Therapeutic activity  PT Plan: Skilled PT  PT Frequency: 4 times per week  PT Discharge Recommendations: Moderate intensity level of continued care  Equipment Recommended upon Discharge: Wheeled walker  PT Recommended Transfer Status: Assist x1, Assistive device (rolling walker)  PT - OK to Discharge: Yes    General Visit Information:   PT  Visit  PT Received On: 01/09/24  General  Missed Visit: No  Missed Visit Reason: Other (Comment)  Family/Caregiver Present: No  Co-Treatment: OT  Co-Treatment Reason: need for second skilled therapist for safe patient handling, limited pt endurance  Prior to Session Communication: Bedside nurse  Patient Position Received: Bed, 3 rail up  Preferred Learning Style: verbal  General Comment: pt reporting she is not feeling any better than earlier attempt. reluctantly agreeable to getting OOB.    Subjective   Precautions:  Precautions  Hearing/Visual Limitations: glasses for reading, hearing is age appropriate  Medical Precautions: Oxygen therapy device and L/min, Fall precautions (1L O2 via NC)  Vital  Signs:  Vital Signs  SpO2: 92 % (after activity on RA)  Patient Position: Sitting    Objective   Pain:  Pain Assessment  Pain Assessment: 0-10  Pain Score: 8  Pain Type: Acute pain  Pain Location: Abdomen  Pain Orientation: Left  Pain Interventions: Repositioned (pt recently had pain medicine provided by RN)  Cognition:  Cognition  Overall Cognitive Status: Within Functional Limits  Orientation Level: Oriented X4  Postural Control:  Postural Control  Posture Comment: forward flexed at trunk  Extremity/Trunk Assessments:  RLE   RLE : Within Functional Limits  LLE   LLE : Within Functional Limits  Activity Tolerance:  Activity Tolerance  Endurance: Decreased tolerance for upright activites  Activity Tolerance Comments: fair, fatigues easily and quickly  Treatments:  Therapeutic Exercise  Therapeutic Exercise Performed:  (performed B LE seated ther ex x 10 reps each:heel/toe raises, LAQ, hip flexion)    Balance/Neuromuscular Re-Education  Balance/Neuromuscular Re-Education Activity Performed:  (sat EOB x 4 minutes, increased time rquired to don nonskid socks. close supervision for safety.)    Bed Mobility  Bed Mobility:  (close supervision for supine to sit EOB with HOB slightly elevated.)    Ambulation/Gait Training  Ambulation/Gait Training Performed: Yes  Ambulation/Gait Training 1  Surface 1: Level tile  Device 1: Rolling walker  Assistance 1: Minimum assistance, Loss of balance (Comment) (max A for LOB during ambulation)  Quality of Gait 1: Narrow base of support, Diminished heel strike, Decreased step length  Comments/Distance (ft) 1: 20'x1, 30'x1, 40'x1. 1 seated rest break, 1 standing rest break needed during gait training. verbal cues for posture and walker proximity. heavy LOB towards the left requiring max A for balance recovery.    Transfers  Transfer:  (min A to guide trunk up to stand. verbal cues for hand placement to sit in chair, decreased eccentric control in sitting, pt keeping right hand on the  walker. min A for safety.)    Stairs  Stairs: No    Other Activity  Other Activity Performed:  (Encouraged pt to sit in chair several times a day and ambulate to the BR when needed instead of using the purewick.)    Outcome Measures:  [facility] Basic Mobility  Turning from your back to your side while in a flat bed without using bedrails: A little  Moving from lying on your back to sitting on the side of a flat bed without using bedrails: A little  Moving to and from bed to chair (including a wheelchair): A little  Standing up from a chair using your arms (e.g. wheelchair or bedside chair): A little  To walk in hospital room: A lot  Climbing 3-5 steps with railing: Total  Basic Mobility - Total Score: 15    IP EDUCATION:  Individual(s) Educated: Patient  Education Provided: Body Mechanics, Fall Risk, Home Safety, Posture  Risk and Benefits Discussed with Patient/Caregiver/Other: yes  Patient/Caregiver Demonstrated Understanding: yes  Plan of Care Discussed and Agreed Upon: yes  Patient Response to Education: Patient/Caregiver Verbalized Understanding of Information  Education Comment: needs reinforcement    Encounter Problems       Encounter Problems (Active)       Mobility       STG - Patient will ambulate 100' with rolling walker and modified independence (Progressing)       Start:  01/04/24    Expected End:  01/31/24            STG - Patient will ascend and descend three stairs with one rail and close supervision. (Not Progressing)       Start:  01/04/24    Expected End:  01/31/24               Pain - Adult          Transfers       STG - Transfer from bed to chair independently (Progressing)       Start:  01/04/24    Expected End:  01/31/24            STG - Patient to transfer to and from sit to supine via log rolling independently (Progressing)       Start:  01/04/24    Expected End:  01/31/24            STG - Patient will transfer sit to and from stand independently (Progressing)       Start:  01/04/24    Expected  End:  01/31/24

## 2024-01-09 NOTE — PROGRESS NOTES
"Occupational Therapy                 Therapy Communication Note    Patient Name: Marylou Ward  MRN: [id number]  Today's Date: 1/9/2024     Discipline: Occupational Therapy    Missed Visit Reason: Missed Visit Reason: Patient refused (Pt continues to decline participation in therapy session secondary to reports of increased pain. Education and encouragement provided however pt continued to decline stating \"I'll get up after I get something for pain\". Per RN at 12:30)    Missed Time: Attempt    Comment: Attempt at 0922  "

## 2024-01-09 NOTE — PROGRESS NOTES
Marylou Ward is a 78 y.o. female on day 11 of admission presenting with Acute vomiting.      Subjective   Patient still has nausea and vomiting  Per staff her blood pressure is little higher with dose episodes  Surgery input appreciated    Objective     Last Recorded Vitals  /63 (BP Location: Left arm, Patient Position: Lying)   Pulse 98   Temp 36.6 °C (97.9 °F) (Temporal)   Resp 16   Wt 63.9 kg (140 lb 14 oz)   SpO2 96%   Intake/Output last 3 Shifts:    Intake/Output Summary (Last 24 hours) at 1/8/2024 2220  Last data filed at 1/8/2024 0224  Gross per 24 hour   Intake 100 ml   Output 350 ml   Net -250 ml         Admission Weight  Weight: 61.2 kg (135 lb) (12/28/23 2100)    Daily Weight  01/08/24 : 63.9 kg (140 lb 14 oz)    Image Results  XR ribs 2 views left w chest pa or ap  Narrative: Interpreted By:  Kit King,   STUDY:  XR RIBS 2 VIEWS LEFT WITH CHEST PA OR AP; 1/5/2024 6:13 pm      INDICATION:  Signs/Symptoms:L rib tenderness      COMPARISON:  Chest radiograph from 01/03/2024.      ACCESSION NUMBER(S):  [id number]      ORDERING CLINICIAN:  LASHONDA MAY      FINDINGS:  AP view of the chest and additional two-view radiographs of the left  ribs were obtained.      Right PICC line remains stable in position.  The heart and mediastinum are within normal limits for the technique.  There is mild pulmonary vascular congestion.  No pneumothorax or confluent infiltrates are identified.  No pleural effusions are seen.  Degenerative changes involve the spine.  No rib fractures are identified.      Impression: No left rib fracture or pneumothorax. Other chronic findings as above.      Signed by: Kit King 1/5/2024 7:00 PM  Dictation workstation:   [id number]      Physical Exam  GENERAL: awake, alert, Ox3, cooperative resting comfortably  SKIN: Skin turgor normal. No rashes  HEENT: no epistaxis, Moist mucosa.  NECK: supple  BACK: spine nontender to palpation, No CVAT.  LUNGS: Vesicular breath  sounds, with no wheeze, no crepitations.   CARDIAC: REGULAR. S1 and S2; no rubs, no murmur  ABDOMEN: Abdomen soft, right upper quadrant and epigastric tenderness  EXTREMITIES: No edema, Good capillary refill.   NEURO: Insight GOOD. Motor and sensory exam wnl. No invol movements. No ataxia.  WOUND:   MUSCULOSKELETAL: No acute inflammation    Relevant Results  Results for orders placed or performed during the hospital encounter of 12/28/23 (from the past 24 hour(s))   POCT GLUCOSE   Result Value Ref Range    POCT Glucose 119 (H) 74 - 99 mg/dL   POCT GLUCOSE   Result Value Ref Range    POCT Glucose 104 (H) 74 - 99 mg/dL   POCT GLUCOSE   Result Value Ref Range    POCT Glucose 134 (H) 74 - 99 mg/dL   POCT GLUCOSE   Result Value Ref Range    POCT Glucose 160 (H) 74 - 99 mg/dL    Scheduled medications  ampicillin-sulbactam, 3 g, intravenous, q6h  dexAMETHasone, 4 mg, oral, q12h BENNY  enoxaparin, 40 mg, subcutaneous, Nightly  insulin lispro, 0-10 Units, subcutaneous, TID with meals  ipratropium-albuteroL, 3 mL, nebulization, TID  metFORMIN, 500 mg, oral, BID with meals  metoprolol tartrate, 50 mg, oral, BID  pantoprazole, 40 mg, oral, Daily  polyethylene glycol, 17 g, oral, Daily  sertraline, 50 mg, oral, Daily      Continuous medications       PRN medications  PRN medications: acetaminophen, ALPRAZolam, alteplase, benzocaine-menthol, bisacodyl, dextromethorphan-guaifenesin, dextrose 10 % in water (D10W), dextrose, diphenhydrAMINE, glucagon, hydrALAZINE, HYDROmorphone, ipratropium-albuteroL, lidocaine-diphenhydraMINE-Maalox 1:1:1, melatonin, ondansetron, oxyCODONE, oxyCODONE-acetaminophen, oxygen         Assessment/Plan   # Chronic cholecystitis   - abdominal pain/nausea/vomiting  -Pain control  -IV fluids  -Will need cholecystectomy as an elective procedure.     # Pt was unable to be intubated - hence surgery was abandoned - will need ENT eval followed by surgery at [facility] [place].  Same d/w pt.    # Left Rib tenderness  - will check  Xray Ribs     # Hypertension  -Continue home meds     # Diabetes  -Insulin sliding scale     # History of PE  -On Eliquis       12/30-patient continues to have recurrent vomiting and abdominal pain surgery input discussed with patient and family.  They do not want patient discharged, prefer cholecystectomy to be done as inpatient, will communicate same   General surgery.    12/31-surgery input noted, surgery being planned for early next week, discussed with patient's POA yesterday, he also prefers surgery to be done.  Patient continues to have pain, today her pain is left upper quadrant also.  She has long history of chronic pain, is being followed by pain management.    1/1-discussed with patient and POA in the room, patient to get laparoscopic cholecystectomy tomorrow, will need to evaluate for the right adnexal mass which is known for the last few months as an outpatient through GYN, for which patient had a appointment but was not able to keep up.     1/5 : Pt c/o Left rib tenderness - check Xray Ribs    1/7 : Pt still hypoxic - pulm note appreciated, L sided rib pain    1/8 : Pt still c/o pain L sided ribs - no abd pain - still hypoxic but weaning O2 down.    Cuca Mullen MD

## 2024-01-09 NOTE — CARE PLAN
Problem: Respiratory  Goal: Minimize anxiety/maximize coping throughout shift  Outcome: Progressing  Goal: Wean oxygen to maintain O2 saturation per order/standard this shift  Outcome: Progressing  Goal: Minimal/no exertional discomfort or dyspnea this shift  Outcome: Progressing  Goal: No signs of respiratory distress (eg. Use of accessory muscles. Peds grunting)  Outcome: Progressing  Goal: Verbalize decreased shortness of breath this shift  Outcome: Progressing

## 2024-01-09 NOTE — PROGRESS NOTES
Physical Therapy                 Therapy Communication Note    Patient Name: Marylou Ward  MRN: [id number]  Today's Date: 1/9/2024     Discipline: Physical Therapy    Missed Visit Reason: Missed Visit Reason: Patient refused (pt reports she will get OOB this afternoon. pt states her pain is too bad right now and she doesn't get pain medication until 1230.)    Missed Time: Attempt

## 2024-01-09 NOTE — NURSING NOTE
Assumed care of pt, pt is asleep in bed, pt has chronic pain, on RA, bed locked and lowered, HR is 75 SR on tele, bedside shift report given by LEN Bergman, call light w/in reach.

## 2024-01-10 ENCOUNTER — APPOINTMENT (OUTPATIENT)
Dept: CARDIOLOGY | Facility: HOSPITAL | Age: 79
DRG: 444 | End: 2024-01-10
Payer: MEDICARE

## 2024-01-10 ENCOUNTER — APPOINTMENT (OUTPATIENT)
Dept: RADIOLOGY | Facility: HOSPITAL | Age: 79
DRG: 444 | End: 2024-01-10
Payer: MEDICARE

## 2024-01-10 VITALS
SYSTOLIC BLOOD PRESSURE: 157 MMHG | WEIGHT: 139.33 LBS | RESPIRATION RATE: 18 BRPM | HEIGHT: 60 IN | OXYGEN SATURATION: 94 % | HEART RATE: 94 BPM | DIASTOLIC BLOOD PRESSURE: 52 MMHG | BODY MASS INDEX: 27.35 KG/M2 | TEMPERATURE: 98.6 F

## 2024-01-10 LAB
GLUCOSE BLD MANUAL STRIP-MCNC: 144 MG/DL (ref 74–99)
GLUCOSE BLD MANUAL STRIP-MCNC: 212 MG/DL (ref 74–99)

## 2024-01-10 PROCEDURE — 9420000001 HC RT PATIENT EDUCATION 5 MIN

## 2024-01-10 PROCEDURE — 72128 CT CHEST SPINE W/O DYE: CPT

## 2024-01-10 PROCEDURE — 2500000002 HC RX 250 W HCPCS SELF ADMINISTERED DRUGS (ALT 637 FOR MEDICARE OP, ALT 636 FOR OP/ED): Performed by: INTERNAL MEDICINE

## 2024-01-10 PROCEDURE — 2500000001 HC RX 250 WO HCPCS SELF ADMINISTERED DRUGS (ALT 637 FOR MEDICARE OP): Performed by: INTERNAL MEDICINE

## 2024-01-10 PROCEDURE — 94660 CPAP INITIATION&MGMT: CPT

## 2024-01-10 PROCEDURE — 93005 ELECTROCARDIOGRAM TRACING: CPT

## 2024-01-10 PROCEDURE — 94640 AIRWAY INHALATION TREATMENT: CPT

## 2024-01-10 PROCEDURE — 2500000004 HC RX 250 GENERAL PHARMACY W/ HCPCS (ALT 636 FOR OP/ED): Performed by: INTERNAL MEDICINE

## 2024-01-10 PROCEDURE — 82947 ASSAY GLUCOSE BLOOD QUANT: CPT

## 2024-01-10 RX ORDER — ACETAMINOPHEN 325 MG/1
650 TABLET ORAL EVERY 6 HOURS PRN
Qty: 30 TABLET | Refills: 0 | Status: SHIPPED | OUTPATIENT
Start: 2024-01-10

## 2024-01-10 RX ADMIN — OXYCODONE HYDROCHLORIDE 5 MG: 5 TABLET ORAL at 03:19

## 2024-01-10 RX ADMIN — HYDRALAZINE HYDROCHLORIDE 10 MG: 20 INJECTION INTRAMUSCULAR; INTRAVENOUS at 00:42

## 2024-01-10 RX ADMIN — SODIUM CHLORIDE 3 G: 900 INJECTION INTRAVENOUS at 02:11

## 2024-01-10 RX ADMIN — ONDANSETRON 4 MG: 2 INJECTION INTRAMUSCULAR; INTRAVENOUS at 03:19

## 2024-01-10 RX ADMIN — SODIUM CHLORIDE 3 G: 900 INJECTION INTRAVENOUS at 08:51

## 2024-01-10 RX ADMIN — ACETAMINOPHEN 650 MG: 325 TABLET ORAL at 03:19

## 2024-01-10 RX ADMIN — SODIUM CHLORIDE 3 G: 900 INJECTION INTRAVENOUS at 13:43

## 2024-01-10 RX ADMIN — INSULIN LISPRO 4 UNITS: 100 INJECTION, SOLUTION INTRAVENOUS; SUBCUTANEOUS at 14:14

## 2024-01-10 RX ADMIN — IPRATROPIUM BROMIDE AND ALBUTEROL SULFATE 3 ML: 2.5; .5 SOLUTION RESPIRATORY (INHALATION) at 07:56

## 2024-01-10 RX ADMIN — IPRATROPIUM BROMIDE AND ALBUTEROL SULFATE 3 ML: 2.5; .5 SOLUTION RESPIRATORY (INHALATION) at 11:24

## 2024-01-10 RX ADMIN — OXYCODONE HYDROCHLORIDE 5 MG: 5 TABLET ORAL at 08:55

## 2024-01-10 RX ADMIN — DEXAMETHASONE 4 MG: 4 TABLET ORAL at 08:55

## 2024-01-10 RX ADMIN — METFORMIN HYDROCHLORIDE 500 MG: 500 TABLET, FILM COATED ORAL at 08:51

## 2024-01-10 RX ADMIN — OXYCODONE HYDROCHLORIDE 5 MG: 5 TABLET ORAL at 15:03

## 2024-01-10 RX ADMIN — SERTRALINE 50 MG: 50 TABLET, FILM COATED ORAL at 08:55

## 2024-01-10 RX ADMIN — METOPROLOL TARTRATE 50 MG: 50 TABLET ORAL at 08:55

## 2024-01-10 RX ADMIN — PANTOPRAZOLE SODIUM 40 MG: 40 TABLET, DELAYED RELEASE ORAL at 08:55

## 2024-01-10 ASSESSMENT — PAIN SCALES - GENERAL
PAINLEVEL_OUTOF10: 2
PAINLEVEL_OUTOF10: 8
PAINLEVEL_OUTOF10: 8
PAINLEVEL_OUTOF10: 9
PAINLEVEL_OUTOF10: 8

## 2024-01-10 ASSESSMENT — PAIN DESCRIPTION - LOCATION: LOCATION: ABDOMEN

## 2024-01-10 ASSESSMENT — PAIN DESCRIPTION - ORIENTATION: ORIENTATION: LEFT

## 2024-01-10 ASSESSMENT — PAIN - FUNCTIONAL ASSESSMENT
PAIN_FUNCTIONAL_ASSESSMENT: 0-10

## 2024-01-10 NOTE — PROGRESS NOTES
Spiritual Care Visit    Clinical Encounter Type  Visited With: Patient  Routine Visit: Follow-up  Continue Visiting: Yes         Values/Beliefs  Spiritual Requests During Hospitalization: Madison Bagley

## 2024-01-10 NOTE — DISCHARGE SUMMARY
Discharge Diagnosis  Acute vomiting    Issues Requiring Follow-Up  Cholecystectomy  ENT  Surgeon    Discharge Meds     Your medication list        START taking these medications        Instructions Last Dose Given Next Dose Due   acetaminophen 325 mg tablet  Commonly known as: Tylenol      Take 2 tablets (650 mg) by mouth every 6 hours if needed for mild pain (1 - 3).              CONTINUE taking these medications        Instructions Last Dose Given Next Dose Due   apixaban 5 mg tablet  Commonly known as: Eliquis           metFORMIN 500 mg tablet  Commonly known as: Glucophage           metoprolol tartrate 25 mg tablet  Commonly known as: Lopressor           omeprazole 20 mg DR capsule  Commonly known as: PriLOSEC           oxyCODONE-acetaminophen 5-325 mg tablet  Commonly known as: Percocet           sertraline 50 mg tablet  Commonly known as: Zoloft                     Where to Get Your Medications        These medications were sent to [id number] - [place] [address] [address]  AT [facility]  [address] [facility] [address]      Phone: [phone]   acetaminophen 325 mg tablet         Test Results Pending At Discharge  Pending Labs       Order Current Status    CBC Collected (01/04/24 0650)    Comprehensive Metabolic Panel Collected (01/04/24 0650)            Hospital Course  HPI: This is a 78 y.o. female who has had multiple ER visits for abdominal pain, nausea, vomiting, no diarrhea.  Patient has had chronic pain in the epigastric and right upper quadrant areas, has undergone EGD and multiple radiological investigations without a conclusive diagnosis.  She is also being followed for pain management.  She lives alone.  Her her nephew is her primary caregiver.     ED NOTE : Patient is an 78-year-old female that presents emergency room for evaluation of abdominal pain, nausea, vomiting, decreased oral intake.  Symptoms have been going on for the last  several days and patient was seen in the emergency department yesterday and had blood work and a CT scan performed which ultimately showed gallstones however no evidence of infection.  Patient was discharged home at that time however patient's son and patient note that she has had increasing nausea, multiple episodes of nonbloody, nonbilious emesis as well as progressively worsening of her abdominal pain.  They are concerned because she has been unable to keep anything down at home.     On exam patient uncomfortable appearing but in no obvious distress.  She is awake, alert and oriented.  She does have diffuse abdominal tenderness palpation worse in the upper abdomen.  There is no rigidity and no evidence of peritonitis on exam.  Lungs are clear to auscultation bilaterally.  Regular rate and rhythm on auscultation of the heart.  Blood work ordered including CBC, CMP, lipase, magnesium along with ultrasound of the right upper quadrant.  Blood work shows mild leukocytosis of 11.9 however patient does have normal electrolytes, normal kidney function with a creatinine 1.2.  Ultrasound shows findings consistent with gallstones however no evidence of cholecystitis.  Given her decreased oral intake, dehydration and nausea and vomiting patient will be admitted for further evaluation.  Case was discussed with patient's primary care physician who accepted patient for admission.       ENT NOTE : Marylou Ward is a 78 y.o. female presenting in the operating room for cholecystectomy with Dr. Casas.  After induction of anesthesia there was difficulty with intubation.  I was called emergently to the operating room to evaluate her.  She has a very small mouth and visualization was limited due to limited mandibular excursion a large tongue and small mouth.  I placed a nasal trumpet.  Anesthesia was able to maintain airway with good saturations and we proceeded to emerging her from anesthesia and reversing any sedation with Narcan.  She  was never given a paralyzing dose.  I stayed with her in the operating room until her airway was stable enough.  She was breathing spontaneously.  She was placed on BiPAP and was transported to the PACU and eventually to the ICU.  I am now evaluating her in the ICU at 1730.  She is on BiPAP.  She is alert and able to communicate.     # Chronic cholecystitis   - abdominal pain/nausea/vomiting  -Pain control  -IV fluids  -Will need cholecystectomy as an elective procedure.     # Pt was unable to be intubated - hence surgery was abandoned - will need [place] eval [place] [facility] [place]  [place] d/w pt.     # Left Rib tenderness  - will check Xray Ribs     # Hypertension  -Continue home meds     # Diabetes  -Insulin sliding scale     # History of PE  -On Eliquis     12/30-patient continues to have recurrent vomiting and abdominal pain surgery input discussed with patient and family.  They do not want patient discharged, prefer cholecystectomy to be done as inpatient, will communicate same     General surgery.     12/31-surgery input noted, surgery being planned for early next week, discussed with patient's POA yesterday, he also prefers surgery to be done.  Patient continues to have pain, today her pain is left upper quadrant also.  She has long history of chronic pain, is being followed by pain management.     1/1-discussed with patient and POA in the room, patient to get laparoscopic cholecystectomy tomorrow, will need to evaluate for the right adnexal mass which is known for the last few months as an outpatient through GYN, for which patient had a appointment but was not able to keep up.      1/5 : Pt c/o Left rib tenderness - check Xray Ribs     1/7 : Pt still hypoxic - pulm note appreciated, L sided rib pain     1/8 : Pt still c/o pain L sided ribs - no abd pain - still hypoxic but weaning O2 down.     1/9 : pt is off O2 - feels ok - wants to be Dced in am.     1/10 : ct SPINE RESULTS NOTED. D/w POA - d/w  pt - ok  for dc today - Discharge is planned for today . Discharge medications were reconciled. Discharge orders completed. Hospital course , medication changes and Investigation's conducted were reviewed with the patient / Family. Activity, Diet and Medications after discharge were reviewed with the patient / Family. Medication reconciliation done - pls refer to medication reconciliation sheet.Follow up with Primary Care Physician were reviewed with the patient / Family. Discharge planning was discussed with staff. Refer to discharge orders sheet. Total time to coordinate disch process incl counseling family, coordinating with other care givers,  and pharmacist was >35 min.     Pertinent Physical Exam At Time of Discharge  Physical Exam    Outpatient Follow-Up  No future appointments.      Cuca Mullen MD

## 2024-01-10 NOTE — PROGRESS NOTES
Physical Therapy                 Therapy Communication Note    Patient Name: Marylou Ward  MRN: [id number]  Today's Date: 1/10/2024     Discipline: Physical Therapy    Missed Visit Reason: Missed Visit Reason: Patient refused (Pt declines PT treatment on premise of going home soon. Pt denies any needs for practice or home going ed.)    Missed Time: Cancel    Comment:

## 2024-01-10 NOTE — NURSING NOTE
Assumed care of pt, pt is lying in bed, pt has chronic abdomen pain, on RA, HR is 81 SR on tele, pt has constant pain, bedside shift report given by LEN Ballesteros, bed locked and lowered, call light w/in reach.

## 2024-01-10 NOTE — CARE PLAN
The patient's goals for the shift include improved pain    The clinical goals for the shift include gain strength and pain management    Problem: Safety  Goal: Patient will be injury free during hospitalization  Outcome: Progressing  Goal: I will remain free of falls  Outcome: Progressing     Problem: Skin  Goal: Participates in plan/prevention/treatment measures  Outcome: Progressing  Goal: Prevent/minimize sheer/friction injuries  Outcome: Progressing  Goal: Promote/optimize nutrition  Outcome: Progressing     Problem: Pain  Goal: Takes deep breaths with improved pain control throughout the shift  Outcome: Progressing  Goal: Turns in bed with improved pain control throughout the shift  Outcome: Progressing  Goal: Walks with improved pain control throughout the shift  Outcome: Progressing  Goal: Performs ADL's with improved pain control throughout shift  Outcome: Progressing  Goal: Participates in PT with improved pain control throughout the shift  Outcome: Progressing  Goal: Free from opioid side effects throughout the shift  Outcome: Progressing  Goal: Free from acute confusion related to pain meds throughout the shift  Outcome: Progressing     Problem: Pain - Adult  Goal: Verbalizes/displays adequate comfort level or baseline comfort level  Outcome: Progressing     Problem: Safety - Adult  Goal: Free from fall injury  Outcome: Progressing     Problem: Discharge Planning  Goal: Discharge to home or other facility with appropriate resources  Outcome: Progressing     Problem: Chronic Conditions and Co-morbidities  Goal: Patient's chronic conditions and co-morbidity symptoms are monitored and maintained or improved  Outcome: Progressing     Problem: Diabetes  Goal: Achieve decreasing blood glucose levels by end of shift  Outcome: Progressing  Goal: Increase stability of blood glucose readings by end of shift  Outcome: Progressing  Goal: Decrease in ketones present in urine by end of shift  Outcome:  Progressing  Goal: Maintain electrolyte levels within acceptable range throughout shift  Outcome: Progressing  Goal: Maintain glucose levels >70mg/dl to <250mg/dl throughout shift  Outcome: Progressing  Goal: No changes in neurological exam by end of shift  Outcome: Progressing  Goal: Learn about and adhere to nutrition recommendations by end of shift  Outcome: Progressing  Goal: Vital signs within normal range for age by end of shift  Outcome: Progressing  Goal: Increase self care and/or family involovement by end of shift  Outcome: Progressing  Goal: Receive DSME education by end of shift  Outcome: Progressing     Problem: Fall/Injury  Goal: Not fall by end of shift  Outcome: Progressing  Goal: Be free from injury by end of the shift  Outcome: Progressing  Goal: Verbalize understanding of personal risk factors for fall in the hospital  Outcome: Progressing  Goal: Verbalize understanding of risk factor reduction measures to prevent injury from fall in the home  Outcome: Progressing  Goal: Use assistive devices by end of the shift  Outcome: Progressing  Goal: Pace activities to prevent fatigue by end of the shift  Outcome: Progressing     Problem: Respiratory  Goal: Minimize anxiety/maximize coping throughout shift  Outcome: Progressing  Goal: Wean oxygen to maintain O2 saturation per order/standard this shift  Outcome: Progressing  Goal: Minimal/no exertional discomfort or dyspnea this shift  Outcome: Progressing  Goal: No signs of respiratory distress (eg. Use of accessory muscles. Peds grunting)  Outcome: Progressing  Goal: Verbalize decreased shortness of breath this shift  Outcome: Progressing

## 2024-01-10 NOTE — PROGRESS NOTES
Marylou Ward is a 78 y.o. female on day 13 of admission presenting with Acute vomiting.      Subjective   Patient still has nausea and vomiting  Per staff her blood pressure is little higher with dose episodes  Surgery input appreciated    Objective     Last Recorded Vitals  /69 (BP Location: Left arm, Patient Position: Lying)   Pulse 87   Temp 36.5 °C (97.7 °F) (Oral)   Resp 20   Wt 63.2 kg (139 lb 5.3 oz)   SpO2 95%   Intake/Output last 3 Shifts:    Intake/Output Summary (Last 24 hours) at 1/10/2024 0744  Last data filed at 1/10/2024 0232  Gross per 24 hour   Intake 120 ml   Output 210 ml   Net -90 ml         Admission Weight  Weight: 61.2 kg (135 lb) (12/28/23 2100)    Daily Weight  01/10/24 : 63.2 kg (139 lb 5.3 oz)    Image Results  XR ribs 2 views left w chest pa or ap  Narrative: Interpreted By:  Kit King,   STUDY:  XR RIBS 2 VIEWS LEFT WITH CHEST PA OR AP; 1/5/2024 6:13 pm      INDICATION:  Signs/Symptoms:L rib tenderness      COMPARISON:  Chest radiograph from 01/03/2024.      ACCESSION NUMBER(S):  [id number]      ORDERING CLINICIAN:  LASHONDA MAY      FINDINGS:  AP view of the chest and additional two-view radiographs of the left  ribs were obtained.      Right PICC line remains stable in position.  The heart and mediastinum are within normal limits for the technique.  There is mild pulmonary vascular congestion.  No pneumothorax or confluent infiltrates are identified.  No pleural effusions are seen.  Degenerative changes involve the spine.  No rib fractures are identified.      Impression: No left rib fracture or pneumothorax. Other chronic findings as above.      Signed by: Kit King 1/5/2024 7:00 PM  Dictation workstation:   [id number]      Physical Exam  GENERAL: awake, alert, Ox3, cooperative resting comfortably  SKIN: Skin turgor normal. No rashes  HEENT: no epistaxis, Moist mucosa.  NECK: supple  BACK: spine nontender to palpation, No CVAT.  LUNGS: Vesicular breath  sounds, with no wheeze, no crepitations.   CARDIAC: REGULAR. S1 and S2; no rubs, no murmur  ABDOMEN: Abdomen soft, right upper quadrant and epigastric tenderness  EXTREMITIES: No edema, Good capillary refill.   NEURO: Insight GOOD. Motor and sensory exam wnl. No invol movements. No ataxia.  WOUND:   MUSCULOSKELETAL: No acute inflammation    Relevant Results  Results for orders placed or performed during the hospital encounter of 12/28/23 (from the past 24 hour(s))   POCT GLUCOSE   Result Value Ref Range    POCT Glucose 170 (H) 74 - 99 mg/dL   POCT GLUCOSE   Result Value Ref Range    POCT Glucose 126 (H) 74 - 99 mg/dL   POCT GLUCOSE   Result Value Ref Range    POCT Glucose 156 (H) 74 - 99 mg/dL   POCT GLUCOSE   Result Value Ref Range    POCT Glucose 144 (H) 74 - 99 mg/dL    Scheduled medications  ampicillin-sulbactam, 3 g, intravenous, q6h  dexAMETHasone, 4 mg, oral, q12h BENNY  enoxaparin, 40 mg, subcutaneous, Nightly  insulin lispro, 0-10 Units, subcutaneous, TID with meals  ipratropium-albuteroL, 3 mL, nebulization, TID  metFORMIN, 500 mg, oral, BID with meals  metoprolol tartrate, 50 mg, oral, BID  pantoprazole, 40 mg, oral, Daily  polyethylene glycol, 17 g, oral, Daily  sertraline, 50 mg, oral, Daily      Continuous medications       PRN medications  PRN medications: acetaminophen, ALPRAZolam, alteplase, benzocaine-menthol, bisacodyl, dextromethorphan-guaifenesin, dextrose 10 % in water (D10W), dextrose, diphenhydrAMINE, glucagon, hydrALAZINE, HYDROmorphone, ipratropium-albuteroL, lidocaine-diphenhydraMINE-Maalox 1:1:1, melatonin, ondansetron, oxyCODONE, oxyCODONE-acetaminophen, oxygen         Assessment/Plan   # Chronic cholecystitis   - abdominal pain/nausea/vomiting  -Pain control  -IV fluids  -Will need cholecystectomy as an elective procedure.     # Pt was unable to be intubated - hence surgery was abandoned - will need ENT eval followed by surgery at [facility].  Same d/w pt.    # Left Rib tenderness  - will check  Xray Ribs     # Hypertension  -Continue home meds     # Diabetes  -Insulin sliding scale     # History of PE  -On Eliquis       12/30-patient continues to have recurrent vomiting and abdominal pain surgery input discussed with patient and family.  They do not want patient discharged, prefer cholecystectomy to be done as inpatient, will communicate same   General surgery.    12/31-surgery input noted, surgery being planned for early next week, discussed with patient's POA yesterday, he also prefers surgery to be done.  Patient continues to have pain, today her pain is left upper quadrant also.  She has long history of chronic pain, is being followed by pain management.    1/1-discussed with patient and POA in the room, patient to get laparoscopic cholecystectomy tomorrow, will need to evaluate for the right adnexal mass which is known for the last few months as an outpatient through GYN, for which patient had a appointment but was not able to keep up.     1/5 : Pt c/o Left rib tenderness - check Xray Ribs    1/7 : Pt still hypoxic - pulm note appreciated, L sided rib pain    1/8 : Pt still c/o pain L sided ribs - no abd pain - still hypoxic but weaning O2 down.    1/9 : pt is off O2 - feels ok - wants to be Dced in am.    1/10 : ct SPINE RESULTS NOTED. D/w POA - d/w  pt - ok for dc today - Discharge is planned for today . Discharge medications were reconciled. Discharge orders completed. Hospital course , medication changes and Investigation's conducted were reviewed with the patient / Family. Activity, Diet and Medications after discharge were reviewed with the patient / Family. Medication reconciliation done - pls refer to medication reconciliation sheet.Follow up with Primary Care Physician were reviewed with the patient / Family. Discharge planning was discussed with staff. Refer to discharge orders sheet. Total time to coordinate disch process incl counseling family, coordinating with other care givers, case  managers and pharmacist was >35 min.     Cuca Mullen MD

## 2024-01-10 NOTE — CARE PLAN
The patient's goals for the shift include improved pain    The clinical goals for the shift include pain management

## 2024-01-12 LAB
ATRIAL RATE: 108 BPM
P AXIS: 63 DEGREES
P OFFSET: 186 MS
P ONSET: 133 MS
PR INTERVAL: 178 MS
Q ONSET: 222 MS
QRS COUNT: 18 BEATS
QRS DURATION: 94 MS
QT INTERVAL: 372 MS
QTC CALCULATION(BAZETT): 498 MS
QTC FREDERICIA: 452 MS
R AXIS: 15 DEGREES
T AXIS: 116 DEGREES
T OFFSET: 408 MS
VENTRICULAR RATE: 108 BPM

## 2024-01-13 ENCOUNTER — APPOINTMENT (OUTPATIENT)
Dept: CARDIOLOGY | Facility: HOSPITAL | Age: 79
End: 2024-01-13
Payer: MEDICARE

## 2024-01-13 ENCOUNTER — HOSPITAL ENCOUNTER (OUTPATIENT)
Facility: HOSPITAL | Age: 79
Setting detail: OBSERVATION
Discharge: HOME | End: 2024-01-15
Attending: EMERGENCY MEDICINE | Admitting: INTERNAL MEDICINE
Payer: MEDICARE

## 2024-01-13 DIAGNOSIS — R53.81 MALAISE AND FATIGUE: ICD-10-CM

## 2024-01-13 DIAGNOSIS — E87.6 HYPOKALEMIA: ICD-10-CM

## 2024-01-13 DIAGNOSIS — R53.83 MALAISE AND FATIGUE: ICD-10-CM

## 2024-01-13 DIAGNOSIS — B37.31 VAGINAL CANDIDIASIS: Primary | ICD-10-CM

## 2024-01-13 DIAGNOSIS — D72.829 LEUKOCYTOSIS, UNSPECIFIED TYPE: ICD-10-CM

## 2024-01-13 PROBLEM — R53.1 GENERALIZED WEAKNESS: Status: ACTIVE | Noted: 2024-01-13

## 2024-01-13 LAB
ALBUMIN SERPL-MCNC: 3.5 G/DL (ref 3.5–5)
ALBUMIN SERPL-MCNC: 3.7 G/DL (ref 3.5–5)
ALP BLD-CCNC: 100 U/L (ref 35–125)
ALP BLD-CCNC: 96 U/L (ref 35–125)
ALT SERPL-CCNC: 8 U/L (ref 5–40)
ALT SERPL-CCNC: 8 U/L (ref 5–40)
ANION GAP SERPL CALC-SCNC: 15 MMOL/L
ANION GAP SERPL CALC-SCNC: 16 MMOL/L
APPEARANCE UR: CLEAR
AST SERPL-CCNC: 10 U/L (ref 5–40)
AST SERPL-CCNC: 11 U/L (ref 5–40)
BASOPHILS # BLD AUTO: 0.04 X10*3/UL (ref 0–0.1)
BASOPHILS NFR BLD AUTO: 0.2 %
BILIRUB SERPL-MCNC: 0.2 MG/DL (ref 0.1–1.2)
BILIRUB SERPL-MCNC: 0.2 MG/DL (ref 0.1–1.2)
BILIRUB UR STRIP.AUTO-MCNC: NEGATIVE MG/DL
BUN SERPL-MCNC: 19 MG/DL (ref 8–25)
BUN SERPL-MCNC: 22 MG/DL (ref 8–25)
CALCIUM SERPL-MCNC: 8.2 MG/DL (ref 8.5–10.4)
CALCIUM SERPL-MCNC: 8.7 MG/DL (ref 8.5–10.4)
CHLORIDE SERPL-SCNC: 90 MMOL/L (ref 97–107)
CHLORIDE SERPL-SCNC: 91 MMOL/L (ref 97–107)
CO2 SERPL-SCNC: 26 MMOL/L (ref 24–31)
CO2 SERPL-SCNC: 27 MMOL/L (ref 24–31)
COLOR UR: ABNORMAL
CREAT SERPL-MCNC: 1.1 MG/DL (ref 0.4–1.6)
CREAT SERPL-MCNC: 1.3 MG/DL (ref 0.4–1.6)
EGFRCR SERPLBLD CKD-EPI 2021: 42 ML/MIN/1.73M*2
EGFRCR SERPLBLD CKD-EPI 2021: 51 ML/MIN/1.73M*2
EOSINOPHIL # BLD AUTO: 0.15 X10*3/UL (ref 0–0.4)
EOSINOPHIL NFR BLD AUTO: 0.8 %
ERYTHROCYTE [DISTWIDTH] IN BLOOD BY AUTOMATED COUNT: 14 % (ref 11.5–14.5)
ERYTHROCYTE [DISTWIDTH] IN BLOOD BY AUTOMATED COUNT: 14.2 % (ref 11.5–14.5)
FLUAV RNA RESP QL NAA+PROBE: NOT DETECTED
FLUBV RNA RESP QL NAA+PROBE: NOT DETECTED
GLUCOSE SERPL-MCNC: 151 MG/DL (ref 65–99)
GLUCOSE SERPL-MCNC: 163 MG/DL (ref 65–99)
GLUCOSE UR STRIP.AUTO-MCNC: NORMAL MG/DL
HCT VFR BLD AUTO: 32.6 % (ref 36–46)
HCT VFR BLD AUTO: 35.2 % (ref 36–46)
HGB BLD-MCNC: 10.2 G/DL (ref 12–16)
HGB BLD-MCNC: 11 G/DL (ref 12–16)
IMM GRANULOCYTES # BLD AUTO: 0.22 X10*3/UL (ref 0–0.5)
IMM GRANULOCYTES NFR BLD AUTO: 1.1 % (ref 0–0.9)
KETONES UR STRIP.AUTO-MCNC: NEGATIVE MG/DL
LEUKOCYTE ESTERASE UR QL STRIP.AUTO: NEGATIVE
LYMPHOCYTES # BLD AUTO: 2.14 X10*3/UL (ref 0.8–3)
LYMPHOCYTES NFR BLD AUTO: 10.8 %
MCH RBC QN AUTO: 27.9 PG (ref 26–34)
MCH RBC QN AUTO: 28.1 PG (ref 26–34)
MCHC RBC AUTO-ENTMCNC: 31.3 G/DL (ref 32–36)
MCHC RBC AUTO-ENTMCNC: 31.3 G/DL (ref 32–36)
MCV RBC AUTO: 89 FL (ref 80–100)
MCV RBC AUTO: 90 FL (ref 80–100)
MONOCYTES # BLD AUTO: 1.43 X10*3/UL (ref 0.05–0.8)
MONOCYTES NFR BLD AUTO: 7.2 %
NEUTROPHILS # BLD AUTO: 15.82 X10*3/UL (ref 1.6–5.5)
NEUTROPHILS NFR BLD AUTO: 79.9 %
NITRITE UR QL STRIP.AUTO: NEGATIVE
NRBC BLD-RTO: 0 /100 WBCS (ref 0–0)
NRBC BLD-RTO: 0 /100 WBCS (ref 0–0)
PH UR STRIP.AUTO: 5.5 [PH]
PLATELET # BLD AUTO: 386 X10*3/UL (ref 150–450)
PLATELET # BLD AUTO: 468 X10*3/UL (ref 150–450)
POTASSIUM SERPL-SCNC: 2.7 MMOL/L (ref 3.4–5.1)
POTASSIUM SERPL-SCNC: 3.2 MMOL/L (ref 3.4–5.1)
PROT SERPL-MCNC: 6 G/DL (ref 5.9–7.9)
PROT SERPL-MCNC: 6.5 G/DL (ref 5.9–7.9)
PROT UR STRIP.AUTO-MCNC: ABNORMAL MG/DL
RBC # BLD AUTO: 3.65 X10*6/UL (ref 4–5.2)
RBC # BLD AUTO: 3.91 X10*6/UL (ref 4–5.2)
RBC # UR STRIP.AUTO: NEGATIVE /UL
RBC #/AREA URNS AUTO: NORMAL /HPF
SARS-COV-2 RNA RESP QL NAA+PROBE: NOT DETECTED
SODIUM SERPL-SCNC: 131 MMOL/L (ref 133–145)
SODIUM SERPL-SCNC: 134 MMOL/L (ref 133–145)
SP GR UR STRIP.AUTO: 1.02
TSH SERPL DL<=0.05 MIU/L-ACNC: 1.09 MIU/L (ref 0.27–4.2)
UROBILINOGEN UR STRIP.AUTO-MCNC: NORMAL MG/DL
WBC # BLD AUTO: 17.2 X10*3/UL (ref 4.4–11.3)
WBC # BLD AUTO: 19.8 X10*3/UL (ref 4.4–11.3)
WBC #/AREA URNS AUTO: NORMAL /HPF

## 2024-01-13 PROCEDURE — 87636 SARSCOV2 & INF A&B AMP PRB: CPT | Performed by: EMERGENCY MEDICINE

## 2024-01-13 PROCEDURE — 99285 EMERGENCY DEPT VISIT HI MDM: CPT | Performed by: EMERGENCY MEDICINE

## 2024-01-13 PROCEDURE — 96365 THER/PROPH/DIAG IV INF INIT: CPT

## 2024-01-13 PROCEDURE — 85027 COMPLETE CBC AUTOMATED: CPT | Mod: 59 | Performed by: INTERNAL MEDICINE

## 2024-01-13 PROCEDURE — 2500000001 HC RX 250 WO HCPCS SELF ADMINISTERED DRUGS (ALT 637 FOR MEDICARE OP): Performed by: INTERNAL MEDICINE

## 2024-01-13 PROCEDURE — 96376 TX/PRO/DX INJ SAME DRUG ADON: CPT

## 2024-01-13 PROCEDURE — 85025 COMPLETE CBC W/AUTO DIFF WBC: CPT | Performed by: EMERGENCY MEDICINE

## 2024-01-13 PROCEDURE — 96366 THER/PROPH/DIAG IV INF ADDON: CPT

## 2024-01-13 PROCEDURE — 93005 ELECTROCARDIOGRAM TRACING: CPT

## 2024-01-13 PROCEDURE — 36415 COLL VENOUS BLD VENIPUNCTURE: CPT | Performed by: EMERGENCY MEDICINE

## 2024-01-13 PROCEDURE — 80053 COMPREHEN METABOLIC PANEL: CPT | Performed by: EMERGENCY MEDICINE

## 2024-01-13 PROCEDURE — 81001 URINALYSIS AUTO W/SCOPE: CPT | Performed by: EMERGENCY MEDICINE

## 2024-01-13 PROCEDURE — 2500000004 HC RX 250 GENERAL PHARMACY W/ HCPCS (ALT 636 FOR OP/ED): Performed by: PHYSICIAN ASSISTANT

## 2024-01-13 PROCEDURE — P9612 CATHETERIZE FOR URINE SPEC: HCPCS

## 2024-01-13 PROCEDURE — 80053 COMPREHEN METABOLIC PANEL: CPT | Mod: MUE | Performed by: INTERNAL MEDICINE

## 2024-01-13 PROCEDURE — 36415 COLL VENOUS BLD VENIPUNCTURE: CPT | Performed by: INTERNAL MEDICINE

## 2024-01-13 PROCEDURE — G0378 HOSPITAL OBSERVATION PER HR: HCPCS

## 2024-01-13 PROCEDURE — 2500000001 HC RX 250 WO HCPCS SELF ADMINISTERED DRUGS (ALT 637 FOR MEDICARE OP): Performed by: EMERGENCY MEDICINE

## 2024-01-13 PROCEDURE — 84443 ASSAY THYROID STIM HORMONE: CPT | Performed by: INTERNAL MEDICINE

## 2024-01-13 RX ORDER — ALUMINUM HYDROXIDE, MAGNESIUM HYDROXIDE, AND SIMETHICONE 1200; 120; 1200 MG/30ML; MG/30ML; MG/30ML
20 SUSPENSION ORAL 3 TIMES DAILY PRN
Status: DISCONTINUED | OUTPATIENT
Start: 2024-01-13 | End: 2024-01-15 | Stop reason: HOSPADM

## 2024-01-13 RX ORDER — TALC
6 POWDER (GRAM) TOPICAL NIGHTLY PRN
Status: DISCONTINUED | OUTPATIENT
Start: 2024-01-13 | End: 2024-01-15 | Stop reason: HOSPADM

## 2024-01-13 RX ORDER — FLUCONAZOLE 150 MG/1
150 TABLET ORAL ONCE
Status: COMPLETED | OUTPATIENT
Start: 2024-01-13 | End: 2024-01-13

## 2024-01-13 RX ORDER — MORPHINE SULFATE 4 MG/ML
4 INJECTION, SOLUTION INTRAMUSCULAR; INTRAVENOUS
Status: DISCONTINUED | OUTPATIENT
Start: 2024-01-13 | End: 2024-01-15 | Stop reason: HOSPADM

## 2024-01-13 RX ORDER — INSULIN LISPRO 100 [IU]/ML
0-10 INJECTION, SOLUTION INTRAVENOUS; SUBCUTANEOUS
Status: DISCONTINUED | OUTPATIENT
Start: 2024-01-14 | End: 2024-01-15 | Stop reason: HOSPADM

## 2024-01-13 RX ORDER — OXYCODONE AND ACETAMINOPHEN 5; 325 MG/1; MG/1
1 TABLET ORAL EVERY 6 HOURS PRN
Status: DISCONTINUED | OUTPATIENT
Start: 2024-01-13 | End: 2024-01-15 | Stop reason: HOSPADM

## 2024-01-13 RX ORDER — POLYETHYLENE GLYCOL 3350 17 G/17G
17 POWDER, FOR SOLUTION ORAL DAILY
Status: DISCONTINUED | OUTPATIENT
Start: 2024-01-13 | End: 2024-01-15 | Stop reason: HOSPADM

## 2024-01-13 RX ORDER — GUAIFENESIN/DEXTROMETHORPHAN 100-10MG/5
5 SYRUP ORAL EVERY 4 HOURS PRN
Status: DISCONTINUED | OUTPATIENT
Start: 2024-01-13 | End: 2024-01-15 | Stop reason: HOSPADM

## 2024-01-13 RX ORDER — HYDROMORPHONE HYDROCHLORIDE 1 MG/ML
1 INJECTION, SOLUTION INTRAMUSCULAR; INTRAVENOUS; SUBCUTANEOUS
Status: DISCONTINUED | OUTPATIENT
Start: 2024-01-13 | End: 2024-01-15 | Stop reason: HOSPADM

## 2024-01-13 RX ORDER — METOPROLOL TARTRATE 25 MG/1
25 TABLET, FILM COATED ORAL 2 TIMES DAILY
Status: DISCONTINUED | OUTPATIENT
Start: 2024-01-13 | End: 2024-01-15 | Stop reason: HOSPADM

## 2024-01-13 RX ORDER — ENOXAPARIN SODIUM 100 MG/ML
30 INJECTION SUBCUTANEOUS EVERY 24 HOURS
Status: DISCONTINUED | OUTPATIENT
Start: 2024-01-13 | End: 2024-01-13

## 2024-01-13 RX ORDER — HYDRALAZINE HYDROCHLORIDE 20 MG/ML
10 INJECTION INTRAMUSCULAR; INTRAVENOUS EVERY 4 HOURS PRN
Status: DISCONTINUED | OUTPATIENT
Start: 2024-01-13 | End: 2024-01-15 | Stop reason: HOSPADM

## 2024-01-13 RX ORDER — POTASSIUM CHLORIDE 14.9 MG/ML
20 INJECTION INTRAVENOUS
Status: DISPENSED | OUTPATIENT
Start: 2024-01-13 | End: 2024-01-13

## 2024-01-13 RX ORDER — BISACODYL 5 MG
10 TABLET, DELAYED RELEASE (ENTERIC COATED) ORAL DAILY PRN
Status: DISCONTINUED | OUTPATIENT
Start: 2024-01-13 | End: 2024-01-15 | Stop reason: HOSPADM

## 2024-01-13 RX ORDER — ALPRAZOLAM 0.5 MG/1
0.5 TABLET ORAL 2 TIMES DAILY PRN
Status: DISCONTINUED | OUTPATIENT
Start: 2024-01-13 | End: 2024-01-15 | Stop reason: HOSPADM

## 2024-01-13 RX ORDER — DIPHENHYDRAMINE HCL 25 MG
25 TABLET ORAL 2 TIMES DAILY PRN
Status: DISCONTINUED | OUTPATIENT
Start: 2024-01-13 | End: 2024-01-15 | Stop reason: HOSPADM

## 2024-01-13 RX ORDER — DEXTROSE 50 % IN WATER (D50W) INTRAVENOUS SYRINGE
25
Status: DISCONTINUED | OUTPATIENT
Start: 2024-01-13 | End: 2024-01-15 | Stop reason: HOSPADM

## 2024-01-13 RX ORDER — ONDANSETRON HYDROCHLORIDE 2 MG/ML
4 INJECTION, SOLUTION INTRAVENOUS EVERY 4 HOURS PRN
Status: DISCONTINUED | OUTPATIENT
Start: 2024-01-13 | End: 2024-01-15 | Stop reason: HOSPADM

## 2024-01-13 RX ORDER — POTASSIUM CHLORIDE 750 MG/1
10 TABLET, FILM COATED, EXTENDED RELEASE ORAL ONCE
Status: COMPLETED | OUTPATIENT
Start: 2024-01-13 | End: 2024-01-13

## 2024-01-13 RX ORDER — ACETAMINOPHEN 325 MG/1
650 TABLET ORAL EVERY 6 HOURS PRN
Status: DISCONTINUED | OUTPATIENT
Start: 2024-01-13 | End: 2024-01-15 | Stop reason: HOSPADM

## 2024-01-13 RX ORDER — ACETAMINOPHEN 325 MG/1
650 TABLET ORAL ONCE
Status: COMPLETED | OUTPATIENT
Start: 2024-01-13 | End: 2024-01-13

## 2024-01-13 RX ORDER — DEXTROSE MONOHYDRATE 100 MG/ML
0.3 INJECTION, SOLUTION INTRAVENOUS ONCE AS NEEDED
Status: DISCONTINUED | OUTPATIENT
Start: 2024-01-13 | End: 2024-01-15 | Stop reason: HOSPADM

## 2024-01-13 RX ADMIN — POTASSIUM CHLORIDE 20 MEQ: 200 INJECTION, SOLUTION INTRAVENOUS at 20:34

## 2024-01-13 RX ADMIN — FLUCONAZOLE 150 MG: 150 TABLET ORAL at 18:22

## 2024-01-13 RX ADMIN — ACETAMINOPHEN 650 MG: 325 TABLET ORAL at 18:04

## 2024-01-13 RX ADMIN — OXYCODONE AND ACETAMINOPHEN 1 TABLET: 5; 325 TABLET ORAL at 23:05

## 2024-01-13 RX ADMIN — POTASSIUM CHLORIDE 10 MEQ: 750 TABLET, EXTENDED RELEASE ORAL at 18:22

## 2024-01-13 RX ADMIN — Medication 6 MG: at 23:05

## 2024-01-13 RX ADMIN — POTASSIUM CHLORIDE 30 MEQ: 1500 TABLET, EXTENDED RELEASE ORAL at 20:32

## 2024-01-13 RX ADMIN — APIXABAN 5 MG: 5 TABLET, FILM COATED ORAL at 20:33

## 2024-01-13 RX ADMIN — METOPROLOL TARTRATE 25 MG: 25 TABLET, FILM COATED ORAL at 20:33

## 2024-01-13 ASSESSMENT — PAIN DESCRIPTION - PAIN TYPE: TYPE: ACUTE PAIN

## 2024-01-13 ASSESSMENT — PAIN SCALES - GENERAL
PAINLEVEL_OUTOF10: 10 - WORST POSSIBLE PAIN
PAINLEVEL_OUTOF10: 10 - WORST POSSIBLE PAIN
PAINLEVEL_OUTOF10: 8

## 2024-01-13 ASSESSMENT — COLUMBIA-SUICIDE SEVERITY RATING SCALE - C-SSRS
6. HAVE YOU EVER DONE ANYTHING, STARTED TO DO ANYTHING, OR PREPARED TO DO ANYTHING TO END YOUR LIFE?: NO
2. HAVE YOU ACTUALLY HAD ANY THOUGHTS OF KILLING YOURSELF?: NO
1. IN THE PAST MONTH, HAVE YOU WISHED YOU WERE DEAD OR WISHED YOU COULD GO TO SLEEP AND NOT WAKE UP?: NO

## 2024-01-13 ASSESSMENT — LIFESTYLE VARIABLES
EVER HAD A DRINK FIRST THING IN THE MORNING TO STEADY YOUR NERVES TO GET RID OF A HANGOVER: NO
HAVE PEOPLE ANNOYED YOU BY CRITICIZING YOUR DRINKING: NO
EVER FELT BAD OR GUILTY ABOUT YOUR DRINKING: NO
REASON UNABLE TO ASSESS: NO
HAVE YOU EVER FELT YOU SHOULD CUT DOWN ON YOUR DRINKING: NO

## 2024-01-13 ASSESSMENT — PAIN DESCRIPTION - DESCRIPTORS: DESCRIPTORS: SHARP

## 2024-01-13 ASSESSMENT — PAIN DESCRIPTION - FREQUENCY: FREQUENCY: CONSTANT/CONTINUOUS

## 2024-01-13 ASSESSMENT — PAIN DESCRIPTION - ONSET: ONSET: ONGOING

## 2024-01-13 ASSESSMENT — PAIN DESCRIPTION - LOCATION
LOCATION: VAGINA
LOCATION: GROIN

## 2024-01-13 ASSESSMENT — PAIN DESCRIPTION - PROGRESSION: CLINICAL_PROGRESSION: NOT CHANGED

## 2024-01-13 ASSESSMENT — PAIN - FUNCTIONAL ASSESSMENT
PAIN_FUNCTIONAL_ASSESSMENT: 0-10

## 2024-01-13 NOTE — PROGRESS NOTES
Attestation note/supervisory note for FATMATA Marie      The patient is an 78-year-old female presenting to the emergency department for evaluation of vaginal irritation/pain.  The patient states that she was recently admitted to the hospital.  She was discharged on 10 January 2024.  She states that she was admitted because of some nausea, generalized malaise and just not feeling well.  She states that while she was in the hospital she did have an indwelling Fagan catheter placed.  She states it was removed prior to her leaving.  She states that she did have pain after it was removed and has had persistent pain since.  Her son reports that she has been laying around at home a lot and not very active since she was released.  The patient states that she does not want to stay in the hospital and does not want any other evaluation other than to be seen for the vaginal pain.  She denies any headache or visual changes.  No chest pain or shortness of breath.  No abdominal pain.  She states that she does have the vaginal pain but no other pelvic pain.  No vaginal discharge.  She states that she is able to urinate without difficulty.  No vaginal discharge.      Pelvic exam performed by FATMATA Marie and reportedly was consistent with vaginal candidiasis.      Oral acetaminophen and IV fluids ordered.      Diagnostic labs with leukocytosis, anemia, thrombocytosis, and an electrolyte imbalance with hypokalemia otherwise unremarkable.      COVID-19 testing negative      Oral Diflucan was ordered.      EKG with normal sinus rhythm at 72 bpm, LVH criteria, normal axis, normal ST segment, normal T waves      Oral and IV potassium chloride was ordered.      The patient was initially still resistant to observation/admission for further management of her current symptoms and for repletion of her potassium but ultimately did agree to allow us to call Dr. Mullen to see if he would admit her for observation overnight for repletion of  her potassium.  Her family member was still concerned about her being less active at home but she is still resistant to going to rehab versus long-term care.  She prefers to be released to go home.      Impression/diagnosis  Vaginal irritation  Vaginal candidiasis  Leukocytosis, unspecified  Anemia, unspecified  Thrombocytosis  electrolyte imbalance with hypokalemia      Critical care time of  22 minutes billed for management of hypokalemia with initiation of oral and IV potassium chloride, monitoring of the patient on telemetry, consultation with the patient regarding results and arrangement for admission..  This time excludes time for billable procedures.      critical care time billed for by me is non concurrent with time billed for by FATMATA Marie      I personally saw the patient and performed a substantive portion of the visit including all aspects of the medical decision making.      I reviewed the results of the diagnostic labs.      Jael Donohue MD

## 2024-01-13 NOTE — ED PROVIDER NOTES
HPI   Chief Complaint   Patient presents with    Groin Pain     Pt was admitted to hospital two weeks ago. Discharged on Wednesday night, having pain after catheter was removed. States it doesn't burn when she urinates. It just hurts all the time. Hasn't been out of bed much since home due to pain.       This is an 78-year-old female with a past medical history of chronic upper abdominal pain, gallstones without evidence of acute cholecystitis, nausea, vomiting presents emergency department due to complaints of sharp pain to her urethra after Fagan catheter was in place.  Patient was in the hospital from December 28 through January 10 due to chronic upper abdominal pain with nausea and vomiting with decreased oral intake.  Elective laparoscopic cholecystectomy is not completed due to applications with small airway.  Was discharged on January 10 from hospital.  She states that she had a Fagan catheter in place while she was in the hospital and when they removed it, she started to have pain around her urethra.  She denies pain with urination, hematuria, increased frequency or urgency.  She denies changes in her baseline abdominal discomfort.  Her nephew is in the room and states that he is concerned patient has been laying around at home since discharge on January 10.  He is concerned that she is overall coming weaker at home.      Please see HPI for pertinent positive and negative ROS.                     [place] Coma Scale Score: 15                  Patient History   Past Medical History:   Diagnosis Date    Arthritis     Diabetes mellitus (CMS/HCC)     Hypertension      No past surgical history on file.  No family history on file.  Social History     Tobacco Use    Smoking status: Never     Passive exposure: Never    Smokeless tobacco: Never   Substance Use Topics    Alcohol use: Not Currently    Drug use: Not on file       Physical Exam   ED Triage Vitals [01/13/24 1613]   Temp Heart Rate Resp BP   36.7 °C (98.1  °F) 72 20 145/62      SpO2 Temp Source Heart Rate Source Patient Position   96 % Tympanic -- Sitting      BP Location FiO2 (%)     Left arm --       Physical Exam  GENERAL APPEARANCE: Awake and alert. No acute distress.  Breathing comfortably and talking in nondyspneic sentences.  VITAL SIGNS: As per the nurses' triage record.  HEENT: Normocephalic, atraumatic. Extraocular muscles are intact. Conjunctiva are pink. Negative scleral icterus. Mucous membranes are dry. Tongue in the midline. Oropharynx clear, uvula midline.   NECK: Soft, nontender and supple, full gross ROM, no meningeal signs.  CHEST: Nontender to palpation. Clear to auscultation bilaterally. No rales, rhonchi, or wheezing. Symmetric rise and fall of chest wall.   HEART: Clear S1 and S2. Regular rate and rhythm. No murmurs appreciated on auscultation.  Strong and equal pulses in the extremities.  ABDOMEN: Soft, nontender, nondistended  MUSCULOSKELETAL: The calves are nontender to palpation. Full gross active range of motion.   NEUROLOGICAL: Awake, alert and oriented x 3. Motor power intact in the upper and lower extremities. Sensation is intact to light touch in the upper and lower extremities. Patient answering questions appropriately.   GENITAL (female): External genital exam reveals erythematous well-demarcated macular rash around the vulvar region extending into the perineum.  There is thick white discharge over the vulvar skin.  Malodorous.  No discharge coming from vaginal introitus.  Leesa HARRINGTON was standby.  IMMUNOLOGICAL: No lymphatic streaking noted  DERMATOLOGIC: Warm and dry without petechiae, rashes, or ecchymosis noted on visible skin.   PYSCH: Cooperative with appropriate mood and affect.  ED Course & MDM   Diagnoses as of 01/13/24 1845   Vaginal candidiasis   Leukocytosis, unspecified type   Malaise and fatigue   Hypokalemia       Medical Decision Making  Parts of this chart have been completed using voice recognition software. Please  excuse any errors of transcription.  My thought process and reason for plan has been formulated from the time that I saw the patient until the time of disposition and is not specific to one specific moment during their visit and furthermore my MDM encompasses this entire chart and not only this text box.      HPI: Detailed above.    Exam: A medically appropriate exam performed, outlined above, given the known history and presentation.    History obtained from: Patient    EKG: See my supervising physician's EKG interpretation.    Social Determinants of Health considered during this visit: Lives at home with her nephew.    Medications given during visit:  Medications   sodium chloride 0.9 % bolus 500 mL (500 mL intravenous Not Given 1/13/24 1615)   potassium chloride CR (Klor-Con) ER tablet 30 mEq (has no administration in time range)   potassium chloride 20 mEq in 100 mL IV premix (has no administration in time range)   acetaminophen (Tylenol) tablet 650 mg (650 mg oral Given 1/13/24 1804)   potassium chloride CR (Klor-Con) ER tablet 10 mEq (10 mEq oral Given 1/13/24 1822)   fluconazole (Diflucan) tablet 150 mg (150 mg oral Given 1/13/24 1822)        Diagnostic/tests  Labs Reviewed   URINALYSIS WITH REFLEX MICROSCOPIC - Abnormal       Result Value    Color, Urine Light-Yellow      Appearance, Urine Clear      Specific Gravity, Urine 1.017      pH, Urine 5.5      Protein, Urine 50 (1+) (*)     Glucose, Urine Normal      Blood, Urine NEGATIVE      Ketones, Urine NEGATIVE      Bilirubin, Urine NEGATIVE      Urobilinogen, Urine Normal      Nitrite, Urine NEGATIVE      Leukocyte Esterase, Urine NEGATIVE     CBC WITH AUTO DIFFERENTIAL - Abnormal    WBC 19.8 (*)     nRBC 0.0      RBC 3.91 (*)     Hemoglobin 11.0 (*)     Hematocrit 35.2 (*)     MCV 90      MCH 28.1      MCHC 31.3 (*)     RDW 14.2      Platelets 468 (*)     Neutrophils % 79.9      Immature Granulocytes %, Automated 1.1 (*)     Lymphocytes % 10.8       Monocytes % 7.2      Eosinophils % 0.8      Basophils % 0.2      Neutrophils Absolute 15.82 (*)     Immature Granulocytes Absolute, Automated 0.22      Lymphocytes Absolute 2.14      Monocytes Absolute 1.43 (*)     Eosinophils Absolute 0.15      Basophils Absolute 0.04     COMPREHENSIVE METABOLIC PANEL - Abnormal    Glucose 151 (*)     Sodium 134      Potassium 2.7 (*)     Chloride 91 (*)     Bicarbonate 27      Urea Nitrogen 22      Creatinine 1.30      eGFR 42 (*)     Calcium 8.7      Albumin 3.7      Alkaline Phosphatase 100      Total Protein 6.5      AST 11      Bilirubin, Total 0.2      ALT 8      Anion Gap 16     SARS-COV-2 AND INFLUENZA A/B PCR - Normal    Flu A Result Not Detected      Flu B Result Not Detected      Coronavirus 2019, PCR Not Detected      Narrative:     This assay has received FDA Emergency Use Authorization (EUA) and  is only authorized for the duration of time that circumstances exist to justify the authorization of the emergency use of in vitro diagnostic tests for the detection of SARS-CoV-2 virus and/or diagnosis of COVID-19 infection under section 564(b)(1) of the Act, 21 U.S.C. 360bbb-3(b)(1). Testing for SARS-CoV-2 is only recommended for patients who meet current clinical and/or epidemiological criteria as defined by federal, state, or local public health directives. This assay is an in vitro diagnostic nucleic acid amplification test for the qualitative detection of SARS-CoV-2, Influenza A, and Influenza B from nasopharyngeal specimens and has been validated for use at [facility]. Negative results do not preclude COVID-19 infections or Influenza A/B infections, and should not be used as the sole basis for diagnosis, treatment, or other management decisions. If Influenza A/B and RSV PCR results are negative, testing for Parainfluenza virus, Adenovirus and Metapneumovirus is routinely performed for [facility] pediatric oncology and intensive care inpatients, and is  available on other patients by placing an add-on request.    MICROSCOPIC ONLY, URINE - Normal    WBC, Urine 1-5      RBC, Urine NONE        No orders to display        Considerations/further MDM:  Patient was seen in conjucntion with my supervising physician,  Dr. Donohue. Please refer to her note.    I have considered evaluated for acute vaginitis, acute candidiasis, electrolyte disturbance, urinary tract infection, acute kidney injury, systemic infection    Patient shows evidence of candidiasis on physical exam.  She was treated with oral Diflucan.  Patient was also found to be hypokalemic with potassium 2.7.  Both oral and IV potassium were ordered for the patient.  Due to impressive hypokalemia, plan to admit for potassium replacement.     The patient was evaluated in the emergency department and an etiology requiring emergent treatment or admission to hospital was identified.  The patient/family was counseled on clinical expression, expectations, and plan along with recommendations for admission.  All questions were answered and involved parties were understanding and agreeable to course of treatment.  Case was discussed with admitting physician, Dr. Mullen.  Bed type ED treatment and further ED work-up decided by joint decision making with admitting team and any consultants.  Patient stable for admission per my assessment and further management of patient will be deferred to the inpatient setting.    Procedure  Procedures     Karen Marie PA-C  01/13/24 3525

## 2024-01-13 NOTE — Clinical Note
Is the patient on isolation?: No   Do you expect the patient to require telemetry (informational-only for bed management): Yes   Do you expect the patient to require observation or inpt? (informational-only for bed management): Observation   Bed Type: Telemetry [3]   Bed request comments: [facility] tele

## 2024-01-14 LAB
ALBUMIN SERPL-MCNC: 3.2 G/DL (ref 3.5–5)
ALP BLD-CCNC: 86 U/L (ref 35–125)
ALT SERPL-CCNC: 6 U/L (ref 5–40)
ANION GAP SERPL CALC-SCNC: 12 MMOL/L
AST SERPL-CCNC: 8 U/L (ref 5–40)
BILIRUB SERPL-MCNC: 0.3 MG/DL (ref 0.1–1.2)
BUN SERPL-MCNC: 16 MG/DL (ref 8–25)
CALCIUM SERPL-MCNC: 8.2 MG/DL (ref 8.5–10.4)
CHLORIDE SERPL-SCNC: 96 MMOL/L (ref 97–107)
CO2 SERPL-SCNC: 24 MMOL/L (ref 24–31)
CREAT SERPL-MCNC: 1 MG/DL (ref 0.4–1.6)
EGFRCR SERPLBLD CKD-EPI 2021: 57 ML/MIN/1.73M*2
ERYTHROCYTE [DISTWIDTH] IN BLOOD BY AUTOMATED COUNT: 14.1 % (ref 11.5–14.5)
GLUCOSE BLD MANUAL STRIP-MCNC: 107 MG/DL (ref 74–99)
GLUCOSE BLD MANUAL STRIP-MCNC: 161 MG/DL (ref 74–99)
GLUCOSE BLD MANUAL STRIP-MCNC: 172 MG/DL (ref 74–99)
GLUCOSE SERPL-MCNC: 117 MG/DL (ref 65–99)
HCT VFR BLD AUTO: 32.5 % (ref 36–46)
HGB BLD-MCNC: 10.1 G/DL (ref 12–16)
MCH RBC QN AUTO: 27.5 PG (ref 26–34)
MCHC RBC AUTO-ENTMCNC: 31.1 G/DL (ref 32–36)
MCV RBC AUTO: 89 FL (ref 80–100)
NRBC BLD-RTO: 0 /100 WBCS (ref 0–0)
PLATELET # BLD AUTO: 374 X10*3/UL (ref 150–450)
POTASSIUM SERPL-SCNC: 3.8 MMOL/L (ref 3.4–5.1)
PROT SERPL-MCNC: 5.8 G/DL (ref 5.9–7.9)
RBC # BLD AUTO: 3.67 X10*6/UL (ref 4–5.2)
SODIUM SERPL-SCNC: 132 MMOL/L (ref 133–145)
WBC # BLD AUTO: 15 X10*3/UL (ref 4.4–11.3)

## 2024-01-14 PROCEDURE — 82947 ASSAY GLUCOSE BLOOD QUANT: CPT | Mod: 59

## 2024-01-14 PROCEDURE — G0378 HOSPITAL OBSERVATION PER HR: HCPCS

## 2024-01-14 PROCEDURE — 84075 ASSAY ALKALINE PHOSPHATASE: CPT | Performed by: INTERNAL MEDICINE

## 2024-01-14 PROCEDURE — 2500000004 HC RX 250 GENERAL PHARMACY W/ HCPCS (ALT 636 FOR OP/ED): Performed by: INTERNAL MEDICINE

## 2024-01-14 PROCEDURE — 2500000002 HC RX 250 W HCPCS SELF ADMINISTERED DRUGS (ALT 637 FOR MEDICARE OP, ALT 636 FOR OP/ED): Performed by: INTERNAL MEDICINE

## 2024-01-14 PROCEDURE — 2500000001 HC RX 250 WO HCPCS SELF ADMINISTERED DRUGS (ALT 637 FOR MEDICARE OP): Performed by: INTERNAL MEDICINE

## 2024-01-14 PROCEDURE — 85027 COMPLETE CBC AUTOMATED: CPT | Performed by: INTERNAL MEDICINE

## 2024-01-14 PROCEDURE — 96375 TX/PRO/DX INJ NEW DRUG ADDON: CPT

## 2024-01-14 RX ADMIN — APIXABAN 5 MG: 5 TABLET, FILM COATED ORAL at 08:21

## 2024-01-14 RX ADMIN — METOPROLOL TARTRATE 25 MG: 25 TABLET, FILM COATED ORAL at 22:13

## 2024-01-14 RX ADMIN — Medication 6 MG: at 22:12

## 2024-01-14 RX ADMIN — OXYCODONE AND ACETAMINOPHEN 1 TABLET: 5; 325 TABLET ORAL at 22:14

## 2024-01-14 RX ADMIN — INSULIN LISPRO 2 UNITS: 100 INJECTION, SOLUTION INTRAVENOUS; SUBCUTANEOUS at 12:28

## 2024-01-14 RX ADMIN — OXYCODONE AND ACETAMINOPHEN 1 TABLET: 5; 325 TABLET ORAL at 15:39

## 2024-01-14 RX ADMIN — MORPHINE SULFATE 4 MG: 4 INJECTION, SOLUTION INTRAMUSCULAR; INTRAVENOUS at 06:03

## 2024-01-14 RX ADMIN — ALPRAZOLAM 0.5 MG: 0.5 TABLET ORAL at 22:12

## 2024-01-14 RX ADMIN — APIXABAN 5 MG: 5 TABLET, FILM COATED ORAL at 22:12

## 2024-01-14 RX ADMIN — METOPROLOL TARTRATE 25 MG: 25 TABLET, FILM COATED ORAL at 08:22

## 2024-01-14 RX ADMIN — ALUMINUM HYDROXIDE, MAGNESIUM HYDROXIDE, AND SIMETHICONE 20 ML: 200; 200; 20 SUSPENSION ORAL at 00:59

## 2024-01-14 RX ADMIN — ALUMINUM HYDROXIDE, MAGNESIUM HYDROXIDE, AND SIMETHICONE 20 ML: 200; 200; 20 SUSPENSION ORAL at 22:19

## 2024-01-14 RX ADMIN — POLYETHYLENE GLYCOL 3350 17 G: 17 POWDER, FOR SOLUTION ORAL at 08:23

## 2024-01-14 ASSESSMENT — PAIN - FUNCTIONAL ASSESSMENT
PAIN_FUNCTIONAL_ASSESSMENT: 0-10
PAIN_FUNCTIONAL_ASSESSMENT: 0-10

## 2024-01-14 ASSESSMENT — PAIN SCALES - GENERAL
PAINLEVEL_OUTOF10: 8
PAINLEVEL_OUTOF10: 8

## 2024-01-14 ASSESSMENT — PAIN DESCRIPTION - LOCATION: LOCATION: VAGINA

## 2024-01-14 NOTE — ED NOTES
Patients blood sugar is 172.  Patient refusing coverage at this tiime.    States she will eat later - and have her blood sugar checked again     Jasmyn Frederick LPN  01/14/24 1608       Jasmyn Frederick LPN  01/14/24 7211

## 2024-01-14 NOTE — H&P
HISTORY AND PHYSICAL EXAMINATION    PATIENT NAME: Marylou Ward    MRN: [id number]  SERVICE DATE: 1/14/2024       PRIMARY CARE PHYSICIAN: Cuca Mullen MD          ASSESSMENT AND PLAN       #Generalized weakness  - since DC    # Vaginal Candidiasis  - received Diflucan PO once    # Chronic cholecystitis   -Will need cholecystectomy as an elective procedure.  - Pt was unable to be intubated - hence surgery was abandoned - will need ENT eval followed by surgery at [facility] [place].  Same d/w pt.     # Left Rib tenderness  - Xray Ribs     # Hypertension  -Continue home meds     # Diabetes  -Insulin sliding scale     # History of PE  -On Eliquis      Discussed with nurses/case management team and the specialists involved in this patient's care. Reviewed the EMR and documentation from other care-givers.    SUBJECTIVE  CHIEF COMPLAINT:      HPI: This is a 78 y.o. female who was discharged recently to go home is returning back to the emergency room as she was very lethargic with generalized weakness as per her nephew who is her immediate caregiver.  He prefers for her to go to a SNF as per ER staff patient also agrees to go to SNF.  She continues to have left-sided rib cage pain which has been evaluated extensively.  Patient has history of chronic pain and is being seen by pain management.  Patient also has chronic cholecystitis for which she needs a cholecystectomy which was unable to be performed as intubation was very difficult.  Plan is to have the same done at [facility] [place]/ with ENT coverage.      ED NOTE : This is an 78-year-old female with a past medical history of chronic upper abdominal pain, gallstones without evidence of acute cholecystitis, nausea, vomiting presents emergency department due to complaints of sharp pain to her urethra after Fagan catheter was in place.  Patient was in the hospital from December 28 through January 10 due to chronic upper abdominal pain with nausea and vomiting with decreased oral intake.  Elective  laparoscopic cholecystectomy is not completed due to applications with small airway.  Was discharged on January 10 from hospital.  She states that she had a Fagan catheter in place while she was in the hospital and when they removed it, she started to have pain around her urethra.  She denies pain with urination, hematuria, increased frequency or urgency.  She denies changes in her baseline abdominal discomfort.  Her nephew is in the room and states that he is concerned patient has been laying around at home since discharge on January 10.  He is concerned that she is overall coming weaker at home.     PAST MEDICAL HISTORY:   Past Medical History:   Diagnosis Date    Arthritis     Diabetes mellitus (CMS/HCC)     Hypertension      PAST SURGICAL HISTORY: History reviewed. No pertinent surgical history.  FAMILY HISTORY: No family history on file.  SOCIAL HISTORY:   Social History     Tobacco Use    Smoking status: Never     Passive exposure: Never    Smokeless tobacco: Never   Vaping Use    Vaping Use: Never used   Substance Use Topics    Alcohol use: Not Currently    Drug use: Never       MEDICATIONS: Prior to Admission Medications  (Not in a hospital admission)     CURRENT ALLERGIES:   Allergies   Allergen Reactions    Gabapentin Anaphylaxis and Unknown    Levofloxacin Shortness of breath    Sulfa (Sulfonamide Antibiotics) Rash    Duloxetine Nausea And Vomiting    Metronidazole GI Upset and Nausea And Vomiting     Nausea and diarrhea    Pregabalin GI Upset    Protonix [Pantoprazole] Unknown    Adhesive Tape-Silicones Rash    Morphine Hives and Rash       COMPLETE REVIEW OF SYSTEMS:      GENERAL: No fever, appetite stable.  HEENT: No epistaxis, no mouth ulcers  NECK: no neck pain  RESPIRATORY: No new resp symptoms.  CARDIOVASCULAR: No cp, no leg edema, No orthopnea  GI: No NVD, no GI Bleed  : No hematuria, no dysuria  MUSCULOSKELETAL: No new jt pains or swelling  SKIN: No rashes, no ulcers  PSYCH: Denies feeling  "anxious or depressed.   HEMATOLOGY/LYMPHOLOGY: No bruising, no hx of VTE  ENDOCRINE: No hx of DM  NEURO: No hx of seizures or syncope, No hx of CVA      OBJECTIVE  PHYSICAL EXAM:   Heart Rate:  [67-76]   Temp:  [36.7 °C (98.1 °F)-36.8 °C (98.2 °F)]   Resp:  [13-20]   BP: (145-179)/(53-77)   Height:  [162.6 cm (5' 4\")]   Weight:  [63 kg (139 lb)]   SpO2:  [89 %-96 %]     Body mass index is 23.86 kg/m².    GENERAL: awake, alert, Ox3, cooperative resting comfortably  SKIN: Skin turgor normal. No rashes  HEENT: no epistaxis, Moist mucosa.  NECK: supple  BACK: spine nontender to palpation, No CVAT.  LUNGS: Vesicular breath sounds, with no wheeze, no crepitations.   CARDIAC: REGULAR. S1 and S2; no rubs, no murmur  ABDOMEN: Abdomen soft, non-tender. BS+  EXTREMITIES: No edema, Good capillary refill.   NEURO: Insight GOOD. Motor and sensory exam wnl. No invol movements. No ataxia.  WOUND:   MUSCULOSKELETAL: No acute inflammation       .  Current Facility-Administered Medications:     acetaminophen (Tylenol) tablet 650 mg, 650 mg, oral, q6h PRN, Cuca Mullen MD    ALPRAZolam (Xanax) tablet 0.5 mg, 0.5 mg, oral, BID PRN, Cuca Mullen MD    alum-mag hydroxide-simeth (Mylanta) 200-200-20 mg/5 mL oral suspension 20 mL, 20 mL, oral, TID PRN, Cuca Mullen MD, 20 mL at 01/14/24 0059    apixaban (Eliquis) tablet 5 mg, 5 mg, oral, q12h, Cuca Mullen MD, 5 mg at 01/14/24 0821    bisacodyl (Dulcolax) EC tablet 10 mg, 10 mg, oral, Daily PRN, Cuca Mullen MD    dextromethorphan-guaifenesin (Robitussin DM)  mg/5 mL oral liquid 5 mL, 5 mL, oral, q4h PRN, Cuca Mullen MD    dextrose 10 % in water (D10W) infusion, 0.3 g/kg/hr, intravenous, Once PRN, Cuca Mullen MD    dextrose 50 % injection 25 g, 25 g, intravenous, q15 min PRN, Cuca Mullen MD    diphenhydrAMINE (Sominex) tablet 25 mg, 25 mg, oral, BID PRN, Cuca Mullen MD    glucagon (Glucagen) injection 1 mg, 1 mg, " intramuscular, q15 min PRN, Cuca Mullen MD    hydrALAZINE (Apresoline) injection 10 mg, 10 mg, intravenous, q4h PRN, Cuca Mullen MD    HYDROmorphone (Dilaudid) injection 1 mg, 1 mg, intravenous, q3h PRN, Cuca Mullen MD    insulin lispro (HumaLOG) injection 0-10 Units, 0-10 Units, subcutaneous, TID with meals, Cuca Mullen MD, 2 Units at 01/14/24 1228    melatonin tablet 6 mg, 6 mg, oral, Nightly PRN, Cuca Mullen MD, 6 mg at 01/13/24 2305    metoprolol tartrate (Lopressor) tablet 25 mg, 25 mg, oral, BID, Cuca Mullen MD, 25 mg at 01/14/24 0822    morphine injection 4 mg, 4 mg, intravenous, q3h PRN, Cuca Mullen MD, 4 mg at 01/14/24 0603    ondansetron (Zofran) injection 4 mg, 4 mg, intravenous, q4h PRN, Cuca Mullen MD    oxyCODONE-acetaminophen (Percocet) 5-325 mg per tablet 1 tablet, 1 tablet, oral, q6h PRN, Cuca Mullen MD, 1 tablet at 01/14/24 1539    polyethylene glycol (Glycolax, Miralax) packet 17 g, 17 g, oral, Daily, Cuca Mullen MD, 17 g at 01/14/24 0823    sodium chloride 0.9 % bolus 500 mL, 500 mL, intravenous, Once, Jael Donohue MD    Current Outpatient Medications:     acetaminophen (Tylenol) 325 mg tablet, Take 2 tablets (650 mg) by mouth every 6 hours if needed for mild pain (1 - 3)., Disp: 30 tablet, Rfl: 0    apixaban (Eliquis) 5 mg tablet, Take 1 tablet (5 mg) by mouth 2 times a day., Disp: , Rfl:     metFORMIN (Glucophage) 500 mg tablet, Take 1 tablet (500 mg) by mouth twice a day., Disp: , Rfl:     metoprolol tartrate (Lopressor) 25 mg tablet, Take 2 tablets (50 mg) by mouth 2 times a day., Disp: , Rfl:     omeprazole (PriLOSEC) 20 mg DR capsule, Take 1 capsule (20 mg) by mouth once daily in the morning. Take before meals., Disp: , Rfl:     oxyCODONE-acetaminophen (Percocet) 5-325 mg tablet, Take 1 tablet by mouth every 6 hours if needed for severe pain (7 - 10)., Disp: , Rfl:     sertraline (Zoloft) 50 mg tablet, Take 1  tablet (50 mg) by mouth once daily., Disp: , Rfl:     DATA:   Diagnostic tests reviewed for today's visit:    Most recent labs  Admission on 01/13/2024   Component Date Value Ref Range Status    Color, Urine 01/13/2024 Light-Yellow  Light-Yellow, Yellow, Dark-Yellow Final    Appearance, Urine 01/13/2024 Clear  Clear Final    Specific Gravity, Urine 01/13/2024 1.017  1.005 - 1.035 Final    pH, Urine 01/13/2024 5.5  5.0, 5.5, 6.0, 6.5, 7.0, 7.5, 8.0 Final    Protein, Urine 01/13/2024 50 (1+) (A)  NEGATIVE, 10 (TRACE), 20 (TRACE) mg/dL Final    Glucose, Urine 01/13/2024 Normal  Normal mg/dL Final    Blood, Urine 01/13/2024 NEGATIVE  NEGATIVE Final    Ketones, Urine 01/13/2024 NEGATIVE  NEGATIVE mg/dL Final    Bilirubin, Urine 01/13/2024 NEGATIVE  NEGATIVE Final    Urobilinogen, Urine 01/13/2024 Normal  Normal mg/dL Final    Nitrite, Urine 01/13/2024 NEGATIVE  NEGATIVE Final    Leukocyte Esterase, Urine 01/13/2024 NEGATIVE  NEGATIVE Final    WBC 01/13/2024 19.8 (H)  4.4 - 11.3 x10*3/uL Final    nRBC 01/13/2024 0.0  0.0 - 0.0 /100 WBCs Final    RBC 01/13/2024 3.91 (L)  4.00 - 5.20 x10*6/uL Final    Hemoglobin 01/13/2024 11.0 (L)  12.0 - 16.0 g/dL Final    Hematocrit 01/13/2024 35.2 (L)  36.0 - 46.0 % Final    MCV 01/13/2024 90  80 - 100 fL Final    MCH 01/13/2024 28.1  26.0 - 34.0 pg Final    MCHC 01/13/2024 31.3 (L)  32.0 - 36.0 g/dL Final    RDW 01/13/2024 14.2  11.5 - 14.5 % Final    Platelets 01/13/2024 468 (H)  150 - 450 x10*3/uL Final    Neutrophils % 01/13/2024 79.9  40.0 - 80.0 % Final    Immature Granulocytes %, Automated 01/13/2024 1.1 (H)  0.0 - 0.9 % Final    Immature Granulocyte Count (IG) includes promyelocytes, myelocytes and metamyelocytes but does not include bands. Percent differential counts (%) should be interpreted in the context of the absolute cell counts (cells/UL).    Lymphocytes % 01/13/2024 10.8  13.0 - 44.0 % Final    Monocytes % 01/13/2024 7.2  2.0 - 10.0 % Final    Eosinophils % 01/13/2024  0.8  0.0 - 6.0 % Final    Basophils % 01/13/2024 0.2  0.0 - 2.0 % Final    Neutrophils Absolute 01/13/2024 15.82 (H)  1.60 - 5.50 x10*3/uL Final    Percent differential counts (%) should be interpreted in the context of the absolute cell counts (cells/uL).    Immature Granulocytes Absolute, Au* 01/13/2024 0.22  0.00 - 0.50 x10*3/uL Final    Lymphocytes Absolute 01/13/2024 2.14  0.80 - 3.00 x10*3/uL Final    Monocytes Absolute 01/13/2024 1.43 (H)  0.05 - 0.80 x10*3/uL Final    Eosinophils Absolute 01/13/2024 0.15  0.00 - 0.40 x10*3/uL Final    Basophils Absolute 01/13/2024 0.04  0.00 - 0.10 x10*3/uL Final    Glucose 01/13/2024 151 (H)  65 - 99 mg/dL Final    Sodium 01/13/2024 134  133 - 145 mmol/L Final    Potassium 01/13/2024 2.7 (LL)  3.4 - 5.1 mmol/L Final    Result rechecked    Chloride 01/13/2024 91 (L)  97 - 107 mmol/L Final    Bicarbonate 01/13/2024 27  24 - 31 mmol/L Final    Urea Nitrogen 01/13/2024 22  8 - 25 mg/dL Final    Creatinine 01/13/2024 1.30  0.40 - 1.60 mg/dL Final    eGFR 01/13/2024 42 (L)  >60 mL/min/1.73m*2 Final    Calculations of estimated GFR are performed using the 2021 CKD-EPI Study Refit equation without the race variable for the IDMS-Traceable creatinine methods.  https://jasn.asnjournals.org/content/early/2021/09/22/ASN.8466911208    Calcium 01/13/2024 8.7  8.5 - 10.4 mg/dL Final    Albumin 01/13/2024 3.7  3.5 - 5.0 g/dL Final    Alkaline Phosphatase 01/13/2024 100  35 - 125 U/L Final    Total Protein 01/13/2024 6.5  5.9 - 7.9 g/dL Final    AST 01/13/2024 11  5 - 40 U/L Final    Bilirubin, Total 01/13/2024 0.2  0.1 - 1.2 mg/dL Final    ALT 01/13/2024 8  5 - 40 U/L Final    Anion Gap 01/13/2024 16  <=19 mmol/L Final    Flu A Result 01/13/2024 Not Detected  Not Detected Final    Flu B Result 01/13/2024 Not Detected  Not Detected Final    Coronavirus 2019, PCR 01/13/2024 Not Detected  Not Detected Final    WBC, Urine 01/13/2024 1-5  1-5, NONE /HPF Final    RBC, Urine 01/13/2024 NONE  NONE,  1-2, 3-5 /HPF Final    WBC 01/13/2024 17.2 (H)  4.4 - 11.3 x10*3/uL Final    nRBC 01/13/2024 0.0  0.0 - 0.0 /100 WBCs Final    RBC 01/13/2024 3.65 (L)  4.00 - 5.20 x10*6/uL Final    Hemoglobin 01/13/2024 10.2 (L)  12.0 - 16.0 g/dL Final    Hematocrit 01/13/2024 32.6 (L)  36.0 - 46.0 % Final    MCV 01/13/2024 89  80 - 100 fL Final    MCH 01/13/2024 27.9  26.0 - 34.0 pg Final    MCHC 01/13/2024 31.3 (L)  32.0 - 36.0 g/dL Final    RDW 01/13/2024 14.0  11.5 - 14.5 % Final    Platelets 01/13/2024 386  150 - 450 x10*3/uL Final    WBC 01/14/2024 15.0 (H)  4.4 - 11.3 x10*3/uL Final    nRBC 01/14/2024 0.0  0.0 - 0.0 /100 WBCs Final    RBC 01/14/2024 3.67 (L)  4.00 - 5.20 x10*6/uL Final    Hemoglobin 01/14/2024 10.1 (L)  12.0 - 16.0 g/dL Final    Hematocrit 01/14/2024 32.5 (L)  36.0 - 46.0 % Final    MCV 01/14/2024 89  80 - 100 fL Final    MCH 01/14/2024 27.5  26.0 - 34.0 pg Final    MCHC 01/14/2024 31.1 (L)  32.0 - 36.0 g/dL Final    RDW 01/14/2024 14.1  11.5 - 14.5 % Final    Platelets 01/14/2024 374  150 - 450 x10*3/uL Final    Glucose 01/13/2024 163 (H)  65 - 99 mg/dL Final    Sodium 01/13/2024 131 (L)  133 - 145 mmol/L Final    Potassium 01/13/2024 3.2 (L)  3.4 - 5.1 mmol/L Final    Chloride 01/13/2024 90 (L)  97 - 107 mmol/L Final    Bicarbonate 01/13/2024 26  24 - 31 mmol/L Final    Urea Nitrogen 01/13/2024 19  8 - 25 mg/dL Final    Creatinine 01/13/2024 1.10  0.40 - 1.60 mg/dL Final    eGFR 01/13/2024 51 (L)  >60 mL/min/1.73m*2 Final    Calculations of estimated GFR are performed using the 2021 CKD-EPI Study Refit equation without the race variable for the IDMS-Traceable creatinine methods.  https://jasn.asnjournals.org/content/early/2021/09/22/ASN.1751238352    Calcium 01/13/2024 8.2 (L)  8.5 - 10.4 mg/dL Final    Albumin 01/13/2024 3.5  3.5 - 5.0 g/dL Final    Alkaline Phosphatase 01/13/2024 96  35 - 125 U/L Final    Total Protein 01/13/2024 6.0  5.9 - 7.9 g/dL Final    AST 01/13/2024 10  5 - 40 U/L Final     "Bilirubin, Total 01/13/2024 0.2  0.1 - 1.2 mg/dL Final    ALT 01/13/2024 8  5 - 40 U/L Final    Anion Gap 01/13/2024 15  <=19 mmol/L Final    Glucose 01/14/2024 117 (H)  65 - 99 mg/dL Final    Sodium 01/14/2024 132 (L)  133 - 145 mmol/L Final    Potassium 01/14/2024 3.8  3.4 - 5.1 mmol/L Final    Chloride 01/14/2024 96 (L)  97 - 107 mmol/L Final    Bicarbonate 01/14/2024 24  24 - 31 mmol/L Final    Urea Nitrogen 01/14/2024 16  8 - 25 mg/dL Final    Creatinine 01/14/2024 1.00  0.40 - 1.60 mg/dL Final    eGFR 01/14/2024 57 (L)  >60 mL/min/1.73m*2 Final    Calculations of estimated GFR are performed using the 2021 CKD-EPI Study Refit equation without the race variable for the IDMS-Traceable creatinine methods.  https://jasn.asnjournals.org/content/early/2021/09/22/ASN.6805717086    Calcium 01/14/2024 8.2 (L)  8.5 - 10.4 mg/dL Final    Albumin 01/14/2024 3.2 (L)  3.5 - 5.0 g/dL Final    Alkaline Phosphatase 01/14/2024 86  35 - 125 U/L Final    Total Protein 01/14/2024 5.8 (L)  5.9 - 7.9 g/dL Final    AST 01/14/2024 8  5 - 40 U/L Final    Bilirubin, Total 01/14/2024 0.3  0.1 - 1.2 mg/dL Final    ALT 01/14/2024 6  5 - 40 U/L Final    Anion Gap 01/14/2024 12  <=19 mmol/L Final    Thyroid Stimulating Hormone 01/13/2024 1.09  0.27 - 4.20 mIU/L Final    POCT Glucose 01/14/2024 107 (H)  74 - 99 mg/dL Final    POCT Glucose 01/14/2024 161 (H)  74 - 99 mg/dL Final       No results found for: \"GLU\"     Electrocardiogram, 12-lead PRN ACS symptoms  Sinus tachycardia  ST & T wave abnormality, consider lateral ischemia  Abnormal ECG    Confirmed by Kyle Martinez ([id number]) on 1/12/2024 11:38:05 AM        EKG:   Tele:    SIGNATURE: Cuca Mullen MD  DATE: January 14, 2024  TIME: 3:50 PM  "

## 2024-01-15 ENCOUNTER — APPOINTMENT (OUTPATIENT)
Dept: CARDIOLOGY | Facility: HOSPITAL | Age: 79
End: 2024-01-15
Payer: MEDICARE

## 2024-01-15 VITALS
OXYGEN SATURATION: 98 % | WEIGHT: 139 LBS | HEART RATE: 85 BPM | DIASTOLIC BLOOD PRESSURE: 88 MMHG | RESPIRATION RATE: 18 BRPM | TEMPERATURE: 98.2 F | SYSTOLIC BLOOD PRESSURE: 172 MMHG | BODY MASS INDEX: 23.73 KG/M2 | HEIGHT: 64 IN

## 2024-01-15 LAB
ALBUMIN SERPL-MCNC: 3.3 G/DL (ref 3.5–5)
ALP BLD-CCNC: 184 U/L (ref 35–125)
ALT SERPL-CCNC: 37 U/L (ref 5–40)
ANION GAP SERPL CALC-SCNC: 10 MMOL/L
AST SERPL-CCNC: 39 U/L (ref 5–40)
BILIRUB SERPL-MCNC: 0.3 MG/DL (ref 0.1–1.2)
BUN SERPL-MCNC: 14 MG/DL (ref 8–25)
CALCIUM SERPL-MCNC: 8.8 MG/DL (ref 8.5–10.4)
CHLORIDE SERPL-SCNC: 98 MMOL/L (ref 97–107)
CO2 SERPL-SCNC: 28 MMOL/L (ref 24–31)
CREAT SERPL-MCNC: 0.9 MG/DL (ref 0.4–1.6)
EGFRCR SERPLBLD CKD-EPI 2021: 65 ML/MIN/1.73M*2
ERYTHROCYTE [DISTWIDTH] IN BLOOD BY AUTOMATED COUNT: 14.2 % (ref 11.5–14.5)
GLUCOSE BLD MANUAL STRIP-MCNC: 113 MG/DL (ref 74–99)
GLUCOSE SERPL-MCNC: 134 MG/DL (ref 65–99)
HCT VFR BLD AUTO: 31.5 % (ref 36–46)
HGB BLD-MCNC: 10 G/DL (ref 12–16)
MCH RBC QN AUTO: 27.6 PG (ref 26–34)
MCHC RBC AUTO-ENTMCNC: 31.7 G/DL (ref 32–36)
MCV RBC AUTO: 87 FL (ref 80–100)
NRBC BLD-RTO: 0 /100 WBCS (ref 0–0)
PLATELET # BLD AUTO: 381 X10*3/UL (ref 150–450)
POTASSIUM SERPL-SCNC: 3.9 MMOL/L (ref 3.4–5.1)
PROT SERPL-MCNC: 6 G/DL (ref 5.9–7.9)
RBC # BLD AUTO: 3.62 X10*6/UL (ref 4–5.2)
SODIUM SERPL-SCNC: 136 MMOL/L (ref 133–145)
WBC # BLD AUTO: 13 X10*3/UL (ref 4.4–11.3)

## 2024-01-15 PROCEDURE — 85027 COMPLETE CBC AUTOMATED: CPT | Performed by: INTERNAL MEDICINE

## 2024-01-15 PROCEDURE — 80053 COMPREHEN METABOLIC PANEL: CPT | Performed by: INTERNAL MEDICINE

## 2024-01-15 PROCEDURE — 36415 COLL VENOUS BLD VENIPUNCTURE: CPT | Performed by: INTERNAL MEDICINE

## 2024-01-15 PROCEDURE — G0378 HOSPITAL OBSERVATION PER HR: HCPCS

## 2024-01-15 PROCEDURE — 82947 ASSAY GLUCOSE BLOOD QUANT: CPT | Mod: 59

## 2024-01-15 PROCEDURE — 93005 ELECTROCARDIOGRAM TRACING: CPT

## 2024-01-15 PROCEDURE — 2500000001 HC RX 250 WO HCPCS SELF ADMINISTERED DRUGS (ALT 637 FOR MEDICARE OP): Performed by: INTERNAL MEDICINE

## 2024-01-15 RX ADMIN — METOPROLOL TARTRATE 25 MG: 25 TABLET, FILM COATED ORAL at 09:28

## 2024-01-15 RX ADMIN — OXYCODONE AND ACETAMINOPHEN 1 TABLET: 5; 325 TABLET ORAL at 16:00

## 2024-01-15 RX ADMIN — OXYCODONE AND ACETAMINOPHEN 1 TABLET: 5; 325 TABLET ORAL at 09:36

## 2024-01-15 RX ADMIN — ALPRAZOLAM 0.5 MG: 0.5 TABLET ORAL at 09:40

## 2024-01-15 RX ADMIN — APIXABAN 5 MG: 5 TABLET, FILM COATED ORAL at 09:28

## 2024-01-15 ASSESSMENT — PAIN - FUNCTIONAL ASSESSMENT
PAIN_FUNCTIONAL_ASSESSMENT: 0-10
PAIN_FUNCTIONAL_ASSESSMENT: WONG-BAKER FACES

## 2024-01-15 ASSESSMENT — PAIN SCALES - GENERAL: PAINLEVEL_OUTOF10: 2

## 2024-01-15 ASSESSMENT — PAIN SCALES - WONG BAKER: WONGBAKER_NUMERICALRESPONSE: NO HURT

## 2024-01-15 NOTE — PROGRESS NOTES
Subjective   HPI: This is a 78 y.o. female who was discharged recently to go home is returning back to the emergency room as she was very lethargic with generalized weakness as per her nephew who is her immediate caregiver.  He prefers for her to go to a SNF as per ER staff patient also agrees to go to SNF.  She continues to have left-sided rib cage pain which has been evaluated extensively.  Patient has history of chronic pain and is being seen by pain management.  Patient also has chronic cholecystitis for which she needs a cholecystectomy which was unable to be performed as intubation was very difficult.  Plan is to have the same done at [facility]/ with ENT coverage.        ED NOTE : This is an 78-year-old female with a past medical history of chronic upper abdominal pain, gallstones without evidence of acute cholecystitis, nausea, vomiting presents emergency department due to complaints of sharp pain to her urethra after Fagan catheter was in place.  Patient was in the hospital from December 28 through January 10 due to chronic upper abdominal pain with nausea and vomiting with decreased oral intake.  Elective laparoscopic cholecystectomy is not completed due to applications with small airway.  Was discharged on January 10 from hospital.  She states that she had a Fagan catheter in place while she was in the hospital and when they removed it, she started to have pain around her urethra.  She denies pain with urination, hematuria, increased frequency or urgency.  She denies changes in her baseline abdominal discomfort.  Her nephew is in the room and states that he is concerned patient has been laying around at home since discharge on January 10.  He is concerned that she is overall coming weaker at home.           Objective     Last Recorded Vitals  /73   Pulse 85   Temp 36.8 °C (98.2 °F) (Temporal)   Resp 19   Wt 63 kg (139 lb)   SpO2 93%   Intake/Output last 3 Shifts:  No intake or output data in  the 24 hours ending 01/15/24 1030    Admission Weight  Weight: 63 kg (139 lb) (01/13/24 1613)    Daily Weight  01/13/24 : 63 kg (139 lb)    Image Results  Electrocardiogram, 12-lead PRN ACS symptoms  Sinus tachycardia  ST & T wave abnormality, consider lateral ischemia  Abnormal ECG    Confirmed by Kyle Martinez ([id number]) on 1/12/2024 11:38:05 AM      Physical Exam  GENERAL: awake, alert, Ox3, cooperative resting comfortably  SKIN: Skin turgor normal. No rashes  HEENT: no epistaxis, Moist mucosa.  NECK: supple  BACK: spine nontender to palpation, No CVAT.  LUNGS: Vesicular breath sounds, with no wheeze, no crepitations.   CARDIAC: REGULAR. S1 and S2; no rubs, no murmur  ABDOMEN: Abdomen soft, non-tender. BS+  EXTREMITIES: No edema, Good capillary refill.   NEURO: Insight GOOD. Motor and sensory exam wnl. No invol movements. No ataxia.  WOUND:   MUSCULOSKELETAL: No acute inflammation      Relevant Results    Results for orders placed or performed during the hospital encounter of 01/13/24 (from the past 24 hour(s))   POCT GLUCOSE   Result Value Ref Range    POCT Glucose 161 (H) 74 - 99 mg/dL   POCT GLUCOSE   Result Value Ref Range    POCT Glucose 172 (H) 74 - 99 mg/dL   CBC   Result Value Ref Range    WBC 13.0 (H) 4.4 - 11.3 x10*3/uL    nRBC 0.0 0.0 - 0.0 /100 WBCs    RBC 3.62 (L) 4.00 - 5.20 x10*6/uL    Hemoglobin 10.0 (L) 12.0 - 16.0 g/dL    Hematocrit 31.5 (L) 36.0 - 46.0 %    MCV 87 80 - 100 fL    MCH 27.6 26.0 - 34.0 pg    MCHC 31.7 (L) 32.0 - 36.0 g/dL    RDW 14.2 11.5 - 14.5 %    Platelets 381 150 - 450 x10*3/uL   Comprehensive Metabolic Panel   Result Value Ref Range    Glucose 134 (H) 65 - 99 mg/dL    Sodium 136 133 - 145 mmol/L    Potassium 3.9 3.4 - 5.1 mmol/L    Chloride 98 97 - 107 mmol/L    Bicarbonate 28 24 - 31 mmol/L    Urea Nitrogen 14 8 - 25 mg/dL    Creatinine 0.90 0.40 - 1.60 mg/dL    eGFR 65 >60 mL/min/1.73m*2    Calcium 8.8 8.5 - 10.4 mg/dL    Albumin 3.3 (L) 3.5 - 5.0 g/dL    Alkaline  Phosphatase 184 (H) 35 - 125 U/L    Total Protein 6.0 5.9 - 7.9 g/dL    AST 39 5 - 40 U/L    Bilirubin, Total 0.3 0.1 - 1.2 mg/dL    ALT 37 5 - 40 U/L    Anion Gap 10 <=19 mmol/L   POCT GLUCOSE   Result Value Ref Range    POCT Glucose 113 (H) 74 - 99 mg/dL      Scheduled medications  apixaban, 5 mg, oral, q12h  insulin lispro, 0-10 Units, subcutaneous, TID with meals  metoprolol tartrate, 25 mg, oral, BID  polyethylene glycol, 17 g, oral, Daily  sodium chloride, 500 mL, intravenous, Once      Continuous medications     PRN medications  PRN medications: acetaminophen, ALPRAZolam, alum-mag hydroxide-simeth, bisacodyl, dextromethorphan-guaifenesin, dextrose 10 % in water (D10W), dextrose, diphenhydrAMINE, glucagon, hydrALAZINE, HYDROmorphone, melatonin, morphine, ondansetron, oxyCODONE-acetaminophen       Assessment/Plan        #Generalized weakness  - since DC  -Feels better today     # Vaginal Candidiasis  - received Diflucan PO once     # Chronic cholecystitis   -Will need cholecystectomy as an elective procedure.  - Pt was unable to be intubated - hence surgery was abandoned - will need ENT eval followed by surgery at [facility] [place].  Same d/w pt.  = Has appointments to follow-up as outpatient     # Left Rib tenderness  - Xray Ribs     # Hypertension  -Continue home meds     # Diabetes  -Insulin sliding scale     # History of PE  -On Eliquis      1/15 : Patient feels better, does not want to go to SNF, discussed with POA, both agreeable to go home today.  Discharge is planned for today . Discharge medications were reconciled. Discharge orders completed. Hospital course , medication changes and Investigation's conducted were reviewed with the patient / Family. Activity, Diet and Medications after discharge were reviewed with the patient / Family. Medication reconciliation done - pls refer to medication reconciliation sheet.Follow up with Primary Care Physician were reviewed with the patient / Family. Discharge planning was  discussed with staff. Refer to discharge orders sheet. Total time to coordinate disch process incl counseling family, coordinating with other care givers,  and pharmacist was >35 min. Marylou Ward is a 78 y.o. female on day 0 of admission presenting with Generalized weakness.    Cuca Mullen MD

## 2024-01-15 NOTE — PROGRESS NOTES
Marylou Ward is a 78 y.o. female on day 0 of admission presenting with Generalized weakness.    Patient was admitted to [facility] from 12/28/2023 - 01/10/2024. She returned 01/13/2024 for groin pain, lehargy and generalized weakness. Per note she was intially seeking SNF placement.   Currently she states she is feeling better and no longer interested in SNF. Per Dr Mullen, [facility] is ok with patient returning home and will pick her up after work, around 17:00.      Rosa Maria Vasquez RN

## 2024-01-16 LAB
ATRIAL RATE: 72 BPM
P AXIS: 69 DEGREES
P OFFSET: 182 MS
P ONSET: 126 MS
PR INTERVAL: 190 MS
Q ONSET: 221 MS
QRS COUNT: 12 BEATS
QRS DURATION: 92 MS
QT INTERVAL: 440 MS
QTC CALCULATION(BAZETT): 481 MS
QTC FREDERICIA: 467 MS
R AXIS: 31 DEGREES
T AXIS: 135 DEGREES
T OFFSET: 441 MS
VENTRICULAR RATE: 72 BPM

## 2024-01-29 NOTE — DOCUMENTATION CLARIFICATION NOTE
PATIENT:               LAILA PATE  ACCT #:                  [id number]  MRN:                       [id number]  :                       1945  ADMIT DATE:       2023 9:12 PM  DISCH DATE:        1/10/2024 5:40 PM  RESPONDING PROVIDER #:        [id number]          PROVIDER RESPONSE TEXT:    Acute Respiratory Failure    CDI QUERY TEXT:    [facility]_Conflicting Documentation    Instruction:    Based on your assessment of the patient and the clinical information, please provide the requested documentation by clicking on the appropriate radio button and enter any additional information if prompted.    Question: Based on your medical judgment, can you please clarify which of these conditions is the most clinically supported    When answering this query, please exercise your independent professional judgment. The fact that a question is being asked, does not imply that any particular answer is desired or expected.    The patient's clinical indicators include:  Clinical Information: There is conflicting documentation in the medical record which requires clarification.    -The diagnosis of Respiratory Failure was documented on 1/10 by Dr. Porfirio Lea    -The diagnosis of Acute Respiratory Insufficiency was documented on  by Dr. Porfirio Lea    Clinical Indicators: 78 Y/F taken the OR for cholecystectomy.  Despite several attempts by anesthesia unable to intubate her.  Several issues including her small mouth large tongue.  Anterior limited neck flexion.  ENT was consulted for concern for possible  submandibular swelling. We proceeded to emerging her from anesthesia and reversing any sedation with Narcan. She was placed on BiPAP and was transported to the PACU and eventually to the ICU. 13 day hospitalization.    Treatment: Reversal of sedation, nasal trumpet, ICU status, BiPap, supplemental O2, ENT consult with flexible laryngoscopy, Decadron 12mg IV then 10mg IV q12H then decadron 4mg oral q12H, Duo-Neb inh  TID    Risk Factors: small mouth, large tongue, anterior limited neck flexion, COPD, vomiting, possible aspiration  Options provided:  -- Acute Respiratory Failure  -- Acute Respiratory Insufficiency  -- Other - I will add my own diagnosis  -- Refer to Clinical Documentation Reviewer    Query created by: Liseth Mcgill on 1/17/2024 11:13 AM      Electronically signed by:  RODRIGUEZ COSTA MD 1/29/2024 11:29 AM

## 2024-02-18 ENCOUNTER — APPOINTMENT (OUTPATIENT)
Dept: RADIOLOGY | Facility: HOSPITAL | Age: 79
End: 2024-02-18
Payer: MEDICARE

## 2024-02-18 ENCOUNTER — HOSPITAL ENCOUNTER (EMERGENCY)
Facility: HOSPITAL | Age: 79
Discharge: HOME | End: 2024-02-18
Attending: STUDENT IN AN ORGANIZED HEALTH CARE EDUCATION/TRAINING PROGRAM
Payer: MEDICARE

## 2024-02-18 VITALS
HEIGHT: 60 IN | SYSTOLIC BLOOD PRESSURE: 181 MMHG | DIASTOLIC BLOOD PRESSURE: 91 MMHG | OXYGEN SATURATION: 93 % | HEART RATE: 72 BPM | RESPIRATION RATE: 13 BRPM | BODY MASS INDEX: 25.52 KG/M2 | TEMPERATURE: 98.1 F | WEIGHT: 130 LBS
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
## 2024-02-18 DIAGNOSIS — M25.561 ACUTE PAIN OF RIGHT KNEE: Primary | ICD-10-CM

## 2024-02-18 DIAGNOSIS — M79.674 TOE PAIN, RIGHT: ICD-10-CM

## 2024-02-18 PROCEDURE — 2500000001 HC RX 250 WO HCPCS SELF ADMINISTERED DRUGS (ALT 637 FOR MEDICARE OP): Performed by: STUDENT IN AN ORGANIZED HEALTH CARE EDUCATION/TRAINING PROGRAM

## 2024-02-18 PROCEDURE — 70450 CT HEAD/BRAIN W/O DYE: CPT

## 2024-02-18 PROCEDURE — 99285 EMERGENCY DEPT VISIT HI MDM: CPT | Mod: 25

## 2024-02-18 PROCEDURE — 73564 X-RAY EXAM KNEE 4 OR MORE: CPT | Mod: RT

## 2024-02-18 PROCEDURE — 2500000005 HC RX 250 GENERAL PHARMACY W/O HCPCS: Performed by: STUDENT IN AN ORGANIZED HEALTH CARE EDUCATION/TRAINING PROGRAM

## 2024-02-18 PROCEDURE — 72125 CT NECK SPINE W/O DYE: CPT

## 2024-02-18 PROCEDURE — 73660 X-RAY EXAM OF TOE(S): CPT | Mod: RT

## 2024-02-18 RX ORDER — ONDANSETRON 4 MG/1
4 TABLET, ORALLY DISINTEGRATING ORAL ONCE
Status: COMPLETED | OUTPATIENT
Start: 2024-02-18 | End: 2024-02-18

## 2024-02-18 RX ORDER — OXYCODONE AND ACETAMINOPHEN 5; 325 MG/1; MG/1
1 TABLET ORAL ONCE
Status: COMPLETED | OUTPATIENT
Start: 2024-02-18 | End: 2024-02-18

## 2024-02-18 RX ADMIN — ONDANSETRON 4 MG: 4 TABLET, ORALLY DISINTEGRATING ORAL at 13:43

## 2024-02-18 RX ADMIN — OXYCODONE AND ACETAMINOPHEN 1 TABLET: 5; 325 TABLET ORAL at 13:43

## 2024-02-18 ASSESSMENT — PAIN SCALES - GENERAL
PAINLEVEL_OUTOF10: 7
PAINLEVEL_OUTOF10: 0 - NO PAIN
PAINLEVEL_OUTOF10: 2
PAINLEVEL_OUTOF10: 7

## 2024-02-18 ASSESSMENT — PAIN DESCRIPTION - LOCATION
LOCATION_2: HEAD
LOCATION: KNEE
LOCATION: NECK
LOCATION: KNEE

## 2024-02-18 ASSESSMENT — PAIN DESCRIPTION - DESCRIPTORS
DESCRIPTORS_2: ACHING
DESCRIPTORS: ACHING
DESCRIPTORS: ACHING

## 2024-02-18 ASSESSMENT — LIFESTYLE VARIABLES
HAVE PEOPLE ANNOYED YOU BY CRITICIZING YOUR DRINKING: NO
EVER HAD A DRINK FIRST THING IN THE MORNING TO STEADY YOUR NERVES TO GET RID OF A HANGOVER: NO
HAVE YOU EVER FELT YOU SHOULD CUT DOWN ON YOUR DRINKING: NO
EVER FELT BAD OR GUILTY ABOUT YOUR DRINKING: NO

## 2024-02-18 ASSESSMENT — PAIN DESCRIPTION - PROGRESSION
CLINICAL_PROGRESSION: NOT CHANGED
CLINICAL_PROGRESSION: RAPIDLY IMPROVING

## 2024-02-18 ASSESSMENT — COLUMBIA-SUICIDE SEVERITY RATING SCALE - C-SSRS
6. HAVE YOU EVER DONE ANYTHING, STARTED TO DO ANYTHING, OR PREPARED TO DO ANYTHING TO END YOUR LIFE?: NO
1. IN THE PAST MONTH, HAVE YOU WISHED YOU WERE DEAD OR WISHED YOU COULD GO TO SLEEP AND NOT WAKE UP?: NO
2. HAVE YOU ACTUALLY HAD ANY THOUGHTS OF KILLING YOURSELF?: NO

## 2024-02-18 ASSESSMENT — PAIN - FUNCTIONAL ASSESSMENT
PAIN_FUNCTIONAL_ASSESSMENT: 0-10
PAIN_FUNCTIONAL_ASSESSMENT: 0-10

## 2024-02-18 ASSESSMENT — PAIN DESCRIPTION - ONSET
ONSET: AWAKENED FROM SLEEP
ONSET: ONGOING

## 2024-02-18 ASSESSMENT — PAIN DESCRIPTION - ORIENTATION
ORIENTATION: RIGHT
ORIENTATION: RIGHT

## 2024-02-18 ASSESSMENT — PAIN DESCRIPTION - PAIN TYPE
TYPE: ACUTE PAIN
TYPE: ACUTE PAIN

## 2024-02-18 ASSESSMENT — PAIN DESCRIPTION - FREQUENCY
FREQUENCY: CONSTANT/CONTINUOUS
FREQUENCY: CONSTANT/CONTINUOUS

## 2024-02-18 NOTE — DISCHARGE INSTRUCTIONS
You are seen in the emergency primary today after a fall.  You not need to stay in the hospital.  Please continue take your pain medication at home as prescribed.

## 2024-02-18 NOTE — ED NOTES
Patient nephew Jimmy picking patient up from lobby in 5 minutes      Barry De La Paz, AMADON  02/18/24 8042

## 2024-02-21 NOTE — ED PROVIDER NOTES
HPI   Chief Complaint   Patient presents with    Fall     Hit head and neck. Dry heaving, Hit toe and knee. No LOC. Takes Eliquis, fell yesterday.       78-year-old female presents after fall.  Patient is on Eliquis.  Patient suffered a mechanical fall after tripping on her dog.  States that she fell forward, striking the front of her head.  No loss of consciousness.  Complains of right toe pain and right knee pain.  Has been ambulatory since the fall.  No other injuries.                          No data recorded                   Patient History   Past Medical History:   Diagnosis Date    Arthritis     Diabetes mellitus (CMS/[facility])     Hypertension      History reviewed. No pertinent surgical history.  No family history on file.  Social History     Tobacco Use    Smoking status: Never     Passive exposure: Never    Smokeless tobacco: Never   Vaping Use    Vaping Use: Never used   Substance Use Topics    Alcohol use: Not Currently    Drug use: Never       Physical Exam   ED Triage Vitals   Temperature Heart Rate Respirations BP   02/18/24 1221 02/18/24 1221 02/18/24 1221 02/18/24 1241   36.7 °C (98.1 °F) 73 18 (!) 221/102      Pulse Ox Temp Source Heart Rate Source Patient Position   02/18/24 1221 02/18/24 1221 -- 02/18/24 1221   96 % Tympanic  Sitting      BP Location FiO2 (%)     02/18/24 1221 --     Left arm        Physical Exam  Vitals and nursing note reviewed.   Constitutional:       General: She is not in acute distress.     Appearance: She is not ill-appearing.   HENT:      Head: Normocephalic and atraumatic.      Mouth/Throat:      Mouth: Mucous membranes are moist.      Pharynx: Oropharynx is clear.   Eyes:      Extraocular Movements: Extraocular movements intact.      Conjunctiva/sclera: Conjunctivae normal.      Pupils: Pupils are equal, round, and reactive to light.   Cardiovascular:      Rate and Rhythm: Normal rate and regular rhythm.   Pulmonary:      Effort: Pulmonary effort is normal. No respiratory  distress.      Breath sounds: Normal breath sounds.   Abdominal:      General: There is no distension.      Palpations: Abdomen is soft.      Tenderness: There is no abdominal tenderness.   Musculoskeletal:         General: No swelling or deformity. Normal range of motion.      Cervical back: Normal range of motion and neck supple.      Comments: Right knee and right great toe without any erythema, ecchymosis, crepitus.  Minimal swelling to right knee.  Tenderness to palpation over both.   Skin:     General: Skin is warm and dry.      Capillary Refill: Capillary refill takes less than 2 seconds.   Neurological:      General: No focal deficit present.      Mental Status: She is alert and oriented to person, place, and time. Mental status is at baseline.   Psychiatric:         Mood and Affect: Mood normal.         Behavior: Behavior normal.         ED Course & MDM   Diagnoses as of 02/21/24 0646   Acute pain of right knee   Toe pain, right   Fall, initial encounter       Medical Decision Making  78 y.o. female presents after mechanical fall.  I have considered the following conditions during my assessment of this patient's condition: Head injury, cervical/thoracic/lumbar injury, thorax injury, extremity fracture/dislocation.  X-ray without evidence of fractures.  CT head without evidence of intracranial bleed or skull fracture.  No cervical spine fracture.  Patient is ambulatory without difficulty.  Patient has safe discharge plan to prevent future falls. No further indication for urgent/emergent workup or management and is appropriate for d/c home.         Procedure  Procedures     Linda Cristobal MD  02/21/24 0627

## 2024-06-03 ENCOUNTER — APPOINTMENT (OUTPATIENT)
Dept: RADIOLOGY | Facility: HOSPITAL | Age: 79
DRG: 444 | End: 2024-06-03
Payer: MEDICARE

## 2024-06-03 ENCOUNTER — APPOINTMENT (OUTPATIENT)
Dept: CARDIOLOGY | Facility: HOSPITAL | Age: 79
DRG: 444 | End: 2024-06-03
Payer: MEDICARE

## 2024-06-03 ENCOUNTER — HOSPITAL ENCOUNTER (INPATIENT)
Facility: HOSPITAL | Age: 79
LOS: 3 days | Discharge: HOME | DRG: 444 | End: 2024-06-07
Attending: EMERGENCY MEDICINE | Admitting: INTERNAL MEDICINE
Payer: MEDICARE

## 2024-06-03 DIAGNOSIS — R07.9 CHEST PAIN, UNSPECIFIED TYPE: Primary | ICD-10-CM

## 2024-06-03 LAB
ALBUMIN SERPL-MCNC: 4.3 G/DL (ref 3.5–5)
ALP BLD-CCNC: 92 U/L (ref 35–125)
ALT SERPL-CCNC: 5 U/L (ref 5–40)
ANION GAP SERPL CALC-SCNC: 12 MMOL/L
APPEARANCE UR: CLEAR
AST SERPL-CCNC: 10 U/L (ref 5–40)
BACTERIA #/AREA URNS AUTO: ABNORMAL /HPF
BASOPHILS # BLD AUTO: 0.09 X10*3/UL (ref 0–0.1)
BASOPHILS NFR BLD AUTO: 1 %
BILIRUB SERPL-MCNC: <0.2 MG/DL (ref 0.1–1.2)
BILIRUB UR STRIP.AUTO-MCNC: NEGATIVE MG/DL
BUN SERPL-MCNC: 15 MG/DL (ref 8–25)
CALCIUM SERPL-MCNC: 9.3 MG/DL (ref 8.5–10.4)
CHLORIDE SERPL-SCNC: 100 MMOL/L (ref 97–107)
CO2 SERPL-SCNC: 25 MMOL/L (ref 24–31)
COLOR UR: COLORLESS
CREAT SERPL-MCNC: 1.2 MG/DL (ref 0.4–1.6)
EGFRCR SERPLBLD CKD-EPI 2021: 46 ML/MIN/1.73M*2
EOSINOPHIL # BLD AUTO: 0.27 X10*3/UL (ref 0–0.4)
EOSINOPHIL NFR BLD AUTO: 3.1 %
ERYTHROCYTE [DISTWIDTH] IN BLOOD BY AUTOMATED COUNT: 14.6 % (ref 11.5–14.5)
GLUCOSE SERPL-MCNC: 107 MG/DL (ref 65–99)
GLUCOSE UR STRIP.AUTO-MCNC: NORMAL MG/DL
HCT VFR BLD AUTO: 32.7 % (ref 36–46)
HGB BLD-MCNC: 10.1 G/DL (ref 12–16)
IMM GRANULOCYTES # BLD AUTO: 0.02 X10*3/UL (ref 0–0.5)
IMM GRANULOCYTES NFR BLD AUTO: 0.2 % (ref 0–0.9)
KETONES UR STRIP.AUTO-MCNC: NEGATIVE MG/DL
LEUKOCYTE ESTERASE UR QL STRIP.AUTO: ABNORMAL
LYMPHOCYTES # BLD AUTO: 1.72 X10*3/UL (ref 0.8–3)
LYMPHOCYTES NFR BLD AUTO: 19.7 %
MCH RBC QN AUTO: 26.1 PG (ref 26–34)
MCHC RBC AUTO-ENTMCNC: 30.9 G/DL (ref 32–36)
MCV RBC AUTO: 85 FL (ref 80–100)
MONOCYTES # BLD AUTO: 0.75 X10*3/UL (ref 0.05–0.8)
MONOCYTES NFR BLD AUTO: 8.6 %
NEUTROPHILS # BLD AUTO: 5.87 X10*3/UL (ref 1.6–5.5)
NEUTROPHILS NFR BLD AUTO: 67.4 %
NITRITE UR QL STRIP.AUTO: ABNORMAL
NRBC BLD-RTO: 0 /100 WBCS (ref 0–0)
NT-PROBNP SERPL-MCNC: 1247 PG/ML (ref 0–624)
PH UR STRIP.AUTO: 6 [PH]
PLATELET # BLD AUTO: 384 X10*3/UL (ref 150–450)
POTASSIUM SERPL-SCNC: 4.5 MMOL/L (ref 3.4–5.1)
PROT SERPL-MCNC: 7.4 G/DL (ref 5.9–7.9)
PROT UR STRIP.AUTO-MCNC: ABNORMAL MG/DL
RBC # BLD AUTO: 3.87 X10*6/UL (ref 4–5.2)
RBC # UR STRIP.AUTO: NEGATIVE /UL
RBC #/AREA URNS AUTO: ABNORMAL /HPF
SODIUM SERPL-SCNC: 137 MMOL/L (ref 133–145)
SP GR UR STRIP.AUTO: 1.01
TROPONIN T SERPL-MCNC: 16 NG/L
TROPONIN T SERPL-MCNC: 16 NG/L
UROBILINOGEN UR STRIP.AUTO-MCNC: NORMAL MG/DL
WBC # BLD AUTO: 8.7 X10*3/UL (ref 4.4–11.3)
WBC #/AREA URNS AUTO: ABNORMAL /HPF

## 2024-06-03 PROCEDURE — 99285 EMERGENCY DEPT VISIT HI MDM: CPT

## 2024-06-03 PROCEDURE — 2500000001 HC RX 250 WO HCPCS SELF ADMINISTERED DRUGS (ALT 637 FOR MEDICARE OP): Performed by: PHYSICIAN ASSISTANT

## 2024-06-03 PROCEDURE — 2500000004 HC RX 250 GENERAL PHARMACY W/ HCPCS (ALT 636 FOR OP/ED): Performed by: PHYSICIAN ASSISTANT

## 2024-06-03 PROCEDURE — 2500000004 HC RX 250 GENERAL PHARMACY W/ HCPCS (ALT 636 FOR OP/ED): Performed by: EMERGENCY MEDICINE

## 2024-06-03 PROCEDURE — 93005 ELECTROCARDIOGRAM TRACING: CPT

## 2024-06-03 PROCEDURE — 83880 ASSAY OF NATRIURETIC PEPTIDE: CPT | Performed by: PHYSICIAN ASSISTANT

## 2024-06-03 PROCEDURE — 81001 URINALYSIS AUTO W/SCOPE: CPT | Performed by: PHYSICIAN ASSISTANT

## 2024-06-03 PROCEDURE — 96374 THER/PROPH/DIAG INJ IV PUSH: CPT

## 2024-06-03 PROCEDURE — 70450 CT HEAD/BRAIN W/O DYE: CPT | Performed by: RADIOLOGY

## 2024-06-03 PROCEDURE — 80053 COMPREHEN METABOLIC PANEL: CPT | Performed by: PHYSICIAN ASSISTANT

## 2024-06-03 PROCEDURE — 71045 X-RAY EXAM CHEST 1 VIEW: CPT | Performed by: RADIOLOGY

## 2024-06-03 PROCEDURE — 2500000001 HC RX 250 WO HCPCS SELF ADMINISTERED DRUGS (ALT 637 FOR MEDICARE OP): Performed by: EMERGENCY MEDICINE

## 2024-06-03 PROCEDURE — 70450 CT HEAD/BRAIN W/O DYE: CPT

## 2024-06-03 PROCEDURE — 36415 COLL VENOUS BLD VENIPUNCTURE: CPT | Performed by: PHYSICIAN ASSISTANT

## 2024-06-03 PROCEDURE — 84484 ASSAY OF TROPONIN QUANT: CPT | Performed by: PHYSICIAN ASSISTANT

## 2024-06-03 PROCEDURE — 87186 SC STD MICRODIL/AGAR DIL: CPT | Mod: WESLAB | Performed by: PHYSICIAN ASSISTANT

## 2024-06-03 PROCEDURE — 96361 HYDRATE IV INFUSION ADD-ON: CPT

## 2024-06-03 PROCEDURE — 71045 X-RAY EXAM CHEST 1 VIEW: CPT

## 2024-06-03 PROCEDURE — 85025 COMPLETE CBC W/AUTO DIFF WBC: CPT | Performed by: PHYSICIAN ASSISTANT

## 2024-06-03 RX ORDER — NITROGLYCERIN 0.4 MG/1
0.4 TABLET SUBLINGUAL ONCE
Status: COMPLETED | OUTPATIENT
Start: 2024-06-03 | End: 2024-06-03

## 2024-06-03 RX ORDER — NAPROXEN SODIUM 220 MG/1
324 TABLET, FILM COATED ORAL ONCE
Status: COMPLETED | OUTPATIENT
Start: 2024-06-03 | End: 2024-06-03

## 2024-06-03 RX ORDER — OXYCODONE HYDROCHLORIDE 5 MG/1
5 TABLET ORAL ONCE
Status: COMPLETED | OUTPATIENT
Start: 2024-06-03 | End: 2024-06-03

## 2024-06-03 RX ORDER — FAMOTIDINE 10 MG/ML
20 INJECTION INTRAVENOUS ONCE
Status: COMPLETED | OUTPATIENT
Start: 2024-06-03 | End: 2024-06-03

## 2024-06-03 RX ORDER — CEFTRIAXONE 1 G/50ML
1 INJECTION, SOLUTION INTRAVENOUS ONCE
Status: COMPLETED | OUTPATIENT
Start: 2024-06-04 | End: 2024-06-04

## 2024-06-03 RX ADMIN — ASPIRIN 81 MG 324 MG: 81 TABLET ORAL at 16:27

## 2024-06-03 RX ADMIN — SODIUM CHLORIDE 500 ML: 900 INJECTION, SOLUTION INTRAVENOUS at 16:40

## 2024-06-03 RX ADMIN — NITROGLYCERIN 0.4 MG: 0.4 TABLET, ORALLY DISINTEGRATING SUBLINGUAL at 16:27

## 2024-06-03 RX ADMIN — FAMOTIDINE 20 MG: 10 INJECTION, SOLUTION INTRAVENOUS at 20:12

## 2024-06-03 RX ADMIN — OXYCODONE HYDROCHLORIDE 5 MG: 5 TABLET ORAL at 22:21

## 2024-06-03 NOTE — PROGRESS NOTES
Attestation/Supervisory note for FATMATA Marie      The patient is an 78-year-old female presenting to the emergency department for evaluation of chest tightness with bilateral neck and arm pain.  She states that she started having symptoms about 2 to 3 hours prior to arrival.  She states that her blood pressure also has been running high.  She states that she took all of her medications today but she may have missed a dose of her blood pressure medication the other day.  She states that the pain has somewhat improved on its own.  It is a dull aching pain.  No better or worse.  No radiation.  She states that she does have some mild shortness of breath and nausea but no vomiting.  She states that she does also have some mild diffuse abdominal pain that is worse in the upper abdomen but she states that this is a chronic issue for her.  She states that it is not changed today.  She denies any headache or visual changes.  No midline neck or back pain.  No palpitations.  No diaphoresis.  No diarrhea or constipation.  No vaginal discharge.  She states that she does have a history of a heart attack but she states that she did not have to have anything done.  She states it was a long time ago and she does not know exactly what it was.  Her cardiologist is Dr. Rodriguez.  She denies any recent injury or trauma.  No recent travel or immobility.  No recent surgery.  Pulses are equal bilaterally.      EKG with normal sinus rhythm at 72 bpm, normal axis, normal voltage, normal ST segment, normal T waves      Diagnostic labs with anemia, mildly elevated BNP but otherwise unremarkable.      Initial trop T 16. Repeat trop T 16       Heart score 3      CT head wo IV contrast   Final Result   No acute intracranial abnormality.        Chronic changes as noted above.        MACRO:   None        Signed by: Joss Guevara 6/3/2024 6:51 PM   Dictation workstation:   [id number]      XR chest 1 view   Final Result   Allowing for the  aforementioned limitation, no acute cardiopulmonary   disease.        Signed by: Kit King 6/3/2024 4:45 PM   Dictation workstation:   [id number]      CT angio chest abdomen pelvis    (Results Pending)        The patient does not have any evidence of ischemia on EKG or cardiac enzymes.  No events on telemetry.  The patient does not have any widening of the mediastinum on chest x-ray.  No evidence of pneumonia or pneumothorax.  She does have equal pulses bilaterally.  CT angio chest abdomen pelvis was pending at the time of my departure.  The urinalysis was also pending at the time of my departure.      FATMATA Marie will continue to manage the pt primarily.  Anticipate disposition based on the results of the pending studies.  If the CT angio is concerning for dissection, anticipate that the patient will be for to [facility].  If the CT angio shows no acute findings, anticipate that the patient could be admitted here for observation and trending of her cardiac enzymes and or could be released to go home if the patient is stable and her blood pressure has improved.      Impression/diagnosis:  Chest pain, substernal  Hypertension, unspecified  Dyspnea  Abdominal pain, chronic      I personally saw the patient and made/approve the management plan and take responsibility for the patient management.      I independently interpreted the following study (S) EKG and diagnostic labs      I personally discussed the patient's management with the patient      I reviewed the results of the diagnostic labs and diagnostic imaging.  Formal radiology read was completed by the radiologist.      Jael Donohue MD

## 2024-06-03 NOTE — ED PROVIDER NOTES
HPI   Chief Complaint   Patient presents with    Chest Pain     Patient arrives via ems with complaints of hypertension in 200s systollically and chest tightness. Including severe neck pain and bilateral arm pain that started today. Compliant with all medications       This is an 78-year-old female presenting to the emergency department with complaints of chest tightness, neck pain and bilateral arm pain.  Patient states that symptom started approximately 2 hours ago.  She was at home.  Patient states that her blood pressure was high in the 200s.  She states that she took all her medications today.  She has associated shortness of breath and nausea.  She states that she has some abdominal discomfort but this is her baseline abdominal discomfort and nothing new today.  No recent fevers chills or coughing.  She states that she has had a heart attack before, and is unknown when it was.  No stents or bypass grafts.  Patient states that she started to get a headache in the left part of her forehead.  It is throbbing.  No visual disturbances.        Please see HPI for pertinent positive and negative ROS.                 Rashaad Coma Scale Score: 15                     Patient History   Past Medical History:   Diagnosis Date    Arthritis     Diabetes mellitus (Multi)     Hypertension      No past surgical history on file.  No family history on file.  Social History     Tobacco Use    Smoking status: Never     Passive exposure: Never    Smokeless tobacco: Never   Vaping Use    Vaping status: Never Used   Substance Use Topics    Alcohol use: Not Currently    Drug use: Never       Physical Exam   ED Triage Vitals [06/03/24 1600]   Temperature Heart Rate Respirations BP   36.9 °C (98.4 °F) 73 18 (!) 202/88      Pulse Ox Temp src Heart Rate Source Patient Position   96 % -- -- --      BP Location FiO2 (%)     -- --       Physical Exam  GENERAL APPEARANCE: Awake and alert. No acute distress.  Patient does appear pale and  diaphoretic.  VITAL SIGNS: As per the nurses' triage record.  HEENT: Normocephalic, atraumatic. Extraocular muscles are intact. Conjunctiva are pink. Negative scleral icterus. Mucous membranes are moist. Tongue in the midline. Oropharynx clear, uvula midline.   NECK: Soft, nontender and supple, full gross ROM, no meningeal signs.  CHEST: Nontender to palpation. Clear to auscultation bilaterally. No rales, rhonchi, or wheezing. Symmetric rise and fall of chest wall.   HEART: Clear S1 and S2. Regular rate and rhythm. Strong and equal pulses in the extremities.  ABDOMEN: Soft, nontender, nondistended  MUSCULOSKELETAL: The calves are nontender to palpation. Full gross active range of motion. Ambulating on own with no acute difficulties  NEUROLOGICAL: Awake, alert and oriented x 3. Motor power intact in the upper and lower extremities. Sensation is intact to light touch in the upper and lower extremities. Patient answering questions appropriately.   IMMUNOLOGICAL: No lymphatic streaking noted  DERMATOLOGIC: Warm and dry without petechiae, rashes, or ecchymosis noted on visible skin.   PYSCH: Cooperative with appropriate mood and affect.  ED Course & MDM   Diagnoses as of 06/04/24 0320   Chest pain, unspecified type       Medical Decision Making  Parts of this chart have been completed using voice recognition software. Please excuse any errors of transcription.  My thought process and reason for plan has been formulated from the time that I saw the patient until the time of disposition and is not specific to one specific moment during their visit and furthermore my MDM encompasses this entire chart and not only this text box.      HPI: Detailed above.    Exam: A medically appropriate exam performed, outlined above, given the known history and presentation.    History obtained from: Patient    EKG: See my supervising physician's EKG interpretation    Medications given during visit:  Medications   aspirin chewable tablet  324 mg (324 mg oral Given 6/3/24 1627)   nitroglycerin (Nitrostat) SL tablet 0.4 mg (0.4 mg sublingual Given 6/3/24 1627)   sodium chloride 0.9 % bolus 500 mL (0 mL intravenous Stopped 6/3/24 1840)   famotidine PF (Pepcid) injection 20 mg (20 mg intravenous Given 6/3/24 2012)   oxyCODONE (Roxicodone) immediate release tablet 5 mg (5 mg oral Given 6/3/24 2221)   cefTRIAXone (Rocephin) IVPB 1 g (0 g intravenous Stopped 6/4/24 0148)   iohexol (OMNIPaque) 350 mg iodine/mL solution 75 mL (75 mL intravenous Given 6/4/24 0057)   ondansetron (Zofran) injection 4 mg (4 mg intravenous Given 6/4/24 0146)   hydrocortisone 1 % cream ( Topical Given 6/4/24 0146)        Diagnostic/tests  Labs Reviewed   CBC WITH AUTO DIFFERENTIAL - Abnormal       Result Value    WBC 8.7      nRBC 0.0      RBC 3.87 (*)     Hemoglobin 10.1 (*)     Hematocrit 32.7 (*)     MCV 85      MCH 26.1      MCHC 30.9 (*)     RDW 14.6 (*)     Platelets 384      Neutrophils % 67.4      Immature Granulocytes %, Automated 0.2      Lymphocytes % 19.7      Monocytes % 8.6      Eosinophils % 3.1      Basophils % 1.0      Neutrophils Absolute 5.87 (*)     Immature Granulocytes Absolute, Automated 0.02      Lymphocytes Absolute 1.72      Monocytes Absolute 0.75      Eosinophils Absolute 0.27      Basophils Absolute 0.09     COMPREHENSIVE METABOLIC PANEL - Abnormal    Glucose 107 (*)     Sodium 137      Potassium 4.5      Chloride 100      Bicarbonate 25      Urea Nitrogen 15      Creatinine 1.20      eGFR 46 (*)     Calcium 9.3      Albumin 4.3      Alkaline Phosphatase 92      Total Protein 7.4      AST 10      Bilirubin, Total <0.2      ALT 5      Anion Gap 12     N-TERMINAL PROBNP - Abnormal    PROBNP 1,247 (*)     Narrative:     Reference ranges are based on clinical submission data. These ranges represent the 95th percentile of normal cut-off points. As NT Pro- BNP values approach 1000 pg/ml, clinical symptoms are more likely associated with CHF.   SERIAL  TROPONIN, INITIAL ([facility]) - Abnormal    Troponin T, High Sensitivity 16 (*)    URINALYSIS WITH REFLEX CULTURE AND MICROSCOPIC - Abnormal    Color, Urine Colorless (*)     Appearance, Urine Clear      Specific Gravity, Urine 1.006      pH, Urine 6.0      Protein, Urine 10 (TRACE)      Glucose, Urine Normal      Blood, Urine NEGATIVE      Ketones, Urine NEGATIVE      Bilirubin, Urine NEGATIVE      Urobilinogen, Urine Normal      Nitrite, Urine 1+ (*)     Leukocyte Esterase, Urine 250 Kamaljit/µL (*)    SERIAL TROPONIN,  2 HOUR ([facility]) - Abnormal    Troponin T, High Sensitivity 16 (*)    MICROSCOPIC ONLY, URINE - Abnormal    WBC, Urine 21-50 (*)     RBC, Urine NONE      Bacteria, Urine 1+ (*)    SERIAL TROPONIN, 6 HOUR ([facility]) - Normal    Troponin T, High Sensitivity <6     URINE CULTURE   TROPONIN T SERIES, HIGH SENSITIVITY (0, 2 HR, 6 HR)    Narrative:     The following orders were created for panel order Troponin T Series, High Sensitivity (0, 2HR, 6HR).  Procedure                               Abnormality         Status                     ---------                               -----------         ------                     Serial Troponin, Initial...[[id number]]  Abnormal            Final result               Serial Troponin, 2 Hour ...[[id number]]  Abnormal            Final result               Serial Troponin, 6 Hour ...[[id number]]  Normal              Final result                 Please view results for these tests on the individual orders.   URINALYSIS WITH REFLEX CULTURE AND MICROSCOPIC    Narrative:     The following orders were created for panel order Urinalysis with Reflex Culture and Microscopic.  Procedure                               Abnormality         Status                     ---------                               -----------         ------                     Urinalysis with Reflex C...[[id number]]  Abnormal            Final result               Extra Urine Gray Tube[[id number]]                             In process                   Please view results for these tests on the individual orders.   EXTRA URINE GRAY TUBE      CT angio chest abdomen pelvis   Final Result   1. No thoracic aortic aneurysm or acute dissection. Irregular   noncalcified plaque at the aortic arch and descending thoracic aorta,   concerning for unstable plaque. Irregular noncalcified plaque at the   proximal brachiocephalic artery. Calcified and noncalcified plaque at   the proximal left subclavian artery with approximate 50% stenosis.   2. Small area of dissection with luminal narrowing at the distal   infrarenal abdominal aorta, of indeterminate age, with a 0.9 x 1.3 x   1.2 cm saccular infrarenal abdominal aortic aneurysm at the site.   3. No evidence for pulmonary emboli.   4. Coronary artery calcifications.   5. Cholelithiasis.   6. Mild interval increase in size in the cystic lesion at the right   adnexa. This can be follow-up with nonemergent pelvic ultrasound.                  MACRO:   Critical Finding:  See findings. Notification was initiated on   6/4/2024 at 2:57 am by  Celina Rubio.  (**-YCF-**) Instructions:        Signed by: Celina Rubio 6/4/2024 3:03 AM   Dictation workstation:   [id number]      CT head wo IV contrast   Final Result   No acute intracranial abnormality.        Chronic changes as noted above.        MACRO:   None        Signed by: Joss Guevara 6/3/2024 6:51 PM   Dictation workstation:   [id number]      XR chest 1 view   Final Result   Allowing for the aforementioned limitation, no acute cardiopulmonary   disease.        Signed by: Kit King 6/3/2024 4:45 PM   Dictation workstation:   [id number]           Considerations/further MDM:  Patient was seen in conjucntion with my supervising physician,  Dr. Donohue. Please refer to her note.    Patient does present with elevated blood pressure at 202/88, temperature 98.4 °F, heart rate 73 beats neal, respirations 18, 96% on room air    Differential  diagnosis includes but not limited to hypertensive urgency versus hypertensive emergency versus electrolyte disturbance versus ACS including myocardial infarction versus pneumothorax versus pneumonia    Heart score of 5.  Initial troponin T 16, repeat troponin T 16.  Urinalysis showed evidence of urinary tract infection, treated with ceftriaxone.  CMP shows no significant electro disturbance or acute kidney injury.  proBNP 1247.  CBC with stable normocytic anemia.  The head shows no acute intracranial abnormality.  Chest x-ray showed no acute cardiopulmonary disease.  CTA of chest abdomen pelvis did show concerns of unstable plaque in the aortic arch.  There was also a small dissection of infrarenal abdominal aorta.  The patient was given oral aspirin, oral nitroglycerin.  She was also treated with Pepcid and Zofran for nausea.  She was given oxycodone for pain.    Due to heart score of 5 with concerns of unstable plaque on CTA plan for admission.    The patient was evaluated in the emergency department and an etiology requiring emergent treatment or admission to hospital was identified.  The patient/family was counseled on clinical expression, expectations, and plan along with recommendations for admission.  All questions were answered and involved parties were understanding and agreeable to course of treatment.  Case was discussed with admitting physician, Dr. Mullen.  Bed type ED treatment and further ED work-up decided by joint decision making with admitting team and any consultants.  Patient stable for admission per my assessment and further management of patient will be deferred to the inpatient setting.    Procedure  Procedures     Karen Marie PA-C  06/04/24 8420

## 2024-06-04 ENCOUNTER — APPOINTMENT (OUTPATIENT)
Dept: RADIOLOGY | Facility: HOSPITAL | Age: 79
DRG: 444 | End: 2024-06-04
Payer: MEDICARE

## 2024-06-04 PROBLEM — R07.9 CHEST PAIN, UNSPECIFIED TYPE: Status: ACTIVE | Noted: 2024-06-04

## 2024-06-04 LAB
ALBUMIN SERPL-MCNC: 4.2 G/DL (ref 3.5–5)
ALP BLD-CCNC: 92 U/L (ref 35–125)
ALT SERPL-CCNC: 5 U/L (ref 5–40)
ANION GAP SERPL CALC-SCNC: 16 MMOL/L
AST SERPL-CCNC: 11 U/L (ref 5–40)
BILIRUB SERPL-MCNC: 0.2 MG/DL (ref 0.1–1.2)
BUN SERPL-MCNC: 11 MG/DL (ref 8–25)
CALCIUM SERPL-MCNC: 9.3 MG/DL (ref 8.5–10.4)
CHLORIDE SERPL-SCNC: 98 MMOL/L (ref 97–107)
CO2 SERPL-SCNC: 23 MMOL/L (ref 24–31)
CREAT SERPL-MCNC: 1 MG/DL (ref 0.4–1.6)
EGFRCR SERPLBLD CKD-EPI 2021: 57 ML/MIN/1.73M*2
ERYTHROCYTE [DISTWIDTH] IN BLOOD BY AUTOMATED COUNT: 14.5 % (ref 11.5–14.5)
GLUCOSE BLD MANUAL STRIP-MCNC: 111 MG/DL (ref 74–99)
GLUCOSE SERPL-MCNC: 111 MG/DL (ref 65–99)
HCT VFR BLD AUTO: 33.5 % (ref 36–46)
HGB BLD-MCNC: 10.8 G/DL (ref 12–16)
HOLD SPECIMEN: NORMAL
MCH RBC QN AUTO: 26.6 PG (ref 26–34)
MCHC RBC AUTO-ENTMCNC: 32.2 G/DL (ref 32–36)
MCV RBC AUTO: 83 FL (ref 80–100)
NRBC BLD-RTO: 0 /100 WBCS (ref 0–0)
PLATELET # BLD AUTO: 372 X10*3/UL (ref 150–450)
POTASSIUM SERPL-SCNC: 4.3 MMOL/L (ref 3.4–5.1)
PROT SERPL-MCNC: 7.4 G/DL (ref 5.9–7.9)
RBC # BLD AUTO: 4.06 X10*6/UL (ref 4–5.2)
SODIUM SERPL-SCNC: 137 MMOL/L (ref 133–145)
TROPONIN T SERPL-MCNC: <6 NG/L
WBC # BLD AUTO: 10.5 X10*3/UL (ref 4.4–11.3)

## 2024-06-04 PROCEDURE — 85027 COMPLETE CBC AUTOMATED: CPT | Performed by: INTERNAL MEDICINE

## 2024-06-04 PROCEDURE — 71275 CT ANGIOGRAPHY CHEST: CPT | Performed by: RADIOLOGY

## 2024-06-04 PROCEDURE — 96365 THER/PROPH/DIAG IV INF INIT: CPT

## 2024-06-04 PROCEDURE — 36415 COLL VENOUS BLD VENIPUNCTURE: CPT | Performed by: PHYSICIAN ASSISTANT

## 2024-06-04 PROCEDURE — 71275 CT ANGIOGRAPHY CHEST: CPT

## 2024-06-04 PROCEDURE — 1200000002 HC GENERAL ROOM WITH TELEMETRY DAILY

## 2024-06-04 PROCEDURE — 2500000001 HC RX 250 WO HCPCS SELF ADMINISTERED DRUGS (ALT 637 FOR MEDICARE OP): Performed by: INTERNAL MEDICINE

## 2024-06-04 PROCEDURE — 2500000004 HC RX 250 GENERAL PHARMACY W/ HCPCS (ALT 636 FOR OP/ED): Performed by: INTERNAL MEDICINE

## 2024-06-04 PROCEDURE — 2550000001 HC RX 255 CONTRASTS: Performed by: EMERGENCY MEDICINE

## 2024-06-04 PROCEDURE — 84484 ASSAY OF TROPONIN QUANT: CPT | Performed by: PHYSICIAN ASSISTANT

## 2024-06-04 PROCEDURE — 80053 COMPREHEN METABOLIC PANEL: CPT | Performed by: INTERNAL MEDICINE

## 2024-06-04 PROCEDURE — 2500000001 HC RX 250 WO HCPCS SELF ADMINISTERED DRUGS (ALT 637 FOR MEDICARE OP): Performed by: STUDENT IN AN ORGANIZED HEALTH CARE EDUCATION/TRAINING PROGRAM

## 2024-06-04 PROCEDURE — 74174 CTA ABD&PLVS W/CONTRAST: CPT | Performed by: RADIOLOGY

## 2024-06-04 PROCEDURE — 76705 ECHO EXAM OF ABDOMEN: CPT | Performed by: STUDENT IN AN ORGANIZED HEALTH CARE EDUCATION/TRAINING PROGRAM

## 2024-06-04 PROCEDURE — 82947 ASSAY GLUCOSE BLOOD QUANT: CPT

## 2024-06-04 PROCEDURE — 99223 1ST HOSP IP/OBS HIGH 75: CPT | Performed by: NURSE PRACTITIONER

## 2024-06-04 PROCEDURE — 2500000002 HC RX 250 W HCPCS SELF ADMINISTERED DRUGS (ALT 637 FOR MEDICARE OP, ALT 636 FOR OP/ED): Performed by: INTERNAL MEDICINE

## 2024-06-04 PROCEDURE — 2500000001 HC RX 250 WO HCPCS SELF ADMINISTERED DRUGS (ALT 637 FOR MEDICARE OP): Performed by: PHYSICIAN ASSISTANT

## 2024-06-04 PROCEDURE — 74174 CTA ABD&PLVS W/CONTRAST: CPT

## 2024-06-04 PROCEDURE — 36415 COLL VENOUS BLD VENIPUNCTURE: CPT | Performed by: INTERNAL MEDICINE

## 2024-06-04 PROCEDURE — 2500000004 HC RX 250 GENERAL PHARMACY W/ HCPCS (ALT 636 FOR OP/ED): Performed by: PHYSICIAN ASSISTANT

## 2024-06-04 PROCEDURE — 76705 ECHO EXAM OF ABDOMEN: CPT

## 2024-06-04 PROCEDURE — 2500000004 HC RX 250 GENERAL PHARMACY W/ HCPCS (ALT 636 FOR OP/ED): Performed by: STUDENT IN AN ORGANIZED HEALTH CARE EDUCATION/TRAINING PROGRAM

## 2024-06-04 PROCEDURE — 96375 TX/PRO/DX INJ NEW DRUG ADDON: CPT

## 2024-06-04 RX ORDER — GUAIFENESIN 600 MG/1
600 TABLET, EXTENDED RELEASE ORAL 2 TIMES DAILY PRN
Status: DISCONTINUED | OUTPATIENT
Start: 2024-06-04 | End: 2024-06-07 | Stop reason: HOSPADM

## 2024-06-04 RX ORDER — BISMUTH SUBSALICYLATE 262 MG
1 TABLET,CHEWABLE ORAL DAILY
COMMUNITY

## 2024-06-04 RX ORDER — METFORMIN HYDROCHLORIDE 500 MG/1
500 TABLET ORAL
Status: DISCONTINUED | OUTPATIENT
Start: 2024-06-04 | End: 2024-06-04

## 2024-06-04 RX ORDER — OXYCODONE HYDROCHLORIDE 5 MG/1
5 TABLET ORAL ONCE
Status: COMPLETED | OUTPATIENT
Start: 2024-06-04 | End: 2024-06-04

## 2024-06-04 RX ORDER — ALUMINUM HYDROXIDE, MAGNESIUM HYDROXIDE, AND SIMETHICONE 1200; 120; 1200 MG/30ML; MG/30ML; MG/30ML
20 SUSPENSION ORAL 3 TIMES DAILY PRN
Status: DISCONTINUED | OUTPATIENT
Start: 2024-06-04 | End: 2024-06-07 | Stop reason: HOSPADM

## 2024-06-04 RX ORDER — METOPROLOL TARTRATE 50 MG/1
50 TABLET ORAL 2 TIMES DAILY
Status: DISCONTINUED | OUTPATIENT
Start: 2024-06-04 | End: 2024-06-07 | Stop reason: HOSPADM

## 2024-06-04 RX ORDER — ONDANSETRON HYDROCHLORIDE 2 MG/ML
4 INJECTION, SOLUTION INTRAVENOUS EVERY 4 HOURS PRN
Status: DISCONTINUED | OUTPATIENT
Start: 2024-06-04 | End: 2024-06-07 | Stop reason: HOSPADM

## 2024-06-04 RX ORDER — OXYCODONE AND ACETAMINOPHEN 5; 325 MG/1; MG/1
1 TABLET ORAL EVERY 6 HOURS PRN
Status: DISCONTINUED | OUTPATIENT
Start: 2024-06-04 | End: 2024-06-07 | Stop reason: HOSPADM

## 2024-06-04 RX ORDER — ALPRAZOLAM 0.5 MG/1
0.5 TABLET ORAL 2 TIMES DAILY PRN
Status: DISCONTINUED | OUTPATIENT
Start: 2024-06-04 | End: 2024-06-07 | Stop reason: HOSPADM

## 2024-06-04 RX ORDER — SERTRALINE HYDROCHLORIDE 50 MG/1
50 TABLET, FILM COATED ORAL DAILY
Status: DISCONTINUED | OUTPATIENT
Start: 2024-06-04 | End: 2024-06-07 | Stop reason: HOSPADM

## 2024-06-04 RX ORDER — GUAIFENESIN/DEXTROMETHORPHAN 100-10MG/5
5 SYRUP ORAL EVERY 4 HOURS PRN
Status: DISCONTINUED | OUTPATIENT
Start: 2024-06-04 | End: 2024-06-07 | Stop reason: HOSPADM

## 2024-06-04 RX ORDER — ACETAMINOPHEN 325 MG/1
650 TABLET ORAL EVERY 6 HOURS PRN
Status: DISCONTINUED | OUTPATIENT
Start: 2024-06-04 | End: 2024-06-07 | Stop reason: HOSPADM

## 2024-06-04 RX ORDER — DEXTROSE 50 % IN WATER (D50W) INTRAVENOUS SYRINGE
25
Status: DISCONTINUED | OUTPATIENT
Start: 2024-06-04 | End: 2024-06-07 | Stop reason: HOSPADM

## 2024-06-04 RX ORDER — TALC
6 POWDER (GRAM) TOPICAL NIGHTLY PRN
Status: DISCONTINUED | OUTPATIENT
Start: 2024-06-04 | End: 2024-06-07 | Stop reason: HOSPADM

## 2024-06-04 RX ORDER — HYDROCORTISONE 1 %
CREAM (GRAM) TOPICAL ONCE
Status: COMPLETED | OUTPATIENT
Start: 2024-06-04 | End: 2024-06-04

## 2024-06-04 RX ORDER — ONDANSETRON HYDROCHLORIDE 2 MG/ML
4 INJECTION, SOLUTION INTRAVENOUS ONCE
Status: COMPLETED | OUTPATIENT
Start: 2024-06-04 | End: 2024-06-04

## 2024-06-04 RX ORDER — MORPHINE SULFATE 4 MG/ML
4 INJECTION, SOLUTION INTRAMUSCULAR; INTRAVENOUS
Status: DISCONTINUED | OUTPATIENT
Start: 2024-06-04 | End: 2024-06-07 | Stop reason: HOSPADM

## 2024-06-04 RX ORDER — BISACODYL 5 MG
10 TABLET, DELAYED RELEASE (ENTERIC COATED) ORAL DAILY PRN
Status: DISCONTINUED | OUTPATIENT
Start: 2024-06-04 | End: 2024-06-07 | Stop reason: HOSPADM

## 2024-06-04 RX ORDER — DEXTROSE 50 % IN WATER (D50W) INTRAVENOUS SYRINGE
12.5
Status: DISCONTINUED | OUTPATIENT
Start: 2024-06-04 | End: 2024-06-07 | Stop reason: HOSPADM

## 2024-06-04 RX ORDER — POLYETHYLENE GLYCOL 3350 17 G/17G
17 POWDER, FOR SOLUTION ORAL DAILY
Status: DISCONTINUED | OUTPATIENT
Start: 2024-06-04 | End: 2024-06-07 | Stop reason: HOSPADM

## 2024-06-04 RX ORDER — FAMOTIDINE 20 MG/1
20 TABLET, FILM COATED ORAL DAILY
COMMUNITY

## 2024-06-04 RX ORDER — HYDRALAZINE HYDROCHLORIDE 20 MG/ML
10 INJECTION INTRAMUSCULAR; INTRAVENOUS EVERY 4 HOURS PRN
Status: DISCONTINUED | OUTPATIENT
Start: 2024-06-04 | End: 2024-06-07 | Stop reason: HOSPADM

## 2024-06-04 RX ORDER — ONDANSETRON 4 MG/1
4 TABLET, FILM COATED ORAL EVERY 8 HOURS PRN
COMMUNITY

## 2024-06-04 RX ORDER — INSULIN LISPRO 100 [IU]/ML
0-10 INJECTION, SOLUTION INTRAVENOUS; SUBCUTANEOUS
Status: DISCONTINUED | OUTPATIENT
Start: 2024-06-04 | End: 2024-06-07 | Stop reason: HOSPADM

## 2024-06-04 RX ORDER — DIPHENHYDRAMINE HCL 25 MG
25 TABLET ORAL 2 TIMES DAILY PRN
Status: DISCONTINUED | OUTPATIENT
Start: 2024-06-04 | End: 2024-06-07 | Stop reason: HOSPADM

## 2024-06-04 RX ADMIN — ALPRAZOLAM 0.5 MG: 0.5 TABLET ORAL at 15:50

## 2024-06-04 RX ADMIN — HYDROCORTISONE: 0.01 CREAM TOPICAL at 01:46

## 2024-06-04 RX ADMIN — ONDANSETRON 4 MG: 2 INJECTION INTRAMUSCULAR; INTRAVENOUS at 13:58

## 2024-06-04 RX ADMIN — OXYCODONE HYDROCHLORIDE 5 MG: 5 TABLET ORAL at 05:54

## 2024-06-04 RX ADMIN — POLYETHYLENE GLYCOL 3350 17 G: 17 POWDER, FOR SOLUTION ORAL at 11:08

## 2024-06-04 RX ADMIN — METOPROLOL TARTRATE 50 MG: 50 TABLET, FILM COATED ORAL at 21:21

## 2024-06-04 RX ADMIN — HYDRALAZINE HYDROCHLORIDE 10 MG: 20 INJECTION INTRAMUSCULAR; INTRAVENOUS at 15:50

## 2024-06-04 RX ADMIN — SERTRALINE 50 MG: 50 TABLET, FILM COATED ORAL at 11:09

## 2024-06-04 RX ADMIN — IOHEXOL 75 ML: 350 INJECTION, SOLUTION INTRAVENOUS at 00:57

## 2024-06-04 RX ADMIN — APIXABAN 5 MG: 5 TABLET, FILM COATED ORAL at 21:21

## 2024-06-04 RX ADMIN — MORPHINE SULFATE 4 MG: 4 INJECTION, SOLUTION INTRAMUSCULAR; INTRAVENOUS at 13:58

## 2024-06-04 RX ADMIN — CEFTRIAXONE SODIUM 1 G: 1 INJECTION, SOLUTION INTRAVENOUS at 01:18

## 2024-06-04 RX ADMIN — METOPROLOL TARTRATE 50 MG: 50 TABLET, FILM COATED ORAL at 11:09

## 2024-06-04 RX ADMIN — ONDANSETRON 4 MG: 2 INJECTION INTRAMUSCULAR; INTRAVENOUS at 01:46

## 2024-06-04 RX ADMIN — ONDANSETRON 4 MG: 2 INJECTION INTRAMUSCULAR; INTRAVENOUS at 05:53

## 2024-06-04 RX ADMIN — APIXABAN 5 MG: 5 TABLET, FILM COATED ORAL at 11:09

## 2024-06-04 SDOH — SOCIAL STABILITY: SOCIAL INSECURITY: HAVE YOU HAD ANY THOUGHTS OF HARMING ANYONE ELSE?: NO

## 2024-06-04 SDOH — ECONOMIC STABILITY: INCOME INSECURITY: HOW HARD IS IT FOR YOU TO PAY FOR THE VERY BASICS LIKE FOOD, HOUSING, MEDICAL CARE, AND HEATING?: NOT HARD AT ALL

## 2024-06-04 SDOH — ECONOMIC STABILITY: HOUSING INSECURITY
IN THE LAST 12 MONTHS, WAS THERE A TIME WHEN YOU DID NOT HAVE A STEADY PLACE TO SLEEP OR SLEPT IN A SHELTER (INCLUDING NOW)?: NO

## 2024-06-04 SDOH — ECONOMIC STABILITY: HOUSING INSECURITY: IN THE LAST 12 MONTHS, HOW MANY PLACES HAVE YOU LIVED?: 0

## 2024-06-04 SDOH — SOCIAL STABILITY: SOCIAL INSECURITY: HAVE YOU HAD THOUGHTS OF HARMING ANYONE ELSE?: NO

## 2024-06-04 SDOH — SOCIAL STABILITY: SOCIAL INSECURITY: WERE YOU ABLE TO COMPLETE ALL THE BEHAVIORAL HEALTH SCREENINGS?: YES

## 2024-06-04 SDOH — SOCIAL STABILITY: SOCIAL NETWORK: ARE YOU MARRIED, WIDOWED, DIVORCED, SEPARATED, NEVER MARRIED, OR LIVING WITH A PARTNER?: WIDOWED

## 2024-06-04 SDOH — SOCIAL STABILITY: SOCIAL INSECURITY: DO YOU FEEL ANYONE HAS EXPLOITED OR TAKEN ADVANTAGE OF YOU FINANCIALLY OR OF YOUR PERSONAL PROPERTY?: NO

## 2024-06-04 SDOH — HEALTH STABILITY: MENTAL HEALTH: HOW OFTEN DO YOU HAVE A DRINK CONTAINING ALCOHOL?: NEVER

## 2024-06-04 SDOH — SOCIAL STABILITY: SOCIAL NETWORK
IN A TYPICAL WEEK, HOW MANY TIMES DO YOU TALK ON THE PHONE WITH FAMILY, FRIENDS, OR NEIGHBORS?: MORE THAN THREE TIMES A WEEK

## 2024-06-04 SDOH — ECONOMIC STABILITY: TRANSPORTATION INSECURITY
IN THE PAST 12 MONTHS, HAS LACK OF TRANSPORTATION KEPT YOU FROM MEETINGS, WORK, OR FROM GETTING THINGS NEEDED FOR DAILY LIVING?: NO

## 2024-06-04 SDOH — ECONOMIC STABILITY: FOOD INSECURITY: WITHIN THE PAST 12 MONTHS, YOU WORRIED THAT YOUR FOOD WOULD RUN OUT BEFORE YOU GOT MONEY TO BUY MORE.: NEVER TRUE

## 2024-06-04 SDOH — ECONOMIC STABILITY: INCOME INSECURITY: IN THE PAST 12 MONTHS, HAS THE ELECTRIC, GAS, OIL, OR WATER COMPANY THREATENED TO SHUT OFF SERVICE IN YOUR HOME?: NO

## 2024-06-04 SDOH — SOCIAL STABILITY: SOCIAL INSECURITY: DOES ANYONE TRY TO KEEP YOU FROM HAVING/CONTACTING OTHER FRIENDS OR DOING THINGS OUTSIDE YOUR HOME?: NO

## 2024-06-04 SDOH — SOCIAL STABILITY: SOCIAL INSECURITY: DO YOU FEEL UNSAFE GOING BACK TO THE PLACE WHERE YOU ARE LIVING?: NO

## 2024-06-04 SDOH — ECONOMIC STABILITY: TRANSPORTATION INSECURITY
IN THE PAST 12 MONTHS, HAS THE LACK OF TRANSPORTATION KEPT YOU FROM MEDICAL APPOINTMENTS OR FROM GETTING MEDICATIONS?: NO

## 2024-06-04 SDOH — SOCIAL STABILITY: SOCIAL INSECURITY: ARE YOU OR HAVE YOU BEEN THREATENED OR ABUSED PHYSICALLY, EMOTIONALLY, OR SEXUALLY BY ANYONE?: NO

## 2024-06-04 SDOH — SOCIAL STABILITY: SOCIAL INSECURITY: HAS ANYONE EVER THREATENED TO HURT YOUR FAMILY OR YOUR PETS?: NO

## 2024-06-04 SDOH — HEALTH STABILITY: MENTAL HEALTH: HOW OFTEN DO YOU HAVE 6 OR MORE DRINKS ON ONE OCCASION?: NEVER

## 2024-06-04 SDOH — SOCIAL STABILITY: SOCIAL INSECURITY: ABUSE: ADULT

## 2024-06-04 SDOH — HEALTH STABILITY: PHYSICAL HEALTH: ON AVERAGE, HOW MANY DAYS PER WEEK DO YOU ENGAGE IN MODERATE TO STRENUOUS EXERCISE (LIKE A BRISK WALK)?: 0 DAYS

## 2024-06-04 SDOH — ECONOMIC STABILITY: FOOD INSECURITY: WITHIN THE PAST 12 MONTHS, THE FOOD YOU BOUGHT JUST DIDN'T LAST AND YOU DIDN'T HAVE MONEY TO GET MORE.: NEVER TRUE

## 2024-06-04 SDOH — ECONOMIC STABILITY: INCOME INSECURITY: IN THE LAST 12 MONTHS, WAS THERE A TIME WHEN YOU WERE NOT ABLE TO PAY THE MORTGAGE OR RENT ON TIME?: NO

## 2024-06-04 SDOH — HEALTH STABILITY: PHYSICAL HEALTH: ON AVERAGE, HOW MANY MINUTES DO YOU ENGAGE IN EXERCISE AT THIS LEVEL?: 0 MIN

## 2024-06-04 SDOH — SOCIAL STABILITY: SOCIAL NETWORK: HOW OFTEN DO YOU GET TOGETHER WITH FRIENDS OR RELATIVES?: MORE THAN THREE TIMES A WEEK

## 2024-06-04 SDOH — SOCIAL STABILITY: SOCIAL INSECURITY: ARE THERE ANY APPARENT SIGNS OF INJURIES/BEHAVIORS THAT COULD BE RELATED TO ABUSE/NEGLECT?: NO

## 2024-06-04 SDOH — HEALTH STABILITY: MENTAL HEALTH: HOW MANY STANDARD DRINKS CONTAINING ALCOHOL DO YOU HAVE ON A TYPICAL DAY?: PATIENT DOES NOT DRINK

## 2024-06-04 ASSESSMENT — PATIENT HEALTH QUESTIONNAIRE - PHQ9
SUM OF ALL RESPONSES TO PHQ9 QUESTIONS 1 & 2: 0
1. LITTLE INTEREST OR PLEASURE IN DOING THINGS: NOT AT ALL
2. FEELING DOWN, DEPRESSED OR HOPELESS: NOT AT ALL

## 2024-06-04 ASSESSMENT — LIFESTYLE VARIABLES
SKIP TO QUESTIONS 9-10: 1
PRESCIPTION_ABUSE_PAST_12_MONTHS: NO
SKIP TO QUESTIONS 9-10: 1
SUBSTANCE_ABUSE_PAST_12_MONTHS: NO
AUDIT-C TOTAL SCORE: 0
AUDIT-C TOTAL SCORE: 0
HOW OFTEN DO YOU HAVE 6 OR MORE DRINKS ON ONE OCCASION: NEVER
HOW OFTEN DO YOU HAVE A DRINK CONTAINING ALCOHOL: NEVER
HOW MANY STANDARD DRINKS CONTAINING ALCOHOL DO YOU HAVE ON A TYPICAL DAY: PATIENT DOES NOT DRINK
AUDIT-C TOTAL SCORE: 0

## 2024-06-04 ASSESSMENT — COGNITIVE AND FUNCTIONAL STATUS - GENERAL
DAILY ACTIVITIY SCORE: 24
PATIENT BASELINE BEDBOUND: NO
MOBILITY SCORE: 24

## 2024-06-04 ASSESSMENT — ACTIVITIES OF DAILY LIVING (ADL)
FEEDING YOURSELF: INDEPENDENT
HEARING - RIGHT EAR: FUNCTIONAL
WALKS IN HOME: INDEPENDENT
PATIENT'S MEMORY ADEQUATE TO SAFELY COMPLETE DAILY ACTIVITIES?: YES
DRESSING YOURSELF: INDEPENDENT
TOILETING: INDEPENDENT
BATHING: INDEPENDENT
GROOMING: INDEPENDENT
HEARING - LEFT EAR: FUNCTIONAL
JUDGMENT_ADEQUATE_SAFELY_COMPLETE_DAILY_ACTIVITIES: YES
ADEQUATE_TO_COMPLETE_ADL: YES

## 2024-06-04 ASSESSMENT — PAIN - FUNCTIONAL ASSESSMENT: PAIN_FUNCTIONAL_ASSESSMENT: 0-10

## 2024-06-04 ASSESSMENT — PAIN SCALES - GENERAL
PAINLEVEL_OUTOF10: 8
PAINLEVEL_OUTOF10: 0 - NO PAIN

## 2024-06-04 ASSESSMENT — PAIN DESCRIPTION - LOCATION: LOCATION: ABDOMEN

## 2024-06-04 NOTE — PROGRESS NOTES
Marylou Ward is a 78 y.o. female on day 0 of admission presenting with Chest pain, unspecified type.       06/04/24 1448   ACS Disability Status   Are you deaf or do you have serious difficulty hearing? N   Are you blind or do you have serious difficulty seeing, even when wearing glasses? N   Because of a physical, mental, or emotional condition, do you have serious difficulty concentrating, remembering, or making decisions? (5 years old or older) N   Do you have serious difficulty walking or climbing stairs? N   Do you have serious difficulty dressing or bathing? N   Because of a physical, mental, or emotional condition, do you have serious difficulty doing errands alone such as visiting the doctor? N       Kourtney Coppola RN

## 2024-06-04 NOTE — PROGRESS NOTES
Marylou Ward is a 78 y.o. female on day 0 of admission presenting with Chest pain, unspecified type.    TCC met with patient at the bedside. Pt lives in a house with her nephew. There are 3 steps to enter. Pt has a grab bar in the shower. Pt is not on oxygen or on dialysis. Pt has not had any falls in the last 6 months. Pt does not use a cane or a walker. Pt does not drive, her nephew transports her to [facility], [place]. Pt is able to shop, cook and clean. There are no services in the home. Pt does not smoke or drink alcohol. Pt is not a diabetic. PCP is Dr. Mullen. Pharmacy of choice is  [facility]. Pt is not a . Pt does not have a LW or a POA. Pt states she does not have any skilled needs and plans to return home at time of discharge.     DISCHARGE PLAN TO HOME NO SKILLED NEEDS.        06/04/24 1443   Discharge Planning   Living Arrangements Family members   Support Systems Children;Family members   Assistance Needed no   Type of Residence Private residence   Number of Stairs to Enter Residence 3   Who is requesting discharge planning? Provider   Home or Post Acute Services None   Patient expects to be discharged to: home   Does the patient need discharge transport arranged? No   Financial Resource Strain   How hard is it for you to pay for the very basics like food, housing, medical care, and heating? Not hard   Housing Stability   In the last 12 months, was there a time when you were not able to pay the mortgage or rent on time? N   In the last 12 months, how many places have you lived? 0   In the last 12 months, was there a time when you did not have a steady place to sleep or slept in a shelter (including now)? N   Transportation Needs   In the past 12 months, has lack of transportation kept you from medical appointments or from getting medications? no   In the past 12 months, has lack of transportation kept you from meetings, work, or from getting things needed for daily living? No       Kourtney Coppola,  RN

## 2024-06-04 NOTE — PROGRESS NOTES
Pharmacy Medication History Review    Marylou Ward is a 78 y.o. female admitted for Chest pain, unspecified type. Pharmacy reviewed the patient's [id number] medications and allergies for accuracy.    Medications ADDED:  Famotadine 20mg  Ondansetron 4mg  Multivitamin   Medications CHANGED:  Metoprolol 50mg - updated dose  Medications REMOVED:   Omeprazole 20mg     The list below reflects the updated PTA list. Comments regarding how patient may be taking medications differently can be found in the Admit Orders Activity  Prior to Admission Medications   Prescriptions Last Dose Informant   acetaminophen (Tylenol) 325 mg tablet  Self, Family Member   Sig: Take 2 tablets (650 mg) by mouth every 6 hours if needed for mild pain (1 - 3).   apixaban (Eliquis) 5 mg tablet  Self, Family Member   Sig: Take 1 tablet (5 mg) by mouth 2 times a day.   famotidine (Pepcid) 20 mg tablet  Self, Family Member   Sig: Take 1 tablet (20 mg) by mouth once daily.   metFORMIN (Glucophage) 500 mg tablet  Self, Family Member   Sig: Take 1 tablet (500 mg) by mouth twice a day.   metoprolol tartrate (Lopressor) 50 mg tablet  Self, Family Member   Sig: Take 1 tablet (50 mg) by mouth 2 times a day.   multivitamin tablet  Self, Family Member   Sig: Take 1 tablet by mouth once daily.   ondansetron (Zofran) 4 mg tablet  Self, Family Member   Sig: Take 1 tablet (4 mg) by mouth every 8 hours if needed for nausea or vomiting.   oxyCODONE-acetaminophen (Percocet) 5-325 mg tablet  Self, Family Member   Sig: Take 1 tablet by mouth every 6 hours if needed for severe pain (7 - 10).   sertraline (Zoloft) 50 mg tablet  Self, Family Member   Sig: Take 1 tablet (50 mg) by mouth once daily.      Facility-Administered Medications: None        The list below reflects the updated allergy list. Please review each documented allergy for additional clarification and justification.  Allergies  Reviewed by Kathleen Davis on 6/4/2024        Severity Reactions  Comments    Gabapentin High Anaphylaxis, Unknown     Levofloxacin High Shortness of breath     Sulfa (sulfonamide Antibiotics) High Rash     Duloxetine Medium Nausea And Vomiting     Metronidazole Medium GI Upset, Nausea And Vomiting Nausea and diarrhea    Pregabalin Medium GI Upset     Protonix [pantoprazole] Not Specified Unknown     Adhesive Tape-silicones Low Rash     Morphine Low Hives, Rash             Pharmacy has been updated to [facility].    Sources used to complete the med history include dispense history, PTA medication list, patient/family interview. Informant is a fair historian.    Below are additional concerns with the patient's PTA list.  none    Kathleen Davis  Please reach out via [employer] Secure Chat for questions

## 2024-06-04 NOTE — H&P
HISTORY AND PHYSICAL EXAMINATION    PATIENT NAME: Marylou Ward    MRN: [id number]  SERVICE DATE: 6/4/2024       PRIMARY CARE PHYSICIAN: Cuca Mullen MD          ASSESSMENT AND PLAN     # Chest pain/epigastric pain/nausea  -Probably noncardiac pain  -Will still consult cardiology     # Chronic cholecystitis   -Will need cholecystectomy as an elective procedure.  - Pt was unable to be intubated (Jan 2024)- hence surgery was abandoned - will need ENT eval [place] [facility]  Same d/w pt.  -Will check ultrasound of the gallbladder     # Left Rib tenderness  - Xray Ribs     # Hypertension  -Continue home meds     # Diabetes  -Insulin sliding scale     # History of PE  -On Eliquis    # Probable UTI  -Continue IV Rocephin      Discussed with nurses/case management team and the specialists involved in this patient's care. Reviewed the EMR and documentation from other care-givers.    SUBJECTIVE  CHIEF COMPLAINT:      HPI: This is a 78 y.o. female who came to the ER for epigastric/chest pain.  Patient has had nausea since admission, she has had this pain chronically for several years, in January 2024 patient was prepared for cholecystectomy for chronic cholecystitis which was abandoned as she could not be intubated.  At that time patient was asked to [place] [facility] [place] with ENT presence.  Patient has not followed up with the same as she was reluctant to go for surgery.      ED HPI : This is an 78-year-old female presenting to the emergency department with complaints of chest tightness, neck pain and bilateral arm pain. Patient states that symptom started approximately 2 hours ago. She was at home. Patient states that her blood pressure was high in the 200s. She states that she took all her medications today. She has associated shortness of breath and nausea. She states that she has some abdominal discomfort but this is her baseline abdominal discomfort and nothing new today. No recent  fevers chills or coughing. She states that she has had a heart attack before, and is unknown when it was. No stents or bypass grafts. Patient states that she started to get a headache in the left part of her forehead. It is throbbing. No visual disturbances.   PAST MEDICAL HISTORY:   Past Medical History:   Diagnosis Date    Arthritis     Diabetes mellitus (Multi)     Hypertension      PAST SURGICAL HISTORY: No past surgical history on file.  FAMILY HISTORY: No family history on file.  SOCIAL HISTORY:   Social History     Tobacco Use    Smoking status: Never     Passive exposure: Never    Smokeless tobacco: Never   Vaping Use    Vaping status: Never Used   Substance Use Topics    Alcohol use: Not Currently    Drug use: Never       MEDICATIONS: Prior to Admission Medications  (Not in a hospital admission)     CURRENT ALLERGIES:   Allergies   Allergen Reactions    Gabapentin Anaphylaxis and Unknown    Levofloxacin Shortness of breath    Sulfa (Sulfonamide Antibiotics) Rash    Duloxetine Nausea And Vomiting    Metronidazole GI Upset and Nausea And Vomiting     Nausea and diarrhea    Pregabalin GI Upset    Protonix [Pantoprazole] Unknown    Adhesive Tape-Silicones Rash    Morphine Hives and Rash       COMPLETE REVIEW OF SYSTEMS:      GENERAL: No fever, appetite stable.  HEENT: No epistaxis, no mouth ulcers  NECK: no neck pain  RESPIRATORY: No new resp symptoms.  CARDIOVASCULAR: No cp, no leg edema, No orthopnea  GI: No NVD, no GI Bleed  : No hematuria, no dysuria  MUSCULOSKELETAL: No new jt pains or swelling  SKIN: No rashes, no ulcers  PSYCH: Denies feeling anxious or depressed.   HEMATOLOGY/LYMPHOLOGY: No bruising, no hx of VTE  ENDOCRINE: No hx of DM  NEURO: No hx of seizures or syncope, No hx of CVA      OBJECTIVE  PHYSICAL EXAM:   Heart Rate:  [73-93]   Temperature:  [36.9 °C (98.4 °F)]   Respirations:  [13-18]   BP: (158-223)/(77-99)   Height:  [152.4 cm (5')]   Weight:  [63.5 kg (140 lb)]   Pulse Ox:  [94 %-97  %]     Body mass index is 27.34 kg/m².    GENERAL: awake, alert, Ox3, cooperative resting comfortably  SKIN: Skin turgor normal. No rashes  HEENT: no epistaxis, Moist mucosa.  NECK: supple  BACK: spine nontender to palpation, No CVAT.  LUNGS: Vesicular breath sounds, with no wheeze, no crepitations.   CARDIAC: REGULAR. S1 and S2; no rubs, no murmur  ABDOMEN: Abdomen soft, epigastric tenderness BS+  EXTREMITIES: No edema, Good capillary refill.   NEURO: Insight GOOD. Motor and sensory exam wnl. No invol movements. No ataxia.  WOUND:   MUSCULOSKELETAL: No acute inflammation       .  Current Facility-Administered Medications:     acetaminophen (Tylenol) tablet 650 mg, 650 mg, oral, q6h PRN, Cuca Mullen MD    ALPRAZolam (Xanax) tablet 0.5 mg, 0.5 mg, oral, BID PRN, Cuca Mullen MD    alum-mag hydroxide-simeth (Mylanta) 200-200-20 mg/5 mL oral suspension 20 mL, 20 mL, oral, TID PRN, Cuca Mullen MD    apixaban (Eliquis) tablet 5 mg, 5 mg, oral, BID, Cuca Mullen MD    bisacodyl (Dulcolax) EC tablet 10 mg, 10 mg, oral, Daily PRN, Cuca Mullen MD    dextromethorphan-guaifenesin (Robitussin DM)  mg/5 mL oral liquid 5 mL, 5 mL, oral, q4h PRN, Cuca Mullen MD    dextrose 50 % injection 12.5 g, 12.5 g, intravenous, q15 min PRN, Cuca Mullen MD    dextrose 50 % injection 25 g, 25 g, intravenous, q15 min PRN, Cuca Mullen MD    diphenhydrAMINE (Sominex) tablet 25 mg, 25 mg, oral, BID PRN, Cuca Mullen MD    glucagon (Glucagen) injection 1 mg, 1 mg, intramuscular, q15 min PRN, Cuca Mullen MD    glucagon (Glucagen) injection 1 mg, 1 mg, intramuscular, q15 min PRN, Cuca Mullen MD    guaiFENesin (Mucinex) 12 hr tablet 600 mg, 600 mg, oral, BID PRN, Cuca Mullen MD    insulin lispro (HumaLOG) injection 0-10 Units, 0-10 Units, subcutaneous, TID, Cuca Mullen MD    melatonin tablet 6 mg, 6 mg, oral, Nightly PRN, Cuca Mullen MD     metFORMIN (Glucophage) tablet 500 mg, 500 mg, oral, BID, Cuca Mullen MD    metoprolol tartrate (Lopressor) tablet 50 mg, 50 mg, oral, BID, Cuca Mullen MD    morphine injection 4 mg, 4 mg, intravenous, q3h PRN, Cuca Mullen MD    ondansetron (Zofran) injection 4 mg, 4 mg, intravenous, q4h PRN, Cuca Mullen MD    oxyCODONE-acetaminophen (Percocet) 5-325 mg per tablet 1 tablet, 1 tablet, oral, q6h PRN, Cuca Mullen MD    polyethylene glycol (Glycolax, Miralax) packet 17 g, 17 g, oral, Daily, Cuca Mullen MD    sertraline (Zoloft) tablet 50 mg, 50 mg, oral, Daily, Cuca Mullen MD    Current Outpatient Medications:     acetaminophen (Tylenol) 325 mg tablet, Take 2 tablets (650 mg) by mouth every 6 hours if needed for mild pain (1 - 3)., Disp: 30 tablet, Rfl: 0    apixaban (Eliquis) 5 mg tablet, Take 1 tablet (5 mg) by mouth 2 times a day., Disp: , Rfl:     metFORMIN (Glucophage) 500 mg tablet, Take 1 tablet (500 mg) by mouth twice a day., Disp: , Rfl:     metoprolol tartrate (Lopressor) 25 mg tablet, Take 2 tablets (50 mg) by mouth 2 times a day., Disp: , Rfl:     omeprazole (PriLOSEC) 20 mg DR capsule, Take 1 capsule (20 mg) by mouth once daily in the morning. Take before meals., Disp: , Rfl:     oxyCODONE-acetaminophen (Percocet) 5-325 mg tablet, Take 1 tablet by mouth every 6 hours if needed for severe pain (7 - 10)., Disp: , Rfl:     sertraline (Zoloft) 50 mg tablet, Take 1 tablet (50 mg) by mouth once daily., Disp: , Rfl:     DATA:   Diagnostic tests reviewed for today's visit:    Most recent labs  Admission on 06/03/2024   Component Date Value Ref Range Status    WBC 06/03/2024 8.7  4.4 - 11.3 x10*3/uL Final    nRBC 06/03/2024 0.0  0.0 - 0.0 /100 WBCs Final    RBC 06/03/2024 3.87 (L)  4.00 - 5.20 x10*6/uL Final    Hemoglobin 06/03/2024 10.1 (L)  12.0 - 16.0 g/dL Final    Hematocrit 06/03/2024 32.7 (L)  36.0 - 46.0 % Final    MCV 06/03/2024 85  80 - 100 fL Final     MCH 06/03/2024 26.1  26.0 - 34.0 pg Final    MCHC 06/03/2024 30.9 (L)  32.0 - 36.0 g/dL Final    RDW 06/03/2024 14.6 (H)  11.5 - 14.5 % Final    Platelets 06/03/2024 384  150 - 450 x10*3/uL Final    Neutrophils % 06/03/2024 67.4  40.0 - 80.0 % Final    Immature Granulocytes %, Automated 06/03/2024 0.2  0.0 - 0.9 % Final    Immature Granulocyte Count (IG) includes promyelocytes, myelocytes and metamyelocytes but does not include bands. Percent differential counts (%) should be interpreted in the context of the absolute cell counts (cells/UL).    Lymphocytes % 06/03/2024 19.7  13.0 - 44.0 % Final    Monocytes % 06/03/2024 8.6  2.0 - 10.0 % Final    Eosinophils % 06/03/2024 3.1  0.0 - 6.0 % Final    Basophils % 06/03/2024 1.0  0.0 - 2.0 % Final    Neutrophils Absolute 06/03/2024 5.87 (H)  1.60 - 5.50 x10*3/uL Final    Percent differential counts (%) should be interpreted in the context of the absolute cell counts (cells/uL).    Immature Granulocytes Absolute, Au* 06/03/2024 0.02  0.00 - 0.50 x10*3/uL Final    Lymphocytes Absolute 06/03/2024 1.72  0.80 - 3.00 x10*3/uL Final    Monocytes Absolute 06/03/2024 0.75  0.05 - 0.80 x10*3/uL Final    Eosinophils Absolute 06/03/2024 0.27  0.00 - 0.40 x10*3/uL Final    Basophils Absolute 06/03/2024 0.09  0.00 - 0.10 x10*3/uL Final    Glucose 06/03/2024 107 (H)  65 - 99 mg/dL Final    Sodium 06/03/2024 137  133 - 145 mmol/L Final    Potassium 06/03/2024 4.5  3.4 - 5.1 mmol/L Final    Chloride 06/03/2024 100  97 - 107 mmol/L Final    Bicarbonate 06/03/2024 25  24 - 31 mmol/L Final    Urea Nitrogen 06/03/2024 15  8 - 25 mg/dL Final    Creatinine 06/03/2024 1.20  0.40 - 1.60 mg/dL Final    eGFR 06/03/2024 46 (L)  >60 mL/min/1.73m*2 Final    Calculations of estimated GFR are performed using the 2021 CKD-EPI Study Refit equation without the race variable for the IDMS-Traceable creatinine methods.  https://jasn.asnjournals.org/content/early/2021/09/22/ASN.1054510010    Calcium 06/03/2024  "9.3  8.5 - 10.4 mg/dL Final    Albumin 06/03/2024 4.3  3.5 - 5.0 g/dL Final    Alkaline Phosphatase 06/03/2024 92  35 - 125 U/L Final    Total Protein 06/03/2024 7.4  5.9 - 7.9 g/dL Final    AST 06/03/2024 10  5 - 40 U/L Final    Bilirubin, Total 06/03/2024 <0.2  0.1 - 1.2 mg/dL Final    ALT 06/03/2024 5  5 - 40 U/L Final    Anion Gap 06/03/2024 12  <=19 mmol/L Final    PROBNP 06/03/2024 1,247 (H)  0 - 624 pg/mL Final    Troponin T, High Sensitivity 06/03/2024 16 (HH)  <=14 ng/L Final    Color, Urine 06/03/2024 Colorless (N)  Light-Yellow, Yellow, Dark-Yellow Final    Appearance, Urine 06/03/2024 Clear  Clear Final    Specific Gravity, Urine 06/03/2024 1.006  1.005 - 1.035 Final    pH, Urine 06/03/2024 6.0  5.0, 5.5, 6.0, 6.5, 7.0, 7.5, 8.0 Final    Protein, Urine 06/03/2024 10 (TRACE)  NEGATIVE, 10 (TRACE), 20 (TRACE) mg/dL Final    Glucose, Urine 06/03/2024 Normal  Normal mg/dL Final    Blood, Urine 06/03/2024 NEGATIVE  NEGATIVE Final    Ketones, Urine 06/03/2024 NEGATIVE  NEGATIVE mg/dL Final    Bilirubin, Urine 06/03/2024 NEGATIVE  NEGATIVE Final    Urobilinogen, Urine 06/03/2024 Normal  Normal mg/dL Final    Nitrite, Urine 06/03/2024 1+ (A)  NEGATIVE Final    Leukocyte Esterase, Urine 06/03/2024 250 Kamaljit/µL (A)  NEGATIVE Final    Extra Tube 06/03/2024 Hold for add-ons.   Final    Auto resulted.    Troponin T, High Sensitivity 06/03/2024 16 (HH)  <=14 ng/L Final    Comment: Consistent with previous results                             Previous result verified on 6/3/2024 1733 on specimen/case [id number] called with component TROP-I for procedure Serial Troponin, Initial ([facility]) with value 16 ng/L.    Troponin T, High Sensitivity 06/04/2024 <6  <=14 ng/L Final    WBC, Urine 06/03/2024 21-50 (A)  1-5, NONE /HPF Final    RBC, Urine 06/03/2024 NONE  NONE, 1-2, 3-5 /HPF Final    Bacteria, Urine 06/03/2024 1+ (A)  NONE SEEN /HPF Final       No results found for: \"GLU\"     CT angio chest abdomen pelvis  Narrative: " Interpreted By:  Celina Rubio,   STUDY:  CT ANGIO CHEST ABDOMEN PELVIS;  6/4/2024 1:20 am      INDICATION:  Signs/Symptoms:chest pain radiating to the back, arms, neck      COMPARISON:  CT abdomen and pelvis 12/27/2023, 02/06/2022. CT angiogram chest  02/01/2022. CT thoracic spine 01/10/2024. Pelvic ultrasound  11/07/2022.      ACCESSION NUMBER(S):  [id number]      ORDERING CLINICIAN:  AVEL DEMPSEY      TECHNIQUE:  Noncontrast axial CT images of the chest, abdomen and pelvis were  obtained prior to intravenous contrast administration. Axial CT  images of the chest, abdomen and pelvis were obtained after the  uneventful administration of intravenous contrast material using CT  angiographic technique. Coronal and sagittal images are  reconstructed.  3-D and MIP reconstructions were performed on an  independent workstation and provided for review.      FINDINGS:  NON-CONTRAST IMAGING:      Atherosclerotic calcifications are noted at the thoracic aorta. No  hyperdense intramural thrombus. Coronary artery calcifications are  present. Atherosclerotic calcifications are noted at the abdominal  aorta and its branches. There is cholelithiasis.  No obstructing ureteral calculi.          POST-CONTRAST IMAGING:  There is motion artifact.      VASCULAR:  No thoracic aortic aneurysm or acute dissection. There is calcified  and noncalcified plaque at the thoracic aorta. There is irregular  noncalcified plaque at the aortic arch and descending thoracic aorta.  There is calcified and noncalcified plaque at the proximal left  subclavian artery with approximate 50% stenosis. There is irregular  noncalcified plaque at the proximal brachiocephalic artery. No  filling defects within the visualized pulmonary arteries.      Calcified and noncalcified plaque at the abdominal aorta and its  branches. There is a small area of dissection with luminal narrowing  at the distal infrarenal abdominal aorta, of indeterminate age. There  is 0.9  x 1.3 x 1.2 cm saccular aneurysm at the site. The celiac  artery, the superior mesenteric artery, the bilateral renal arteries  and inferior mesenteric artery are patent. There is suboptimal  contrast opacification of the iliac arteries, not well evaluated on  this exam.              CHEST:      HEART: Normal size. No pericardial effusion. Coronary artery  calcifications are present. MEDIASTINUM AND MARCELA: No pathologically  enlarged thoracic lymph nodes. LUNG, PLEURA, LARGE AIRWAYS: The  trachea and the mainstem bronchi are patent. The evaluation of the  lungs is degraded by motion artifact. Redemonstration of chronic  interstitial changes with scarring at the right lung. No  consolidation, pleural effusion or pneumothorax. CHEST WALL AND LOWER  NECK: Within normal limits.      ABDOMEN:      BONES: There is diffuse osteopenia. Mild multilevel degenerative  changes at the spine. ABDOMINAL WALL: There is a small fat containing  umbilical hernia.      LIVER: Unremarkable.  BILE DUCTS: No significant dilation.  GALLBLADDER: There is cholelithiasis.  PANCREAS: No peripancreatic soft tissue stranding.  SPLEEN: Unremarkable.  ADRENALS:  Unremarkable.  KIDNEYS: No hydronephrosis. There is a 2.8 cm fluid attenuation  lesion at the superior pole of the left kidney, may represent a cyst.  Additional subcentimeter cortical renal hypodensities are too small  to be adequately characterized. URETERS: No hydroureter.      PELVIS:      REPRODUCTIVE ORGANS: Redemonstration of cystic lesion at the right  adnexa measuring 4.0 x 3.4 cm, previously measured 3.5 x 3.2 cm.  BLADDER: Partially distended.      RETROPERITONEUM: No retroperitoneal hemorrhage.  BOWEL: No bowel obstruction.  Normal appendix.  PERITONEUM: No ascites or free air. No pathologically enlarged lymph  nodes are identified.      Impression: 1. No thoracic aortic aneurysm or acute dissection. Irregular  noncalcified plaque at the aortic arch and descending thoracic  aorta,  concerning for unstable plaque. Irregular noncalcified plaque at the  proximal brachiocephalic artery. Calcified and noncalcified plaque at  the proximal left subclavian artery with approximate 50% stenosis.  2. Small area of dissection with luminal narrowing at the distal  infrarenal abdominal aorta, of indeterminate age, with a 0.9 x 1.3 x  1.2 cm saccular infrarenal abdominal aortic aneurysm at the site.  3. No evidence for pulmonary emboli.  4. Coronary artery calcifications.  5. Cholelithiasis.  6. Mild interval increase in size in the cystic lesion at the right  adnexa. This can be follow-up with nonemergent pelvic ultrasound.              MACRO:  Critical Finding:  See findings. Notification was initiated on  6/4/2024 at 2:57 am by  Celina Rubio.  (**-YCF-**) Instructions:      Signed by: Celina Rubio 6/4/2024 3:03 AM  Dictation workstation:   [id number]        EKG:   Tele:     SIGNATURE: Cuca Mullen MD  DATE: June 4, 2024  TIME: 9:46 AM

## 2024-06-04 NOTE — PROGRESS NOTES
Marylou Ward is a 78 y.o. female on day 0 of admission presenting with Chest pain, unspecified type.       06/04/24 2417   Current Planned Discharge Disposition   Current Planned Discharge Disposition Home       Kourtney Coppola RN

## 2024-06-05 LAB
ALBUMIN SERPL-MCNC: 4.1 G/DL (ref 3.5–5)
ALP BLD-CCNC: 90 U/L (ref 35–125)
ALT SERPL-CCNC: <5 U/L (ref 5–40)
AMYLASE SERPL-CCNC: 260 U/L (ref 28–100)
ANION GAP SERPL CALC-SCNC: 13 MMOL/L
AST SERPL-CCNC: 10 U/L (ref 5–40)
BILIRUB SERPL-MCNC: 0.2 MG/DL (ref 0.1–1.2)
BUN SERPL-MCNC: 18 MG/DL (ref 8–25)
CALCIUM SERPL-MCNC: 9.5 MG/DL (ref 8.5–10.4)
CHLORIDE SERPL-SCNC: 99 MMOL/L (ref 97–107)
CO2 SERPL-SCNC: 24 MMOL/L (ref 24–31)
CREAT SERPL-MCNC: 1.6 MG/DL (ref 0.4–1.6)
EGFRCR SERPLBLD CKD-EPI 2021: 32 ML/MIN/1.73M*2
ERYTHROCYTE [DISTWIDTH] IN BLOOD BY AUTOMATED COUNT: 14.6 % (ref 11.5–14.5)
GLUCOSE BLD MANUAL STRIP-MCNC: 113 MG/DL (ref 74–99)
GLUCOSE BLD MANUAL STRIP-MCNC: 122 MG/DL (ref 74–99)
GLUCOSE BLD MANUAL STRIP-MCNC: 131 MG/DL (ref 74–99)
GLUCOSE SERPL-MCNC: 125 MG/DL (ref 65–99)
HCT VFR BLD AUTO: 35.8 % (ref 36–46)
HGB BLD-MCNC: 11.2 G/DL (ref 12–16)
LIPASE SERPL-CCNC: 18 U/L (ref 16–63)
MCH RBC QN AUTO: 26 PG (ref 26–34)
MCHC RBC AUTO-ENTMCNC: 31.3 G/DL (ref 32–36)
MCV RBC AUTO: 83 FL (ref 80–100)
NRBC BLD-RTO: 0 /100 WBCS (ref 0–0)
PLATELET # BLD AUTO: 380 X10*3/UL (ref 150–450)
POTASSIUM SERPL-SCNC: 4.4 MMOL/L (ref 3.4–5.1)
PROT SERPL-MCNC: 7.4 G/DL (ref 5.9–7.9)
RBC # BLD AUTO: 4.31 X10*6/UL (ref 4–5.2)
SODIUM SERPL-SCNC: 136 MMOL/L (ref 133–145)
WBC # BLD AUTO: 8.9 X10*3/UL (ref 4.4–11.3)

## 2024-06-05 PROCEDURE — 1200000002 HC GENERAL ROOM WITH TELEMETRY DAILY

## 2024-06-05 PROCEDURE — 2500000004 HC RX 250 GENERAL PHARMACY W/ HCPCS (ALT 636 FOR OP/ED): Performed by: INTERNAL MEDICINE

## 2024-06-05 PROCEDURE — 2500000002 HC RX 250 W HCPCS SELF ADMINISTERED DRUGS (ALT 637 FOR MEDICARE OP, ALT 636 FOR OP/ED): Mod: MUE | Performed by: INTERNAL MEDICINE

## 2024-06-05 PROCEDURE — 2500000001 HC RX 250 WO HCPCS SELF ADMINISTERED DRUGS (ALT 637 FOR MEDICARE OP): Performed by: INTERNAL MEDICINE

## 2024-06-05 PROCEDURE — 36415 COLL VENOUS BLD VENIPUNCTURE: CPT | Performed by: INTERNAL MEDICINE

## 2024-06-05 PROCEDURE — 82947 ASSAY GLUCOSE BLOOD QUANT: CPT

## 2024-06-05 PROCEDURE — 82150 ASSAY OF AMYLASE: CPT | Performed by: INTERNAL MEDICINE

## 2024-06-05 PROCEDURE — 80053 COMPREHEN METABOLIC PANEL: CPT | Performed by: INTERNAL MEDICINE

## 2024-06-05 PROCEDURE — 83690 ASSAY OF LIPASE: CPT | Performed by: INTERNAL MEDICINE

## 2024-06-05 PROCEDURE — 85027 COMPLETE CBC AUTOMATED: CPT | Performed by: INTERNAL MEDICINE

## 2024-06-05 RX ADMIN — OXYCODONE AND ACETAMINOPHEN 1 TABLET: 5; 325 TABLET ORAL at 10:49

## 2024-06-05 RX ADMIN — APIXABAN 5 MG: 5 TABLET, FILM COATED ORAL at 08:45

## 2024-06-05 RX ADMIN — SERTRALINE 50 MG: 50 TABLET, FILM COATED ORAL at 08:45

## 2024-06-05 RX ADMIN — OXYCODONE AND ACETAMINOPHEN 1 TABLET: 5; 325 TABLET ORAL at 19:13

## 2024-06-05 RX ADMIN — APIXABAN 5 MG: 5 TABLET, FILM COATED ORAL at 20:55

## 2024-06-05 RX ADMIN — ALUMINUM HYDROXIDE, MAGNESIUM HYDROXIDE, AND SIMETHICONE 20 ML: 200; 200; 20 SUSPENSION ORAL at 22:44

## 2024-06-05 RX ADMIN — POLYETHYLENE GLYCOL 3350 17 G: 17 POWDER, FOR SOLUTION ORAL at 08:45

## 2024-06-05 RX ADMIN — ACETAMINOPHEN 650 MG: 325 TABLET ORAL at 05:40

## 2024-06-05 RX ADMIN — METOPROLOL TARTRATE 50 MG: 50 TABLET, FILM COATED ORAL at 20:55

## 2024-06-05 RX ADMIN — ONDANSETRON 4 MG: 2 INJECTION INTRAMUSCULAR; INTRAVENOUS at 10:49

## 2024-06-05 RX ADMIN — METOPROLOL TARTRATE 50 MG: 50 TABLET, FILM COATED ORAL at 08:45

## 2024-06-05 ASSESSMENT — PAIN SCALES - GENERAL
PAINLEVEL_OUTOF10: 0 - NO PAIN
PAINLEVEL_OUTOF10: 6
PAINLEVEL_OUTOF10: 8
PAINLEVEL_OUTOF10: 0 - NO PAIN
PAINLEVEL_OUTOF10: 0 - NO PAIN
PAINLEVEL_OUTOF10: 3
PAINLEVEL_OUTOF10: 3
PAINLEVEL_OUTOF10: 0 - NO PAIN

## 2024-06-05 ASSESSMENT — COGNITIVE AND FUNCTIONAL STATUS - GENERAL
MOBILITY SCORE: 24
DAILY ACTIVITIY SCORE: 24
MOBILITY SCORE: 24
DAILY ACTIVITIY SCORE: 24

## 2024-06-05 ASSESSMENT — PAIN SCALES - WONG BAKER: WONGBAKER_NUMERICALRESPONSE: HURTS LITTLE BIT

## 2024-06-05 ASSESSMENT — PAIN - FUNCTIONAL ASSESSMENT
PAIN_FUNCTIONAL_ASSESSMENT: 0-10

## 2024-06-05 ASSESSMENT — PAIN DESCRIPTION - LOCATION
LOCATION: LEG
LOCATION: ABDOMEN

## 2024-06-05 ASSESSMENT — PAIN DESCRIPTION - DESCRIPTORS
DESCRIPTORS: CRAMPING
DESCRIPTORS: CRAMPING

## 2024-06-05 NOTE — CONSULTS
Inpatient consult to Gastroenterology  Consult performed by: Binta Kirby, APRN-CNP  Consult ordered by: Cuca Mullen MD          Reason For Consult  Nausea/Epigastric pain     History Of Present Illness  Marylou Ward is a 78 y.o. female presenting with epigastric/chest pain, nausea. Patient  has had this pain chronically for several years, in January 2024 she was scheduled  cholecystectomy for chronic cholecystitis,  however was not completed due to difficulty intubation.  US showed no intrahepatic or extrahepatic biliary ductal dilatation, CBD 0.3 cm. Gallbladder contains stones without wall thickening. No pericholecystic fluid. Labs show normal LFTs, no leukocytosis,H/H stable. Lipase normal.  Patient had EGD 2022 by Dr. Murillo showed 3 cm hiatal hernia was present. Localized mild inflammation characterized by erythema was found in the gastric body, on the anterior wall of the stomach, on the greater curvature of the stomach and in the gastric antrum. Colonoscopy 2019 showed Non-bleeding Grade 1 internal hemorrhoids. Patient continues to have pain and intermittent nausea. Patient reports BM last night, no melena/hematochezia.      Past Medical History  She has a past medical history of Arthritis, Diabetes mellitus (Multi), and Hypertension.    Surgical History  She has no past surgical history on file.     Social History  She reports that she has never smoked. She has never been exposed to tobacco smoke. She has never used smokeless tobacco. She reports that she does not currently use alcohol. She reports that she does not use drugs.    Family History  No family history on file.     Allergies  Gabapentin, Levofloxacin, Sulfa (sulfonamide antibiotics), Duloxetine, Metronidazole, Pregabalin, Protonix [pantoprazole], Adhesive tape-silicones, and Morphine    Review of Systems   Constitutional: Negative.    HENT: Negative.     Eyes: Negative.    Respiratory: Negative.     Cardiovascular: Negative.     Gastrointestinal:  Positive for abdominal pain.   Endocrine: Negative.    Genitourinary: Negative.    Musculoskeletal: Negative.    Skin: Negative.    Allergic/Immunologic: Negative.    Neurological: Negative.    Hematological: Negative.    Psychiatric/Behavioral: Negative.          Physical Exam  HENT:      Head: Normocephalic.      Nose: Nose normal.      Mouth/Throat:      Mouth: Mucous membranes are moist.   Eyes:      Pupils: Pupils are equal, round, and reactive to light.   Cardiovascular:      Rate and Rhythm: Normal rate.   Pulmonary:      Effort: Pulmonary effort is normal.   Abdominal:      Palpations: Abdomen is soft.      Tenderness: There is abdominal tenderness.   Musculoskeletal:         General: Normal range of motion.      Cervical back: Normal range of motion.   Skin:     General: Skin is warm.   Neurological:      General: No focal deficit present.      Mental Status: She is alert.   Psychiatric:         Mood and Affect: Mood normal.          Last Recorded Vitals  Blood pressure 177/62, pulse 75, temperature 36.6 °C (97.9 °F), temperature source Oral, resp. rate 17, height 1.524 m (5'), weight 63.5 kg (140 lb), SpO2 94%.    Relevant Results  US abdomen limited    Result Date: 6/4/2024  Interpreted By:  Mack Moore, STUDY: US ABDOMEN LIMITED;  6/4/2024 8:22 pm   INDICATION: Signs/Symptoms:History of chronic cholecystitis, epigastric pain, nausea.   COMPARISON: Relation with same day CTA chest abdomen pelvis.   ACCESSION NUMBER(S): [id number]   ORDERING CLINICIAN: LASHONDA MAY   TECHNIQUE: Multiple static images of the abdomen were obtained.   FINDINGS: The pancreas appears within normal limits to the extent visualized.   The liver measures 16.4 cm. No focal parenchymal abnormalities.   There is no intrahepatic or extrahepatic biliary ductal dilatation. The common bile duct measures 0.3 cm.   The gallbladder contains stones without wall thickening. The sonographic Coulter sign is  negative. No pericholecystic fluid.   The right kidney measures 8.2 cm. No hydronephrosis.   No free fluid.       Cholelithiasis without evidence of acute cholecystitis.     MACRO: None.   Signed by: Mack Moore 6/4/2024 9:25 PM Dictation workstation:   [id number]      Scheduled medications  apixaban, 5 mg, oral, BID  insulin lispro, 0-10 Units, subcutaneous, TID  metoprolol tartrate, 50 mg, oral, BID  polyethylene glycol, 17 g, oral, Daily  sertraline, 50 mg, oral, Daily      Continuous medications     PRN medications  PRN medications: acetaminophen, ALPRAZolam, alum-mag hydroxide-simeth, bisacodyl, dextromethorphan-guaifenesin, dextrose, dextrose, diphenhydrAMINE, glucagon, glucagon, guaiFENesin, hydrALAZINE, melatonin, morphine, ondansetron, oxyCODONE-acetaminophen  Results for orders placed or performed during the hospital encounter of 06/03/24 (from the past 24 hour(s))   POCT GLUCOSE   Result Value Ref Range    POCT Glucose 111 (H) 74 - 99 mg/dL   CBC   Result Value Ref Range    WBC 8.9 4.4 - 11.3 x10*3/uL    nRBC 0.0 0.0 - 0.0 /100 WBCs    RBC 4.31 4.00 - 5.20 x10*6/uL    Hemoglobin 11.2 (L) 12.0 - 16.0 g/dL    Hematocrit 35.8 (L) 36.0 - 46.0 %    MCV 83 80 - 100 fL    MCH 26.0 26.0 - 34.0 pg    MCHC 31.3 (L) 32.0 - 36.0 g/dL    RDW 14.6 (H) 11.5 - 14.5 %    Platelets 380 150 - 450 x10*3/uL   Comprehensive Metabolic Panel   Result Value Ref Range    Glucose 125 (H) 65 - 99 mg/dL    Sodium 136 133 - 145 mmol/L    Potassium 4.4 3.4 - 5.1 mmol/L    Chloride 99 97 - 107 mmol/L    Bicarbonate 24 24 - 31 mmol/L    Urea Nitrogen 18 8 - 25 mg/dL    Creatinine 1.60 0.40 - 1.60 mg/dL    eGFR 32 (L) >60 mL/min/1.73m*2    Calcium 9.5 8.5 - 10.4 mg/dL    Albumin 4.1 3.5 - 5.0 g/dL    Alkaline Phosphatase 90 35 - 125 U/L    Total Protein 7.4 5.9 - 7.9 g/dL    AST 10 5 - 40 U/L    Bilirubin, Total 0.2 0.1 - 1.2 mg/dL    ALT <5 (L) 5 - 40 U/L    Anion Gap 13 <=19 mmol/L   POCT GLUCOSE   Result Value Ref Range    POCT  Glucose 122 (H) 74 - 99 mg/dL   POCT GLUCOSE   Result Value Ref Range    POCT Glucose 113 (H) 74 - 99 mg/dL        Assessment/Plan     Nausea/Epigastric pain  Most likely gallbladder etiology   US showed no intrahepatic or extrahepatic biliary ductal dilatation, CBD 0.3 cm. Gallbladder contains stones without wall thickening. No pericholecystic fluid.  Ultimately needs surgery for relief in symptoms. Patient currently does not want to have surgery and wants to go home. CT angio showed no thoracic aortic aneurysm or acute dissection. Irregular noncalcified plaque at the aortic arch and descending thoracic aorta. Vascular was consulted and noted likely chronic small saccular abdominal aortic aneurysm. No   vascular surgical intervention indicated. Continue antiemetics as ordered, supportive care.      Binta Kirby, APRN-CNP

## 2024-06-05 NOTE — NURSING NOTE
Pt observed laying in bed resting alert + orient, denies any chest pain, states that she does have mild abdominal pain. Active bowel sounds in all four quadrants. States that she is a constipated as she hasn't had a bowel moment in a couple days. Pt refuses telemetry monitor, patient was educated on the importance of the telemetry monitor. Monitor reapplied

## 2024-06-05 NOTE — NURSING NOTE
Patient c/o pain in her neck and legs rating pain at a 3, tylenol administered. Pt dissatisfied with temp of the stating its too cold in there. Blankets were placed on register and thermostat placed on 82. Will continue to monitor.

## 2024-06-05 NOTE — CONSULTS
Inpatient consult to Cardiology  Consult performed by: Erasmo Gilliam MD  Consult ordered by: Cuca Mullen MD  Reason for consult: chest pain        History Of Present Illness:    Marylou Ward is a 78 y.o. female presenting with complaints of chest pain and tightness. She has a past medical history that includes osteoarthritis, diabetes, hypertension she notes that it is in the epigastric area as well as in the mid sternal area. During the course of her workup, she had a CT angiogram of chest abdomen and pelvis. She had a small saccular abdominal aortic aneurysm noted as well as a age-indeterminate dissection.  Symptoms to me that she was diagnosed with acute cholecystitis and she was scheduled to have cholecystectomy and patient could not be intubated secondary to difficult anatomy of her ishmael-pharynx patient was seen by ENT during that hospitalization at [facility] and was recommended to have the elective cholecystectomy at [facility] with support of ENT.  Patient however refused to go to [facility] and she now presents back with symptoms of midepigastric pain.  She does have chronic low back pain and risk taking Percocet for chronic pain but denies any taking NSAIDs.  She also informs that whenever she eats and then she would have symptoms of severe pain in the mid epigastric area.  She was complaining of pain in the midepigastric area as well as in the both middle quadrants of the abdomen with tenderness to palpation with no guarding.  She denies exertional chest discomfort.  She admits compliance to her medications.     Last Recorded Vitals:  Vitals:    06/04/24 2137 06/04/24 2351 06/05/24 0823 06/05/24 1554   BP: 131/69 137/71 177/62 128/57   BP Location: Left arm Left arm Left arm Left arm   Patient Position: Lying Lying Lying Lying   Pulse: 88 89 75 80   Resp: 17 18 17 18   Temp: 36.5 °C (97.7 °F) 36.1 °C (97 °F) 36.6 °C (97.9 °F) 36.6 °C (97.9 °F)   TempSrc: Oral Oral  Oral Oral   SpO2: 94% 96% 94% 95%   Weight:       Height:           Last Labs:  CBC - 6/5/2024:  5:39 AM  8.9 11.2 380    35.8      CMP - 6/5/2024:  5:39 AM  9.5 7.4 10 --- 0.2   _ 4.1 <5 90      PTT - No results in last year.  _   _ _     Troponin I   Date/Time Value Ref Range Status   05/01/2022 09:54 PM 6 0 - 13 ng/L Final     Comment:     .  Less than 99th percentile of normal range cutoff-  Female and children under 18 years old <14 ng/L; Male <21 ng/L: Negative  Repeat testing should be performed if clinically indicated.   .  Female and children under 18 years old 14-50 ng/L; Male 21-50 ng/L:  Consistent with possible cardiac damage and possible increased clinical   risk. Serial measurements may help to assess extent of myocardial damage.   .  >50 ng/L: Consistent with cardiac damage, increased clinical risk and  myocardial infarction. Serial measurements may help assess extent of   myocardial damage.   .   NOTE: Children less than 1 year old may have higher baseline troponin   levels and results should be interpreted in conjunction with the overall   clinical context.   .  NOTE: Troponin I testing is performed using a different   testing methodology at [facility] than at other   [facility]. Direct result comparisons should only   be made within the same method.     05/01/2022 08:41 PM 5 0 - 13 ng/L Final     Comment:     .  Less than 99th percentile of normal range cutoff-  Female and children under 18 years old <14 ng/L; Male <21 ng/L: Negative  Repeat testing should be performed if clinically indicated.   .  Female and children under 18 years old 14-50 ng/L; Male 21-50 ng/L:  Consistent with possible cardiac damage and possible increased clinical   risk. Serial measurements may help to assess extent of myocardial damage.   .  >50 ng/L: Consistent with cardiac damage, increased clinical risk and  myocardial infarction. Serial measurements may help assess extent of   myocardial damage.   .    NOTE: Children less than 1 year old may have higher baseline troponin   levels and results should be interpreted in conjunction with the overall   clinical context.   .  NOTE: Troponin I testing is performed using a different   testing methodology at [facility] than at other   [facility] hospitals. Direct result comparisons should only   be made within the same method.     05/01/2022 07:08 PM 5 0 - 13 ng/L Final     Comment:     .  Less than 99th percentile of normal range cutoff-  Female and children under 18 years old <14 ng/L; Male <21 ng/L: Negative  Repeat testing should be performed if clinically indicated.   .  Female and children under 18 years old 14-50 ng/L; Male 21-50 ng/L:  Consistent with possible cardiac damage and possible increased clinical   risk. Serial measurements may help to assess extent of myocardial damage.   .  >50 ng/L: Consistent with cardiac damage, increased clinical risk and  myocardial infarction. Serial measurements may help assess extent of   myocardial damage.   .   NOTE: Children less than 1 year old may have higher baseline troponin   levels and results should be interpreted in conjunction with the overall   clinical context.   .  NOTE: Troponin I testing is performed using a different   testing methodology at [facility] than at other   [facility] hospitals. Direct result comparisons should only   be made within the same method.       BNP   Date/Time Value Ref Range Status   05/20/2021 03:58 PM 74 0 - 99 pg/mL Final     Comment:     .  <100 pg/mL - Heart failure unlikely  100-299 pg/mL - Intermediate probability of acute heart  .               failure exacerbation. Correlate with clinical  .               context and patient history.    >=300 pg/mL - Heart Failure likely. Correlate with clinical  .               context and patient history.  BNP testing is performed using different testing   methodology at [facility] than at other   system  hospitals. Direct result comparisons should   only be made within the same method.     06/11/2019 02:07  (H) 0 - 99 pg/mL Final     Comment:     .  <100 pg/mL - Heart failure unlikely  100-299 pg/mL - Intermediate probability of acute heart  .               failure exacerbation. Correlate with clinical  .               context and patient history.    >=300 pg/mL - Heart Failure likely. Correlate with clinical  .               context and patient history.  BNP testing is performed using different testing   methodology at [facility] than at other   [facility] hospitals. Direct result comparisons should   only be made within the same method.       Hemoglobin A1C   Date/Time Value Ref Range Status   01/09/2024 06:57 AM 5.7 (H) See below % Final   08/10/2022 03:28 PM 5.9 4.0 - 6.0 % Final     Comment:     Hemoglobin A1C levels are related to mean blood glucose during the   preceding 2-3 months. The relationship table below may be used as a   general guide. Each 1% increase in HGB A1C is a reflection of an   increase in mean glucose of approximately 30 mg/dl.   Reference: Diabetes Care, volume 29, supplement 1 Jan. 2006                        HGB A1C ................. Approx. Mean Glucose   _______________________________________________   6%   ...............................  120 mg/dl   7%   ...............................  150 mg/dl   8%   ...............................  180 mg/dl   9%   ...............................  210 mg/dl   10%  ...............................  240 mg/dl  Performed [address] [address]     12/21/2021 08:40 AM 7.7 (A) % Final     Comment:          Diagnosis of Diabetes-Adults   Non-Diabetic: < or = 5.6%   Increased risk for developing diabetes: 5.7-6.4%   Diagnostic of diabetes: > or = 6.5%  .       Monitoring of Diabetes                Age (y)     Therapeutic Goal (%)   Adults:          >18           <7.0   Pediatrics:    13-18           <7.5         "           7-12           <8.0                   0- 6            7.5-8.5   American Diabetes Association. Diabetes Care 33(S1), Jan 2010.       VLDL   Date/Time Value Ref Range Status   09/22/2020 11:15 AM 66 (H) 0 - 40 mg/dL Final   06/12/2019 06:27 AM 50 (H) 0 - 40 mg/dL Final      Last I/O:  No intake/output data recorded.    Past Cardiology Tests (Last 3 Years):  EKG:  ECG 12 lead 01/13/2024      Electrocardiogram, 12-lead PRN ACS symptoms 01/09/2024      ECG 12 lead 12/27/2023    Echo:  No results found for this or any previous visit from the past 1095 days.    Ejection Fractions:  No results found for: \"EF\"  Cath:  No results found for this or any previous visit from the past 1095 days.    Stress Test:  No results found for this or any previous visit from the past 1095 days.    Cardiac Imaging:  No results found for this or any previous visit from the past 1095 days.      Past Medical History:  She has a past medical history of Arthritis, Diabetes mellitus (Multi), and Hypertension.    Past Surgical History:  She has no past surgical history on file.      Social History:  She reports that she has never smoked. She has never been exposed to tobacco smoke. She has never used smokeless tobacco. She reports that she does not currently use alcohol. She reports that she does not use drugs.    Family History:  No family history on file.     Allergies:  Gabapentin, Levofloxacin, Sulfa (sulfonamide antibiotics), Duloxetine, Metronidazole, Pregabalin, Protonix [pantoprazole], Adhesive tape-silicones, and Morphine    Inpatient Medications:  Scheduled medications   Medication Dose Route Frequency    apixaban  5 mg oral BID    insulin lispro  0-10 Units subcutaneous TID    metoprolol tartrate  50 mg oral BID    polyethylene glycol  17 g oral Daily    sertraline  50 mg oral Daily     PRN medications   Medication    acetaminophen    ALPRAZolam    alum-mag hydroxide-simeth    bisacodyl    dextromethorphan-guaifenesin    " dextrose    dextrose    diphenhydrAMINE    glucagon    glucagon    guaiFENesin    hydrALAZINE    melatonin    morphine    ondansetron    oxyCODONE-acetaminophen     Continuous Medications   Medication Dose Last Rate     Outpatient Medications:  Current Outpatient Medications   Medication Instructions    acetaminophen (TYLENOL) 650 mg, oral, Every 6 hours PRN    apixaban (ELIQUIS) 5 mg, oral, 2 times daily    famotidine (PEPCID) 20 mg, oral, Daily    metFORMIN (GLUCOPHAGE) 500 mg, oral, 2 times daily    metoprolol tartrate (LOPRESSOR) 50 mg, oral, 2 times daily    multivitamin tablet 1 tablet, oral, Daily    ondansetron (ZOFRAN) 4 mg, oral, Every 8 hours PRN    oxyCODONE-acetaminophen (Percocet) 5-325 mg tablet 1 tablet, oral, Every 6 hours PRN    sertraline (ZOLOFT) 50 mg, oral, Daily       Physical Exam:  HEENT PRL neck is supple lungs clear to auscultation bilaterally with good air entry heart S1-S2 present with no significant murmurs abdomen soft tenderness in the midepigastric area as well as predominance in the right upper quadrant and in the both middle quadrants with no guarding.  Extremity shows no evidence for pedal edema.  Neuro is grossly nonfocal.     Assessment/Plan   #1 atypical chest pain I do not believe patient has any evidence of acute coronary syndrome her EKG shows no acute ST segment changes and EKG showed normal sinus rhythm.  For now I would not pursue any workup for ischemic heart disease  2.  Midepigastric pain size and patient may have a peptic ulcer disease.    #3 pain in the right upper quadrant with prior documented evidence of chronic cholecystitis.   4.  Prior history of pulmonary embolism for which patient remains on Eliquis 5 mg twice daily which should be continued.  Discussed with Dr. Mullen.  Will See her in follow-up and keep Dr. Mullen informed  Peripheral IV 06/03/24 20 G  Right Forearm (Active)   Site Assessment Clean;Dry;Intact 06/05/24 0730   Dressing Type Transparent  06/05/24 0730   Line Status Saline locked 06/05/24 0730   Dressing Status Clean;Dry 06/05/24 0730   Number of days: 2       Code Status:  Full Code    I spent 45 minutes in the professional and overall care of this patient.        Erasmo Gilliam MD

## 2024-06-05 NOTE — CONSULTS
Reason For Consult  Aortic dissection, small saccular aortic aneurysm    History Of Present Illness  Marylou Ward is a 78 y.o. female presenting with complaints of chest pain and tightness.  She has a past medical history that includes osteoarthritis, diabetes, hypertension she notes that it is in the epigastric area as well as in the mid sternal area.  During the course of her workup, she had a CT angiogram of chest abdomen and pelvis.  She had a small saccular abdominal aortic aneurysm noted as well as a age-indeterminate dissection.  She denies any significant symptoms of abdominal pain or back pain related to the aorta.      Past Medical History  She has a past medical history of Arthritis, Diabetes mellitus (Multi), and Hypertension.    Surgical History  She has no past surgical history on file.     Social History  She reports that she has never smoked. She has never been exposed to tobacco smoke. She has never used smokeless tobacco. She reports that she does not currently use alcohol. She reports that she does not use drugs.    Family History  No family history on file.     Allergies  Gabapentin, Levofloxacin, Sulfa (sulfonamide antibiotics), Duloxetine, Metronidazole, Pregabalin, Protonix [pantoprazole], Adhesive tape-silicones, and Morphine    Review of Systems    CONSTITUTIONAL: Denies weight loss, fever and chills.    HEENT: Denies changes in vision and hearing.    RESPIRATORY: Denies cough.  Positive shortness of breath    CV: Denies palpitations.  Positive for chest pain.      GI: Denies abdominal pain, nausea, vomiting and diarrhea.    : Denies dysuria and urinary frequency.    MSK: Denies myalgia and joint pain.    SKIN: Denies rash and pruritus.    VASC: Denies claudication, ischemic rest pain, or open wounds or sores    NEUROLOGICAL: Denies headache and syncope.    PSYCHIATRIC: Denies recent changes in mood. Denies anxiety and depression.     Physical Exam    General: Pt is alert and oriented x 3.  Pleasant, conversive  HEENT: Head is atraumatic, normocephalic.   Cardiac: Normal S1-S2.  Regular rate and rhythm.  No murmurs.  Respiratory: Lungs clear to auscultation.  No adventitious sounds.  Abdomen: Soft, nondistended.  Bowel sounds x4 quadrants.  Positive for generalized abdominal tenderness with palpation.  Pulse exam: Palpable brachial and radial pulses bilaterall.   femoral, popliteal, pedal pulses are palpable bilaterally.  Extremities: No significant edema noted.  Extremities are warm to the touch and normal in color.  No open wounds or sores.  Neuro: Moves all extremities spontaneously.  No focal deficits.  Psych: Appropriate affect.  Answers questions appropriately.     Last Recorded Vitals  Blood pressure 144/68, pulse 75, temperature 36.9 °C (98.4 °F), resp. rate 18, height 1.524 m (5'), weight 63.5 kg (140 lb), SpO2 94%.    Relevant Results  CT angio chest abdomen pelvis    Result Date: 6/4/2024  Interpreted By:  Celina Rubio, STUDY: CT ANGIO CHEST ABDOMEN PELVIS;  6/4/2024 1:20 am   INDICATION: Signs/Symptoms:chest pain radiating to the back, arms, neck   COMPARISON: CT abdomen and pelvis 12/27/2023, 02/06/2022. CT angiogram chest 02/01/2022. CT thoracic spine 01/10/2024. Pelvic ultrasound 11/07/2022.   ACCESSION NUMBER(S): [id number]   ORDERING CLINICIAN: AVEL DEMPSEY   TECHNIQUE: Noncontrast axial CT images of the chest, abdomen and pelvis were obtained prior to intravenous contrast administration. Axial CT images of the chest, abdomen and pelvis were obtained after the uneventful administration of intravenous contrast material using CT angiographic technique. Coronal and sagittal images are reconstructed.  3-D and MIP reconstructions were performed on an independent workstation and provided for review.   FINDINGS: NON-CONTRAST IMAGING:   Atherosclerotic calcifications are noted at the thoracic aorta. No hyperdense intramural thrombus. Coronary artery calcifications are present.  Atherosclerotic calcifications are noted at the abdominal aorta and its branches. There is cholelithiasis. No obstructing ureteral calculi.     POST-CONTRAST IMAGING: There is motion artifact.   VASCULAR: No thoracic aortic aneurysm or acute dissection. There is calcified and noncalcified plaque at the thoracic aorta. There is irregular noncalcified plaque at the aortic arch and descending thoracic aorta. There is calcified and noncalcified plaque at the proximal left subclavian artery with approximate 50% stenosis. There is irregular noncalcified plaque at the proximal brachiocephalic artery. No filling defects within the visualized pulmonary arteries.   Calcified and noncalcified plaque at the abdominal aorta and its branches. There is a small area of dissection with luminal narrowing at the distal infrarenal abdominal aorta, of indeterminate age. There is 0.9 x 1.3 x 1.2 cm saccular aneurysm at the site. The celiac artery, the superior mesenteric artery, the bilateral renal arteries and inferior mesenteric artery are patent. There is suboptimal contrast opacification of the iliac arteries, not well evaluated on this exam.       CHEST:   HEART: Normal size. No pericardial effusion. Coronary artery calcifications are present. MEDIASTINUM AND MARCELA: No pathologically enlarged thoracic lymph nodes. LUNG, PLEURA, LARGE AIRWAYS: The trachea and the mainstem bronchi are patent. The evaluation of the lungs is degraded by motion artifact. Redemonstration of chronic interstitial changes with scarring at the right lung. No consolidation, pleural effusion or pneumothorax. CHEST WALL AND LOWER NECK: Within normal limits.   ABDOMEN:   BONES: There is diffuse osteopenia. Mild multilevel degenerative changes at the spine. ABDOMINAL WALL: There is a small fat containing umbilical hernia.   LIVER: Unremarkable. BILE DUCTS: No significant dilation. GALLBLADDER: There is cholelithiasis. PANCREAS: No peripancreatic soft tissue  stranding. SPLEEN: Unremarkable. ADRENALS:  Unremarkable. KIDNEYS: No hydronephrosis. There is a 2.8 cm fluid attenuation lesion at the superior pole of the left kidney, may represent a cyst. Additional subcentimeter cortical renal hypodensities are too small to be adequately characterized. URETERS: No hydroureter.   PELVIS:   REPRODUCTIVE ORGANS: Redemonstration of cystic lesion at the right adnexa measuring 4.0 x 3.4 cm, previously measured 3.5 x 3.2 cm. BLADDER: Partially distended.   RETROPERITONEUM: No retroperitoneal hemorrhage. BOWEL: No bowel obstruction.  Normal appendix. PERITONEUM: No ascites or free air. No pathologically enlarged lymph nodes are identified.       1. No thoracic aortic aneurysm or acute dissection. Irregular noncalcified plaque at the aortic arch and descending thoracic aorta, concerning for unstable plaque. Irregular noncalcified plaque at the proximal brachiocephalic artery. Calcified and noncalcified plaque at the proximal left subclavian artery with approximate 50% stenosis. 2. Small area of dissection with luminal narrowing at the distal infrarenal abdominal aorta, of indeterminate age, with a 0.9 x 1.3 x 1.2 cm saccular infrarenal abdominal aortic aneurysm at the site. 3. No evidence for pulmonary emboli. 4. Coronary artery calcifications. 5. Cholelithiasis. 6. Mild interval increase in size in the cystic lesion at the right adnexa. This can be follow-up with nonemergent pelvic ultrasound.       MACRO: Critical Finding:  See findings. Notification was initiated on 6/4/2024 at 2:57 am by  Celina Rubio.  (**-YCF-**) Instructions:   Signed by: Celina Rubio 6/4/2024 3:03 AM Dictation workstation:   [id number]    CT head wo IV contrast    Result Date: 6/3/2024  Interpreted By:  Joss Guevara, STUDY: CT HEAD WO IV CONTRAST;  6/3/2024 6:13 pm   INDICATION: Signs/Symptoms:headache with elevated BP.   COMPARISON: CT scan of the head 02/18/2020   ACCESSION NUMBER(S): [id number]    ORDERING CLINICIAN: KELSIE ENCARNACION   TECHNIQUE: Axial noncontrast CT images of the head.   FINDINGS: BRAIN PARENCHYMA: Gray-white matter interfaces are preserved. No mass, mass effect or midline shift. Moderate deep and periventricular white matter hypodensities are nonspecific, but favored to represent chronic small vessel ischemic changes.   HEMORRHAGE: No acute intracranial hemorrhage. VENTRICLES and EXTRA-AXIAL SPACES: Age-appropriate volume loss. EXTRACRANIAL SOFT TISSUES: Within normal limits. PARANASAL SINUSES/MASTOIDS: The visualized paranasal sinuses and mastoid air cells are aerated. CALVARIUM: No depressed skull fracture. No destructive osseous lesion.   OTHER FINDINGS: Atherosclerotic calcification of the carotid siphons and vertebral arteries.       No acute intracranial abnormality.   Chronic changes as noted above.   MACRO: None   Signed by: Joss Guevara 6/3/2024 6:51 PM Dictation workstation:   [id number]    XR chest 1 view    Result Date: 6/3/2024  Interpreted By:  Kit King, STUDY: XR CHEST 1 VIEW; 6/3/2024 4:23 pm   INDICATION: Signs/Symptoms:chest pain   COMPARISON: January 2024   ACCESSION NUMBER(S): [id number]   ORDERING CLINICIAN: KELSIE ENCARNACION   FINDINGS: The study is limited due to rotation, respiratory motion and overlying artifacts obscuring some detail. The cardiac silhouette is within normal limits for the technique. Calcifications involve the tortuous aorta. There is no pneumothorax, confluent infiltrates or significant effusion. The osseous structures are unchanged.       Allowing for the aforementioned limitation, no acute cardiopulmonary disease.   Signed by: Kit King 6/3/2024 4:45 PM Dictation workstation:   [id number]         Results for orders placed or performed during the hospital encounter of 06/03/24 (from the past 24 hour(s))   Urinalysis with Reflex Culture and Microscopic   Result Value Ref Range    Color, Urine Colorless (N) Light-Yellow, Yellow,  Dark-Yellow    Appearance, Urine Clear Clear    Specific Gravity, Urine 1.006 1.005 - 1.035    pH, Urine 6.0 5.0, 5.5, 6.0, 6.5, 7.0, 7.5, 8.0    Protein, Urine 10 (TRACE) NEGATIVE, 10 (TRACE), 20 (TRACE) mg/dL    Glucose, Urine Normal Normal mg/dL    Blood, Urine NEGATIVE NEGATIVE    Ketones, Urine NEGATIVE NEGATIVE mg/dL    Bilirubin, Urine NEGATIVE NEGATIVE    Urobilinogen, Urine Normal Normal mg/dL    Nitrite, Urine 1+ (A) NEGATIVE    Leukocyte Esterase, Urine 250 Kamaljit/µL (A) NEGATIVE   Extra Urine Gray Tube   Result Value Ref Range    Extra Tube Hold for add-ons.    Microscopic Only, Urine   Result Value Ref Range    WBC, Urine 21-50 (A) 1-5, NONE /HPF    RBC, Urine NONE NONE, 1-2, 3-5 /HPF    Bacteria, Urine 1+ (A) NONE SEEN /HPF   Serial Troponin, 6 Hour ([facility])   Result Value Ref Range    Troponin T, High Sensitivity <6 <=14 ng/L   CBC   Result Value Ref Range    WBC 10.5 4.4 - 11.3 x10*3/uL    nRBC 0.0 0.0 - 0.0 /100 WBCs    RBC 4.06 4.00 - 5.20 x10*6/uL    Hemoglobin 10.8 (L) 12.0 - 16.0 g/dL    Hematocrit 33.5 (L) 36.0 - 46.0 %    MCV 83 80 - 100 fL    MCH 26.6 26.0 - 34.0 pg    MCHC 32.2 32.0 - 36.0 g/dL    RDW 14.5 11.5 - 14.5 %    Platelets 372 150 - 450 x10*3/uL   Comprehensive Metabolic Panel   Result Value Ref Range    Glucose 111 (H) 65 - 99 mg/dL    Sodium 137 133 - 145 mmol/L    Potassium 4.3 3.4 - 5.1 mmol/L    Chloride 98 97 - 107 mmol/L    Bicarbonate 23 (L) 24 - 31 mmol/L    Urea Nitrogen 11 8 - 25 mg/dL    Creatinine 1.00 0.40 - 1.60 mg/dL    eGFR 57 (L) >60 mL/min/1.73m*2    Calcium 9.3 8.5 - 10.4 mg/dL    Albumin 4.2 3.5 - 5.0 g/dL    Alkaline Phosphatase 92 35 - 125 U/L    Total Protein 7.4 5.9 - 7.9 g/dL    AST 11 5 - 40 U/L    Bilirubin, Total 0.2 0.1 - 1.2 mg/dL    ALT 5 5 - 40 U/L    Anion Gap 16 <=19 mmol/L   POCT GLUCOSE   Result Value Ref Range    POCT Glucose 111 (H) 74 - 99 mg/dL      Assessment/Plan   Abdominal aortic dissection, likely chronic  Small saccular abdominal aortic  aneurysm    Discussed in detail with Dr. Gaspar    Patient with incidental finding of a chronic aortic dissection as well as a small saccular abdominal aortic aneurysm.  This is a chronic finding as the area of dissection is heavily calcified.  No vascular surgical intervention indicated.  Would recommend systolic blood pressure less than 140 moving forward.    Small saccular abdominal aortic aneurysm-measuring 0.9 x 1.3 x 1.2 cm.    Plan for outpatient follow-up in 1 month.  Orders placed.    Vascular surgery signing off      I spent 45 minutes in the professional and overall care of this patient.      Papo Bergman, APRN-CNP

## 2024-06-05 NOTE — RESEARCH NOTES
Artificial Intelligence Monitoring in Nursing (AIMS Nursing) Study    Principle Investigator - Dr. Alex Cui  Research Coordinator - Marilyn Cobb     Patient Name - Marylou Ward  Date - 6/5/2024 11:49 AM  Location - [facility]    Marylou Ward was not approached by Marilyn Cobb to talk about participating in the AIMS Nursing Study. The patient was not able to be approached, a research coordinator will come back at a later time.     Marilyn Cobb

## 2024-06-05 NOTE — CARE PLAN
The patient's goals for the shift include  No chest pain    The clinical goals for the shift include pain management    Over the shift, the patient did not make progress toward the following goals. Barriers to progression include none. Recommendations to address these barriers include assess pain.

## 2024-06-05 NOTE — PROGRESS NOTES
Subjective Data:  Continues to have symptoms of midepigastric pain as well as pain in the right upper quadrant.    Overnight Events:    none     Objective Data:  Last Recorded Vitals:  Vitals:    06/04/24 2137 06/04/24 2351 06/05/24 0823 06/05/24 1554   BP: 131/69 137/71 177/62 128/57   BP Location: Left arm Left arm Left arm Left arm   Patient Position: Lying Lying Lying Lying   Pulse: 88 89 75 80   Resp: 17 18 17 18   Temp: 36.5 °C (97.7 °F) 36.1 °C (97 °F) 36.6 °C (97.9 °F) 36.6 °C (97.9 °F)   TempSrc: Oral Oral Oral Oral   SpO2: 94% 96% 94% 95%   Weight:       Height:           Last Labs:  CBC - 6/5/2024:  5:39 AM  8.9 11.2 380    35.8      CMP - 6/5/2024:  5:39 AM  9.5 7.4 10 --- 0.2   _ 4.1 <5 90      PTT - No results in last year.  _   _ _     TROPHS   Date/Time Value Ref Range Status   05/01/2022 09:54 PM 6 0 - 13 ng/L Final     Comment:     .  Less than 99th percentile of normal range cutoff-  Female and children under 18 years old <14 ng/L; Male <21 ng/L: Negative  Repeat testing should be performed if clinically indicated.   .  Female and children under 18 years old 14-50 ng/L; Male 21-50 ng/L:  Consistent with possible cardiac damage and possible increased clinical   risk. Serial measurements may help to assess extent of myocardial damage.   .  >50 ng/L: Consistent with cardiac damage, increased clinical risk and  myocardial infarction. Serial measurements may help assess extent of   myocardial damage.   .   NOTE: Children less than 1 year old may have higher baseline troponin   levels and results should be interpreted in conjunction with the overall   clinical context.   .  NOTE: Troponin I testing is performed using a different   testing methodology at [facility] than at other   [facility] hospitals. Direct result comparisons should only   be made within the same method.     05/01/2022 08:41 PM 5 0 - 13 ng/L Final     Comment:     .  Less than 99th percentile of normal range cutoff-  Female  and children under 18 years old <14 ng/L; Male <21 ng/L: Negative  Repeat testing should be performed if clinically indicated.   .  Female and children under 18 years old 14-50 ng/L; Male 21-50 ng/L:  Consistent with possible cardiac damage and possible increased clinical   risk. Serial measurements may help to assess extent of myocardial damage.   .  >50 ng/L: Consistent with cardiac damage, increased clinical risk and  myocardial infarction. Serial measurements may help assess extent of   myocardial damage.   .   NOTE: Children less than 1 year old may have higher baseline troponin   levels and results should be interpreted in conjunction with the overall   clinical context.   .  NOTE: Troponin I testing is performed using a different   testing methodology at [facility] than at other   [facility]. Direct result comparisons should only   be made within the same method.     05/01/2022 07:08 PM 5 0 - 13 ng/L Final     Comment:     .  Less than 99th percentile of normal range cutoff-  Female and children under 18 years old <14 ng/L; Male <21 ng/L: Negative  Repeat testing should be performed if clinically indicated.   .  Female and children under 18 years old 14-50 ng/L; Male 21-50 ng/L:  Consistent with possible cardiac damage and possible increased clinical   risk. Serial measurements may help to assess extent of myocardial damage.   .  >50 ng/L: Consistent with cardiac damage, increased clinical risk and  myocardial infarction. Serial measurements may help assess extent of   myocardial damage.   .   NOTE: Children less than 1 year old may have higher baseline troponin   levels and results should be interpreted in conjunction with the overall   clinical context.   .  NOTE: Troponin I testing is performed using a different   testing methodology at [facility] than at other   [facility] hospitals. Direct result comparisons should only   be made within the same method.       BNP   Date/Time  Value Ref Range Status   05/20/2021 03:58 PM 74 0 - 99 pg/mL Final     Comment:     .  <100 pg/mL - Heart failure unlikely  100-299 pg/mL - Intermediate probability of acute heart  .               failure exacerbation. Correlate with clinical  .               context and patient history.    >=300 pg/mL - Heart Failure likely. Correlate with clinical  .               context and patient history.  BNP testing is performed using different testing   methodology at [facility] than at other   system [facility]. Direct result comparisons should   only be made within the same method.     06/11/2019 02:07  0 - 99 pg/mL Final     Comment:     .  <100 pg/mL - Heart failure unlikely  100-299 pg/mL - Intermediate probability of acute heart  .               failure exacerbation. Correlate with clinical  .               context and patient history.    >=300 pg/mL - Heart Failure likely. Correlate with clinical  .               context and patient history.  BNP testing is performed using different testing   methodology at [facility] than at other   [facility] hospitals. Direct result comparisons should   only be made within the same method.       HGBA1C   Date/Time Value Ref Range Status   01/09/2024 06:57 AM 5.7 See below % Final   08/10/2022 03:28 PM 5.9 4.0 - 6.0 % Final     Comment:     Hemoglobin A1C levels are related to mean blood glucose during the   preceding 2-3 months. The relationship table below may be used as a   general guide. Each 1% increase in HGB A1C is a reflection of an   increase in mean glucose of approximately 30 mg/dl.   Reference: Diabetes Care, volume 29, supplement 1 Jan. 2006                        HGB A1C ................. Approx. Mean Glucose   _______________________________________________   6%   ...............................  120 mg/dl   7%   ...............................  150 mg/dl   8%   ...............................  180 mg/dl   9%    "...............................  210 mg/dl   10%  ...............................  240 mg/dl  Performed at [address] [place] [address]     12/21/2021 08:40 AM 7.7 % Final     Comment:          Diagnosis of Diabetes-Adults   Non-Diabetic: < or = 5.6%   Increased risk for developing diabetes: 5.7-6.4%   Diagnostic of diabetes: > or = 6.5%  .       Monitoring of Diabetes                Age (y)     Therapeutic Goal (%)   Adults:          >18           <7.0   Pediatrics:    13-18           <7.5                   7-12           <8.0                   0- 6            7.5-8.5   American Diabetes Association. Diabetes Care 33(S1), Jan 2010.       VLDL   Date/Time Value Ref Range Status   09/22/2020 11:15 AM 66 0 - 40 mg/dL Final   06/12/2019 06:27 AM 50 0 - 40 mg/dL Final      Last I/O:  No intake/output data recorded.    Past Cardiology Tests (Last 3 Years):  EKG:  ECG 12 lead 01/13/2024      Electrocardiogram, 12-lead PRN ACS symptoms 01/09/2024      ECG 12 lead 12/27/2023    Echo:  No results found for this or any previous visit from the past 1095 days.    Ejection Fractions:  No results found for: \"EF\"  Cath:  No results found for this or any previous visit from the past 1095 days.    Stress Test:  No results found for this or any previous visit from the past 1095 days.    Cardiac Imaging:  No results found for this or any previous visit from the past 1095 days.      Inpatient Medications:  Scheduled medications   Medication Dose Route Frequency    apixaban  5 mg oral BID    insulin lispro  0-10 Units subcutaneous TID    metoprolol tartrate  50 mg oral BID    polyethylene glycol  17 g oral Daily    sertraline  50 mg oral Daily     PRN medications   Medication    acetaminophen    ALPRAZolam    alum-mag hydroxide-simeth    bisacodyl    dextromethorphan-guaifenesin    dextrose    dextrose    diphenhydrAMINE    glucagon    glucagon    guaiFENesin    hydrALAZINE    melatonin    morphine    ondansetron    " oxyCODONE-acetaminophen     Continuous Medications   Medication Dose Last Rate       Physical Exam:  EENT PERRLA neck supple lungs clear to auscultation bilaterally with good air entry heart S1-S2 present with no murmurs abdomen soft tender in the midepigastric area as well as in the right upper quadrant.  Patient did had a good bowel movement this morning.     Assessment/Plan   #1 atypical chest pain with symptoms lasting of peptic ulcer disease and with possible acute on chronic cholecystitis.  Discussed with Dr. Mullen who like an opinion from general surgery as well as from gastroenterology on this admission and will add a serum lipase to current existing labs to look for any evidence of gallstone induced pancreatitis.  2.  History of pulm embolism for which patient trenton on Eliquis 5 mg twice daily which should be continued  3.  History of depression for which she remains on Zoloft   #4 diabetes mellitus for which she remains on insulin supplementation.  Peripheral IV 06/03/24 20 G  Right Forearm (Active)   Site Assessment Clean;Dry;Intact 06/05/24 0730   Dressing Type Transparent 06/05/24 0730   Line Status Saline locked 06/05/24 0730   Dressing Status Clean;Dry 06/05/24 0730   Number of days: 2       Code Status:  Full Code    I spent 25 minutes in the professional and overall care of this patient.        Erasmo Gilliam MD

## 2024-06-05 NOTE — PROGRESS NOTES
Marylou Ward is a 78 y.o. female on day 1 of admission presenting with Chest pain, unspecified type.      Subjective   HPI: This is a 78 y.o. female who came to the ER for epigastric/chest pain.  Patient has had nausea since admission, she has had this pain chronically for several years, in January 2024 patient was prepared for cholecystectomy for chronic cholecystitis which was abandoned as she could not be intubated.  At that time patient was asked to follow-up at [facility] for a elective cholecystectomy with ENT presence.  Patient has not followed up with the same as she was reluctant to go for surgery.          Objective     Last Recorded Vitals  /62 (BP Location: Left arm, Patient Position: Lying)   Pulse 75   Temp 36.6 °C (97.9 °F) (Oral)   Resp 17   Wt 63.5 kg (140 lb)   SpO2 94%   Intake/Output last 3 Shifts:  No intake or output data in the 24 hours ending 06/05/24 1022    Admission Weight  Weight: 63.5 kg (140 lb) (06/03/24 1600)    Daily Weight  06/03/24 : 63.5 kg (140 lb)    Image Results  US abdomen limited  Narrative: Interpreted By:  Mack Moore,   STUDY:  US ABDOMEN LIMITED;  6/4/2024 8:22 pm      INDICATION:  Signs/Symptoms:History of chronic cholecystitis, epigastric pain,  nausea.      COMPARISON:  Relation with same day CTA chest abdomen pelvis.      ACCESSION NUMBER(S):  [id number]      ORDERING CLINICIAN:  LASHONDA MAY      TECHNIQUE:  Multiple static images of the abdomen were obtained.      FINDINGS:  The pancreas appears within normal limits to the extent visualized.      The liver measures 16.4 cm. No focal parenchymal abnormalities.      There is no intrahepatic or extrahepatic biliary ductal dilatation.  The common bile duct measures 0.3 cm.      The gallbladder contains stones without wall thickening. The  sonographic Coulter sign is negative. No pericholecystic fluid.      The right kidney measures 8.2 cm. No hydronephrosis.      No free fluid.      Impression:  Cholelithiasis without evidence of acute cholecystitis.          MACRO:  None.      Signed by: Mack Moore 6/4/2024 9:25 PM  Dictation workstation:   [id number]  CT angio chest abdomen pelvis  Narrative: Interpreted By:  Celina Rubio,   STUDY:  CT ANGIO CHEST ABDOMEN PELVIS;  6/4/2024 1:20 am      INDICATION:  Signs/Symptoms:chest pain radiating to the back, arms, neck      COMPARISON:  CT abdomen and pelvis 12/27/2023, 02/06/2022. CT angiogram chest  02/01/2022. CT thoracic spine 01/10/2024. Pelvic ultrasound  11/07/2022.      ACCESSION NUMBER(S):  [id number]      ORDERING CLINICIAN:  AVEL DEMPSEY      TECHNIQUE:  Noncontrast axial CT images of the chest, abdomen and pelvis were  obtained prior to intravenous contrast administration. Axial CT  images of the chest, abdomen and pelvis were obtained after the  uneventful administration of intravenous contrast material using CT  angiographic technique. Coronal and sagittal images are  reconstructed.  3-D and MIP reconstructions were performed on an  independent workstation and provided for review.      FINDINGS:  NON-CONTRAST IMAGING:      Atherosclerotic calcifications are noted at the thoracic aorta. No  hyperdense intramural thrombus. Coronary artery calcifications are  present. Atherosclerotic calcifications are noted at the abdominal  aorta and its branches. There is cholelithiasis.  No obstructing ureteral calculi.          POST-CONTRAST IMAGING:  There is motion artifact.      VASCULAR:  No thoracic aortic aneurysm or acute dissection. There is calcified  and noncalcified plaque at the thoracic aorta. There is irregular  noncalcified plaque at the aortic arch and descending thoracic aorta.  There is calcified and noncalcified plaque at the proximal left  subclavian artery with approximate 50% stenosis. There is irregular  noncalcified plaque at the proximal brachiocephalic artery. No  filling defects within the visualized pulmonary arteries.       Calcified and noncalcified plaque at the abdominal aorta and its  branches. There is a small area of dissection with luminal narrowing  at the distal infrarenal abdominal aorta, of indeterminate age. There  is 0.9 x 1.3 x 1.2 cm saccular aneurysm at the site. The celiac  artery, the superior mesenteric artery, the bilateral renal arteries  and inferior mesenteric artery are patent. There is suboptimal  contrast opacification of the iliac arteries, not well evaluated on  this exam.              CHEST:      HEART: Normal size. No pericardial effusion. Coronary artery  calcifications are present. MEDIASTINUM AND MARCELA: No pathologically  enlarged thoracic lymph nodes. LUNG, PLEURA, LARGE AIRWAYS: The  trachea and the mainstem bronchi are patent. The evaluation of the  lungs is degraded by motion artifact. Redemonstration of chronic  interstitial changes with scarring at the right lung. No  consolidation, pleural effusion or pneumothorax. CHEST WALL AND LOWER  NECK: Within normal limits.      ABDOMEN:      BONES: There is diffuse osteopenia. Mild multilevel degenerative  changes at the spine. ABDOMINAL WALL: There is a small fat containing  umbilical hernia.      LIVER: Unremarkable.  BILE DUCTS: No significant dilation.  GALLBLADDER: There is cholelithiasis.  PANCREAS: No peripancreatic soft tissue stranding.  SPLEEN: Unremarkable.  ADRENALS:  Unremarkable.  KIDNEYS: No hydronephrosis. There is a 2.8 cm fluid attenuation  lesion at the superior pole of the left kidney, may represent a cyst.  Additional subcentimeter cortical renal hypodensities are too small  to be adequately characterized. URETERS: No hydroureter.      PELVIS:      REPRODUCTIVE ORGANS: Redemonstration of cystic lesion at the right  adnexa measuring 4.0 x 3.4 cm, previously measured 3.5 x 3.2 cm.  BLADDER: Partially distended.      RETROPERITONEUM: No retroperitoneal hemorrhage.  BOWEL: No bowel obstruction.  Normal appendix.  PERITONEUM: No ascites  or free air. No pathologically enlarged lymph  nodes are identified.      Impression: 1. No thoracic aortic aneurysm or acute dissection. Irregular  noncalcified plaque at the aortic arch and descending thoracic aorta,  concerning for unstable plaque. Irregular noncalcified plaque at the  proximal brachiocephalic artery. Calcified and noncalcified plaque at  the proximal left subclavian artery with approximate 50% stenosis.  2. Small area of dissection with luminal narrowing at the distal  infrarenal abdominal aorta, of indeterminate age, with a 0.9 x 1.3 x  1.2 cm saccular infrarenal abdominal aortic aneurysm at the site.  3. No evidence for pulmonary emboli.  4. Coronary artery calcifications.  5. Cholelithiasis.  6. Mild interval increase in size in the cystic lesion at the right  adnexa. This can be follow-up with nonemergent pelvic ultrasound.              MACRO:  Critical Finding:  See findings. Notification was initiated on  6/4/2024 at 2:57 am by  Celina Rubio.  (**-YCF-**) Instructions:      Signed by: Celina Rubio 6/4/2024 3:03 AM  Dictation workstation:   [id number]      Physical Exam    Relevant Results    Results for orders placed or performed during the hospital encounter of 06/03/24 (from the past 24 hour(s))   CBC   Result Value Ref Range    WBC 8.9 4.4 - 11.3 x10*3/uL    nRBC 0.0 0.0 - 0.0 /100 WBCs    RBC 4.31 4.00 - 5.20 x10*6/uL    Hemoglobin 11.2 (L) 12.0 - 16.0 g/dL    Hematocrit 35.8 (L) 36.0 - 46.0 %    MCV 83 80 - 100 fL    MCH 26.0 26.0 - 34.0 pg    MCHC 31.3 (L) 32.0 - 36.0 g/dL    RDW 14.6 (H) 11.5 - 14.5 %    Platelets 380 150 - 450 x10*3/uL   Comprehensive Metabolic Panel   Result Value Ref Range    Glucose 125 (H) 65 - 99 mg/dL    Sodium 136 133 - 145 mmol/L    Potassium 4.4 3.4 - 5.1 mmol/L    Chloride 99 97 - 107 mmol/L    Bicarbonate 24 24 - 31 mmol/L    Urea Nitrogen 18 8 - 25 mg/dL    Creatinine 1.60 0.40 - 1.60 mg/dL    eGFR 32 (L) >60 mL/min/1.73m*2    Calcium 9.5 8.5 -  10.4 mg/dL    Albumin 4.1 3.5 - 5.0 g/dL    Alkaline Phosphatase 90 35 - 125 U/L    Total Protein 7.4 5.9 - 7.9 g/dL    AST 10 5 - 40 U/L    Bilirubin, Total 0.2 0.1 - 1.2 mg/dL    ALT <5 (L) 5 - 40 U/L    Anion Gap 13 <=19 mmol/L   Amylase   Result Value Ref Range    Amylase 260 (H) 28 - 100 U/L   Lipase   Result Value Ref Range    Lipase 18 16 - 63 U/L   POCT GLUCOSE   Result Value Ref Range    POCT Glucose 122 (H) 74 - 99 mg/dL   POCT GLUCOSE   Result Value Ref Range    POCT Glucose 113 (H) 74 - 99 mg/dL   POCT GLUCOSE   Result Value Ref Range    POCT Glucose 131 (H) 74 - 99 mg/dL            Assessment/Plan      # Chest pain/epigastric pain/nausea  -Probably noncardiac pain  -Will still consult cardiology     # Chronic cholecystitis   -Will need cholecystectomy as an elective procedure.  - Pt was unable to be intubated (Jan 2024)- hence surgery was abandoned - will need ENT eval [place] [facility] [place]  Same d/w pt.  -Will check ultrasound of the gallbladder     # Left Rib tenderness  - Xray Ribs     # Hypertension  -Continue home meds     # Diabetes  -Insulin sliding scale     # History of PE  -On Eliquis     # Probable UTI  -Continue IV Rocephin    6/5 : D/W cardio - not cardiac, GI consulted, h/o Ch Pain         Cuca Mullen MD

## 2024-06-06 LAB
ALBUMIN SERPL-MCNC: 3.9 G/DL (ref 3.5–5)
ALP BLD-CCNC: 84 U/L (ref 35–125)
ALT SERPL-CCNC: 5 U/L (ref 5–40)
AMYLASE SERPL-CCNC: 107 U/L (ref 28–100)
ANION GAP SERPL CALC-SCNC: 10 MMOL/L
AST SERPL-CCNC: 12 U/L (ref 5–40)
BACTERIA UR CULT: ABNORMAL
BILIRUB SERPL-MCNC: <0.2 MG/DL (ref 0.1–1.2)
BUN SERPL-MCNC: 23 MG/DL (ref 8–25)
CALCIUM SERPL-MCNC: 8.7 MG/DL (ref 8.5–10.4)
CHLORIDE SERPL-SCNC: 102 MMOL/L (ref 97–107)
CO2 SERPL-SCNC: 25 MMOL/L (ref 24–31)
CREAT SERPL-MCNC: 1.5 MG/DL (ref 0.4–1.6)
EGFRCR SERPLBLD CKD-EPI 2021: 35 ML/MIN/1.73M*2
ERYTHROCYTE [DISTWIDTH] IN BLOOD BY AUTOMATED COUNT: 14.8 % (ref 11.5–14.5)
GLUCOSE BLD MANUAL STRIP-MCNC: 200 MG/DL (ref 74–99)
GLUCOSE SERPL-MCNC: 114 MG/DL (ref 65–99)
HCT VFR BLD AUTO: 33.4 % (ref 36–46)
HGB BLD-MCNC: 10.3 G/DL (ref 12–16)
LIPASE SERPL-CCNC: 21 U/L (ref 16–63)
MCH RBC QN AUTO: 25.9 PG (ref 26–34)
MCHC RBC AUTO-ENTMCNC: 30.8 G/DL (ref 32–36)
MCV RBC AUTO: 84 FL (ref 80–100)
NRBC BLD-RTO: 0 /100 WBCS (ref 0–0)
PLATELET # BLD AUTO: 383 X10*3/UL (ref 150–450)
POTASSIUM SERPL-SCNC: 4.4 MMOL/L (ref 3.4–5.1)
PROT SERPL-MCNC: 6.9 G/DL (ref 5.9–7.9)
RBC # BLD AUTO: 3.98 X10*6/UL (ref 4–5.2)
SODIUM SERPL-SCNC: 137 MMOL/L (ref 133–145)
WBC # BLD AUTO: 8.5 X10*3/UL (ref 4.4–11.3)

## 2024-06-06 PROCEDURE — 80053 COMPREHEN METABOLIC PANEL: CPT | Performed by: INTERNAL MEDICINE

## 2024-06-06 PROCEDURE — 82947 ASSAY GLUCOSE BLOOD QUANT: CPT

## 2024-06-06 PROCEDURE — 82150 ASSAY OF AMYLASE: CPT | Performed by: INTERNAL MEDICINE

## 2024-06-06 PROCEDURE — 36415 COLL VENOUS BLD VENIPUNCTURE: CPT | Performed by: INTERNAL MEDICINE

## 2024-06-06 PROCEDURE — 1200000002 HC GENERAL ROOM WITH TELEMETRY DAILY

## 2024-06-06 PROCEDURE — 2500000001 HC RX 250 WO HCPCS SELF ADMINISTERED DRUGS (ALT 637 FOR MEDICARE OP): Performed by: INTERNAL MEDICINE

## 2024-06-06 PROCEDURE — 85027 COMPLETE CBC AUTOMATED: CPT | Performed by: INTERNAL MEDICINE

## 2024-06-06 PROCEDURE — 2500000002 HC RX 250 W HCPCS SELF ADMINISTERED DRUGS (ALT 637 FOR MEDICARE OP, ALT 636 FOR OP/ED): Mod: MUE | Performed by: INTERNAL MEDICINE

## 2024-06-06 PROCEDURE — 2500000004 HC RX 250 GENERAL PHARMACY W/ HCPCS (ALT 636 FOR OP/ED): Performed by: INTERNAL MEDICINE

## 2024-06-06 PROCEDURE — 83690 ASSAY OF LIPASE: CPT | Performed by: INTERNAL MEDICINE

## 2024-06-06 RX ADMIN — METOPROLOL TARTRATE 50 MG: 50 TABLET, FILM COATED ORAL at 09:03

## 2024-06-06 RX ADMIN — APIXABAN 5 MG: 5 TABLET, FILM COATED ORAL at 09:03

## 2024-06-06 RX ADMIN — OXYCODONE AND ACETAMINOPHEN 1 TABLET: 5; 325 TABLET ORAL at 21:59

## 2024-06-06 RX ADMIN — APIXABAN 5 MG: 5 TABLET, FILM COATED ORAL at 21:57

## 2024-06-06 RX ADMIN — Medication 6 MG: at 21:59

## 2024-06-06 RX ADMIN — HYDRALAZINE HYDROCHLORIDE 10 MG: 20 INJECTION INTRAMUSCULAR; INTRAVENOUS at 02:31

## 2024-06-06 RX ADMIN — OXYCODONE AND ACETAMINOPHEN 1 TABLET: 5; 325 TABLET ORAL at 01:34

## 2024-06-06 RX ADMIN — OXYCODONE AND ACETAMINOPHEN 1 TABLET: 5; 325 TABLET ORAL at 15:13

## 2024-06-06 RX ADMIN — SERTRALINE 50 MG: 50 TABLET, FILM COATED ORAL at 09:03

## 2024-06-06 RX ADMIN — ALUMINUM HYDROXIDE, MAGNESIUM HYDROXIDE, AND SIMETHICONE 20 ML: 200; 200; 20 SUSPENSION ORAL at 21:57

## 2024-06-06 RX ADMIN — ONDANSETRON 4 MG: 2 INJECTION INTRAMUSCULAR; INTRAVENOUS at 23:57

## 2024-06-06 RX ADMIN — OXYCODONE AND ACETAMINOPHEN 1 TABLET: 5; 325 TABLET ORAL at 09:04

## 2024-06-06 RX ADMIN — METOPROLOL TARTRATE 50 MG: 50 TABLET, FILM COATED ORAL at 21:57

## 2024-06-06 ASSESSMENT — ENCOUNTER SYMPTOMS
EYES NEGATIVE: 1
CARDIOVASCULAR NEGATIVE: 1
ALLERGIC/IMMUNOLOGIC NEGATIVE: 1
NEUROLOGICAL NEGATIVE: 1
CONSTITUTIONAL NEGATIVE: 1
RESPIRATORY NEGATIVE: 1
ENDOCRINE NEGATIVE: 1
HEMATOLOGIC/LYMPHATIC NEGATIVE: 1
MUSCULOSKELETAL NEGATIVE: 1
PSYCHIATRIC NEGATIVE: 1
ABDOMINAL PAIN: 1

## 2024-06-06 ASSESSMENT — COGNITIVE AND FUNCTIONAL STATUS - GENERAL
DAILY ACTIVITIY SCORE: 24
DAILY ACTIVITIY SCORE: 24
MOBILITY SCORE: 24
MOBILITY SCORE: 24

## 2024-06-06 ASSESSMENT — PAIN SCALES - GENERAL
PAINLEVEL_OUTOF10: 7
PAINLEVEL_OUTOF10: 4
PAINLEVEL_OUTOF10: 8

## 2024-06-06 ASSESSMENT — PAIN - FUNCTIONAL ASSESSMENT
PAIN_FUNCTIONAL_ASSESSMENT: 0-10

## 2024-06-06 ASSESSMENT — PAIN SCALES - PAIN ASSESSMENT IN ADVANCED DEMENTIA (PAINAD)
TOTALSCORE: 0
CONSOLABILITY: NO NEED TO CONSOLE
FACIALEXPRESSION: SMILING OR INEXPRESSIVE
FACIALEXPRESSION: SMILING OR INEXPRESSIVE
BREATHING: NORMAL
TOTALSCORE: 0
BODYLANGUAGE: RELAXED
BODYLANGUAGE: RELAXED
BREATHING: NORMAL
CONSOLABILITY: NO NEED TO CONSOLE

## 2024-06-06 ASSESSMENT — PAIN SCALES - WONG BAKER: WONGBAKER_NUMERICALRESPONSE: HURTS LITTLE BIT

## 2024-06-06 ASSESSMENT — PAIN DESCRIPTION - LOCATION
LOCATION: ABDOMEN
LOCATION: ABDOMEN
LOCATION: ARM
LOCATION: ABDOMEN

## 2024-06-06 ASSESSMENT — PAIN DESCRIPTION - DESCRIPTORS: DESCRIPTORS: ACHING

## 2024-06-06 ASSESSMENT — PAIN DESCRIPTION - ORIENTATION
ORIENTATION: RIGHT
ORIENTATION: MID

## 2024-06-06 NOTE — PROGRESS NOTES
06/06/24 6061   Discharge Planning   Patient expects to be discharged to: home     No home going needs anticipated.  Patient is independent.

## 2024-06-06 NOTE — PROGRESS NOTES
Spiritual Care Visit    Clinical Encounter Type  Visited With: Patient  Routine Visit: Follow-up  Continue Visiting: Yes         Values/Beliefs  Spiritual Requests During Hospitalization: Communion today    Sacramental Encounters  Communion: Patient wants communion  Communion Given Indicator: Yes     Gerard Bagley

## 2024-06-06 NOTE — NURSING NOTE
Pt BP has been elevated, PRN Hydralazine given with positive effect, BP now is 131/63, will continue to monitor.

## 2024-06-06 NOTE — PROGRESS NOTES
Marylou Ward is a 78 y.o. female on day 2 of admission presenting with Chest pain, unspecified type.      Subjective   HPI: This is a 78 y.o. female who came to the ER for epigastric/chest pain.  Patient has had nausea since admission, she has had this pain chronically for several years, in January 2024 patient was prepared for cholecystectomy for chronic cholecystitis which was abandoned as she could not be intubated.  At that time patient was asked to follow-up at [facility] for a elective cholecystectomy with ENT presence.  Patient has not followed up with the same as she was reluctant to go for surgery.          Objective     Last Recorded Vitals  /72 (BP Location: Right arm, Patient Position: Lying)   Pulse 76   Temp 36.2 °C (97.2 °F) (Axillary)   Resp 16   Wt 63.5 kg (140 lb)   SpO2 95%   Intake/Output last 3 Shifts:    Intake/Output Summary (Last 24 hours) at 6/6/2024 1827  Last data filed at 6/6/2024 1324  Gross per 24 hour   Intake 630 ml   Output 2 ml   Net 628 ml       Admission Weight  Weight: 63.5 kg (140 lb) (06/03/24 1600)    Daily Weight  06/03/24 : 63.5 kg (140 lb)    Image Results  US abdomen limited  Narrative: Interpreted By:  Mack Moore,   STUDY:  US ABDOMEN LIMITED;  6/4/2024 8:22 pm      INDICATION:  Signs/Symptoms:History of chronic cholecystitis, epigastric pain,  nausea.      COMPARISON:  Relation with same day CTA chest abdomen pelvis.      ACCESSION NUMBER(S):  [id number]      ORDERING CLINICIAN:  LASHONDA MAY      TECHNIQUE:  Multiple static images of the abdomen were obtained.      FINDINGS:  The pancreas appears within normal limits to the extent visualized.      The liver measures 16.4 cm. No focal parenchymal abnormalities.      There is no intrahepatic or extrahepatic biliary ductal dilatation.  The common bile duct measures 0.3 cm.      The gallbladder contains stones without wall thickening. The  sonographic Coulter sign is negative. No pericholecystic fluid.       The right kidney measures 8.2 cm. No hydronephrosis.      No free fluid.      Impression: Cholelithiasis without evidence of acute cholecystitis.          MACRO:  None.      Signed by: Mack Moore 6/4/2024 9:25 PM  Dictation workstation:   [id number]  CT angio chest abdomen pelvis  Narrative: Interpreted By:  Celina Rubio,   STUDY:  CT ANGIO CHEST ABDOMEN PELVIS;  6/4/2024 1:20 am      INDICATION:  Signs/Symptoms:chest pain radiating to the back, arms, neck      COMPARISON:  CT abdomen and pelvis 12/27/2023, 02/06/2022. CT angiogram chest  02/01/2022. CT thoracic spine 01/10/2024. Pelvic ultrasound  11/07/2022.      ACCESSION NUMBER(S):  [id number]      ORDERING CLINICIAN:  AVEL DEMPSEY      TECHNIQUE:  Noncontrast axial CT images of the chest, abdomen and pelvis were  obtained prior to intravenous contrast administration. Axial CT  images of the chest, abdomen and pelvis were obtained after the  uneventful administration of intravenous contrast material using CT  angiographic technique. Coronal and sagittal images are  reconstructed.  3-D and MIP reconstructions were performed on an  independent workstation and provided for review.      FINDINGS:  NON-CONTRAST IMAGING:      Atherosclerotic calcifications are noted at the thoracic aorta. No  hyperdense intramural thrombus. Coronary artery calcifications are  present. Atherosclerotic calcifications are noted at the abdominal  aorta and its branches. There is cholelithiasis.  No obstructing ureteral calculi.          POST-CONTRAST IMAGING:  There is motion artifact.      VASCULAR:  No thoracic aortic aneurysm or acute dissection. There is calcified  and noncalcified plaque at the thoracic aorta. There is irregular  noncalcified plaque at the aortic arch and descending thoracic aorta.  There is calcified and noncalcified plaque at the proximal left  subclavian artery with approximate 50% stenosis. There is irregular  noncalcified plaque at the proximal  brachiocephalic artery. No  filling defects within the visualized pulmonary arteries.      Calcified and noncalcified plaque at the abdominal aorta and its  branches. There is a small area of dissection with luminal narrowing  at the distal infrarenal abdominal aorta, of indeterminate age. There  is 0.9 x 1.3 x 1.2 cm saccular aneurysm at the site. The celiac  artery, the superior mesenteric artery, the bilateral renal arteries  and inferior mesenteric artery are patent. There is suboptimal  contrast opacification of the iliac arteries, not well evaluated on  this exam.              CHEST:      HEART: Normal size. No pericardial effusion. Coronary artery  calcifications are present. MEDIASTINUM AND MARCELA: No pathologically  enlarged thoracic lymph nodes. LUNG, PLEURA, LARGE AIRWAYS: The  trachea and the mainstem bronchi are patent. The evaluation of the  lungs is degraded by motion artifact. Redemonstration of chronic  interstitial changes with scarring at the right lung. No  consolidation, pleural effusion or pneumothorax. CHEST WALL AND LOWER  NECK: Within normal limits.      ABDOMEN:      BONES: There is diffuse osteopenia. Mild multilevel degenerative  changes at the spine. ABDOMINAL WALL: There is a small fat containing  umbilical hernia.      LIVER: Unremarkable.  BILE DUCTS: No significant dilation.  GALLBLADDER: There is cholelithiasis.  PANCREAS: No peripancreatic soft tissue stranding.  SPLEEN: Unremarkable.  ADRENALS:  Unremarkable.  KIDNEYS: No hydronephrosis. There is a 2.8 cm fluid attenuation  lesion at the superior pole of the left kidney, may represent a cyst.  Additional subcentimeter cortical renal hypodensities are too small  to be adequately characterized. URETERS: No hydroureter.      PELVIS:      REPRODUCTIVE ORGANS: Redemonstration of cystic lesion at the right  adnexa measuring 4.0 x 3.4 cm, previously measured 3.5 x 3.2 cm.  BLADDER: Partially distended.      RETROPERITONEUM: No  retroperitoneal hemorrhage.  BOWEL: No bowel obstruction.  Normal appendix.  PERITONEUM: No ascites or free air. No pathologically enlarged lymph  nodes are identified.      Impression: 1. No thoracic aortic aneurysm or acute dissection. Irregular  noncalcified plaque at the aortic arch and descending thoracic aorta,  concerning for unstable plaque. Irregular noncalcified plaque at the  proximal brachiocephalic artery. Calcified and noncalcified plaque at  the proximal left subclavian artery with approximate 50% stenosis.  2. Small area of dissection with luminal narrowing at the distal  infrarenal abdominal aorta, of indeterminate age, with a 0.9 x 1.3 x  1.2 cm saccular infrarenal abdominal aortic aneurysm at the site.  3. No evidence for pulmonary emboli.  4. Coronary artery calcifications.  5. Cholelithiasis.  6. Mild interval increase in size in the cystic lesion at the right  adnexa. This can be follow-up with nonemergent pelvic ultrasound.              MACRO:  Critical Finding:  See findings. Notification was initiated on  6/4/2024 at 2:57 am by  Celina Rubio.  (**-YCF-**) Instructions:      Signed by: Celina Rubio 6/4/2024 3:03 AM  Dictation workstation:   [id number]      Physical Exam    Relevant Results    Results for orders placed or performed during the hospital encounter of 06/03/24 (from the past 24 hour(s))   CBC   Result Value Ref Range    WBC 8.5 4.4 - 11.3 x10*3/uL    nRBC 0.0 0.0 - 0.0 /100 WBCs    RBC 3.98 (L) 4.00 - 5.20 x10*6/uL    Hemoglobin 10.3 (L) 12.0 - 16.0 g/dL    Hematocrit 33.4 (L) 36.0 - 46.0 %    MCV 84 80 - 100 fL    MCH 25.9 (L) 26.0 - 34.0 pg    MCHC 30.8 (L) 32.0 - 36.0 g/dL    RDW 14.8 (H) 11.5 - 14.5 %    Platelets 383 150 - 450 x10*3/uL   Comprehensive Metabolic Panel   Result Value Ref Range    Glucose 114 (H) 65 - 99 mg/dL    Sodium 137 133 - 145 mmol/L    Potassium 4.4 3.4 - 5.1 mmol/L    Chloride 102 97 - 107 mmol/L    Bicarbonate 25 24 - 31 mmol/L    Urea Nitrogen  23 8 - 25 mg/dL    Creatinine 1.50 0.40 - 1.60 mg/dL    eGFR 35 (L) >60 mL/min/1.73m*2    Calcium 8.7 8.5 - 10.4 mg/dL    Albumin 3.9 3.5 - 5.0 g/dL    Alkaline Phosphatase 84 35 - 125 U/L    Total Protein 6.9 5.9 - 7.9 g/dL    AST 12 5 - 40 U/L    Bilirubin, Total <0.2 0.1 - 1.2 mg/dL    ALT 5 5 - 40 U/L    Anion Gap 10 <=19 mmol/L            Assessment/Plan      # Chest pain/epigastric pain/nausea  -Probably noncardiac pain  -Will still consult cardiology     # Chronic cholecystitis   -Will need cholecystectomy as an elective procedure.  - Pt was unable to be intubated (Jan 2024)- hence surgery was abandoned - will need ENT eval followed by surgery at [facility] [place].  Same d/w pt.  -Will check ultrasound of the gallbladder     # Left Rib tenderness  - Xray Ribs     # Hypertension  -Continue home meds     # Diabetes  -Insulin sliding scale     # History of PE  -On Eliquis     # Probable UTI  -Continue IV Rocephin    6/5 : D/W cardio - not cardiac, GI consulted, h/o Ch Pain     6/6-patient has amylase elevation, may be related to her pancreas, but CT abdomen done 6/4-24 does not show any pancreatic involvement, patient feels her abdominal pain is better, she is able to eat well, discussed with patient's POA, wants to recheck amylase and lipase before patient is discharged, he prefers discharge tomorrow.    Cuca Mullen MD

## 2024-06-06 NOTE — CARE PLAN
Problem: Pain - Adult  Goal: Verbalizes/displays adequate comfort level or baseline comfort level  Outcome: Progressing     Problem: Safety - Adult  Goal: Free from fall injury  Outcome: Progressing     Problem: Discharge Planning  Goal: Discharge to home or other facility with appropriate resources  Outcome: Progressing     Problem: Chronic Conditions and Co-morbidities  Goal: Patient's chronic conditions and co-morbidity symptoms are monitored and maintained or improved  Outcome: Progressing     Problem: Skin  Goal: Decreased wound size/increased tissue granulation at next dressing change  Outcome: Progressing  Flowsheets (Taken 6/6/2024 0025)  Decreased wound size/increased tissue granulation at next dressing change:   Promote sleep for wound healing   Protective dressings over bony prominences   Utilize specialty bed per algorithm  Goal: Participates in plan/prevention/treatment measures  Outcome: Progressing  Flowsheets (Taken 6/6/2024 0025)  Participates in plan/prevention/treatment measures:   Discuss with provider PT/OT consult   Elevate heels   Increase activity/out of bed for meals  Goal: Prevent/manage excess moisture  Outcome: Progressing  Flowsheets (Taken 6/6/2024 0025)  Prevent/manage excess moisture:   Cleanse incontinence/protect with barrier cream   Follow provider orders for dressing changes   Moisturize dry skin   Monitor for/manage infection if present   Use wicking fabric (obtain order)  Goal: Prevent/minimize sheer/friction injuries  Outcome: Progressing  Flowsheets (Taken 6/6/2024 0025)  Prevent/minimize sheer/friction injuries:   Complete micro-shifts as needed if patient unable. Adjust patient position to relieve pressure points, not a full turn   HOB 30 degrees or less   Increase activity/out of bed for meals   Turn/reposition every 2 hours/use positioning/transfer devices   Use pull sheet   Utilize specialty bed per algorithm  Goal: Promote/optimize nutrition  Outcome:  Progressing  Flowsheets (Taken 6/6/2024 0025)  Promote/optimize nutrition:   Assist with feeding   Consume > 50% meals/supplements   Discuss with provider if NPO > 2 days   Monitor/record intake including meals   Offer water/supplements/favorite foods   Reassess MST if dietician not consulted  Goal: Promote skin healing  Outcome: Progressing  Flowsheets (Taken 6/6/2024 0025)  Promote skin healing:   Assess skin/pad under line(s)/device(s)   Ensure correct size (line/device) and apply per  instructions   Protective dressings over bony prominences   Rotate device position/do not position patient on device   Turn/reposition every 2 hours/use positioning/transfer devices     Problem: Diabetes  Goal: Achieve decreasing blood glucose levels by end of shift  Outcome: Progressing  Goal: Increase stability of blood glucose readings by end of shift  Outcome: Progressing  Goal: Decrease in ketones present in urine by end of shift  Outcome: Progressing  Goal: Maintain electrolyte levels within acceptable range throughout shift  Outcome: Progressing  Goal: Maintain glucose levels >70mg/dl to <250mg/dl throughout shift  Outcome: Progressing  Goal: No changes in neurological exam by end of shift  Outcome: Progressing  Goal: Learn about and adhere to nutrition recommendations by end of shift  Outcome: Progressing  Goal: Vital signs within normal range for age by end of shift  Outcome: Progressing  Goal: Increase self care and/or family involovement by end of shift  Outcome: Progressing  Goal: Receive DSME education by end of shift  Outcome: Progressing   The patient's goals for the shift include      The clinical goals for the shift include Pain control    Over the shift, the patient did not make progress toward the following goals. Barriers to progression include . Recommendations to address these barriers include .

## 2024-06-06 NOTE — NURSING NOTE
Assumed care of pt, bedside nurse shift report, pt alert and oriented, skin intact, up independent, complaining of abdominal discomfort, day shift nurse will medicate pt, a clean gown, and  a clean underwear given per request, call light within reach, will monitor.

## 2024-06-06 NOTE — RESEARCH NOTES
Artificial Intelligence Monitoring in Nursing (AIMS Nursing) Study    Principle Investigator - Dr. Alex Cui  Research Coordinator - Lucina Kamara RN     Patient Name - Marylou Ward  Date - 6/6/2024 11:46 AM  Location - [facility]    Marylou Ward was not approached by Lucina Kamara RN to talk about participating in the AIMS Nursing Study. Nurse states that the patient would not want to participate.      Lucina Kamara RN

## 2024-06-06 NOTE — CARE PLAN
The patient's goals for the shift include      The clinical goals for the shift include Pain control      Problem: Pain - Adult  Goal: Verbalizes/displays adequate comfort level or baseline comfort level  Outcome: Progressing     Problem: Safety - Adult  Goal: Free from fall injury  Outcome: Progressing     Problem: Discharge Planning  Goal: Discharge to home or other facility with appropriate resources  Outcome: Progressing     Problem: Chronic Conditions and Co-morbidities  Goal: Patient's chronic conditions and co-morbidity symptoms are monitored and maintained or improved  Outcome: Progressing     Problem: Skin  Goal: Decreased wound size/increased tissue granulation at next dressing change  Outcome: Progressing  Goal: Participates in plan/prevention/treatment measures  Outcome: Progressing  Goal: Prevent/manage excess moisture  Outcome: Progressing  Goal: Prevent/minimize sheer/friction injuries  Outcome: Progressing  Goal: Promote/optimize nutrition  Outcome: Progressing  Goal: Promote skin healing  Outcome: Progressing     Problem: Diabetes  Goal: Achieve decreasing blood glucose levels by end of shift  Outcome: Progressing  Goal: Increase stability of blood glucose readings by end of shift  Outcome: Progressing  Goal: Decrease in ketones present in urine by end of shift  Outcome: Progressing  Goal: Maintain electrolyte levels within acceptable range throughout shift  Outcome: Progressing  Goal: Maintain glucose levels >70mg/dl to <250mg/dl throughout shift  Outcome: Progressing  Goal: No changes in neurological exam by end of shift  Outcome: Progressing  Goal: Learn about and adhere to nutrition recommendations by end of shift  Outcome: Progressing  Goal: Vital signs within normal range for age by end of shift  Outcome: Progressing  Goal: Increase self care and/or family involovement by end of shift  Outcome: Progressing  Goal: Receive DSME education by end of shift  Outcome: Progressing

## 2024-06-07 VITALS
HEART RATE: 68 BPM | WEIGHT: 140 LBS | DIASTOLIC BLOOD PRESSURE: 65 MMHG | TEMPERATURE: 97 F | SYSTOLIC BLOOD PRESSURE: 177 MMHG | BODY MASS INDEX: 27.48 KG/M2 | HEIGHT: 60 IN | RESPIRATION RATE: 16 BRPM | OXYGEN SATURATION: 92 %

## 2024-06-07 LAB
ALBUMIN SERPL-MCNC: 3.8 G/DL (ref 3.5–5)
ALP BLD-CCNC: 89 U/L (ref 35–125)
ALT SERPL-CCNC: 8 U/L (ref 5–40)
AMYLASE SERPL-CCNC: 59 U/L (ref 28–100)
ANION GAP SERPL CALC-SCNC: 12 MMOL/L
AST SERPL-CCNC: 17 U/L (ref 5–40)
BILIRUB SERPL-MCNC: <0.2 MG/DL (ref 0.1–1.2)
BUN SERPL-MCNC: 25 MG/DL (ref 8–25)
CALCIUM SERPL-MCNC: 9.1 MG/DL (ref 8.5–10.4)
CHLORIDE SERPL-SCNC: 101 MMOL/L (ref 97–107)
CO2 SERPL-SCNC: 23 MMOL/L (ref 24–31)
CREAT SERPL-MCNC: 1.3 MG/DL (ref 0.4–1.6)
EGFRCR SERPLBLD CKD-EPI 2021: 42 ML/MIN/1.73M*2
ERYTHROCYTE [DISTWIDTH] IN BLOOD BY AUTOMATED COUNT: 14.8 % (ref 11.5–14.5)
GLUCOSE BLD MANUAL STRIP-MCNC: 94 MG/DL (ref 74–99)
GLUCOSE SERPL-MCNC: 154 MG/DL (ref 65–99)
HCT VFR BLD AUTO: 30.8 % (ref 36–46)
HGB BLD-MCNC: 10 G/DL (ref 12–16)
LIPASE SERPL-CCNC: 18 U/L (ref 16–63)
MCH RBC QN AUTO: 26.2 PG (ref 26–34)
MCHC RBC AUTO-ENTMCNC: 32.5 G/DL (ref 32–36)
MCV RBC AUTO: 81 FL (ref 80–100)
NRBC BLD-RTO: 0 /100 WBCS (ref 0–0)
PLATELET # BLD AUTO: 371 X10*3/UL (ref 150–450)
POTASSIUM SERPL-SCNC: 4.6 MMOL/L (ref 3.4–5.1)
PROT SERPL-MCNC: 6.9 G/DL (ref 5.9–7.9)
RBC # BLD AUTO: 3.82 X10*6/UL (ref 4–5.2)
SODIUM SERPL-SCNC: 136 MMOL/L (ref 133–145)
WBC # BLD AUTO: 8.4 X10*3/UL (ref 4.4–11.3)

## 2024-06-07 PROCEDURE — 85027 COMPLETE CBC AUTOMATED: CPT | Performed by: INTERNAL MEDICINE

## 2024-06-07 PROCEDURE — 83690 ASSAY OF LIPASE: CPT | Performed by: INTERNAL MEDICINE

## 2024-06-07 PROCEDURE — 2500000002 HC RX 250 W HCPCS SELF ADMINISTERED DRUGS (ALT 637 FOR MEDICARE OP, ALT 636 FOR OP/ED): Mod: MUE | Performed by: INTERNAL MEDICINE

## 2024-06-07 PROCEDURE — 82947 ASSAY GLUCOSE BLOOD QUANT: CPT

## 2024-06-07 PROCEDURE — 2500000001 HC RX 250 WO HCPCS SELF ADMINISTERED DRUGS (ALT 637 FOR MEDICARE OP): Performed by: INTERNAL MEDICINE

## 2024-06-07 PROCEDURE — 82150 ASSAY OF AMYLASE: CPT | Performed by: INTERNAL MEDICINE

## 2024-06-07 PROCEDURE — 80053 COMPREHEN METABOLIC PANEL: CPT | Performed by: INTERNAL MEDICINE

## 2024-06-07 PROCEDURE — 36415 COLL VENOUS BLD VENIPUNCTURE: CPT | Performed by: INTERNAL MEDICINE

## 2024-06-07 RX ADMIN — APIXABAN 5 MG: 5 TABLET, FILM COATED ORAL at 08:24

## 2024-06-07 RX ADMIN — SERTRALINE 50 MG: 50 TABLET, FILM COATED ORAL at 08:24

## 2024-06-07 RX ADMIN — OXYCODONE AND ACETAMINOPHEN 1 TABLET: 5; 325 TABLET ORAL at 05:33

## 2024-06-07 RX ADMIN — METOPROLOL TARTRATE 50 MG: 50 TABLET, FILM COATED ORAL at 08:24

## 2024-06-07 ASSESSMENT — PAIN - FUNCTIONAL ASSESSMENT
PAIN_FUNCTIONAL_ASSESSMENT: 0-10
PAIN_FUNCTIONAL_ASSESSMENT: 0-10

## 2024-06-07 ASSESSMENT — COGNITIVE AND FUNCTIONAL STATUS - GENERAL
MOBILITY SCORE: 24
DAILY ACTIVITIY SCORE: 24

## 2024-06-07 ASSESSMENT — PAIN DESCRIPTION - DESCRIPTORS: DESCRIPTORS: ACHING

## 2024-06-07 ASSESSMENT — PAIN SCALES - GENERAL
PAINLEVEL_OUTOF10: 0 - NO PAIN
PAINLEVEL_OUTOF10: 0 - NO PAIN
PAINLEVEL_OUTOF10: 8

## 2024-06-07 NOTE — NURSING NOTE
Reviewed discharge with patient, all questions answered. Aware of all follow up appointments, Floor nurse to Remove IV and tele

## 2024-06-07 NOTE — PROGRESS NOTES
Marylou Ward is a 78 y.o. female on day 3 of admission presenting with Chest pain, unspecified type.      Subjective   HPI: This is a 78 y.o. female who came to the ER for epigastric/chest pain.  Patient has had nausea since admission, she has had this pain chronically for several years, in January 2024 patient was prepared for cholecystectomy for chronic cholecystitis which was abandoned as she could not be intubated.  At that time patient was asked to follow-up at [facility] for a elective cholecystectomy with ENT presence.  Patient has not followed up with the same as she was reluctant to go for surgery.          Objective     Last Recorded Vitals  /65 (BP Location: Right arm, Patient Position: Lying)   Pulse 68   Temp 36.1 °C (97 °F) (Axillary)   Resp 16   Wt 63.5 kg (140 lb)   SpO2 92%   Intake/Output last 3 Shifts:    Intake/Output Summary (Last 24 hours) at 6/7/2024 1326  Last data filed at 6/6/2024 2200  Gross per 24 hour   Intake --   Output 1 ml   Net -1 ml       Admission Weight  Weight: 63.5 kg (140 lb) (06/03/24 1600)    Daily Weight  06/03/24 : 63.5 kg (140 lb)    Image Results  US abdomen limited  Narrative: Interpreted By:  Mack Moore,   STUDY:  US ABDOMEN LIMITED;  6/4/2024 8:22 pm      INDICATION:  Signs/Symptoms:History of chronic cholecystitis, epigastric pain,  nausea.      COMPARISON:  Relation with same day CTA chest abdomen pelvis.      ACCESSION NUMBER(S):  [id number]      ORDERING CLINICIAN:  LASHONDA MAY      TECHNIQUE:  Multiple static images of the abdomen were obtained.      FINDINGS:  The pancreas appears within normal limits to the extent visualized.      The liver measures 16.4 cm. No focal parenchymal abnormalities.      There is no intrahepatic or extrahepatic biliary ductal dilatation.  The common bile duct measures 0.3 cm.      The gallbladder contains stones without wall thickening. The  sonographic Coulter sign is negative. No pericholecystic fluid.      The right  kidney measures 8.2 cm. No hydronephrosis.      No free fluid.      Impression: Cholelithiasis without evidence of acute cholecystitis.          MACRO:  None.      Signed by: Mack Moore 6/4/2024 9:25 PM  Dictation workstation:   [id number]  CT angio chest abdomen pelvis  Narrative: Interpreted By:  Celina Rubio,   STUDY:  CT ANGIO CHEST ABDOMEN PELVIS;  6/4/2024 1:20 am      INDICATION:  Signs/Symptoms:chest pain radiating to the back, arms, neck      COMPARISON:  CT abdomen and pelvis 12/27/2023, 02/06/2022. CT angiogram chest  02/01/2022. CT thoracic spine 01/10/2024. Pelvic ultrasound  11/07/2022.      ACCESSION NUMBER(S):  [id number]      ORDERING CLINICIAN:  AVEL DEMPSEY      TECHNIQUE:  Noncontrast axial CT images of the chest, abdomen and pelvis were  obtained prior to intravenous contrast administration. Axial CT  images of the chest, abdomen and pelvis were obtained after the  uneventful administration of intravenous contrast material using CT  angiographic technique. Coronal and sagittal images are  reconstructed.  3-D and MIP reconstructions were performed on an  independent workstation and provided for review.      FINDINGS:  NON-CONTRAST IMAGING:      Atherosclerotic calcifications are noted at the thoracic aorta. No  hyperdense intramural thrombus. Coronary artery calcifications are  present. Atherosclerotic calcifications are noted at the abdominal  aorta and its branches. There is cholelithiasis.  No obstructing ureteral calculi.          POST-CONTRAST IMAGING:  There is motion artifact.      VASCULAR:  No thoracic aortic aneurysm or acute dissection. There is calcified  and noncalcified plaque at the thoracic aorta. There is irregular  noncalcified plaque at the aortic arch and descending thoracic aorta.  There is calcified and noncalcified plaque at the proximal left  subclavian artery with approximate 50% stenosis. There is irregular  noncalcified plaque at the proximal  brachiocephalic artery. No  filling defects within the visualized pulmonary arteries.      Calcified and noncalcified plaque at the abdominal aorta and its  branches. There is a small area of dissection with luminal narrowing  at the distal infrarenal abdominal aorta, of indeterminate age. There  is 0.9 x 1.3 x 1.2 cm saccular aneurysm at the site. The celiac  artery, the superior mesenteric artery, the bilateral renal arteries  and inferior mesenteric artery are patent. There is suboptimal  contrast opacification of the iliac arteries, not well evaluated on  this exam.              CHEST:      HEART: Normal size. No pericardial effusion. Coronary artery  calcifications are present. MEDIASTINUM AND MARCELA: No pathologically  enlarged thoracic lymph nodes. LUNG, PLEURA, LARGE AIRWAYS: The  trachea and the mainstem bronchi are patent. The evaluation of the  lungs is degraded by motion artifact. Redemonstration of chronic  interstitial changes with scarring at the right lung. No  consolidation, pleural effusion or pneumothorax. CHEST WALL AND LOWER  NECK: Within normal limits.      ABDOMEN:      BONES: There is diffuse osteopenia. Mild multilevel degenerative  changes at the spine. ABDOMINAL WALL: There is a small fat containing  umbilical hernia.      LIVER: Unremarkable.  BILE DUCTS: No significant dilation.  GALLBLADDER: There is cholelithiasis.  PANCREAS: No peripancreatic soft tissue stranding.  SPLEEN: Unremarkable.  ADRENALS:  Unremarkable.  KIDNEYS: No hydronephrosis. There is a 2.8 cm fluid attenuation  lesion at the superior pole of the left kidney, may represent a cyst.  Additional subcentimeter cortical renal hypodensities are too small  to be adequately characterized. URETERS: No hydroureter.      PELVIS:      REPRODUCTIVE ORGANS: Redemonstration of cystic lesion at the right  adnexa measuring 4.0 x 3.4 cm, previously measured 3.5 x 3.2 cm.  BLADDER: Partially distended.      RETROPERITONEUM: No  retroperitoneal hemorrhage.  BOWEL: No bowel obstruction.  Normal appendix.  PERITONEUM: No ascites or free air. No pathologically enlarged lymph  nodes are identified.      Impression: 1. No thoracic aortic aneurysm or acute dissection. Irregular  noncalcified plaque at the aortic arch and descending thoracic aorta,  concerning for unstable plaque. Irregular noncalcified plaque at the  proximal brachiocephalic artery. Calcified and noncalcified plaque at  the proximal left subclavian artery with approximate 50% stenosis.  2. Small area of dissection with luminal narrowing at the distal  infrarenal abdominal aorta, of indeterminate age, with a 0.9 x 1.3 x  1.2 cm saccular infrarenal abdominal aortic aneurysm at the site.  3. No evidence for pulmonary emboli.  4. Coronary artery calcifications.  5. Cholelithiasis.  6. Mild interval increase in size in the cystic lesion at the right  adnexa. This can be follow-up with nonemergent pelvic ultrasound.              MACRO:  Critical Finding:  See findings. Notification was initiated on  6/4/2024 at 2:57 am by  Celina Rubio.  (**-YCF-**) Instructions:      Signed by: Celina Rubio 6/4/2024 3:03 AM  Dictation workstation:   [id number]      Physical Exam    Relevant Results    Results for orders placed or performed during the hospital encounter of 06/03/24 (from the past 24 hour(s))   POCT GLUCOSE   Result Value Ref Range    POCT Glucose 200 (H) 74 - 99 mg/dL   Comprehensive Metabolic Panel   Result Value Ref Range    Glucose 154 (H) 65 - 99 mg/dL    Sodium 136 133 - 145 mmol/L    Potassium 4.6 3.4 - 5.1 mmol/L    Chloride 101 97 - 107 mmol/L    Bicarbonate 23 (L) 24 - 31 mmol/L    Urea Nitrogen 25 8 - 25 mg/dL    Creatinine 1.30 0.40 - 1.60 mg/dL    eGFR 42 (L) >60 mL/min/1.73m*2    Calcium 9.1 8.5 - 10.4 mg/dL    Albumin 3.8 3.5 - 5.0 g/dL    Alkaline Phosphatase 89 35 - 125 U/L    Total Protein 6.9 5.9 - 7.9 g/dL    AST 17 5 - 40 U/L    Bilirubin, Total <0.2 0.1 -  1.2 mg/dL    ALT 8 5 - 40 U/L    Anion Gap 12 <=19 mmol/L   Amylase   Result Value Ref Range    Amylase 59 28 - 100 U/L   Lipase   Result Value Ref Range    Lipase 18 16 - 63 U/L   CBC   Result Value Ref Range    WBC 8.4 4.4 - 11.3 x10*3/uL    nRBC 0.0 0.0 - 0.0 /100 WBCs    RBC 3.82 (L) 4.00 - 5.20 x10*6/uL    Hemoglobin 10.0 (L) 12.0 - 16.0 g/dL    Hematocrit 30.8 (L) 36.0 - 46.0 %    MCV 81 80 - 100 fL    MCH 26.2 26.0 - 34.0 pg    MCHC 32.5 32.0 - 36.0 g/dL    RDW 14.8 (H) 11.5 - 14.5 %    Platelets 371 150 - 450 x10*3/uL   POCT GLUCOSE   Result Value Ref Range    POCT Glucose 94 74 - 99 mg/dL            Assessment/Plan      # Chest pain/epigastric pain/nausea  -Probably noncardiac pain  -Will still consult cardiology     # Chronic cholecystitis   -Will need cholecystectomy as an elective procedure.  - Pt was unable to be intubated (Jan 2024)- hence surgery was abandoned - will need ENT eval followed by surgery at [facility] [place].  Same d/w pt.  -Will check ultrasound of the gallbladder     # Left Rib tenderness  - Xray Ribs     # Hypertension  -Continue home meds     # Diabetes  -Insulin sliding scale     # History of PE  -On Eliquis     # Probable UTI  -Continue IV Rocephin    6/5 : D/W cardio - not cardiac, GI consulted, h/o Ch Pain     6/6-patient has amylase elevation, may be related to her pancreas, but CT abdomen done 6/4-24 does not show any pancreatic involvement, patient feels her abdominal pain is better, she is able to eat well, discussed with patient's POA, wants to recheck amylase and lipase before patient is discharged, he prefers discharge tomorrow.    6/7-patient eating well, denies complaints, abdominal pain better, amylase and lipase are normal today, GI input noted, discussed with patient about chronic cholecystitis and a possible elective cholecystectomy, she is not keen to pursue the surgical option, follow-up discussed with patient.    Discharge is planned for today . Discharge medications were  reconciled. Discharge orders completed. Hospital course , medication changes and Investigation's conducted were reviewed with the patient / Family. Activity, Diet and Medications after discharge were reviewed with the patient / Family. Medication reconciliation done - pls refer to medication reconciliation sheet.Follow up with Primary Care Physician were reviewed with the patient / Family. Discharge planning was discussed with staff. Refer to discharge orders sheet. Total time to coordinate disch process incl counseling family, coordinating with other care givers,  and pharmacist was >35 min.     Cuca Mullen MD

## 2024-06-07 NOTE — CARE PLAN
The patient's goals for the shift include comfort    The clinical goals for the shift include Pt will remain safe and free from harm throughout the shift    Problem: Pain - Adult  Goal: Verbalizes/displays adequate comfort level or baseline comfort level  Outcome: Progressing     Problem: Safety - Adult  Goal: Free from fall injury  Outcome: Progressing     Problem: Discharge Planning  Goal: Discharge to home or other facility with appropriate resources  Outcome: Progressing     Problem: Chronic Conditions and Co-morbidities  Goal: Patient's chronic conditions and co-morbidity symptoms are monitored and maintained or improved  Outcome: Progressing     Problem: Skin  Goal: Decreased wound size/increased tissue granulation at next dressing change  Outcome: Progressing  Goal: Participates in plan/prevention/treatment measures  Outcome: Progressing  Goal: Prevent/manage excess moisture  Outcome: Progressing  Goal: Prevent/minimize sheer/friction injuries  Outcome: Progressing  Goal: Promote/optimize nutrition  Outcome: Progressing  Goal: Promote skin healing  Outcome: Progressing     Problem: Diabetes  Goal: Achieve decreasing blood glucose levels by end of shift  Outcome: Progressing  Goal: Increase stability of blood glucose readings by end of shift  Outcome: Progressing  Goal: Decrease in ketones present in urine by end of shift  Outcome: Progressing  Goal: Maintain electrolyte levels within acceptable range throughout shift  Outcome: Progressing  Goal: Maintain glucose levels >70mg/dl to <250mg/dl throughout shift  Outcome: Progressing  Goal: No changes in neurological exam by end of shift  Outcome: Progressing  Goal: Learn about and adhere to nutrition recommendations by end of shift  Outcome: Progressing  Goal: Vital signs within normal range for age by end of shift  Outcome: Progressing  Goal: Increase self care and/or family involovement by end of shift  Outcome: Progressing  Goal: Receive DSME education by end  of shift  Outcome: Progressing

## 2024-06-07 NOTE — CARE PLAN
The patient's goals for the shift include comfort    The clinical goals for the shift include Pt will remain safe and free from harm throughout the shift      Problem: Safety - Adult  Goal: Free from fall injury  Outcome: Progressing     Problem: Discharge Planning  Goal: Discharge to home or other facility with appropriate resources  Outcome: Progressing     Problem: Chronic Conditions and Co-morbidities  Goal: Patient's chronic conditions and co-morbidity symptoms are monitored and maintained or improved  Outcome: Progressing     Problem: Skin  Goal: Participates in plan/prevention/treatment measures  Outcome: Progressing       Problem: Pain - Adult  Goal: Verbalizes/displays adequate comfort level or baseline comfort level  Outcome: Progressing

## 2024-06-07 NOTE — DISCHARGE SUMMARY
Discharge Diagnosis  Chest pain, unspecified type    Issues Requiring Follow-Up  Ch Cholecystitis    Discharge Meds     Your medication list        CONTINUE taking these medications        Instructions Last Dose Given Next Dose Due   acetaminophen 325 mg tablet  Commonly known as: Tylenol      Take 2 tablets (650 mg) by mouth every 6 hours if needed for mild pain (1 - 3).       apixaban 5 mg tablet  Commonly known as: Eliquis           famotidine 20 mg tablet  Commonly known as: Pepcid           metFORMIN 500 mg tablet  Commonly known as: Glucophage           metoprolol tartrate 50 mg tablet  Commonly known as: Lopressor           multivitamin tablet           ondansetron 4 mg tablet  Commonly known as: Zofran           oxyCODONE-acetaminophen 5-325 mg tablet  Commonly known as: Percocet           sertraline 50 mg tablet  Commonly known as: Zoloft                    Test Results Pending At Discharge  Pending Labs       No current pending labs.            Hospital Course  # Chest pain/epigastric pain/nausea  -Probably noncardiac pain  -Will still consult cardiology     # Chronic cholecystitis   -Will need cholecystectomy as an elective procedure.  - Pt was unable to be intubated (Jan 2024)- hence surgery was abandoned - will need ENT eval [place] [facility] [place]  Same d/w pt.  -Will check ultrasound of the gallbladder     # Left Rib tenderness  - Xray Ribs     # Hypertension  -Continue home meds     # Diabetes  -Insulin sliding scale     # History of PE  -On Eliquis     # Probable UTI  -Continue IV Rocephin    6/5 : D/W cardio - not cardiac, GI consulted, h/o Ch Pain     6/6-patient has amylase elevation, may be related to her pancreas, but CT abdomen done 6/4-24 does not show any pancreatic involvement, patient feels her abdominal pain is better, she is able to eat well, discussed with patient's POA, wants to recheck amylase and lipase before patient is discharged, he prefers discharge tomorrow.    6/7-patient  eating well, denies complaints, abdominal pain better, amylase and lipase are normal today, GI input noted, discussed with patient about chronic cholecystitis and a possible elective cholecystectomy, she is not keen to pursue the surgical option, follow-up discussed with patient.    Discharge is planned for today . Discharge medications were reconciled. Discharge orders completed. Hospital course , medication changes and Investigation's conducted were reviewed with the patient / Family. Activity, Diet and Medications after discharge were reviewed with the patient / Family. Medication reconciliation done - pls refer to medication reconciliation sheet.Follow up with Primary Care Physician were reviewed with the patient / Family. Discharge planning was discussed with staff. Refer to discharge orders sheet. Total time to coordinate disch process incl counseling family, coordinating with other care givers,  and pharmacist was >35 min.     Outpatient Follow-Up  Future Appointments   Date Time Provider Department Center   6/24/2024  1:00 PM Tyrell Cassidy MD [id number] [facility]         Cuca Mullen MD

## 2024-06-12 NOTE — DOCUMENTATION CLARIFICATION NOTE
"    PATIENT:               LAILA PATE  ACCT #:                  [id number]  MRN:                       [id number]  :                       1945  ADMIT DATE:       6/3/2024 3:49 PM  DISCH DATE:        2024 4:10 AM  RESPONDING PROVIDER #:        [id number]          PROVIDER RESPONSE TEXT:    Other comments NON CARDIAC CHEST PAIN    CDI QUERY TEXT:    Clarification    Instruction:    Based on your assessment of the patient and the clinical information, please provide the requested documentation by clicking on the appropriate radio button and enter any additional information if prompted.    Question: Please clarify if a relationship exists between    When answering this query, please exercise your independent professional judgment. The fact that a question is being asked, does not imply that any particular answer is desired or expected.    The patient's clinical indicators include:  Clinical Information: 78y.o. F presents with chest pain/tightness & severe neck & arm pain. Per H&P, admitted with chest pain/epigastric pain/nausea & Chronic cholecystitis.  VS: 36.9, 73, 18, 202/88 Map 126, 96% RA    6/3 (ED Provider Note): \" This is an 78-year-old female presenting to the emergency department with complaints of chest tightness, neck pain and bilateral arm pain. Differential diagnosis includes but not limited to hypertensive urgency versus hypertensive emergency versus electrolyte disturbance versus ACS including myocardial infarction versus pneumothorax versus pneumonia \"     (H&P): \" Chest pain/epigastric pain/nausea. Probably noncardiac pain \"     (Vascular Surgery consult): \" Patient with incidental finding of a chronic aortic dissection as well as a small saccular abdominal aortic aneurysm.  This is a chronic finding as the area of dissection is heavily calcified. \"    Clinical Indicators:  6/3 ProBNP: 1,247  6/3 at 1652 Troponin T- 16  6/3 at  Troponin T- 16   at 0122 Troponin T-   6    6/3 H&H " 10.1/32.7, WBC   8.7, Plts 384  6/4 H&H 10.8/33.5, WBC 10.5, Plts 372  6/5 H&H 11.2/35.8, WBC   8.9, Plts 380    Treatment: (EPIC MAR Info)  6/3 Aspirin 324mg po x1 dose  6/3 Nitroglycerin 0.4mg sublingual x1 dose  6/4 Hydralazine 10mg IV x1 dose  6/4 Metoprolol 50mg po x2 doses    Risk Factors: PMHx: Arthritis, DM2, HTN, Old MI.  BMI: 27.34  Options provided:  -- Chest pain likely a result of Hypertensive Urgency as evident by (BPs 202/88, 223/96, 224/84 and headache)  -- Chest pain as a result of Aortic dissection  -- Chest pain likely due to Non-ischemic myocardial injury  -- Chest pain as a result of Unstable Angina  -- Other comments, please provide details here  -- Other - I will add my own diagnosis  -- Refer to Clinical Documentation Reviewer    Query created by: Joann Garcia on 6/5/2024 9:05 AM      Electronically signed by:  LASHONDA MAY MD 6/12/2024 7:39 PM

## 2024-06-24 ENCOUNTER — OFFICE VISIT (OUTPATIENT)
Dept: PAIN MEDICINE | Facility: CLINIC | Age: 79
End: 2024-06-24
Payer: MEDICARE

## 2024-06-24 VITALS
WEIGHT: 135 LBS | OXYGEN SATURATION: 98 % | BODY MASS INDEX: 26.5 KG/M2 | RESPIRATION RATE: 22 BRPM | SYSTOLIC BLOOD PRESSURE: 214 MMHG | DIASTOLIC BLOOD PRESSURE: 100 MMHG | HEIGHT: 60 IN

## 2024-06-24 DIAGNOSIS — M54.16 LUMBAR RADICULOPATHY: Primary | ICD-10-CM

## 2024-06-24 DIAGNOSIS — G58.8 INTERCOSTAL NEURALGIA: ICD-10-CM

## 2024-06-24 DIAGNOSIS — M54.12 CERVICAL RADICULOPATHY: ICD-10-CM

## 2024-06-24 DIAGNOSIS — Z79.891 ENCOUNTER FOR LONG-TERM OPIATE ANALGESIC USE: ICD-10-CM

## 2024-06-24 DIAGNOSIS — M48.062 LUMBAR STENOSIS WITH NEUROGENIC CLAUDICATION: ICD-10-CM

## 2024-06-24 PROCEDURE — 80355 GABAPENTIN NON-BLOOD: CPT | Mod: MUE | Performed by: ANESTHESIOLOGY

## 2024-06-24 PROCEDURE — 80362 OPIOIDS & OPIATE ANALOGS 1/2: CPT | Mod: MUE | Performed by: ANESTHESIOLOGY

## 2024-06-24 PROCEDURE — 99214 OFFICE O/P EST MOD 30 MIN: CPT | Performed by: ANESTHESIOLOGY

## 2024-06-24 PROCEDURE — 80358 DRUG SCREENING METHADONE: CPT | Mod: MUE | Performed by: ANESTHESIOLOGY

## 2024-06-24 PROCEDURE — 80323 ALKALOIDS NOS: CPT | Mod: MUE | Performed by: ANESTHESIOLOGY

## 2024-06-24 PROCEDURE — 80324 DRUG SCREEN AMPHETAMINES 1/2: CPT | Mod: MUE | Performed by: ANESTHESIOLOGY

## 2024-06-24 PROCEDURE — 80354 DRUG SCREENING FENTANYL: CPT | Mod: MUE | Performed by: ANESTHESIOLOGY

## 2024-06-24 PROCEDURE — 80353 DRUG SCREENING COCAINE: CPT | Mod: MUE | Performed by: ANESTHESIOLOGY

## 2024-06-24 PROCEDURE — 80366 DRUG SCREENING PREGABALIN: CPT | Mod: MUE | Performed by: ANESTHESIOLOGY

## 2024-06-24 PROCEDURE — 83992 ASSAY FOR PHENCYCLIDINE: CPT | Mod: MUE | Performed by: ANESTHESIOLOGY

## 2024-06-24 PROCEDURE — 1036F TOBACCO NON-USER: CPT | Performed by: ANESTHESIOLOGY

## 2024-06-24 PROCEDURE — 80348 DRUG SCREENING BUPRENORPHINE: CPT | Mod: MUE | Performed by: ANESTHESIOLOGY

## 2024-06-24 PROCEDURE — 1159F MED LIST DOCD IN RCRD: CPT | Performed by: ANESTHESIOLOGY

## 2024-06-24 PROCEDURE — 3077F SYST BP >= 140 MM HG: CPT | Performed by: ANESTHESIOLOGY

## 2024-06-24 PROCEDURE — 80373 DRUG SCREENING TRAMADOL: CPT | Mod: MUE | Performed by: ANESTHESIOLOGY

## 2024-06-24 PROCEDURE — 80332 ANTIDEPRESSANTS CLASS 1 OR 2: CPT | Mod: MUE | Performed by: ANESTHESIOLOGY

## 2024-06-24 PROCEDURE — 80368 SEDATIVE HYPNOTICS: CPT | Mod: MUE | Performed by: ANESTHESIOLOGY

## 2024-06-24 PROCEDURE — 80349 CANNABINOIDS NATURAL: CPT | Mod: MUE | Performed by: ANESTHESIOLOGY

## 2024-06-24 PROCEDURE — 1125F AMNT PAIN NOTED PAIN PRSNT: CPT | Performed by: ANESTHESIOLOGY

## 2024-06-24 PROCEDURE — 80372 DRUG SCREENING TAPENTADOL: CPT | Mod: MUE | Performed by: ANESTHESIOLOGY

## 2024-06-24 PROCEDURE — 80365 DRUG SCREENING OXYCODONE: CPT | Mod: MUE | Performed by: ANESTHESIOLOGY

## 2024-06-24 PROCEDURE — 80359 METHYLENEDIOXYAMPHETAMINES: CPT | Performed by: ANESTHESIOLOGY

## 2024-06-24 PROCEDURE — 80361 OPIATES 1 OR MORE: CPT | Mod: MUE | Performed by: ANESTHESIOLOGY

## 2024-06-24 PROCEDURE — 80346 BENZODIAZEPINES1-12: CPT | Mod: MUE | Performed by: ANESTHESIOLOGY

## 2024-06-24 PROCEDURE — 1111F DSCHRG MED/CURRENT MED MERGE: CPT | Performed by: ANESTHESIOLOGY

## 2024-06-24 PROCEDURE — 3080F DIAST BP >= 90 MM HG: CPT | Performed by: ANESTHESIOLOGY

## 2024-06-24 PROCEDURE — 80367 DRUG SCREENING PROPOXYPHENE: CPT | Mod: MUE | Performed by: ANESTHESIOLOGY

## 2024-06-24 RX ORDER — OXYCODONE AND ACETAMINOPHEN 5; 325 MG/1; MG/1
1 TABLET ORAL EVERY 6 HOURS PRN
Qty: 120 TABLET | Refills: 0 | Status: SHIPPED | OUTPATIENT
Start: 2024-06-30

## 2024-06-24 ASSESSMENT — PATIENT HEALTH QUESTIONNAIRE - PHQ9
2. FEELING DOWN, DEPRESSED OR HOPELESS: SEVERAL DAYS
10. IF YOU CHECKED OFF ANY PROBLEMS, HOW DIFFICULT HAVE THESE PROBLEMS MADE IT FOR YOU TO DO YOUR WORK, TAKE CARE OF THINGS AT HOME, OR GET ALONG WITH OTHER PEOPLE: SOMEWHAT DIFFICULT
SUM OF ALL RESPONSES TO PHQ9 QUESTIONS 1 & 2: 2
1. LITTLE INTEREST OR PLEASURE IN DOING THINGS: SEVERAL DAYS

## 2024-06-24 ASSESSMENT — ENCOUNTER SYMPTOMS
BACK PAIN: 1
NECK PAIN: 1
ACTIVITY CHANGE: 1
NECK STIFFNESS: 1

## 2024-06-24 ASSESSMENT — PAIN DESCRIPTION - DESCRIPTORS: DESCRIPTORS: THROBBING

## 2024-06-24 ASSESSMENT — PAIN SCALES - GENERAL
PAINLEVEL_OUTOF10: 8
PAINLEVEL: 8

## 2024-06-24 ASSESSMENT — PAIN - FUNCTIONAL ASSESSMENT: PAIN_FUNCTIONAL_ASSESSMENT: 0-10

## 2024-06-24 NOTE — PROGRESS NOTES
The patient is an 78-year-old female with low back and bilateral leg pain as well as neck and arm pain.  She also has pain radiating along her left side.  She has been diagnosed with intercostal neuralgia.  She has had musculoskeletal pain for many years.  The pain is constant.  The pain is worse with activity.  She gets some relief with rest.  The low back and leg pain is the predominant issue.  She has been taking oxycodone/acetaminophen 4 times daily for the last several years.  She gets some relief without side effects.  She has not had recent imaging of her spine.    Review of Systems   Constitutional:  Positive for activity change.   Musculoskeletal:  Positive for back pain, gait problem, neck pain and neck stiffness.   All other systems reviewed and are negative.    GENERAL: alert and appropriate, in no distress, well-hydrated, well nourished, interactive         SKIN: no rash noted         HEAD: normocephalic, no abnormality or lesion noted         EYES: no injection and visual acuity is grossly normal         EARS: external ears normal, no mastoid tenderness         NOSE: external nose normal without rhinorrhea         OROPHARYNX: moist mucus membranes, no tonsillar hypertrophy/exudate, uvula midline and pharynx non-erythematous, lips, teeth and gums are without obvious lesion         NECK: Reduced ROM, no cervical LNs noted         RESPIRATORY: breathing non-labored and no grunting/flaring/retractions         CHEST: equal chest rise with normal respiratory effort         ABDOMEN: soft and non-tender         BACK: back normal in appearance, cervical and lumbar spine with reduced ROM         EXTREMITIES: Dorsiflexor weakness on the left         NEUROLOGIC: gait antalgic, SLR positive, John sign negative, Spurling sign reproduced pain, decreased sensation distally in the L5 distribution on the left    Assessment and Plan    -Chronicity--chronic spinal pain    -Diagnostics--given her pattern of pain and  the neurologic deficits, I would like the patient to have a lumbar spine MRI.  I would also like the patient to have plain films of the lumbar spine with flexion and extension views.  Depending upon the results, we will consider a referral to one of our excellent spine surgeons.    -Pharmacologic--the patient may have a refill of the oxycodone/acetaminophen.  An opioid agreement was reviewed and signed with the patient. A copy was given to the patient. OARRS was reviewed and was consistent with the history. A urine or saliva specimen was obtained for toxicology screening. The patient and I discussed the nature of this medication and its side effects. We discussed tolerance, physical dependence, psychological dependence, addiction and opioid-induced hyperalgesia. We discussed the potential need to wean from this medication. We discussed the availability of programs that can help with this process if necessary. We discussed safety issues related to opioids including safe storage. We discussed the fact that the patient should not drive an automobile or operate heavy machinery while taking this medication. A prescription for naloxone was offered to the patient. The patient will be reevaluated for the need to continue opioid therapy in 30-90 days.    -Psychologic--no need for psychologic intervention from my standpoint    -Physical--we discussed the importance of physical therapy and exercise.  We discussed avoidance and modification techniques.    -Intervention--we may consider minimally invasive lumbar decompression depending upon the results of the imaging.    I spent time educating the patient on the condition including the treatment and the prognosis.  I invited the patient to call at anytime with any questions.

## 2024-07-01 LAB — SCAN RESULT: NORMAL

## 2024-07-22 ENCOUNTER — APPOINTMENT (OUTPATIENT)
Dept: PAIN MEDICINE | Facility: CLINIC | Age: 79
End: 2024-07-22
Payer: MEDICARE

## 2024-07-29 ENCOUNTER — OFFICE VISIT (OUTPATIENT)
Dept: PAIN MEDICINE | Facility: CLINIC | Age: 79
End: 2024-07-29
Payer: MEDICARE

## 2024-07-29 VITALS
RESPIRATION RATE: 16 BRPM | BODY MASS INDEX: 26.5 KG/M2 | WEIGHT: 135 LBS | SYSTOLIC BLOOD PRESSURE: 154 MMHG | HEIGHT: 60 IN | OXYGEN SATURATION: 96 % | HEART RATE: 77 BPM | DIASTOLIC BLOOD PRESSURE: 82 MMHG

## 2024-07-29 DIAGNOSIS — M54.16 LUMBAR RADICULOPATHY: ICD-10-CM

## 2024-07-29 DIAGNOSIS — M48.062 LUMBAR STENOSIS WITH NEUROGENIC CLAUDICATION: ICD-10-CM

## 2024-07-29 PROCEDURE — 3077F SYST BP >= 140 MM HG: CPT | Performed by: PHYSICIAN ASSISTANT

## 2024-07-29 PROCEDURE — 1036F TOBACCO NON-USER: CPT | Performed by: PHYSICIAN ASSISTANT

## 2024-07-29 PROCEDURE — 99214 OFFICE O/P EST MOD 30 MIN: CPT | Performed by: PHYSICIAN ASSISTANT

## 2024-07-29 PROCEDURE — 1159F MED LIST DOCD IN RCRD: CPT | Performed by: PHYSICIAN ASSISTANT

## 2024-07-29 PROCEDURE — 1125F AMNT PAIN NOTED PAIN PRSNT: CPT | Performed by: PHYSICIAN ASSISTANT

## 2024-07-29 PROCEDURE — 1160F RVW MEDS BY RX/DR IN RCRD: CPT | Performed by: PHYSICIAN ASSISTANT

## 2024-07-29 PROCEDURE — 3079F DIAST BP 80-89 MM HG: CPT | Performed by: PHYSICIAN ASSISTANT

## 2024-07-29 RX ORDER — OXYCODONE AND ACETAMINOPHEN 5; 325 MG/1; MG/1
1 TABLET ORAL EVERY 6 HOURS PRN
Qty: 120 TABLET | Refills: 0 | Status: SHIPPED | OUTPATIENT
Start: 2024-07-29

## 2024-07-29 RX ORDER — OXYCODONE AND ACETAMINOPHEN 5; 325 MG/1; MG/1
1 TABLET ORAL EVERY 6 HOURS PRN
Qty: 120 TABLET | Refills: 0 | Status: SHIPPED | OUTPATIENT
Start: 2024-07-29 | End: 2024-08-28

## 2024-07-29 ASSESSMENT — ENCOUNTER SYMPTOMS
BACK PAIN: 1
ARTHRALGIAS: 1

## 2024-07-29 ASSESSMENT — PATIENT HEALTH QUESTIONNAIRE - PHQ9
2. FEELING DOWN, DEPRESSED OR HOPELESS: NOT AT ALL
SUM OF ALL RESPONSES TO PHQ9 QUESTIONS 1 AND 2: 0
1. LITTLE INTEREST OR PLEASURE IN DOING THINGS: NOT AT ALL

## 2024-07-29 ASSESSMENT — PAIN DESCRIPTION - DESCRIPTORS: DESCRIPTORS: ACHING;NUMBNESS

## 2024-07-29 ASSESSMENT — PAIN - FUNCTIONAL ASSESSMENT: PAIN_FUNCTIONAL_ASSESSMENT: 0-10

## 2024-07-29 ASSESSMENT — PAIN SCALES - GENERAL: PAINLEVEL_OUTOF10: 8

## 2024-07-29 NOTE — PROGRESS NOTES
MEDICATION NAME: percocet  STRENGTH: 5/325mg  LAST FILL DATE: 24  DATE LAST TAKEN: 24  QUANTITY FILLED: 120  QUANTITY REMAININ  COUNT COMPLETED BY: ARTURO GALVAN RN and FATMATA MARIE      UDS LAST COMPLETED: 2024  CONTROLLED SUBSTANCES AGREEMENT LAST SIGNED: 2024  ORT LAST COMPLETED:2024  Modified Oswestry disability form filled out annually.

## 2024-07-29 NOTE — PROGRESS NOTES
Subjective   Patient ID: Marylou Ward is a 79 y.o. female who presents for Back Pain.  The patient is an 78-year-old female body wide pain and diffuse arthralgias.  Patient has diffuse arthralgias that she has been having variable intercostal pain she has been also having low back pain and radiating into the lower extremities.  Patient denies any injuries or traumas in the past 30 days patient currently here with her son today that does help provide historical information and reports her medications usually take her from about 8 out of 10 to approximately 3 at best and a 5 and average    Back Pain        Review of Systems   Musculoskeletal:  Positive for arthralgias and back pain.       Objective   Physical Exam  Vitals reviewed.   Constitutional:       Appearance: Normal appearance.   HENT:      Head: Normocephalic and atraumatic.      Mouth/Throat:      Mouth: Mucous membranes are moist.   Neck:      Vascular: No JVD.   Pulmonary:      Effort: Pulmonary effort is normal. No tachypnea or bradypnea.   Abdominal:      Palpations: Abdomen is soft.   Musculoskeletal:      Lumbar back: Decreased range of motion.   Skin:     General: Skin is warm and dry.   Neurological:      Mental Status: She is alert and oriented to person, place, and time.   Psychiatric:         Mood and Affect: Mood normal.         Behavior: Behavior normal. Behavior is cooperative.         Assessment/Plan   Problem List Items Addressed This Visit    None  Visit Diagnoses         Codes    Lumbar radiculopathy     M54.16    Relevant Medications    oxyCODONE-acetaminophen (Percocet) 5-325 mg tablet    oxyCODONE-acetaminophen (Percocet) 5-325 mg tablet    Lumbar stenosis with neurogenic claudication     M48.062    Relevant Medications    oxyCODONE-acetaminophen (Percocet) 5-325 mg tablet    oxyCODONE-acetaminophen (Percocet) 5-325 mg tablet        I had nice discussion with the patient today our plan will be as follows.      Radiology: [Once patient's  MRI has been received by Dr. Tyrell Weinberg and either he or I will review the information]      Physically:  [ continue modification of activities, healthy lifestyle choice ]      Psychologically:  [ No acute psychological concerns ]      Medication: [I will refill the medications at the same dose and frequency. We will continue to monitor the patient bimonthly for compliance, adverse reaction or interactions The patient continues to see benefit and improvement in their quality of life and ability to maintain ADLs. Patient educated about the risks of taking opioids and operating a motor vehicle. Patient reports no adverse side effects to current medication regimen. Current regimen does allow patient to maintain ADLs. Oarrs has been reviewed. No suspicion of diversion or abuse. Compliance with medication regime, no use of illicit drugs, no sharing of narcotic medications with others, do not use others narcotic medication, and to avoid alcohol use. Patient has been educated on the risks, benefits, and alternatives of controlled substances as well as the proper way to store these medications.   The patient and I discussed the nature of this medication and its side effects. We discussed tolerance, physical dependence, psychological dependence, addiction and opioid-induced hyperalgesia   Toxicology screen was reviewed  We did touch on points of making sure to bring medications for count avoidance of alcohol and drugs and call the office if there is acute exacerbations and not to take additional medication      Duration:  [ 1-2 months ]      Intervention:  [ none at this time. Patient is stable.  Will review MRI and based on findings will correlate patient's symptoms and provide patient with interventional option treatments can be from epidural injections to potential minimally invasive decompressions]           Denton Worthy PA-C 07/29/24 3:40 PM

## 2024-07-30 ENCOUNTER — APPOINTMENT (OUTPATIENT)
Dept: RADIOLOGY | Facility: HOSPITAL | Age: 79
End: 2024-07-30
Payer: MEDICARE

## 2024-08-08 ENCOUNTER — APPOINTMENT (OUTPATIENT)
Dept: RADIOLOGY | Facility: HOSPITAL | Age: 79
End: 2024-08-08
Payer: MEDICARE

## 2024-08-08 ENCOUNTER — HOSPITAL ENCOUNTER (EMERGENCY)
Facility: HOSPITAL | Age: 79
Discharge: HOME | End: 2024-08-08
Attending: STUDENT IN AN ORGANIZED HEALTH CARE EDUCATION/TRAINING PROGRAM
Payer: MEDICARE

## 2024-08-08 VITALS
TEMPERATURE: 98.2 F | RESPIRATION RATE: 17 BRPM | OXYGEN SATURATION: 86 % | HEIGHT: 61 IN | SYSTOLIC BLOOD PRESSURE: 200 MMHG | HEART RATE: 75 BPM | DIASTOLIC BLOOD PRESSURE: 87 MMHG | BODY MASS INDEX: 25.49 KG/M2 | WEIGHT: 135 LBS

## 2024-08-08 DIAGNOSIS — I10 HYPERTENSION, UNSPECIFIED TYPE: ICD-10-CM

## 2024-08-08 DIAGNOSIS — S09.90XA CLOSED HEAD INJURY, INITIAL ENCOUNTER: Primary | ICD-10-CM

## 2024-08-08 LAB
ALBUMIN SERPL-MCNC: 4.3 G/DL (ref 3.5–5)
ALP BLD-CCNC: 92 U/L (ref 35–125)
ALT SERPL-CCNC: 6 U/L (ref 5–40)
ANION GAP SERPL CALC-SCNC: 15 MMOL/L
AST SERPL-CCNC: 16 U/L (ref 5–40)
BASOPHILS # BLD AUTO: 0.07 X10*3/UL (ref 0–0.1)
BASOPHILS NFR BLD AUTO: 0.7 %
BILIRUB SERPL-MCNC: 0.2 MG/DL (ref 0.1–1.2)
BUN SERPL-MCNC: 13 MG/DL (ref 8–25)
CALCIUM SERPL-MCNC: 9.5 MG/DL (ref 8.5–10.4)
CHLORIDE SERPL-SCNC: 101 MMOL/L (ref 97–107)
CO2 SERPL-SCNC: 21 MMOL/L (ref 24–31)
CREAT SERPL-MCNC: 1.1 MG/DL (ref 0.4–1.6)
EGFRCR SERPLBLD CKD-EPI 2021: 51 ML/MIN/1.73M*2
EOSINOPHIL # BLD AUTO: 0.23 X10*3/UL (ref 0–0.4)
EOSINOPHIL NFR BLD AUTO: 2.3 %
ERYTHROCYTE [DISTWIDTH] IN BLOOD BY AUTOMATED COUNT: 15.3 % (ref 11.5–14.5)
ETHANOL SERPL-MCNC: <0.01 G/DL
GLUCOSE SERPL-MCNC: 106 MG/DL (ref 65–99)
HCT VFR BLD AUTO: 33.2 % (ref 36–46)
HGB BLD-MCNC: 10.4 G/DL (ref 12–16)
IMM GRANULOCYTES # BLD AUTO: 0.03 X10*3/UL (ref 0–0.5)
IMM GRANULOCYTES NFR BLD AUTO: 0.3 % (ref 0–0.9)
INR PPP: 1 (ref 0.9–1.2)
LYMPHOCYTES # BLD AUTO: 1.91 X10*3/UL (ref 0.8–3)
LYMPHOCYTES NFR BLD AUTO: 19.5 %
MCH RBC QN AUTO: 26.7 PG (ref 26–34)
MCHC RBC AUTO-ENTMCNC: 31.3 G/DL (ref 32–36)
MCV RBC AUTO: 85 FL (ref 80–100)
MONOCYTES # BLD AUTO: 0.58 X10*3/UL (ref 0.05–0.8)
MONOCYTES NFR BLD AUTO: 5.9 %
NEUTROPHILS # BLD AUTO: 6.97 X10*3/UL (ref 1.6–5.5)
NEUTROPHILS NFR BLD AUTO: 71.3 %
NRBC BLD-RTO: 0 /100 WBCS (ref 0–0)
PLATELET # BLD AUTO: 385 X10*3/UL (ref 150–450)
POTASSIUM SERPL-SCNC: 4.1 MMOL/L (ref 3.4–5.1)
PROT SERPL-MCNC: 7.8 G/DL (ref 5.9–7.9)
PROTHROMBIN TIME: 10.7 SECONDS (ref 9.3–12.7)
RBC # BLD AUTO: 3.89 X10*6/UL (ref 4–5.2)
SODIUM SERPL-SCNC: 137 MMOL/L (ref 133–145)
WBC # BLD AUTO: 9.8 X10*3/UL (ref 4.4–11.3)

## 2024-08-08 PROCEDURE — 82077 ASSAY SPEC XCP UR&BREATH IA: CPT | Performed by: PHYSICIAN ASSISTANT

## 2024-08-08 PROCEDURE — 36415 COLL VENOUS BLD VENIPUNCTURE: CPT | Performed by: PHYSICIAN ASSISTANT

## 2024-08-08 PROCEDURE — 2500000004 HC RX 250 GENERAL PHARMACY W/ HCPCS (ALT 636 FOR OP/ED): Performed by: PHYSICIAN ASSISTANT

## 2024-08-08 PROCEDURE — 2500000004 HC RX 250 GENERAL PHARMACY W/ HCPCS (ALT 636 FOR OP/ED): Performed by: STUDENT IN AN ORGANIZED HEALTH CARE EDUCATION/TRAINING PROGRAM

## 2024-08-08 PROCEDURE — 71045 X-RAY EXAM CHEST 1 VIEW: CPT | Performed by: RADIOLOGY

## 2024-08-08 PROCEDURE — 72170 X-RAY EXAM OF PELVIS: CPT

## 2024-08-08 PROCEDURE — 85610 PROTHROMBIN TIME: CPT | Performed by: PHYSICIAN ASSISTANT

## 2024-08-08 PROCEDURE — 71045 X-RAY EXAM CHEST 1 VIEW: CPT

## 2024-08-08 PROCEDURE — 96375 TX/PRO/DX INJ NEW DRUG ADDON: CPT | Mod: 59

## 2024-08-08 PROCEDURE — 74177 CT ABD & PELVIS W/CONTRAST: CPT | Performed by: RADIOLOGY

## 2024-08-08 PROCEDURE — 72170 X-RAY EXAM OF PELVIS: CPT | Performed by: RADIOLOGY

## 2024-08-08 PROCEDURE — 74177 CT ABD & PELVIS W/CONTRAST: CPT

## 2024-08-08 PROCEDURE — 99285 EMERGENCY DEPT VISIT HI MDM: CPT | Mod: 25

## 2024-08-08 PROCEDURE — 72125 CT NECK SPINE W/O DYE: CPT | Performed by: RADIOLOGY

## 2024-08-08 PROCEDURE — 2550000001 HC RX 255 CONTRASTS: Performed by: STUDENT IN AN ORGANIZED HEALTH CARE EDUCATION/TRAINING PROGRAM

## 2024-08-08 PROCEDURE — 72125 CT NECK SPINE W/O DYE: CPT

## 2024-08-08 PROCEDURE — 2500000001 HC RX 250 WO HCPCS SELF ADMINISTERED DRUGS (ALT 637 FOR MEDICARE OP): Performed by: STUDENT IN AN ORGANIZED HEALTH CARE EDUCATION/TRAINING PROGRAM

## 2024-08-08 PROCEDURE — 70450 CT HEAD/BRAIN W/O DYE: CPT | Performed by: RADIOLOGY

## 2024-08-08 PROCEDURE — 71260 CT THORAX DX C+: CPT | Performed by: RADIOLOGY

## 2024-08-08 PROCEDURE — 85025 COMPLETE CBC W/AUTO DIFF WBC: CPT | Performed by: PHYSICIAN ASSISTANT

## 2024-08-08 PROCEDURE — 70450 CT HEAD/BRAIN W/O DYE: CPT

## 2024-08-08 PROCEDURE — 93010 ELECTROCARDIOGRAM REPORT: CPT | Performed by: INTERNAL MEDICINE

## 2024-08-08 PROCEDURE — 96374 THER/PROPH/DIAG INJ IV PUSH: CPT | Mod: 59

## 2024-08-08 PROCEDURE — 84075 ASSAY ALKALINE PHOSPHATASE: CPT | Performed by: PHYSICIAN ASSISTANT

## 2024-08-08 RX ORDER — DIPHENHYDRAMINE HYDROCHLORIDE 50 MG/ML
25 INJECTION INTRAMUSCULAR; INTRAVENOUS ONCE
Status: COMPLETED | OUTPATIENT
Start: 2024-08-08 | End: 2024-08-08

## 2024-08-08 RX ORDER — FENTANYL CITRATE 50 UG/ML
25 INJECTION, SOLUTION INTRAMUSCULAR; INTRAVENOUS ONCE
Status: COMPLETED | OUTPATIENT
Start: 2024-08-08 | End: 2024-08-08

## 2024-08-08 RX ORDER — METOPROLOL TARTRATE 50 MG/1
50 TABLET ORAL ONCE
Status: COMPLETED | OUTPATIENT
Start: 2024-08-08 | End: 2024-08-08

## 2024-08-08 RX ADMIN — IOHEXOL 75 ML: 350 INJECTION, SOLUTION INTRAVENOUS at 14:14

## 2024-08-08 RX ADMIN — METHYLPREDNISOLONE SODIUM SUCCINATE 125 MG: 125 INJECTION, POWDER, FOR SOLUTION INTRAMUSCULAR; INTRAVENOUS at 14:25

## 2024-08-08 RX ADMIN — DIPHENHYDRAMINE HYDROCHLORIDE 25 MG: 50 INJECTION, SOLUTION INTRAMUSCULAR; INTRAVENOUS at 14:26

## 2024-08-08 RX ADMIN — METOPROLOL TARTRATE 50 MG: 50 TABLET, FILM COATED ORAL at 15:31

## 2024-08-08 RX ADMIN — FENTANYL CITRATE 25 MCG: 50 INJECTION INTRAMUSCULAR; INTRAVENOUS at 13:20

## 2024-08-08 ASSESSMENT — PAIN DESCRIPTION - PROGRESSION: CLINICAL_PROGRESSION: NOT CHANGED

## 2024-08-08 ASSESSMENT — LIFESTYLE VARIABLES
EVER FELT BAD OR GUILTY ABOUT YOUR DRINKING: NO
TOTAL SCORE: 0
EVER HAD A DRINK FIRST THING IN THE MORNING TO STEADY YOUR NERVES TO GET RID OF A HANGOVER: NO
HAVE PEOPLE ANNOYED YOU BY CRITICIZING YOUR DRINKING: NO
HAVE YOU EVER FELT YOU SHOULD CUT DOWN ON YOUR DRINKING: NO

## 2024-08-08 ASSESSMENT — PAIN DESCRIPTION - LOCATION: LOCATION: LEG

## 2024-08-08 ASSESSMENT — PAIN DESCRIPTION - ORIENTATION: ORIENTATION: RIGHT

## 2024-08-08 ASSESSMENT — PAIN SCALES - GENERAL: PAINLEVEL_OUTOF10: 7

## 2024-08-08 ASSESSMENT — PAIN - FUNCTIONAL ASSESSMENT: PAIN_FUNCTIONAL_ASSESSMENT: 0-10

## 2024-08-08 ASSESSMENT — PAIN DESCRIPTION - PAIN TYPE: TYPE: ACUTE PAIN

## 2024-08-08 NOTE — ED PROVIDER NOTES
HPI   Chief Complaint   Patient presents with    Fall     Mechanical fall this AM while getting out of bed. NO LOC,  Head Trauma + thinners Takes eliquis R sided leg pain s/p fall       HPI  Patient is an 79-year-old female presenting to the emergency department for evaluation of a mechanical fall.  Limited trauma was activated on patient's arrival given that the patient was on blood thinning medications and is complaining of multiple injuries.  Patient states that she attempted to get out of bed today when she fell, landing downwards on her right side.  Patient is unsure if she hit her head but she may have.  She is complaining of right-sided flank and right hip pain with bruising.  She denies chest pain or shortness of breath.  No abdominal pain.  No lightheadedness or dizziness.  No head or neck pain.  No back pain.  No numbness of her extremities.  No saddle anesthesia.  No bowel or bladder incontinence.      Patient History   Past Medical History:   Diagnosis Date    Arthritis     Diabetes mellitus (Multi)     Hypertension      History reviewed. No pertinent surgical history.  No family history on file.  Social History     Tobacco Use    Smoking status: Never     Passive exposure: Never    Smokeless tobacco: Never   Vaping Use    Vaping status: Never Used   Substance Use Topics    Alcohol use: Not Currently    Drug use: Never       Physical Exam   ED Triage Vitals   Temperature Heart Rate Respirations BP   08/08/24 1259 08/08/24 1309 08/08/24 1259 08/08/24 1259   36.8 °C (98.2 °F) 86 18 (!) 239/101      Pulse Ox Temp Source Heart Rate Source Patient Position   08/08/24 1259 08/08/24 1259 08/08/24 1259 08/08/24 1259   94 % Oral Monitor Sitting      BP Location FiO2 (%)     08/08/24 1259 --     Left arm        Physical Exam  Vitals and nursing note reviewed.   Constitutional:       Appearance: Normal appearance.   HENT:      Head: Normocephalic and atraumatic.   Eyes:      Extraocular Movements: Extraocular  movements intact.      Conjunctiva/sclera: Conjunctivae normal.      Pupils: Pupils are equal, round, and reactive to light.   Neck:      Comments: No C-spine palpable bony deformities, step-offs or tenderness.  Cardiovascular:      Rate and Rhythm: Normal rate and regular rhythm.   Pulmonary:      Effort: Pulmonary effort is normal.      Breath sounds: Normal breath sounds.   Abdominal:      General: Abdomen is flat. Bowel sounds are normal.      Palpations: Abdomen is soft.   Musculoskeletal:      Cervical back: Normal range of motion and neck supple.      Comments: There is gross ecchymosis to the right posterior lateral flank and right posterior lateral hip with tenderness to palpation.   Neurological:      Mental Status: She is alert.           ED Course & MDM   ED Course as of 08/08/24 1730   Thu Aug 08, 2024   1315 EKG Time:1314  EKG Interpretation time:1315  EKG Interpretation: EKG shows normal sinus rhythm with a rate of 77 bpm, normal axis, QTc 454, no evidence of STEMI.    EKG was interpreted by myself independently [JL]      ED Course User Index  [JL] Andrew Rivera,          Diagnoses as of 08/08/24 1730   Closed head injury, initial encounter   Hypertension, unspecified type                 No data recorded     [place] Coma Scale Score: 15 (08/08/24 1433 : Leesa Coppola RN)                           Medical Decision Making  Parts of this chart have been completed using voice recognition software. Please excuse any errors of transcription. Despite the medical decision making time stamp above-my medical decision making has taken place during the patient's entire visit. My thought process and reason for plan has been formulated from the time that I saw the patient until the time of disposition and is not specific to one specific moment during their visit and furthermore my MDM encompasses this entire chart and not only this text box.      HPI: Detailed above.    Exam: A medically appropriate exam  performed, outlined above, given the known history and presentation.    History obtained from: Patient    Social Determinants of Health considered during this visit: Coming from home    EKG interpreted by my attending physician, reviewed by myself.    Labs Reviewed   CBC WITH AUTO DIFFERENTIAL - Abnormal       Result Value    WBC 9.8      nRBC 0.0      RBC 3.89 (*)     Hemoglobin 10.4 (*)     Hematocrit 33.2 (*)     MCV 85      MCH 26.7      MCHC 31.3 (*)     RDW 15.3 (*)     Platelets 385      Neutrophils % 71.3      Immature Granulocytes %, Automated 0.3      Lymphocytes % 19.5      Monocytes % 5.9      Eosinophils % 2.3      Basophils % 0.7      Neutrophils Absolute 6.97 (*)     Immature Granulocytes Absolute, Automated 0.03      Lymphocytes Absolute 1.91      Monocytes Absolute 0.58      Eosinophils Absolute 0.23      Basophils Absolute 0.07     COMPREHENSIVE METABOLIC PANEL - Abnormal    Glucose 106 (*)     Sodium 137      Potassium 4.1      Chloride 101      Bicarbonate 21 (*)     Urea Nitrogen 13      Creatinine 1.10      eGFR 51 (*)     Calcium 9.5      Albumin 4.3      Alkaline Phosphatase 92      Total Protein 7.8      AST 16      Bilirubin, Total 0.2      ALT 6      Anion Gap 15     ALCOHOL - Normal    Alcohol <0.010     PROTIME-INR - Normal    Protime 10.7      INR 1.0      Narrative:     INR Therapeutic Range: 2.0-3.5   LACTATE     XR chest 1 view   Final Result   Mild elevation of the right diaphragm. Mild perihilar interstitial   prominence.        MACRO:   None.        Signed by: Delia Morel 8/8/2024 2:55 PM   Dictation workstation:   [id number]      XR pelvis 1-2 views   Final Result   No evidence of acute displaced osseous pelvic fracture.        MACRO:   None.        Signed by: Delia Morel 8/8/2024 2:56 PM   Dictation workstation:   [id number]      CT chest abdomen pelvis w IV contrast   Final Result   CHEST ABDOMEN AND PELVIS:        Mild stranding and skin thickening, possible  bruising/contusion, in   the right flank. No displaced rib fracture, pleural effusion or   pneumothorax. No evidence of solid abdominal visceral injury or   peritoneal free fluid.        Mild pulmonary heterogeneity and probable scarring. 6 mm right lung   nodule stable from 06/04/2024. Continued follow-up to assess   stability. See below.        Extensive atherosclerotic disease detailed above similar to   06/04/2024.        4.1 cm cystic right adnexal mass, reportedly increased on the most   recent comparison exam. Cystic ovarian neoplasm not excluded. Again   further evaluation/follow-up using pelvic ultrasound recommended.        Cholelithiasis.        Hypodense lesions in the liver and both kidneys, possible cysts   although the former can not be further characterized due to small   size.        Multiple additional findings as detailed above.        MACRO:   Incidental Finding:  A solid non-calcified pulmonary nodule measuring   6-8 mm .  (**-YCF-**)        Instructions:  Consider follow up non contrast chest CT at 6-12   months, then consider CT chest at 18-24 months. (Tereso Diane et   al., Guidelines for management of incidental pulmonary nodules   detected on CT images: From the Fleischner Society 2017, Radiology.   2017 Jul;284 (1):228-243.) [email or web address]        Signed by: Delia Morel 8/8/2024 2:54 PM   Dictation workstation:   [id number]      CT head W O contrast trauma protocol   Final Result   Age-related atrophy and small-vessel ischemic changes of the cerebral   white matter.        No CT evidence of acute intracranial hemorrhage or mass effect.             MACRO:   None        Signed by: Roly lA 8/8/2024 2:13 PM   Dictation workstation:   [id number]      CT cervical spine wo IV contrast   Final Result   No sign of acute fracture or subluxation.        Osteopenia.        Mild multilevel cervical spondylosis and hypertrophic facet   arthrosis. There is no significant central  canal narrowing.             MACRO:   None        Signed by: Roly Al 8/8/2024 2:24 PM   Dictation workstation:   [id number]        Medications   fentaNYL PF (Sublimaze) injection 25 mcg (25 mcg intravenous Given 8/8/24 1320)   iohexol (OMNIPaque) 350 mg iodine/mL solution 75 mL (75 mL intravenous Given 8/8/24 1414)   diphenhydrAMINE (BENADryl) injection 25 mg (25 mg intravenous Given 8/8/24 1426)   methylPREDNISolone sod succinate (SOLU-Medrol) injection 125 mg (125 mg intravenous Given 8/8/24 1425)   metoprolol tartrate (Lopressor) tablet 50 mg (50 mg oral Given 8/8/24 1531)     Differential diagnoses considered for this visit include: Acute intracranial processes versus cervical spine instability versus hip fracture versus hip contusion versus rib fracture versus pneumothorax versus hemothorax    Considerations/further MDM:    Patient is an 79-year-old female presenting as a limited trauma activation for a head injury with multiple injuries on an anticoagulant therapy.  Vitals does appreciate significant hypertension with a pressure of 230/98.  Son is at the bedside, stating that the patient does have a history of hypertension and did not take her medications this morning.  Physical exam was pertinent for significant bruising to the right flank and right hip.    CBC and CMP were within normal limits.  Alcohol was negative.  PT/INR was within normal limits. CT head without contrast shows no evidence of acute intracranial hemorrhage or mass effect.  CT cervical spine shows no signs of acute fracture or subluxation.  CT chest abdomen pelvis with IV contrast showed mild stranding and skin thickening, possible bruising/contusion in the right flank.  No evidence for displaced rib fracture, pleural effusion or pneumothorax.  No evidence of solid abdominal visceral injury or peritoneal free fluid.  Chest x-ray shows mild elevation of the right diaphragm with mild perihilar interstitial prominence.  X-ray of  the pelvis shows no evidence of acute displaced osseous pelvic fracture.    Patient successfully ambulated to the restroom without difficulty.  Single dose of patient's Lopressor was administered here in the hospital for management of her blood pressure.  Patient's vital signs improved to 186/85 after administration of medications.  Patient's results were discussed with her at the bedside, and the patient did feel comfortable to return home.  There were no acute findings warranting hospital admission or inpatient management at this time for this patient.  Return precautions were discussed.  Patient was discharged home in good condition.    All of the patient's questions were answered to the best of my ability. Patient states understanding that they have been screened for an emergency today, and we have not found any etiology of symptoms that requires emergent treatment or admission to the hospital at this point. They understand that they have not had definitive care day and require follow-up for treatment of their condition. They also state understanding that they may have an emergent condition that may potentially have not of detected at this visit and they must return to the emergency department if they develop any worsening of symptoms or new complaints.    20 minutes of critical care time is independently billed for this encounter.  This is not concurrent with billable procedures.  This is billed in the event of a potentially life-threatening or life deteriorating setting in the limited trauma activation.    Patient was seen in conjunction with attending physician Dr. Andrew Rivera   Patient's history, physical exam, diagnostic studies, and treatment plan were discussed thoroughly.    Procedure  Procedures     Binta Ospina PA-C  08/08/24 [id number]

## 2024-08-08 NOTE — DISCHARGE INSTRUCTIONS
[facility]  [address] [facility]  [address] [place] [id number]  Dept: [phone]     Date:   2016    Patient:  Juliane Koyanagi  :      10/24/1959  MRN:      [id number]    Chief Complaint:  Patient presents with: Thank you for choosing [facility] for your healthcare needs today!    There were no acute findings on your diagnostic imaging or blood work today that warranted hospital admission at this time.    Return to the emergency department if you develop symptoms that are of concern to you.    Follow-up with your primary care provider as recommended after today's visit.  Please call and schedule an appointment with them as soon as you are able to.   ROSUVASTATIN CALCIUM 20 MG Oral Tab TAKE 1 TABLET(20 MG) BY MOUTH DAILY Disp: 90 tablet Rfl: 1   LEVOTHYROXINE SODIUM 150 MCG Oral Tab TAKE 1 TABLET BY MOUTH DAILY BEFORE BREAKFAST.  Disp: 90 tablet Rfl: 0   LOSARTAN POTASSIUM-HCTZ 100-25 MG Oral Tab TAKE • Cancer Neg        Food Journal  · Reviewed and Discussed:       · Patient has a Food Journal?: yes   · Patient is reading nutrition labels? yes  · Average Caloric Intake:     · Average CHO Intake: 120  · Is patient exercising? no  · Type of exercise? Skin color, texture, turgor normal. No rashes or lesions    ASSESSMENT     HYPERTENSION:  The patient's blood pressure has been well controlled. she has been checking it as instructed and has remained in relatively good control.     HYPERCHOLESTEROLEMIA: minutes per day. 4. Increase fruit and vegetable servings to 5-6 per day. Tolerating vyvanse  Will refill at 60 mg    Blood work done    saxenda tolerating well, admits to not taking daily    Agree with another sleep study.        Should come to semin

## 2024-08-09 LAB
ATRIAL RATE: 77 BPM
P AXIS: 72 DEGREES
P OFFSET: 182 MS
P ONSET: 124 MS
PR INTERVAL: 200 MS
Q ONSET: 224 MS
QRS COUNT: 12 BEATS
QRS DURATION: 86 MS
QT INTERVAL: 402 MS
QTC CALCULATION(BAZETT): 454 MS
QTC FREDERICIA: 436 MS
R AXIS: 30 DEGREES
T AXIS: 77 DEGREES
T OFFSET: 425 MS
VENTRICULAR RATE: 77 BPM

## 2024-08-14 ENCOUNTER — APPOINTMENT (OUTPATIENT)
Dept: RADIOLOGY | Facility: HOSPITAL | Age: 79
End: 2024-08-14
Payer: MEDICARE

## 2024-09-12 ENCOUNTER — APPOINTMENT (OUTPATIENT)
Dept: RADIOLOGY | Facility: HOSPITAL | Age: 79
End: 2024-09-12
Payer: MEDICARE

## 2024-09-19 ENCOUNTER — APPOINTMENT (OUTPATIENT)
Dept: RADIOLOGY | Facility: HOSPITAL | Age: 79
End: 2024-09-19
Payer: MEDICARE

## 2024-09-23 ENCOUNTER — APPOINTMENT (OUTPATIENT)
Dept: RADIOLOGY | Facility: HOSPITAL | Age: 79
End: 2024-09-23
Payer: MEDICARE

## 2024-09-25 ENCOUNTER — OFFICE VISIT (OUTPATIENT)
Dept: PAIN MEDICINE | Facility: CLINIC | Age: 79
End: 2024-09-25
Payer: MEDICARE

## 2024-09-25 VITALS
DIASTOLIC BLOOD PRESSURE: 80 MMHG | BODY MASS INDEX: 25.49 KG/M2 | WEIGHT: 135 LBS | RESPIRATION RATE: 22 BRPM | OXYGEN SATURATION: 99 % | HEART RATE: 65 BPM | SYSTOLIC BLOOD PRESSURE: 130 MMHG | HEIGHT: 61 IN

## 2024-09-25 DIAGNOSIS — M54.16 LUMBAR RADICULOPATHY: ICD-10-CM

## 2024-09-25 DIAGNOSIS — M48.062 LUMBAR STENOSIS WITH NEUROGENIC CLAUDICATION: ICD-10-CM

## 2024-09-25 PROCEDURE — 1159F MED LIST DOCD IN RCRD: CPT | Performed by: ANESTHESIOLOGY

## 2024-09-25 PROCEDURE — 1125F AMNT PAIN NOTED PAIN PRSNT: CPT | Performed by: ANESTHESIOLOGY

## 2024-09-25 PROCEDURE — 1036F TOBACCO NON-USER: CPT | Performed by: ANESTHESIOLOGY

## 2024-09-25 PROCEDURE — 99214 OFFICE O/P EST MOD 30 MIN: CPT | Performed by: ANESTHESIOLOGY

## 2024-09-25 PROCEDURE — 3079F DIAST BP 80-89 MM HG: CPT | Performed by: ANESTHESIOLOGY

## 2024-09-25 PROCEDURE — 3075F SYST BP GE 130 - 139MM HG: CPT | Performed by: ANESTHESIOLOGY

## 2024-09-25 RX ORDER — OXYCODONE AND ACETAMINOPHEN 5; 325 MG/1; MG/1
1 TABLET ORAL EVERY 6 HOURS PRN
Qty: 120 TABLET | Refills: 0 | Status: SHIPPED | OUTPATIENT
Start: 2024-09-28

## 2024-09-25 RX ORDER — OXYCODONE AND ACETAMINOPHEN 5; 325 MG/1; MG/1
1 TABLET ORAL EVERY 6 HOURS PRN
Qty: 120 TABLET | Refills: 0 | Status: SHIPPED | OUTPATIENT
Start: 2024-10-28 | End: 2024-11-27

## 2024-09-25 ASSESSMENT — PATIENT HEALTH QUESTIONNAIRE - PHQ9
SUM OF ALL RESPONSES TO PHQ9 QUESTIONS 1 & 2: 0
2. FEELING DOWN, DEPRESSED OR HOPELESS: NOT AT ALL
1. LITTLE INTEREST OR PLEASURE IN DOING THINGS: NOT AT ALL

## 2024-09-25 ASSESSMENT — PAIN DESCRIPTION - DESCRIPTORS: DESCRIPTORS: ACHING

## 2024-09-25 ASSESSMENT — ENCOUNTER SYMPTOMS
ACTIVITY CHANGE: 1
BACK PAIN: 1

## 2024-09-25 ASSESSMENT — PAIN SCALES - GENERAL
PAINLEVEL_OUTOF10: 8
PAINLEVEL: 8

## 2024-09-25 ASSESSMENT — PAIN - FUNCTIONAL ASSESSMENT: PAIN_FUNCTIONAL_ASSESSMENT: 0-10

## 2024-09-25 NOTE — PROGRESS NOTES
The patient is an 79-year-old female with low back and bilateral leg pain.  The back pain is constant.  The pain is worse with activity.  She gets some relief with rest.  She feels better when she leans forward on something such as a grocery cart or countertop.  I ordered plain films and a lumbar spine MRI at her first visit.  She has not yet done that.  She has been taking oxycodone/acetaminophen 4 times daily for many years.  She gets some relief without side effects.    Review of Systems   Constitutional:  Positive for activity change.   Musculoskeletal:  Positive for back pain and gait problem.   All other systems reviewed and are negative.    GENERAL: alert and appropriate, in no distress, well-hydrated, well-nourished and happy, smiling, interactive  SKIN: no rash noted  RESPIRATORY: breathing non-labored and no grunting/flaring/retractions  CHEST: equal chest rise with normal respiratory effort  ABDOMEN: soft and non-tender  BACK: back normal in appearance, spine with reduced ROM  EXTREMITIES: strength intact  NEUROLOGIC: gait antalgic, SLR negative, sensation grossly intact.    Assessment and Plan    -Chronicity--chronic spinal pain    -Diagnostics--encouraged the patient to have the x-rays and the MRI of the lumbar spine as ordered    -Pharmacologic--refill oxycodone/acetaminophen.  The [facility] and recent toxicology screen results were reviewed and were appropriate.      -Psychologic--no need for psychologic intervention from my standpoint.  There are no mental health issues of which I am aware that are contributing to the patient's pain.  There are no substance abuse or alcohol abuse issues of which I am aware that are contributing to the patient's pain.    -Physical--we discussed the importance of physical therapy and exercise.  We discussed avoidance and modification techniques.    -Intervention--no intervention planned    I spent time educating the patient on the condition including the treatment and  the prognosis.  I invited the patient to call at anytime with any questions.

## 2024-10-31 ENCOUNTER — HOSPITAL ENCOUNTER (EMERGENCY)
Facility: HOSPITAL | Age: 79
Discharge: HOME | End: 2024-10-31
Attending: EMERGENCY MEDICINE
Payer: MEDICARE

## 2024-10-31 ENCOUNTER — APPOINTMENT (OUTPATIENT)
Dept: CARDIOLOGY | Facility: HOSPITAL | Age: 79
End: 2024-10-31
Payer: MEDICARE

## 2024-10-31 ENCOUNTER — APPOINTMENT (OUTPATIENT)
Dept: RADIOLOGY | Facility: HOSPITAL | Age: 79
End: 2024-10-31
Payer: MEDICARE

## 2024-10-31 VITALS
WEIGHT: 140 LBS | OXYGEN SATURATION: 98 % | BODY MASS INDEX: 27.48 KG/M2 | RESPIRATION RATE: 19 BRPM | HEART RATE: 69 BPM | HEIGHT: 60 IN | DIASTOLIC BLOOD PRESSURE: 89 MMHG | SYSTOLIC BLOOD PRESSURE: 190 MMHG | TEMPERATURE: 98.1 F

## 2024-10-31 DIAGNOSIS — K80.20 CALCULUS OF GALLBLADDER WITHOUT CHOLECYSTITIS WITHOUT OBSTRUCTION: ICD-10-CM

## 2024-10-31 DIAGNOSIS — R10.13 EPIGASTRIC ABDOMINAL PAIN: Primary | ICD-10-CM

## 2024-10-31 LAB
ALBUMIN SERPL BCP-MCNC: 4.6 G/DL (ref 3.4–5)
ALP SERPL-CCNC: 87 U/L (ref 33–136)
ALT SERPL W P-5'-P-CCNC: 5 U/L (ref 7–45)
ANION GAP SERPL CALCULATED.3IONS-SCNC: 13 MMOL/L (ref 10–20)
AST SERPL W P-5'-P-CCNC: 10 U/L (ref 9–39)
BASOPHILS # BLD AUTO: 0.06 X10*3/UL (ref 0–0.1)
BASOPHILS NFR BLD AUTO: 0.5 %
BILIRUB SERPL-MCNC: 0.3 MG/DL (ref 0–1.2)
BUN SERPL-MCNC: 16 MG/DL (ref 6–23)
CALCIUM SERPL-MCNC: 10 MG/DL (ref 8.6–10.3)
CHLORIDE SERPL-SCNC: 99 MMOL/L (ref 98–107)
CO2 SERPL-SCNC: 26 MMOL/L (ref 21–32)
CREAT SERPL-MCNC: 1.24 MG/DL (ref 0.5–1.05)
EGFRCR SERPLBLD CKD-EPI 2021: 44 ML/MIN/1.73M*2
EOSINOPHIL # BLD AUTO: 0.56 X10*3/UL (ref 0–0.4)
EOSINOPHIL NFR BLD AUTO: 4.9 %
ERYTHROCYTE [DISTWIDTH] IN BLOOD BY AUTOMATED COUNT: 14.2 % (ref 11.5–14.5)
GLUCOSE SERPL-MCNC: 103 MG/DL (ref 74–99)
HCT VFR BLD AUTO: 36.8 % (ref 36–46)
HGB BLD-MCNC: 11.5 G/DL (ref 12–16)
IMM GRANULOCYTES # BLD AUTO: 0.04 X10*3/UL (ref 0–0.5)
IMM GRANULOCYTES NFR BLD AUTO: 0.3 % (ref 0–0.9)
LIPASE SERPL-CCNC: 15 U/L (ref 9–82)
LYMPHOCYTES # BLD AUTO: 2.27 X10*3/UL (ref 0.8–3)
LYMPHOCYTES NFR BLD AUTO: 19.8 %
MAGNESIUM SERPL-MCNC: 1.56 MG/DL (ref 1.6–2.4)
MCH RBC QN AUTO: 26.6 PG (ref 26–34)
MCHC RBC AUTO-ENTMCNC: 31.3 G/DL (ref 32–36)
MCV RBC AUTO: 85 FL (ref 80–100)
MONOCYTES # BLD AUTO: 0.8 X10*3/UL (ref 0.05–0.8)
MONOCYTES NFR BLD AUTO: 7 %
NEUTROPHILS # BLD AUTO: 7.73 X10*3/UL (ref 1.6–5.5)
NEUTROPHILS NFR BLD AUTO: 67.5 %
NRBC BLD-RTO: 0 /100 WBCS (ref 0–0)
PLATELET # BLD AUTO: 428 X10*3/UL (ref 150–450)
POTASSIUM SERPL-SCNC: 4.1 MMOL/L (ref 3.5–5.3)
PROT SERPL-MCNC: 8.2 G/DL (ref 6.4–8.2)
RBC # BLD AUTO: 4.33 X10*6/UL (ref 4–5.2)
SODIUM SERPL-SCNC: 134 MMOL/L (ref 136–145)
WBC # BLD AUTO: 11.5 X10*3/UL (ref 4.4–11.3)

## 2024-10-31 PROCEDURE — 96375 TX/PRO/DX INJ NEW DRUG ADDON: CPT | Performed by: EMERGENCY MEDICINE

## 2024-10-31 PROCEDURE — 85025 COMPLETE CBC W/AUTO DIFF WBC: CPT

## 2024-10-31 PROCEDURE — 83735 ASSAY OF MAGNESIUM: CPT

## 2024-10-31 PROCEDURE — 2500000004 HC RX 250 GENERAL PHARMACY W/ HCPCS (ALT 636 FOR OP/ED)

## 2024-10-31 PROCEDURE — 99285 EMERGENCY DEPT VISIT HI MDM: CPT | Performed by: EMERGENCY MEDICINE

## 2024-10-31 PROCEDURE — 2550000001 HC RX 255 CONTRASTS

## 2024-10-31 PROCEDURE — 74177 CT ABD & PELVIS W/CONTRAST: CPT

## 2024-10-31 PROCEDURE — 36415 COLL VENOUS BLD VENIPUNCTURE: CPT

## 2024-10-31 PROCEDURE — 80053 COMPREHEN METABOLIC PANEL: CPT

## 2024-10-31 PROCEDURE — 93005 ELECTROCARDIOGRAM TRACING: CPT

## 2024-10-31 PROCEDURE — 83690 ASSAY OF LIPASE: CPT

## 2024-10-31 PROCEDURE — 96374 THER/PROPH/DIAG INJ IV PUSH: CPT | Performed by: EMERGENCY MEDICINE

## 2024-10-31 PROCEDURE — 74177 CT ABD & PELVIS W/CONTRAST: CPT | Performed by: RADIOLOGY

## 2024-10-31 RX ORDER — ONDANSETRON HYDROCHLORIDE 2 MG/ML
4 INJECTION, SOLUTION INTRAVENOUS ONCE
Status: COMPLETED | OUTPATIENT
Start: 2024-10-31 | End: 2024-10-31

## 2024-10-31 RX ORDER — FAMOTIDINE 10 MG/ML
20 INJECTION INTRAVENOUS ONCE
Status: COMPLETED | OUTPATIENT
Start: 2024-10-31 | End: 2024-10-31

## 2024-10-31 RX ORDER — ONDANSETRON 4 MG/1
4 TABLET, FILM COATED ORAL EVERY 6 HOURS
Qty: 12 TABLET | Refills: 0 | Status: SHIPPED | OUTPATIENT
Start: 2024-10-31 | End: 2024-11-03

## 2024-10-31 ASSESSMENT — PAIN DESCRIPTION - PAIN TYPE: TYPE: ACUTE PAIN

## 2024-10-31 ASSESSMENT — LIFESTYLE VARIABLES
TOTAL SCORE: 0
EVER HAD A DRINK FIRST THING IN THE MORNING TO STEADY YOUR NERVES TO GET RID OF A HANGOVER: NO
HAVE YOU EVER FELT YOU SHOULD CUT DOWN ON YOUR DRINKING: NO
EVER FELT BAD OR GUILTY ABOUT YOUR DRINKING: NO
HAVE PEOPLE ANNOYED YOU BY CRITICIZING YOUR DRINKING: NO

## 2024-10-31 ASSESSMENT — PAIN DESCRIPTION - DESCRIPTORS: DESCRIPTORS: SHARP

## 2024-10-31 ASSESSMENT — PAIN DESCRIPTION - LOCATION: LOCATION: ABDOMEN

## 2024-10-31 ASSESSMENT — PAIN SCALES - GENERAL: PAINLEVEL_OUTOF10: 8

## 2024-10-31 ASSESSMENT — PAIN - FUNCTIONAL ASSESSMENT: PAIN_FUNCTIONAL_ASSESSMENT: 0-10

## 2024-11-01 LAB
ATRIAL RATE: 66 BPM
P AXIS: 58 DEGREES
P OFFSET: 177 MS
P ONSET: 123 MS
PR INTERVAL: 200 MS
Q ONSET: 223 MS
QRS COUNT: 11 BEATS
QRS DURATION: 88 MS
QT INTERVAL: 426 MS
QTC CALCULATION(BAZETT): 446 MS
QTC FREDERICIA: 440 MS
R AXIS: 2 DEGREES
T AXIS: 86 DEGREES
T OFFSET: 436 MS
VENTRICULAR RATE: 66 BPM

## 2024-11-25 ENCOUNTER — APPOINTMENT (OUTPATIENT)
Dept: PAIN MEDICINE | Facility: CLINIC | Age: 79
End: 2024-11-25
Payer: MEDICARE

## 2024-11-27 ENCOUNTER — OFFICE VISIT (OUTPATIENT)
Dept: PAIN MEDICINE | Facility: CLINIC | Age: 79
End: 2024-11-27
Payer: MEDICARE

## 2024-11-27 VITALS
RESPIRATION RATE: 16 BRPM | WEIGHT: 140 LBS | HEART RATE: 72 BPM | HEIGHT: 60 IN | DIASTOLIC BLOOD PRESSURE: 90 MMHG | BODY MASS INDEX: 27.48 KG/M2 | SYSTOLIC BLOOD PRESSURE: 150 MMHG | OXYGEN SATURATION: 96 %

## 2024-11-27 DIAGNOSIS — M54.16 LUMBAR RADICULOPATHY: ICD-10-CM

## 2024-11-27 DIAGNOSIS — M48.062 LUMBAR STENOSIS WITH NEUROGENIC CLAUDICATION: ICD-10-CM

## 2024-11-27 DIAGNOSIS — Z79.899 HISTORY OF ONGOING TREATMENT WITH HIGH-RISK MEDICATION: Primary | ICD-10-CM

## 2024-11-27 PROCEDURE — 1160F RVW MEDS BY RX/DR IN RCRD: CPT | Performed by: PHYSICIAN ASSISTANT

## 2024-11-27 PROCEDURE — 3080F DIAST BP >= 90 MM HG: CPT | Performed by: PHYSICIAN ASSISTANT

## 2024-11-27 PROCEDURE — 1159F MED LIST DOCD IN RCRD: CPT | Performed by: PHYSICIAN ASSISTANT

## 2024-11-27 PROCEDURE — 99214 OFFICE O/P EST MOD 30 MIN: CPT | Performed by: PHYSICIAN ASSISTANT

## 2024-11-27 PROCEDURE — 1036F TOBACCO NON-USER: CPT | Performed by: PHYSICIAN ASSISTANT

## 2024-11-27 PROCEDURE — G2211 COMPLEX E/M VISIT ADD ON: HCPCS | Performed by: PHYSICIAN ASSISTANT

## 2024-11-27 PROCEDURE — 1125F AMNT PAIN NOTED PAIN PRSNT: CPT | Performed by: PHYSICIAN ASSISTANT

## 2024-11-27 PROCEDURE — 3077F SYST BP >= 140 MM HG: CPT | Performed by: PHYSICIAN ASSISTANT

## 2024-11-27 RX ORDER — OXYCODONE AND ACETAMINOPHEN 5; 325 MG/1; MG/1
1 TABLET ORAL EVERY 6 HOURS PRN
Qty: 120 TABLET | Refills: 0 | Status: SHIPPED | OUTPATIENT
Start: 2024-12-27 | End: 2025-01-26

## 2024-11-27 RX ORDER — OXYCODONE AND ACETAMINOPHEN 5; 325 MG/1; MG/1
1 TABLET ORAL EVERY 6 HOURS PRN
Qty: 120 TABLET | Refills: 0 | Status: SHIPPED | OUTPATIENT
Start: 2024-11-27 | End: 2024-12-27

## 2024-11-27 ASSESSMENT — PATIENT HEALTH QUESTIONNAIRE - PHQ9
2. FEELING DOWN, DEPRESSED OR HOPELESS: NOT AT ALL
1. LITTLE INTEREST OR PLEASURE IN DOING THINGS: NOT AT ALL
SUM OF ALL RESPONSES TO PHQ9 QUESTIONS 1 AND 2: 0

## 2024-11-27 ASSESSMENT — ENCOUNTER SYMPTOMS
ARTHRALGIAS: 1
BACK PAIN: 1

## 2024-11-27 ASSESSMENT — PAIN DESCRIPTION - DESCRIPTORS: DESCRIPTORS: ACHING

## 2024-11-27 ASSESSMENT — PAIN SCALES - GENERAL
PAINLEVEL_OUTOF10: 8
PAINLEVEL_OUTOF10: 8

## 2024-11-27 ASSESSMENT — PAIN - FUNCTIONAL ASSESSMENT: PAIN_FUNCTIONAL_ASSESSMENT: 0-10

## 2024-11-27 NOTE — PROGRESS NOTES
Subjective   Patient ID: Marylou Ward is a 79 y.o. female who presents for Back Pain.  Patient is an 79-year-old female with lumbosacral radiculopathy and spinal stenosis presents today for follow-up her son his here today for it helps provide historical information.  Patient was recently in the emergency department for epigastric pain CTs were ordered and it does seem that a EGD will be required.  Patient has not yet been able to complete the MRI that was ordered in June.  Patient continues to state that she gets an 8 out of 10 and states that she can go to a 5 out of 10 with her pain in the a.m.  Son does help provide patient historical information and patient does supplement this.        Review of Systems   Musculoskeletal:  Positive for arthralgias and back pain.   All other systems reviewed and are negative.      Objective   Physical Exam  Vitals reviewed.   Constitutional:       Appearance: Normal appearance.   HENT:      Head: Normocephalic and atraumatic.      Mouth/Throat:      Mouth: Mucous membranes are moist.   Neck:      Vascular: No JVD.   Pulmonary:      Effort: Pulmonary effort is normal. No tachypnea or bradypnea.   Abdominal:      Palpations: Abdomen is soft.   Musculoskeletal:      Lumbar back: Decreased range of motion.   Skin:     General: Skin is warm and dry.   Neurological:      Mental Status: She is alert and oriented to person, place, and time.   Psychiatric:         Mood and Affect: Mood normal.         Behavior: Behavior normal. Behavior is cooperative.       Assessment/Plan   Problem List Items Addressed This Visit    None  Visit Diagnoses         Codes    Lumbar radiculopathy     M54.16    Relevant Medications    oxyCODONE-acetaminophen (Percocet) 5-325 mg tablet    oxyCODONE-acetaminophen (Percocet) 5-325 mg tablet (Start on 12/27/2024)    Lumbar stenosis with neurogenic claudication     M48.062    Relevant Medications    oxyCODONE-acetaminophen (Percocet) 5-325 mg tablet     oxyCODONE-acetaminophen (Percocet) 5-325 mg tablet (Start on 12/27/2024)          I had nice discussion with the patient today our plan will be as follows.      Radiology: [ none at this time ]      Physically:  [ continue modification of activities, healthy lifestyle choice ]      Psychologically:  [ No acute psychological concerns. There are no mental health issues of which I am aware that are contributing to the patient's pain. There are no substance abuse or alcohol abuse issues of which I am aware that are contributing to the patient's pain. ]      Medication: [ I will refill the patient's opioids today for 2 month.  The patient continues to see benefit and improvement in their quality of life and ability to maintain ADLs.  Patient educated about the risks of taking opioids and operating a motor vehicle.  Patient reports no adverse side effects to current medication regimen.  Current regimen does allow patient to maintain ADLs.  Patient reports no new neurologic symptoms, new pain areas, or exacerbation in pain today.  Patient reports they are happy with current treatment care path.    [place]AR[place] was reviewed and was consistent with the history.    Patient has been educated on the risks, benefits, and alternatives of controlled substances as well as the proper way to store these medications.  The patient and I discussed the nature of this medication and its side effects.  We discussed tolerance, physical dependence, psychological dependence, addiction and opioid-induced hyperalgesia.  We discussed the potential need to wean from this medication.  We discussed the availability of programs that can help with this process if necessary.  We discussed safety issues related to opioids including safe storage.  We discussed the fact that the patient should not drive an automobile or operate heavy machinery while taking this medication.  A prescription for naloxone was offered to the patient.  The patient will be  re-evaluated for the need to continue opioid therapy in 60 days.  Pt to submit sample for tox screening.  ]      Duration:  [ 2 months ]      Intervention:  [ none at this time. Patient is stable. I encouraged them to obtain imaging so that we can hopefully provide patient with additional interventional options ]         Denton Worthy PA-C 11/27/24 3:21 PM

## 2024-11-27 NOTE — PROGRESS NOTES
MEDICATION NAME: percocet  STRENGTH: 5/325mg  LAST FILL DATE: 10/29/24  DATE LAST TAKEN: 24  QUANTITY FILLED: 120  QUANTITY REMAININ  COUNT COMPLETED BY: KIMBERLY CHARLES LPN and ARTURO GALVAN RN      UDS LAST COMPLETED: 2024  CONTROLLED SUBSTANCES AGREEMENT LAST SIGNED: 2024  ORT LAST COMPLETED:2024  Modified Osw[employer] disability form filled out annually.

## 2024-12-09 LAB — SCAN RESULT: NORMAL

## 2025-01-16 ENCOUNTER — TELEPHONE (OUTPATIENT)
Dept: PAIN MEDICINE | Facility: CLINIC | Age: 80
End: 2025-01-16
Payer: MEDICARE

## 2025-01-16 DIAGNOSIS — M54.16 LUMBAR RADICULOPATHY: ICD-10-CM

## 2025-01-16 DIAGNOSIS — M48.062 LUMBAR STENOSIS WITH NEUROGENIC CLAUDICATION: ICD-10-CM

## 2025-01-16 RX ORDER — OXYCODONE AND ACETAMINOPHEN 5; 325 MG/1; MG/1
1 TABLET ORAL EVERY 6 HOURS PRN
Qty: 31 TABLET | Refills: 0 | Status: SHIPPED | OUTPATIENT
Start: 2025-01-16 | End: 2025-01-24

## 2025-01-16 NOTE — TELEPHONE ENCOUNTER
Pt nephew called stating when they picked up her last fill of percocet the pharmacy only had 89 tablets.  Pt is looking for a new RX for the remainder 31 tablets.  Please advise.

## 2025-01-27 ENCOUNTER — TELEPHONE (OUTPATIENT)
Dept: PAIN MEDICINE | Facility: CLINIC | Age: 80
End: 2025-01-27
Payer: MEDICARE

## 2025-01-27 ENCOUNTER — APPOINTMENT (OUTPATIENT)
Dept: PAIN MEDICINE | Facility: CLINIC | Age: 80
End: 2025-01-27
Payer: MEDICARE

## 2025-01-27 DIAGNOSIS — M54.16 LUMBAR RADICULOPATHY: ICD-10-CM

## 2025-01-27 DIAGNOSIS — M48.062 LUMBAR STENOSIS WITH NEUROGENIC CLAUDICATION: ICD-10-CM

## 2025-01-27 RX ORDER — OXYCODONE AND ACETAMINOPHEN 5; 325 MG/1; MG/1
1 TABLET ORAL EVERY 6 HOURS PRN
Qty: 60 TABLET | Refills: 0 | Status: SHIPPED | OUTPATIENT
Start: 2025-01-27 | End: 2025-02-11

## 2025-01-27 NOTE — TELEPHONE ENCOUNTER
Pt's relative calls to cancel her appointment for today. States she has a fever and he is going to test her for Covid. Will call for follow up when he knows Covid result or pt feels better.  Pt's Percocet is also due. Please advise.

## 2025-01-27 NOTE — TELEPHONE ENCOUNTER
Called patient and left message to let them know she needs to be seen within 2 weeks and a bridge prescription for her pain med has been sent. Asked her to call as soon as possible.

## 2025-02-05 ENCOUNTER — APPOINTMENT (OUTPATIENT)
Dept: PAIN MEDICINE | Facility: CLINIC | Age: 80
End: 2025-02-05
Payer: MEDICARE

## 2025-02-07 ENCOUNTER — OFFICE VISIT (OUTPATIENT)
Dept: PAIN MEDICINE | Facility: CLINIC | Age: 80
End: 2025-02-07
Payer: MEDICARE

## 2025-02-07 VITALS
HEIGHT: 60 IN | DIASTOLIC BLOOD PRESSURE: 100 MMHG | BODY MASS INDEX: 27.48 KG/M2 | WEIGHT: 140 LBS | SYSTOLIC BLOOD PRESSURE: 160 MMHG | RESPIRATION RATE: 18 BRPM | HEART RATE: 78 BPM | OXYGEN SATURATION: 96 %

## 2025-02-07 DIAGNOSIS — M48.062 LUMBAR STENOSIS WITH NEUROGENIC CLAUDICATION: ICD-10-CM

## 2025-02-07 DIAGNOSIS — M54.16 LUMBAR RADICULOPATHY: ICD-10-CM

## 2025-02-07 PROCEDURE — 1036F TOBACCO NON-USER: CPT | Performed by: PHYSICIAN ASSISTANT

## 2025-02-07 PROCEDURE — 99214 OFFICE O/P EST MOD 30 MIN: CPT | Performed by: PHYSICIAN ASSISTANT

## 2025-02-07 PROCEDURE — 1125F AMNT PAIN NOTED PAIN PRSNT: CPT | Performed by: PHYSICIAN ASSISTANT

## 2025-02-07 PROCEDURE — 3077F SYST BP >= 140 MM HG: CPT | Performed by: PHYSICIAN ASSISTANT

## 2025-02-07 PROCEDURE — 1160F RVW MEDS BY RX/DR IN RCRD: CPT | Performed by: PHYSICIAN ASSISTANT

## 2025-02-07 PROCEDURE — 3080F DIAST BP >= 90 MM HG: CPT | Performed by: PHYSICIAN ASSISTANT

## 2025-02-07 PROCEDURE — 1159F MED LIST DOCD IN RCRD: CPT | Performed by: PHYSICIAN ASSISTANT

## 2025-02-07 PROCEDURE — G2211 COMPLEX E/M VISIT ADD ON: HCPCS | Performed by: PHYSICIAN ASSISTANT

## 2025-02-07 RX ORDER — OXYCODONE AND ACETAMINOPHEN 5; 325 MG/1; MG/1
1 TABLET ORAL EVERY 6 HOURS PRN
Qty: 120 TABLET | Refills: 0 | Status: SHIPPED | OUTPATIENT
Start: 2025-02-11 | End: 2025-03-13

## 2025-02-07 RX ORDER — OXYCODONE AND ACETAMINOPHEN 5; 325 MG/1; MG/1
1 TABLET ORAL EVERY 6 HOURS PRN
Qty: 120 TABLET | Refills: 0 | Status: SHIPPED | OUTPATIENT
Start: 2025-03-13 | End: 2025-04-12

## 2025-02-07 ASSESSMENT — LIFESTYLE VARIABLES
HOW OFTEN DO YOU HAVE SIX OR MORE DRINKS ON ONE OCCASION: NEVER
AUDIT-C TOTAL SCORE: 0
SKIP TO QUESTIONS 9-10: 1
HOW MANY STANDARD DRINKS CONTAINING ALCOHOL DO YOU HAVE ON A TYPICAL DAY: PATIENT DOES NOT DRINK
HOW OFTEN DO YOU HAVE A DRINK CONTAINING ALCOHOL: NEVER

## 2025-02-07 ASSESSMENT — ENCOUNTER SYMPTOMS
BACK PAIN: 1
ABDOMINAL PAIN: 1
ARTHRALGIAS: 1

## 2025-02-07 ASSESSMENT — PAIN DESCRIPTION - DESCRIPTORS: DESCRIPTORS: ACHING

## 2025-02-07 ASSESSMENT — PAIN - FUNCTIONAL ASSESSMENT: PAIN_FUNCTIONAL_ASSESSMENT: 0-10

## 2025-02-07 ASSESSMENT — PAIN SCALES - GENERAL
PAINLEVEL_OUTOF10: 8
PAINLEVEL_OUTOF10: 8

## 2025-02-07 NOTE — PROGRESS NOTES
Subjective   Patient ID: Marylou Ward is a 79 y.o. female who presents for Back Pain.  Is an 79-year-old female with lumbar radiculopathy spinal stenosis neurogenic claudication presents today for follow-up.  Patient did notes that she had recent CT that does did note that there seem to be some vascular calcifications.  Patient has been having some lateral abdominal pain on the left side and seeming to have difficulty with ambulation.  Patient did notes that pain in the lower extremities is rapidly reduced if the offending activity is stopped.She did notes that she has no constipation type movements more towards diarrhea        Review of Systems   Gastrointestinal:  Positive for abdominal pain.   Musculoskeletal:  Positive for arthralgias, back pain and gait problem.   All other systems reviewed and are negative.      Objective   Physical Exam  Vitals reviewed.   Constitutional:       Appearance: Normal appearance.   HENT:      Head: Normocephalic and atraumatic.      Mouth/Throat:      Mouth: Mucous membranes are moist.   Neck:      Vascular: No JVD.   Pulmonary:      Effort: Pulmonary effort is normal. No tachypnea or bradypnea.   Abdominal:      Palpations: Abdomen is soft.          Comments: Mild tenderness soft to palpation.    Musculoskeletal:      Lumbar back: Tenderness present. No spasms. Decreased range of motion. Negative right straight leg raise test and negative left straight leg raise test.      Comments: Pt is wheel chair. BLE sensation grossly intact.    Skin:     General: Skin is warm and dry.   Neurological:      Mental Status: She is alert and oriented to person, place, and time.   Psychiatric:         Mood and Affect: Mood normal.         Behavior: Behavior normal. Behavior is cooperative.         Assessment/Plan   Problem List Items Addressed This Visit    None  Visit Diagnoses         Codes    Lumbar radiculopathy     M54.16    Lumbar stenosis with neurogenic claudication     M48.062          I  had nice discussion with the patient today our plan will be as follows.      Radiology: [ none at this time ]      Physically:  [ continue modification of activities, healthy lifestyle choice ]      Psychologically:  [ No acute psychological concerns. There are no mental health issues of which I am aware that are contributing to the patient's pain. There are no substance abuse or alcohol abuse issues of which I am aware that are contributing to the patient's pain. ]      Medication: [ I will refill the patient's opioids today for 2 month.  The patient continues to see benefit and improvement in their quality of life and ability to maintain ADLs.  Patient educated about the risks of taking opioids and operating a motor vehicle.  Patient reports no adverse side effects to current medication regimen.  Current regimen does allow patient to maintain ADLs.  Patient reports no new neurologic symptoms, new pain areas, or exacerbation in pain today.  Patient reports they are happy with current treatment care path.    OARRS was reviewed and was consistent with the history.    Patient has been educated on the risks, benefits, and alternatives of controlled substances as well as the proper way to store these medications.  The patient and I discussed the nature of this medication and its side effects.  We discussed tolerance, physical dependence, psychological dependence, addiction and opioid-induced hyperalgesia.  We discussed the potential need to wean from this medication.  We discussed the availability of programs that can help with this process if necessary.  We discussed safety issues related to opioids including safe storage.  We discussed the fact that the patient should not drive an automobile or operate heavy machinery while taking this medication.  A prescription for naloxone was offered to the patient.  The patient will be re-evaluated for the need to continue opioid therapy in 60 days. ]      Duration:  [ 2 months  ]      Intervention:  [Would be appropriate for evaluation for potential distal extremity blockages patient will reach out to her primary care which is the prescriber of her Eliquis for good vascular recommendation.  ]        Please note that this report has been produced using speech recognition software. It may contain errors related to grammar, punctuation or spelling. Electronically signed, but not reviewed.          Denton Worthy PA-C 02/07/25 1:17 PM

## 2025-02-07 NOTE — PROGRESS NOTES
MEDICATION NAME: Percocet   STRENGTH: 5-325  LAST FILL DATE: 25  DATE LAST TAKEN: 25  QUANTITY FILLED: 60  QUANTITY REMAININ  COUNT COMPLETED BY: FATMATA MARIE and LEN ZULUAGA      UDS LAST COMPLETED:   CONTROLLED SUBSTANCES AGREEMENT LAST SIGNED:   ORT LAST COMPLETED:  Modified Osw[employer] disability form filled out annually.

## 2025-03-13 ENCOUNTER — APPOINTMENT (OUTPATIENT)
Dept: CARDIOLOGY | Facility: HOSPITAL | Age: 80
End: 2025-03-13
Payer: MEDICARE

## 2025-03-13 ENCOUNTER — HOSPITAL ENCOUNTER (EMERGENCY)
Facility: HOSPITAL | Age: 80
Discharge: HOME | End: 2025-03-13
Payer: MEDICARE

## 2025-03-13 ENCOUNTER — APPOINTMENT (OUTPATIENT)
Dept: RADIOLOGY | Facility: HOSPITAL | Age: 80
End: 2025-03-13
Payer: MEDICARE

## 2025-03-13 VITALS
RESPIRATION RATE: 15 BRPM | HEART RATE: 77 BPM | OXYGEN SATURATION: 95 % | SYSTOLIC BLOOD PRESSURE: 188 MMHG | DIASTOLIC BLOOD PRESSURE: 88 MMHG | TEMPERATURE: 98.1 F | WEIGHT: 140 LBS | HEIGHT: 62 IN | BODY MASS INDEX: 25.76 KG/M2

## 2025-03-13 DIAGNOSIS — R10.84 ABDOMINAL PAIN, GENERALIZED: Primary | ICD-10-CM

## 2025-03-13 DIAGNOSIS — K52.9 COLITIS: ICD-10-CM

## 2025-03-13 LAB
ALBUMIN SERPL BCP-MCNC: 4.3 G/DL (ref 3.4–5)
ALP SERPL-CCNC: 72 U/L (ref 33–136)
ALT SERPL W P-5'-P-CCNC: 5 U/L (ref 7–45)
ANION GAP SERPL CALCULATED.3IONS-SCNC: 14 MMOL/L (ref 10–20)
AST SERPL W P-5'-P-CCNC: 10 U/L (ref 9–39)
ATRIAL RATE: 71 BPM
BASOPHILS # BLD AUTO: 0.07 X10*3/UL (ref 0–0.1)
BASOPHILS NFR BLD AUTO: 0.8 %
BILIRUB SERPL-MCNC: 0.3 MG/DL (ref 0–1.2)
BUN SERPL-MCNC: 17 MG/DL (ref 6–23)
CALCIUM SERPL-MCNC: 9.9 MG/DL (ref 8.6–10.3)
CARDIAC TROPONIN I PNL SERPL HS: 10 NG/L (ref 0–13)
CARDIAC TROPONIN I PNL SERPL HS: 8 NG/L (ref 0–13)
CHLORIDE SERPL-SCNC: 100 MMOL/L (ref 98–107)
CO2 SERPL-SCNC: 24 MMOL/L (ref 21–32)
CREAT SERPL-MCNC: 1.32 MG/DL (ref 0.5–1.05)
EGFRCR SERPLBLD CKD-EPI 2021: 41 ML/MIN/1.73M*2
EOSINOPHIL # BLD AUTO: 0.3 X10*3/UL (ref 0–0.4)
EOSINOPHIL NFR BLD AUTO: 3.3 %
ERYTHROCYTE [DISTWIDTH] IN BLOOD BY AUTOMATED COUNT: 15.1 % (ref 11.5–14.5)
GLUCOSE SERPL-MCNC: 102 MG/DL (ref 74–99)
HCT VFR BLD AUTO: 32.9 % (ref 36–46)
HGB BLD-MCNC: 10.3 G/DL (ref 12–16)
IMM GRANULOCYTES # BLD AUTO: 0.03 X10*3/UL (ref 0–0.5)
IMM GRANULOCYTES NFR BLD AUTO: 0.3 % (ref 0–0.9)
LIPASE SERPL-CCNC: 17 U/L (ref 9–82)
LYMPHOCYTES # BLD AUTO: 2.08 X10*3/UL (ref 0.8–3)
LYMPHOCYTES NFR BLD AUTO: 22.8 %
MCH RBC QN AUTO: 26.6 PG (ref 26–34)
MCHC RBC AUTO-ENTMCNC: 31.3 G/DL (ref 32–36)
MCV RBC AUTO: 85 FL (ref 80–100)
MONOCYTES # BLD AUTO: 0.74 X10*3/UL (ref 0.05–0.8)
MONOCYTES NFR BLD AUTO: 8.1 %
NEUTROPHILS # BLD AUTO: 5.92 X10*3/UL (ref 1.6–5.5)
NEUTROPHILS NFR BLD AUTO: 64.7 %
NRBC BLD-RTO: 0 /100 WBCS (ref 0–0)
P AXIS: 48 DEGREES
P OFFSET: 174 MS
P ONSET: 121 MS
PLATELET # BLD AUTO: 379 X10*3/UL (ref 150–450)
POTASSIUM SERPL-SCNC: 4.3 MMOL/L (ref 3.5–5.3)
PR INTERVAL: 202 MS
PROT SERPL-MCNC: 7.3 G/DL (ref 6.4–8.2)
Q ONSET: 222 MS
QRS COUNT: 12 BEATS
QRS DURATION: 88 MS
QT INTERVAL: 422 MS
QTC CALCULATION(BAZETT): 458 MS
QTC FREDERICIA: 446 MS
R AXIS: -8 DEGREES
RBC # BLD AUTO: 3.87 X10*6/UL (ref 4–5.2)
SODIUM SERPL-SCNC: 134 MMOL/L (ref 136–145)
T AXIS: 90 DEGREES
T OFFSET: 433 MS
VENTRICULAR RATE: 71 BPM
WBC # BLD AUTO: 9.1 X10*3/UL (ref 4.4–11.3)

## 2025-03-13 PROCEDURE — 93005 ELECTROCARDIOGRAM TRACING: CPT

## 2025-03-13 PROCEDURE — 74177 CT ABD & PELVIS W/CONTRAST: CPT

## 2025-03-13 PROCEDURE — 2550000001 HC RX 255 CONTRASTS: Performed by: EMERGENCY MEDICINE

## 2025-03-13 PROCEDURE — 83690 ASSAY OF LIPASE: CPT | Performed by: EMERGENCY MEDICINE

## 2025-03-13 PROCEDURE — 99285 EMERGENCY DEPT VISIT HI MDM: CPT | Mod: 25

## 2025-03-13 PROCEDURE — 85025 COMPLETE CBC W/AUTO DIFF WBC: CPT | Performed by: EMERGENCY MEDICINE

## 2025-03-13 PROCEDURE — 36415 COLL VENOUS BLD VENIPUNCTURE: CPT | Performed by: EMERGENCY MEDICINE

## 2025-03-13 PROCEDURE — 80053 COMPREHEN METABOLIC PANEL: CPT | Performed by: EMERGENCY MEDICINE

## 2025-03-13 PROCEDURE — 74177 CT ABD & PELVIS W/CONTRAST: CPT | Performed by: RADIOLOGY

## 2025-03-13 PROCEDURE — 96375 TX/PRO/DX INJ NEW DRUG ADDON: CPT

## 2025-03-13 PROCEDURE — 84484 ASSAY OF TROPONIN QUANT: CPT | Performed by: EMERGENCY MEDICINE

## 2025-03-13 PROCEDURE — 2500000002 HC RX 250 W HCPCS SELF ADMINISTERED DRUGS (ALT 637 FOR MEDICARE OP, ALT 636 FOR OP/ED): Performed by: EMERGENCY MEDICINE

## 2025-03-13 PROCEDURE — 96374 THER/PROPH/DIAG INJ IV PUSH: CPT | Mod: 59

## 2025-03-13 PROCEDURE — 94640 AIRWAY INHALATION TREATMENT: CPT

## 2025-03-13 PROCEDURE — 2500000004 HC RX 250 GENERAL PHARMACY W/ HCPCS (ALT 636 FOR OP/ED): Mod: JZ | Performed by: EMERGENCY MEDICINE

## 2025-03-13 RX ORDER — DIPHENHYDRAMINE HYDROCHLORIDE 50 MG/ML
25 INJECTION, SOLUTION INTRAMUSCULAR; INTRAVENOUS ONCE
Status: COMPLETED | OUTPATIENT
Start: 2025-03-13 | End: 2025-03-13

## 2025-03-13 RX ORDER — ONDANSETRON 4 MG/1
4 TABLET, ORALLY DISINTEGRATING ORAL EVERY 8 HOURS PRN
Qty: 12 TABLET | Refills: 0 | Status: SHIPPED | OUTPATIENT
Start: 2025-03-13 | End: 2025-03-20

## 2025-03-13 RX ORDER — IPRATROPIUM BROMIDE AND ALBUTEROL SULFATE 2.5; .5 MG/3ML; MG/3ML
3 SOLUTION RESPIRATORY (INHALATION) ONCE
Status: COMPLETED | OUTPATIENT
Start: 2025-03-13 | End: 2025-03-13

## 2025-03-13 RX ORDER — ONDANSETRON HYDROCHLORIDE 2 MG/ML
4 INJECTION, SOLUTION INTRAVENOUS ONCE
Status: COMPLETED | OUTPATIENT
Start: 2025-03-13 | End: 2025-03-13

## 2025-03-13 RX ORDER — TRAMADOL HYDROCHLORIDE 50 MG/1
50 TABLET ORAL EVERY 12 HOURS PRN
Qty: 6 TABLET | Refills: 0 | Status: SHIPPED | OUTPATIENT
Start: 2025-03-13 | End: 2025-03-16

## 2025-03-13 RX ORDER — FENTANYL CITRATE 50 UG/ML
50 INJECTION, SOLUTION INTRAMUSCULAR; INTRAVENOUS ONCE
Status: COMPLETED | OUTPATIENT
Start: 2025-03-13 | End: 2025-03-13

## 2025-03-13 RX ORDER — DICYCLOMINE HYDROCHLORIDE 20 MG/1
20 TABLET ORAL 2 TIMES DAILY
Qty: 20 TABLET | Refills: 0 | Status: SHIPPED | OUTPATIENT
Start: 2025-03-13 | End: 2025-03-23

## 2025-03-13 RX ADMIN — ONDANSETRON 4 MG: 2 INJECTION, SOLUTION INTRAMUSCULAR; INTRAVENOUS at 04:44

## 2025-03-13 RX ADMIN — FENTANYL CITRATE 50 MCG: 0.05 INJECTION, SOLUTION INTRAMUSCULAR; INTRAVENOUS at 04:44

## 2025-03-13 RX ADMIN — METHYLPREDNISOLONE SODIUM SUCCINATE 125 MG: 125 INJECTION, POWDER, FOR SOLUTION INTRAMUSCULAR; INTRAVENOUS at 05:29

## 2025-03-13 RX ADMIN — IOHEXOL 75 ML: 350 INJECTION, SOLUTION INTRAVENOUS at 05:22

## 2025-03-13 RX ADMIN — DIPHENHYDRAMINE HYDROCHLORIDE 25 MG: 50 INJECTION, SOLUTION INTRAMUSCULAR; INTRAVENOUS at 05:28

## 2025-03-13 RX ADMIN — IPRATROPIUM BROMIDE AND ALBUTEROL SULFATE 3 ML: .5; 3 SOLUTION RESPIRATORY (INHALATION) at 05:37

## 2025-03-13 ASSESSMENT — PAIN SCALES - GENERAL
PAINLEVEL_OUTOF10: 9
PAINLEVEL_OUTOF10: 8

## 2025-03-13 ASSESSMENT — PAIN DESCRIPTION - PAIN TYPE: TYPE: ACUTE PAIN

## 2025-03-13 ASSESSMENT — PAIN - FUNCTIONAL ASSESSMENT: PAIN_FUNCTIONAL_ASSESSMENT: 0-10

## 2025-03-13 ASSESSMENT — PAIN DESCRIPTION - LOCATION
LOCATION: ABDOMEN
LOCATION: ABDOMEN

## 2025-03-13 ASSESSMENT — PAIN DESCRIPTION - ORIENTATION: ORIENTATION: MID

## 2025-03-13 NOTE — ED PROVIDER NOTES
HPI   Chief Complaint   Patient presents with    Abdominal Pain     Pt c/o abd pain x 2 days       HPI  79-year-old female presents complaint abdominal pain.  She has had abdominal pain but is worse tonight.  Mid abdomen.  Been progressing last 2 days.  Does not endorse any vomiting or diarrhea.  Does not complain of chest pain.  Not short of breath.  Nothing seems to make it better or worse.  No other complaints.      Patient History   Past Medical History:   Diagnosis Date    Arthritis     Diabetes mellitus (Multi)     Hypertension      History reviewed. No pertinent surgical history.  No family history on file.  Social History     Tobacco Use    Smoking status: Never     Passive exposure: Never    Smokeless tobacco: Never   Vaping Use    Vaping status: Never Used   Substance Use Topics    Alcohol use: Not Currently    Drug use: Never       Physical Exam   ED Triage Vitals [03/13/25 0408]   Temperature Heart Rate Respirations BP   37.1 °C (98.8 °F) 73 15 (!) 177/157      Pulse Ox Temp Source Heart Rate Source Patient Position   100 % Temporal Monitor --      BP Location FiO2 (%)     -- --       Physical Exam  General:  Awake, alert, no acute distress.  Head: Normocephalic, Atraumatic  Neck: Supple, trachea midline, no stridor  Skin: Warm and dry, no rashes   Lungs: Clear to auscultation bilaterally no acute respiratory distress, speaking in full sentences without difficulty  CV: Regular Rate Rhythm with no obvious murmurs gallops rubs noted, no jugular venous distention, no pedal edema   Abdomen: Soft, mild mid tenderness palpation, nondistended, positive bowel sounds, no peritoneal signs  Neuro:  No gross focal neurologic deficits, NIH is 0  Musculoskeletal:  Full range of motion in all 4 extremities  Psychiatric:  Alert oriented x 3, Good insight into condition.    ED Course & MDM   Diagnoses as of 03/13/25 0604   Abdominal pain, generalized   Colitis                 No data recorded     Rashaad Coma Scale  Score: 15 (03/13/25 0409 : Pham Almaraz RN)                           Medical Decision Making  My EKG interpretation at 0 423:  Sinus rhythm 71 bpm normal axis IL interval 202 QTc 458 no ectopy or acute ischemic changes noted    Patient with IV fluids, Zofran, fentanyl her pain is better controlled.  Patient went down for CT abdomen pelvis with contrast and she felt like she is having a hard time breathing.  She was administered Benadryl and Solu-Medrol and reevaluated her she is in no distress.  She does have a history of COPD DuoNeb was ordered.    Patient laboratory studies unremarkable.  CT abdomen pelvis shows signs consistent with colitis.  She has an adnexal lesion which has increased in size and recommend outpatient evaluation.  Discussed the findings with the patient and son at bedside.  She was prescribed tramadol, Bentyl, Zofran for home.  Advised rest, fluids, follow-up with her doctor.  Return if condition should worsen.  Stable for discharge.    Procedure  Procedures     Suhas June DO  03/13/25 0537       Suhas June DO  03/13/25 0605

## 2025-03-13 NOTE — DISCHARGE INSTRUCTIONS
Thank you for choosing [facility] Emergency Department. It was my pleasure to be involved in your care today.         As of today's visit, based on reasonable likelihood, that it is safe for you to be discharged back to your residence to follow-up as an outpatient for ongoing management of your medical problem. You should follow-up with any referrals / primary provider as soon as possible. The contacts (number, addresses) are listed below.         Important:  Even though we think it is safe for you to go home, there is always a small chance that we are missing something that could require hospitalization.  Therefore it is very important that if you get worse or develops any new symptoms that you return here as soon as possible to be re-evaluated.  This includes return of symptoms that have resolved such as fainting, chest pain, or symptoms that could be warning signs for stroke important:  Even though we think it is safe for you to go home, there is always a small chance that we are missing something that could require hospitalization.  Therefore it is very important that if you get worse or develops any new symptoms that you return here as soon as possible to be re-evaluated.  This includes return of symptoms that have resolved such as fainting, chest pain, or symptoms that could be warning signs for stroke         Make sure your pharmacy and primary doctor is aware of any new medications prescribed today.          It is your responsibility to contact as soon as possible, and follow through with, any referrals you were given today. We do recommend you inform them you are a [facility] follow-up patient, as often they can better accommodate your need to be seen, provided their schedules allow. We will, and have, made every effort to ensure you have access to adequate follow-up specialists available.          All problems may not be able to be fixed in one ER visit. This is why timely ongoing care is important, and this  is a responsibility you share in. Further, you are free to follow up with any provider you choose, and this is not limited to our suggestion.          If cultures were obtained today, you will be contacted should anything result that would require further treatment. Please contact the ED at the number provided with questions.          Having trouble affording medications? Try [email or web address]! (This is not a hospital endorsed website, merely a recommendation based on my own personal experiences with [employer])

## 2025-04-09 ENCOUNTER — APPOINTMENT (OUTPATIENT)
Dept: CARDIOLOGY | Facility: HOSPITAL | Age: 80
End: 2025-04-09
Payer: MEDICARE

## 2025-04-09 ENCOUNTER — HOSPITAL ENCOUNTER (EMERGENCY)
Facility: HOSPITAL | Age: 80
Discharge: HOME | End: 2025-04-09
Attending: STUDENT IN AN ORGANIZED HEALTH CARE EDUCATION/TRAINING PROGRAM
Payer: MEDICARE

## 2025-04-09 ENCOUNTER — OFFICE VISIT (OUTPATIENT)
Dept: PAIN MEDICINE | Facility: CLINIC | Age: 80
End: 2025-04-09
Payer: MEDICARE

## 2025-04-09 VITALS
DIASTOLIC BLOOD PRESSURE: 86 MMHG | SYSTOLIC BLOOD PRESSURE: 209 MMHG | HEART RATE: 74 BPM | WEIGHT: 140 LBS | RESPIRATION RATE: 18 BRPM | HEIGHT: 61 IN | BODY MASS INDEX: 26.43 KG/M2 | OXYGEN SATURATION: 100 % | TEMPERATURE: 97.7 F

## 2025-04-09 VITALS
HEART RATE: 79 BPM | RESPIRATION RATE: 18 BRPM | HEIGHT: 62 IN | DIASTOLIC BLOOD PRESSURE: 90 MMHG | SYSTOLIC BLOOD PRESSURE: 200 MMHG | WEIGHT: 140 LBS | BODY MASS INDEX: 25.76 KG/M2 | OXYGEN SATURATION: 99 %

## 2025-04-09 DIAGNOSIS — M48.062 LUMBAR STENOSIS WITH NEUROGENIC CLAUDICATION: ICD-10-CM

## 2025-04-09 DIAGNOSIS — M54.16 LUMBAR RADICULOPATHY: ICD-10-CM

## 2025-04-09 DIAGNOSIS — I10 ASYMPTOMATIC HYPERTENSION: Primary | ICD-10-CM

## 2025-04-09 LAB
ALBUMIN SERPL BCP-MCNC: 4.7 G/DL (ref 3.4–5)
ALP SERPL-CCNC: 81 U/L (ref 33–136)
ALT SERPL W P-5'-P-CCNC: 5 U/L (ref 7–45)
ANION GAP SERPL CALCULATED.3IONS-SCNC: 15 MMOL/L (ref 10–20)
AST SERPL W P-5'-P-CCNC: 13 U/L (ref 9–39)
BASOPHILS # BLD AUTO: 0.08 X10*3/UL (ref 0–0.1)
BASOPHILS NFR BLD AUTO: 0.9 %
BILIRUB SERPL-MCNC: 0.3 MG/DL (ref 0–1.2)
BUN SERPL-MCNC: 18 MG/DL (ref 6–23)
CALCIUM SERPL-MCNC: 9.8 MG/DL (ref 8.6–10.3)
CARDIAC TROPONIN I PNL SERPL HS: 10 NG/L (ref 0–13)
CARDIAC TROPONIN I PNL SERPL HS: 9 NG/L (ref 0–13)
CHLORIDE SERPL-SCNC: 100 MMOL/L (ref 98–107)
CO2 SERPL-SCNC: 25 MMOL/L (ref 21–32)
CREAT SERPL-MCNC: 1.25 MG/DL (ref 0.5–1.05)
EGFRCR SERPLBLD CKD-EPI 2021: 43 ML/MIN/1.73M*2
EOSINOPHIL # BLD AUTO: 0.17 X10*3/UL (ref 0–0.4)
EOSINOPHIL NFR BLD AUTO: 1.8 %
ERYTHROCYTE [DISTWIDTH] IN BLOOD BY AUTOMATED COUNT: 15.6 % (ref 11.5–14.5)
GLUCOSE SERPL-MCNC: 94 MG/DL (ref 74–99)
HCT VFR BLD AUTO: 36.3 % (ref 36–46)
HGB BLD-MCNC: 11.5 G/DL (ref 12–16)
IMM GRANULOCYTES # BLD AUTO: 0.04 X10*3/UL (ref 0–0.5)
IMM GRANULOCYTES NFR BLD AUTO: 0.4 % (ref 0–0.9)
LYMPHOCYTES # BLD AUTO: 1.91 X10*3/UL (ref 0.8–3)
LYMPHOCYTES NFR BLD AUTO: 20.4 %
MCH RBC QN AUTO: 27 PG (ref 26–34)
MCHC RBC AUTO-ENTMCNC: 31.7 G/DL (ref 32–36)
MCV RBC AUTO: 85 FL (ref 80–100)
MONOCYTES # BLD AUTO: 0.8 X10*3/UL (ref 0.05–0.8)
MONOCYTES NFR BLD AUTO: 8.5 %
NEUTROPHILS # BLD AUTO: 6.37 X10*3/UL (ref 1.6–5.5)
NEUTROPHILS NFR BLD AUTO: 68 %
NRBC BLD-RTO: 0 /100 WBCS (ref 0–0)
PLATELET # BLD AUTO: 419 X10*3/UL (ref 150–450)
POTASSIUM SERPL-SCNC: 4.8 MMOL/L (ref 3.5–5.3)
PROT SERPL-MCNC: 8.1 G/DL (ref 6.4–8.2)
RBC # BLD AUTO: 4.26 X10*6/UL (ref 4–5.2)
SODIUM SERPL-SCNC: 135 MMOL/L (ref 136–145)
WBC # BLD AUTO: 9.4 X10*3/UL (ref 4.4–11.3)

## 2025-04-09 PROCEDURE — 3077F SYST BP >= 140 MM HG: CPT | Performed by: PHYSICIAN ASSISTANT

## 2025-04-09 PROCEDURE — 3080F DIAST BP >= 90 MM HG: CPT | Performed by: PHYSICIAN ASSISTANT

## 2025-04-09 PROCEDURE — 99284 EMERGENCY DEPT VISIT MOD MDM: CPT | Mod: 25,27 | Performed by: STUDENT IN AN ORGANIZED HEALTH CARE EDUCATION/TRAINING PROGRAM

## 2025-04-09 PROCEDURE — 2500000001 HC RX 250 WO HCPCS SELF ADMINISTERED DRUGS (ALT 637 FOR MEDICARE OP): Performed by: STUDENT IN AN ORGANIZED HEALTH CARE EDUCATION/TRAINING PROGRAM

## 2025-04-09 PROCEDURE — 93005 ELECTROCARDIOGRAM TRACING: CPT

## 2025-04-09 PROCEDURE — 36415 COLL VENOUS BLD VENIPUNCTURE: CPT | Performed by: STUDENT IN AN ORGANIZED HEALTH CARE EDUCATION/TRAINING PROGRAM

## 2025-04-09 PROCEDURE — 99214 OFFICE O/P EST MOD 30 MIN: CPT | Performed by: PHYSICIAN ASSISTANT

## 2025-04-09 PROCEDURE — 1036F TOBACCO NON-USER: CPT | Performed by: PHYSICIAN ASSISTANT

## 2025-04-09 PROCEDURE — 84484 ASSAY OF TROPONIN QUANT: CPT | Performed by: STUDENT IN AN ORGANIZED HEALTH CARE EDUCATION/TRAINING PROGRAM

## 2025-04-09 PROCEDURE — 2500000001 HC RX 250 WO HCPCS SELF ADMINISTERED DRUGS (ALT 637 FOR MEDICARE OP)

## 2025-04-09 PROCEDURE — 1160F RVW MEDS BY RX/DR IN RCRD: CPT | Performed by: PHYSICIAN ASSISTANT

## 2025-04-09 PROCEDURE — 1159F MED LIST DOCD IN RCRD: CPT | Performed by: PHYSICIAN ASSISTANT

## 2025-04-09 PROCEDURE — 80053 COMPREHEN METABOLIC PANEL: CPT | Performed by: STUDENT IN AN ORGANIZED HEALTH CARE EDUCATION/TRAINING PROGRAM

## 2025-04-09 PROCEDURE — 1125F AMNT PAIN NOTED PAIN PRSNT: CPT | Performed by: PHYSICIAN ASSISTANT

## 2025-04-09 PROCEDURE — 85025 COMPLETE CBC W/AUTO DIFF WBC: CPT | Performed by: STUDENT IN AN ORGANIZED HEALTH CARE EDUCATION/TRAINING PROGRAM

## 2025-04-09 PROCEDURE — G2211 COMPLEX E/M VISIT ADD ON: HCPCS | Performed by: PHYSICIAN ASSISTANT

## 2025-04-09 RX ORDER — OXYCODONE AND ACETAMINOPHEN 5; 325 MG/1; MG/1
1 TABLET ORAL ONCE
Status: COMPLETED | OUTPATIENT
Start: 2025-04-09 | End: 2025-04-09

## 2025-04-09 RX ORDER — HYDROXYZINE HYDROCHLORIDE 25 MG/1
25 TABLET, FILM COATED ORAL ONCE
Status: COMPLETED | OUTPATIENT
Start: 2025-04-09 | End: 2025-04-09

## 2025-04-09 RX ORDER — METOPROLOL TARTRATE 50 MG/1
50 TABLET ORAL ONCE
Status: COMPLETED | OUTPATIENT
Start: 2025-04-09 | End: 2025-04-09

## 2025-04-09 RX ORDER — OXYCODONE AND ACETAMINOPHEN 5; 325 MG/1; MG/1
1 TABLET ORAL EVERY 6 HOURS PRN
Qty: 120 TABLET | Refills: 0 | Status: SHIPPED | OUTPATIENT
Start: 2025-04-12 | End: 2025-05-12

## 2025-04-09 RX ORDER — OXYCODONE AND ACETAMINOPHEN 5; 325 MG/1; MG/1
1 TABLET ORAL EVERY 6 HOURS PRN
Qty: 120 TABLET | Refills: 0 | Status: SHIPPED | OUTPATIENT
Start: 2025-05-12 | End: 2025-06-11

## 2025-04-09 RX ADMIN — OXYCODONE HYDROCHLORIDE AND ACETAMINOPHEN 1 TABLET: 5; 325 TABLET ORAL at 18:21

## 2025-04-09 RX ADMIN — METOPROLOL TARTRATE 50 MG: 50 TABLET, FILM COATED ORAL at 16:51

## 2025-04-09 RX ADMIN — HYDROXYZINE HYDROCHLORIDE 25 MG: 25 TABLET, FILM COATED ORAL at 18:21

## 2025-04-09 ASSESSMENT — COLUMBIA-SUICIDE SEVERITY RATING SCALE - C-SSRS
1. IN THE PAST MONTH, HAVE YOU WISHED YOU WERE DEAD OR WISHED YOU COULD GO TO SLEEP AND NOT WAKE UP?: NO
6. HAVE YOU EVER DONE ANYTHING, STARTED TO DO ANYTHING, OR PREPARED TO DO ANYTHING TO END YOUR LIFE?: NO
2. HAVE YOU ACTUALLY HAD ANY THOUGHTS OF KILLING YOURSELF?: NO

## 2025-04-09 ASSESSMENT — PATIENT HEALTH QUESTIONNAIRE - PHQ9
2. FEELING DOWN, DEPRESSED OR HOPELESS: NOT AT ALL
1. LITTLE INTEREST OR PLEASURE IN DOING THINGS: NOT AT ALL
SUM OF ALL RESPONSES TO PHQ9 QUESTIONS 1 & 2: 0

## 2025-04-09 ASSESSMENT — LIFESTYLE VARIABLES
HAVE PEOPLE ANNOYED YOU BY CRITICIZING YOUR DRINKING: NO
HOW MANY STANDARD DRINKS CONTAINING ALCOHOL DO YOU HAVE ON A TYPICAL DAY: PATIENT DOES NOT DRINK
SKIP TO QUESTIONS 9-10: 1
AUDIT-C TOTAL SCORE: 0
EVER HAD A DRINK FIRST THING IN THE MORNING TO STEADY YOUR NERVES TO GET RID OF A HANGOVER: NO
HAVE YOU EVER FELT YOU SHOULD CUT DOWN ON YOUR DRINKING: NO
HOW OFTEN DO YOU HAVE SIX OR MORE DRINKS ON ONE OCCASION: NEVER
EVER FELT BAD OR GUILTY ABOUT YOUR DRINKING: NO
TOTAL SCORE: 0
HOW OFTEN DO YOU HAVE A DRINK CONTAINING ALCOHOL: NEVER

## 2025-04-09 ASSESSMENT — ENCOUNTER SYMPTOMS
ABDOMINAL PAIN: 1
ARTHRALGIAS: 1
BACK PAIN: 1

## 2025-04-09 ASSESSMENT — PAIN DESCRIPTION - PAIN TYPE: TYPE: ACUTE PAIN

## 2025-04-09 ASSESSMENT — PAIN - FUNCTIONAL ASSESSMENT
PAIN_FUNCTIONAL_ASSESSMENT: 0-10
PAIN_FUNCTIONAL_ASSESSMENT: 0-10

## 2025-04-09 ASSESSMENT — PAIN DESCRIPTION - LOCATION: LOCATION: GENERALIZED

## 2025-04-09 ASSESSMENT — PAIN SCALES - GENERAL
PAINLEVEL_OUTOF10: 5 - MODERATE PAIN
PAINLEVEL_OUTOF10: 8
PAINLEVEL_OUTOF10: 8

## 2025-04-09 ASSESSMENT — PAIN DESCRIPTION - DESCRIPTORS: DESCRIPTORS: ACHING

## 2025-04-09 NOTE — ED PROVIDER NOTES
HPI   Chief Complaint   Patient presents with    Hypertension       Patient is an 79-year-old female presents emergency department for evaluation of elevated blood pressure.  Patient has longstanding history of hypertension and is on medication for it.  She reports that she took her medication this morning.  She states that she follows with pain management for low back pain and was at an appointment today when her blood pressure was noted to be 200s over 90 and they became concerned and sent her here for further evaluation.  She has no symptoms at this time.  She denies any chest pain, shortness of breath, lightheadedness, dizziness, vision changes, numbness, tingling.  She denies any recent illnesses.  She states he has a history of COPD and diabetes which she takes medication for.  She states that she took her blood pressure medication this morning and is due for her evening dose later tonight.  She notes that she is a history of heart attack in the past but denies any history of stents or bypass.  She states that she is not really followed with cardiologist.      History provided by:  Patient   used: No            Patient History   Past Medical History:   Diagnosis Date    Arthritis     Diabetes mellitus (Multi)     Hypertension      No past surgical history on file.  No family history on file.  Social History     Tobacco Use    Smoking status: Never     Passive exposure: Never    Smokeless tobacco: Never   Vaping Use    Vaping status: Never Used   Substance Use Topics    Alcohol use: Not Currently    Drug use: Never       Physical Exam   ED Triage Vitals [04/09/25 1558]   Temperature Heart Rate Respirations BP   36.5 °C (97.7 °F) 75 18 (!) 196/106      Pulse Ox Temp src Heart Rate Source Patient Position   98 % -- Monitor Sitting      BP Location FiO2 (%)     Left arm --       Physical Exam  Constitutional:       Appearance: Normal appearance.   Cardiovascular:      Rate and Rhythm: Normal  rate and regular rhythm.   Pulmonary:      Effort: Pulmonary effort is normal.      Breath sounds: Normal breath sounds.   Abdominal:      General: Abdomen is flat.      Palpations: Abdomen is soft.      Tenderness: There is no abdominal tenderness.   Musculoskeletal:         General: Normal range of motion.   Skin:     General: Skin is warm and dry.   Neurological:      General: No focal deficit present.      Mental Status: She is alert and oriented to person, place, and time.      Cranial Nerves: No cranial nerve deficit.      Sensory: No sensory deficit.      Motor: No weakness.      Coordination: Coordination normal.      Gait: Gait normal.           ED Course & MDM   ED Course as of 04/10/25 1532   Wed Apr 09, 2025   1607 Attempt made to call the pain management office.  Informed that the office closes at 1600 and they have no way of contacting the patient's treatment team after this. They are unable to call the office to see if someone is still present to give report.  I did ask to speak to the referring PA but I was informed that they do not have his contact information.  Unable to get report at this time. [TL]   1608 EKG performed at 16: 03 and independently reviewed by provider: Reveals NSR with a rate of 72 bpm, normal axis, normal intervals, no ST changes, T wave inversions noted in lead III and biphasic T waves noted in lead aVF, biphasic T waves noted in lead V4, V5, V6, no ectopy. No STEMI. Changes in T waves noted compared to prior from March 2025 and October 2024. [TL]   1614 Chart review indicates patient has been hypertensive on almost every healthcare encounter in EMR going back to 11/2024. [TL]   1637 Patient presenting for asymptomatic elevated blood pressure.  No chest pain short of breath.  No headache or blurry vision.  She has not taken her afternoon medication which is the Toprol 50 mg.  Will give at this time and reassess blood pressure.  I do not suspect hypertensive emergency.  Will  obtain troponin given T wave abnormalities.  However given asymptomatic nature I have a low suspicion for acute ACS.  She is no sign of heart failure.  She has no headache or blurry vision.  Lungs are clear with no pulmonary edema, rales, JVD.  She was postop an appointment with PCP but missed this appointment last week.  Should she be able be discharged home with normal laboratory studies I educated the patient and her son to start utilizing her home blood pressure cuff which she already has to take her blood pressure once daily 1 to 2 hours after taking her blood pressure medication.  Should she have significant headache that does not respond to Tylenol, significant chest pain, shortness of breath, blurry vision associate with elevated blood pressure I recommended repeat evaluation in the emergency department. [TL]   1741 Chronic CKD.  Normal troponin. [TL]   1815 Patient [place] asymptomatic but hypertensive on repeat check.  Given chronic pain and what appears to be a mildly anxious affect, assessment will order Percocet at this time as well as Atarax and reevaluate.  Pending repeat troponin. [TL]   1855 Repeat troponin normal. [TL]      ED Course User Index  [TL] Roger Sanchez, DO         Diagnoses as of 04/10/25 1532   Asymptomatic hypertension                 No data recorded                                 Medical Decision Making  Patient is an 79-year-old female presents emergency department for evaluation elevated blood pressure.    EKG was interpreted by attending physician and reviewed by me.    Lab work done today included CMP, CBC, troponins.  Lab work with renal insufficiency at baseline and anemia otherwise unremarkable.    Scans not warranted at today's visit.    Medications given at today's visit include p.o. hydroxyzine, p.o. Percocet, p.o. metoprolol    I saw this patient in conjunction with Dr. Sanchez.  Patient given her home metoprolol dose.  Only minimal improvement of diastolic blood pressure  with no significant improvement of systolic blood pressure.  Patient remained asymptomatic at this time and neurologically intact.  Troponins are within normal range and no evidence for endorgan damage at this time.  On review of patient's chart had multiple visits with primary care and pain management she has had significantly elevated blood pressures and appears to be more chronic for patient.  She was educated to keep blood pressure log at home and continue monitoring to follow-up close with primary care provider for blood pressure medication adjustments.  No indication for admission or further workup at this time.  Patient otherwise well-appearing.  Emergent pathologies were considered for this patient, although I have low suspicion for anything acutely emergent given patient's clinical presentation, history, physical exam, stable vital signs, and relatively unremarkable workup.  Discharging patient home is reasonable plan of care for outpatient management.    All labs, imaging, and diagnostic studies were reviewed by me and patient was counseled on clinical impression, expectations, and plan.  Patient was educated to follow-up with PCP in the following 1-2 days.  All questions from patient were answered. They elicited understanding and were agreeable to course of treatment.  Patient was discharged in stable condition and given strict return precautions.    ** Disclaimer:  Parts of this document were written utilizing a voice to text dictation software.  Note may contain minor transcription or typographical errors that were inadvertently transcribed by the computer software.        Procedure  Procedures     Kinjal Mesa PA-C  04/10/25 1242

## 2025-04-09 NOTE — PROGRESS NOTES
MEDICATION NAME: Percocet   STRENGTH: 5-325  LAST FILL DATE: 3/13/25  DATE LAST TAKEN: 4/9/25  QUANTITY FILLED: 120  QUANTITY REMAINING: 10  COUNT COMPLETED BY: FATMATA MARIE and LEN ZULUAGA      UDS LAST COMPLETED:   CONTROLLED SUBSTANCES AGREEMENT LAST SIGNED:   ORT LAST COMPLETED:  Modified Osw[employer] disability form filled out annually.

## 2025-04-09 NOTE — PROGRESS NOTES
Subjective   Patient ID: Marylou Ward is a 79 y.o. female who presents for Back Pain.  Is an 79-year-old female with lumbar radiculopathy here today for follow-up.  Patient denies any headaches vision changes other than increased axial back pain and radiating lower extremity.  She states that she does take her blood pressures at home and they seem to be normally within appropriate limits.  She does state that she took her hypertensive medications today.  The son is here and does corroborate information.        Review of Systems   Gastrointestinal:  Positive for abdominal pain.   Musculoskeletal:  Positive for arthralgias, back pain and gait problem.   All other systems reviewed and are negative.      Objective   Physical Exam  Vitals reviewed.   Constitutional:       Appearance: Normal appearance.   HENT:      Head: Normocephalic and atraumatic.      Mouth/Throat:      Mouth: Mucous membranes are moist.   Neck:      Vascular: No JVD.   Pulmonary:      Effort: Pulmonary effort is normal. No tachypnea or bradypnea.   Abdominal:      Palpations: Abdomen is soft.   Skin:     General: Skin is warm and dry.   Neurological:      Mental Status: She is alert and oriented to person, place, and time.   Psychiatric:         Mood and Affect: Mood normal.         Behavior: Behavior normal. Behavior is cooperative.         Assessment/Plan   Problem List Items Addressed This Visit    None  Visit Diagnoses         Codes    Lumbar radiculopathy     M54.16    Relevant Medications    oxyCODONE-acetaminophen (Percocet) 5-325 mg tablet (Start on 5/12/2025)    oxyCODONE-acetaminophen (Percocet) 5-325 mg tablet (Start on 4/12/2025)    Lumbar stenosis with neurogenic claudication     M48.062    Relevant Medications    oxyCODONE-acetaminophen (Percocet) 5-325 mg tablet (Start on 5/12/2025)    oxyCODONE-acetaminophen (Percocet) 5-325 mg tablet (Start on 4/12/2025)        I had nice discussion with the patient today our plan will be as  follows.      Radiology: [ none at this time ]      Physically:  [ continue modification of activities, healthy lifestyle choice ]      Psychologically:  [ No acute psychological concerns. There are no mental health issues of which I am aware that are contributing to the patient's pain. There are no substance abuse or alcohol abuse issues of which I am aware that are contributing to the patient's pain. ]      Medication: [ I will refill the patient's opioids today for 2 month.  The patient continues to see benefit and improvement in their quality of life and ability to maintain ADLs.  Patient educated about the risks of taking opioids and operating a motor vehicle.  Patient reports no adverse side effects to current medication regimen.  Current regimen does allow patient to maintain ADLs.  Patient reports no new neurologic symptoms, new pain areas, or exacerbation in pain today.  Patient reports they are happy with current treatment care path.    [place] was reviewed and was consistent with the history.    Patient has been educated on the risks, benefits, and alternatives of controlled substances as well as the proper way to store these medications.  The patient and I discussed the nature of this medication and its side effects.  We discussed tolerance, physical dependence, psychological dependence, addiction and opioid-induced hyperalgesia.  We discussed the potential need to wean from this medication.  We discussed the availability of programs that can help with this process if necessary.  We discussed safety issues related to opioids including safe storage.  We discussed the fact that the patient should not drive an automobile or operate heavy machinery while taking this medication.  A prescription for naloxone was offered to the patient.  The patient will be re-evaluated for the need to continue opioid therapy in 60 days. ]      Duration:  [ 2 months ]      Intervention:  [ Multiple readings of her blood pressure  reached over 200/90.  These were taken in both left and right upper extremities it is mentation the patient report to the emergency department for hypertension control.  I will be sending a message to her primary care letting her know that she had been sent today  ]        Please note that this report has been produced using speech recognition software. It may contain errors related to grammar, punctuation or spelling. Electronically signed, but not reviewed.            Denton Worthy PA-C 04/09/25 3:40 PM

## 2025-04-10 LAB
ATRIAL RATE: 72 BPM
P AXIS: 65 DEGREES
P OFFSET: 185 MS
P ONSET: 127 MS
PR INTERVAL: 194 MS
Q ONSET: 224 MS
QRS COUNT: 12 BEATS
QRS DURATION: 84 MS
QT INTERVAL: 408 MS
QTC CALCULATION(BAZETT): 446 MS
QTC FREDERICIA: 433 MS
R AXIS: 55 DEGREES
T AXIS: 2 DEGREES
T OFFSET: 428 MS
VENTRICULAR RATE: 72 BPM

## 2025-04-10 NOTE — DISCHARGE INSTRUCTIONS
Please follow close with your primary care provider in the following week.  Continue logging blood pressure at home daily as explained in the emergency department.  Continue monitoring and please return for any new or worsening symptoms.  Continue your home medications as prescribed and do not miss any doses.

## 2025-05-19 ENCOUNTER — APPOINTMENT (OUTPATIENT)
Dept: OBSTETRICS AND GYNECOLOGY | Facility: CLINIC | Age: 80
End: 2025-05-19
Payer: MEDICARE

## 2025-06-10 ENCOUNTER — TELEPHONE (OUTPATIENT)
Facility: CLINIC | Age: 80
End: 2025-06-10
Payer: MEDICARE

## 2025-06-10 DIAGNOSIS — M48.062 LUMBAR STENOSIS WITH NEUROGENIC CLAUDICATION: ICD-10-CM

## 2025-06-10 DIAGNOSIS — M54.16 LUMBAR RADICULOPATHY: ICD-10-CM

## 2025-06-10 RX ORDER — OXYCODONE AND ACETAMINOPHEN 5; 325 MG/1; MG/1
1 TABLET ORAL EVERY 6 HOURS PRN
Qty: 28 TABLET | Refills: 0 | Status: SHIPPED | OUTPATIENT
Start: 2025-06-11 | End: 2025-06-19 | Stop reason: SDUPTHER

## 2025-06-10 NOTE — TELEPHONE ENCOUNTER
Pt son, Jimmy, stopped by the office requesting a refill of percocet for his mother.  Stated he called the office with no response.  Pt will be out of medication tomorrow and her next appointment is not until 6/19.  Please advise if you will fill.  Thanks!

## 2025-06-19 ENCOUNTER — OFFICE VISIT (OUTPATIENT)
Facility: CLINIC | Age: 80
End: 2025-06-19
Payer: MEDICARE

## 2025-06-19 VITALS
RESPIRATION RATE: 16 BRPM | OXYGEN SATURATION: 97 % | WEIGHT: 140 LBS | HEART RATE: 66 BPM | DIASTOLIC BLOOD PRESSURE: 80 MMHG | SYSTOLIC BLOOD PRESSURE: 142 MMHG | HEIGHT: 61 IN | BODY MASS INDEX: 26.43 KG/M2

## 2025-06-19 DIAGNOSIS — M54.16 LUMBAR RADICULOPATHY: ICD-10-CM

## 2025-06-19 DIAGNOSIS — Z79.899 HISTORY OF LONG-TERM TREATMENT WITH HIGH-RISK MEDICATION: ICD-10-CM

## 2025-06-19 DIAGNOSIS — Z79.899 HISTORY OF ONGOING TREATMENT WITH HIGH-RISK MEDICATION: Primary | ICD-10-CM

## 2025-06-19 DIAGNOSIS — M48.062 LUMBAR STENOSIS WITH NEUROGENIC CLAUDICATION: ICD-10-CM

## 2025-06-19 PROCEDURE — 1036F TOBACCO NON-USER: CPT | Performed by: PHYSICIAN ASSISTANT

## 2025-06-19 PROCEDURE — 1159F MED LIST DOCD IN RCRD: CPT | Performed by: PHYSICIAN ASSISTANT

## 2025-06-19 PROCEDURE — 1125F AMNT PAIN NOTED PAIN PRSNT: CPT | Performed by: PHYSICIAN ASSISTANT

## 2025-06-19 PROCEDURE — 3077F SYST BP >= 140 MM HG: CPT | Performed by: PHYSICIAN ASSISTANT

## 2025-06-19 PROCEDURE — 1160F RVW MEDS BY RX/DR IN RCRD: CPT | Performed by: PHYSICIAN ASSISTANT

## 2025-06-19 PROCEDURE — 99214 OFFICE O/P EST MOD 30 MIN: CPT | Performed by: PHYSICIAN ASSISTANT

## 2025-06-19 PROCEDURE — 3079F DIAST BP 80-89 MM HG: CPT | Performed by: PHYSICIAN ASSISTANT

## 2025-06-19 PROCEDURE — G2211 COMPLEX E/M VISIT ADD ON: HCPCS | Performed by: PHYSICIAN ASSISTANT

## 2025-06-19 RX ORDER — OXYCODONE AND ACETAMINOPHEN 5; 325 MG/1; MG/1
1 TABLET ORAL EVERY 6 HOURS PRN
Qty: 120 TABLET | Refills: 0 | Status: SHIPPED | OUTPATIENT
Start: 2025-06-19 | End: 2025-07-19

## 2025-06-19 RX ORDER — OXYCODONE AND ACETAMINOPHEN 5; 325 MG/1; MG/1
1 TABLET ORAL EVERY 6 HOURS PRN
Qty: 120 TABLET | Refills: 0 | Status: SHIPPED | OUTPATIENT
Start: 2025-07-19 | End: 2025-08-18

## 2025-06-19 ASSESSMENT — ENCOUNTER SYMPTOMS
ARTHRALGIAS: 1
BACK PAIN: 1
ABDOMINAL PAIN: 1

## 2025-06-19 ASSESSMENT — PAIN SCALES - GENERAL
PAINLEVEL_OUTOF10: 8
PAINLEVEL_OUTOF10: 8

## 2025-06-19 ASSESSMENT — PATIENT HEALTH QUESTIONNAIRE - PHQ9
SUM OF ALL RESPONSES TO PHQ9 QUESTIONS 1 AND 2: 0
1. LITTLE INTEREST OR PLEASURE IN DOING THINGS: NOT AT ALL
2. FEELING DOWN, DEPRESSED OR HOPELESS: NOT AT ALL

## 2025-06-19 ASSESSMENT — PAIN DESCRIPTION - DESCRIPTORS: DESCRIPTORS: SHARP

## 2025-06-19 ASSESSMENT — PAIN - FUNCTIONAL ASSESSMENT: PAIN_FUNCTIONAL_ASSESSMENT: 0-10

## 2025-06-19 NOTE — PROGRESS NOTES
MEDICATION NAME: PERCOCET  STRENGTH: 5/325MG  LAST FILL DATE: 25  DATE LAST TAKEN: 25  QUANTITY FILLED: 28  QUANTITY REMAININ  COUNT COMPLETED BY: ARTURO GALVAN RN and KHALIF VITAL LPN      ORAL TOX [id number]  CONTROLLED SUBSTANCES AGREEMENT [id number]  ORT COMPLETED2024  Modified [employer] disability form filled out annually.

## 2025-06-19 NOTE — PROGRESS NOTES
Subjective   Patient ID: Marylou Ward is a 79 y.o. female who presents for Back Pain.  Patient is an 79-year-old female with spinal stenosis of neurogenic claudication radiculopathy presents today for follow-up.  Patient son does help provide some historical information.  Patient denies any injuries traumas or adverse reactions her son states that she is very quickwitted and has a good sense about her.  Patient continues her axial pain radiating to lower extremities.  Seating position tends to reduce symptoms slightly    Back Pain  Associated symptoms include abdominal pain.       Review of Systems   Gastrointestinal:  Positive for abdominal pain.   Musculoskeletal:  Positive for arthralgias, back pain and gait problem.   All other systems reviewed and are negative.      Objective   Physical Exam  Vitals reviewed.   Constitutional:       Appearance: Normal appearance.   HENT:      Head: Normocephalic and atraumatic.      Mouth/Throat:      Mouth: Mucous membranes are moist.   Neck:      Vascular: No JVD.   Pulmonary:      Effort: Pulmonary effort is normal. No tachypnea or bradypnea.   Abdominal:      Palpations: Abdomen is soft.   Skin:     General: Skin is warm and dry.   Neurological:      Mental Status: She is alert and oriented to person, place, and time.   Psychiatric:         Mood and Affect: Mood normal.         Behavior: Behavior normal. Behavior is cooperative.       Assessment/Plan   Problem List Items Addressed This Visit    None  Visit Diagnoses         Codes      History of ongoing treatment with high-risk medication    -  Primary Z79.899      Lumbar radiculopathy     M54.16      Lumbar stenosis with neurogenic claudication     M48.062        I had nice discussion with the patient today our plan will be as follows.      Radiology: [ none at this time ]      Physically:  [ continue modification of activities, healthy lifestyle choice ]      Psychologically:  [ No acute psychological concerns. There are no  mental health issues of which I am aware that are contributing to the patient's pain. There are no substance abuse or alcohol abuse issues of which I am aware that are contributing to the patient's pain. ]      Medication: [ I will refill the patient's opioids today for 2 month.  The patient continues to see benefit and improvement in their quality of life and ability to maintain ADLs.  Patient educated about the risks of taking opioids and operating a motor vehicle.  Patient reports no adverse side effects to current medication regimen.  Current regimen does allow patient to maintain ADLs.  Patient reports no new neurologic symptoms, new pain areas, or exacerbation in pain today.  Patient reports they are happy with current treatment care path.    [place] was reviewed and was consistent with the history.    Patient has been educated on the risks, benefits, and alternatives of controlled substances as well as the proper way to store these medications.  The patient and I discussed the nature of this medication and its side effects.  We discussed tolerance, physical dependence, psychological dependence, addiction and opioid-induced hyperalgesia.  We discussed the potential need to wean from this medication.  We discussed the availability of programs that can help with this process if necessary.  We discussed safety issues related to opioids including safe storage.  We discussed the fact that the patient should not drive an automobile or operate heavy machinery while taking this medication.  A prescription for naloxone was offered to the patient.  The patient will be re-evaluated for the need to continue opioid therapy in 60 days.   Pt to submit sample for tox screening ]      Duration:  [ 2 months ]      Intervention:  [ none at this time. Patient is stable.  ]        Please note that this report has been produced using speech recognition software. It may contain errors related to grammar, punctuation or spelling.  Electronically signed, but not reviewed.            Denton Worthy PA-C 06/19/25 1:03 PM

## 2025-06-23 LAB — QUEST FLEXITEST1 RESULTS:: NORMAL

## 2025-07-09 ENCOUNTER — APPOINTMENT (OUTPATIENT)
Dept: OBSTETRICS AND GYNECOLOGY | Facility: CLINIC | Age: 80
End: 2025-07-09
Payer: MEDICARE

## 2025-07-28 ENCOUNTER — APPOINTMENT (OUTPATIENT)
Dept: OBSTETRICS AND GYNECOLOGY | Facility: CLINIC | Age: 80
End: 2025-07-28
Payer: MEDICARE

## 2025-08-15 ENCOUNTER — APPOINTMENT (OUTPATIENT)
Facility: CLINIC | Age: 80
End: 2025-08-15
Payer: MEDICARE

## 2025-08-18 ENCOUNTER — OFFICE VISIT (OUTPATIENT)
Facility: CLINIC | Age: 80
End: 2025-08-18
Payer: MEDICARE

## 2025-08-18 VITALS
RESPIRATION RATE: 16 BRPM | BODY MASS INDEX: 27.48 KG/M2 | HEART RATE: 70 BPM | WEIGHT: 140 LBS | OXYGEN SATURATION: 94 % | DIASTOLIC BLOOD PRESSURE: 80 MMHG | HEIGHT: 60 IN | SYSTOLIC BLOOD PRESSURE: 162 MMHG

## 2025-08-18 DIAGNOSIS — M48.062 LUMBAR STENOSIS WITH NEUROGENIC CLAUDICATION: ICD-10-CM

## 2025-08-18 DIAGNOSIS — M54.16 LUMBAR RADICULOPATHY: ICD-10-CM

## 2025-08-18 PROCEDURE — 3079F DIAST BP 80-89 MM HG: CPT | Performed by: PHYSICIAN ASSISTANT

## 2025-08-18 PROCEDURE — 1036F TOBACCO NON-USER: CPT | Performed by: PHYSICIAN ASSISTANT

## 2025-08-18 PROCEDURE — 3077F SYST BP >= 140 MM HG: CPT | Performed by: PHYSICIAN ASSISTANT

## 2025-08-18 PROCEDURE — 1159F MED LIST DOCD IN RCRD: CPT | Performed by: PHYSICIAN ASSISTANT

## 2025-08-18 PROCEDURE — 1160F RVW MEDS BY RX/DR IN RCRD: CPT | Performed by: PHYSICIAN ASSISTANT

## 2025-08-18 PROCEDURE — 99214 OFFICE O/P EST MOD 30 MIN: CPT | Performed by: PHYSICIAN ASSISTANT

## 2025-08-18 PROCEDURE — 1125F AMNT PAIN NOTED PAIN PRSNT: CPT | Performed by: PHYSICIAN ASSISTANT

## 2025-08-18 PROCEDURE — G2211 COMPLEX E/M VISIT ADD ON: HCPCS | Performed by: PHYSICIAN ASSISTANT

## 2025-08-18 PROCEDURE — 99212 OFFICE O/P EST SF 10 MIN: CPT

## 2025-08-18 RX ORDER — OXYCODONE AND ACETAMINOPHEN 5; 325 MG/1; MG/1
1 TABLET ORAL EVERY 6 HOURS PRN
Qty: 120 TABLET | Refills: 0 | Status: SHIPPED | OUTPATIENT
Start: 2025-09-17 | End: 2025-10-17

## 2025-08-18 RX ORDER — OXYCODONE AND ACETAMINOPHEN 5; 325 MG/1; MG/1
1 TABLET ORAL EVERY 6 HOURS PRN
Qty: 120 TABLET | Refills: 0 | Status: SHIPPED | OUTPATIENT
Start: 2025-08-18 | End: 2025-09-17

## 2025-08-18 ASSESSMENT — PAIN - FUNCTIONAL ASSESSMENT: PAIN_FUNCTIONAL_ASSESSMENT: 0-10

## 2025-08-18 ASSESSMENT — ENCOUNTER SYMPTOMS
BACK PAIN: 1
ARTHRALGIAS: 1
ABDOMINAL PAIN: 1

## 2025-08-18 ASSESSMENT — PATIENT HEALTH QUESTIONNAIRE - PHQ9
SUM OF ALL RESPONSES TO PHQ9 QUESTIONS 1 AND 2: 0
2. FEELING DOWN, DEPRESSED OR HOPELESS: NOT AT ALL
1. LITTLE INTEREST OR PLEASURE IN DOING THINGS: NOT AT ALL

## 2025-08-18 ASSESSMENT — PAIN SCALES - GENERAL
PAINLEVEL_OUTOF10: 8
PAINLEVEL_OUTOF10: 8

## 2025-08-18 ASSESSMENT — PAIN DESCRIPTION - DESCRIPTORS: DESCRIPTORS: THROBBING

## 2025-09-01 PROCEDURE — 99285 EMERGENCY DEPT VISIT HI MDM: CPT

## 2025-09-02 ENCOUNTER — APPOINTMENT (OUTPATIENT)
Dept: RADIOLOGY | Facility: HOSPITAL | Age: 80
End: 2025-09-02
Payer: MEDICARE

## 2025-09-02 ENCOUNTER — HOSPITAL ENCOUNTER (EMERGENCY)
Facility: HOSPITAL | Age: 80
Discharge: HOME | End: 2025-09-02
Payer: MEDICARE

## 2025-09-02 VITALS
HEIGHT: 60 IN | HEART RATE: 73 BPM | DIASTOLIC BLOOD PRESSURE: 59 MMHG | SYSTOLIC BLOOD PRESSURE: 143 MMHG | WEIGHT: 140 LBS | BODY MASS INDEX: 27.48 KG/M2 | TEMPERATURE: 98.4 F | RESPIRATION RATE: 18 BRPM | OXYGEN SATURATION: 92 %
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
## 2025-09-02 LAB
ANION GAP SERPL CALCULATED.3IONS-SCNC: 12 MMOL/L (ref 10–20)
BASOPHILS # BLD AUTO: 0.05 X10*3/UL (ref 0–0.1)
BASOPHILS NFR BLD AUTO: 0.4 %
BUN SERPL-MCNC: 18 MG/DL (ref 6–23)
CALCIUM SERPL-MCNC: 9.5 MG/DL (ref 8.6–10.3)
CHLORIDE SERPL-SCNC: 101 MMOL/L (ref 98–107)
CO2 SERPL-SCNC: 25 MMOL/L (ref 21–32)
CREAT SERPL-MCNC: 1.33 MG/DL (ref 0.5–1.05)
EGFRCR SERPLBLD CKD-EPI 2021: 40 ML/MIN/1.73M*2
EOSINOPHIL # BLD AUTO: 0.17 X10*3/UL (ref 0–0.4)
EOSINOPHIL NFR BLD AUTO: 1.5 %
ERYTHROCYTE [DISTWIDTH] IN BLOOD BY AUTOMATED COUNT: 14.2 % (ref 11.5–14.5)
GLUCOSE SERPL-MCNC: 113 MG/DL (ref 74–99)
HCT VFR BLD AUTO: 31.7 % (ref 36–46)
HGB BLD-MCNC: 9.8 G/DL (ref 12–16)
IMM GRANULOCYTES # BLD AUTO: 0.1 X10*3/UL (ref 0–0.5)
IMM GRANULOCYTES NFR BLD AUTO: 0.9 % (ref 0–0.9)
LYMPHOCYTES # BLD AUTO: 1.72 X10*3/UL (ref 0.8–3)
LYMPHOCYTES NFR BLD AUTO: 15.1 %
MCH RBC QN AUTO: 27.5 PG (ref 26–34)
MCHC RBC AUTO-ENTMCNC: 30.9 G/DL (ref 32–36)
MCV RBC AUTO: 89 FL (ref 80–100)
MONOCYTES # BLD AUTO: 0.84 X10*3/UL (ref 0.05–0.8)
MONOCYTES NFR BLD AUTO: 7.4 %
NEUTROPHILS # BLD AUTO: 8.54 X10*3/UL (ref 1.6–5.5)
NEUTROPHILS NFR BLD AUTO: 74.7 %
NRBC BLD-RTO: 0 /100 WBCS (ref 0–0)
PLATELET # BLD AUTO: 366 X10*3/UL (ref 150–450)
POTASSIUM SERPL-SCNC: 4.4 MMOL/L (ref 3.5–5.3)
RBC # BLD AUTO: 3.57 X10*6/UL (ref 4–5.2)
SODIUM SERPL-SCNC: 134 MMOL/L (ref 136–145)
WBC # BLD AUTO: 11.4 X10*3/UL (ref 4.4–11.3)

## 2025-09-02 PROCEDURE — 72131 CT LUMBAR SPINE W/O DYE: CPT | Performed by: STUDENT IN AN ORGANIZED HEALTH CARE EDUCATION/TRAINING PROGRAM

## 2025-09-02 PROCEDURE — 36415 COLL VENOUS BLD VENIPUNCTURE: CPT

## 2025-09-02 PROCEDURE — 73502 X-RAY EXAM HIP UNI 2-3 VIEWS: CPT | Mod: RT

## 2025-09-02 PROCEDURE — 85025 COMPLETE CBC W/AUTO DIFF WBC: CPT

## 2025-09-02 PROCEDURE — 90715 TDAP VACCINE 7 YRS/> IM: CPT

## 2025-09-02 PROCEDURE — 71045 X-RAY EXAM CHEST 1 VIEW: CPT | Performed by: STUDENT IN AN ORGANIZED HEALTH CARE EDUCATION/TRAINING PROGRAM

## 2025-09-02 PROCEDURE — 90471 IMMUNIZATION ADMIN: CPT

## 2025-09-02 PROCEDURE — 71045 X-RAY EXAM CHEST 1 VIEW: CPT

## 2025-09-02 PROCEDURE — 73502 X-RAY EXAM HIP UNI 2-3 VIEWS: CPT | Mod: RIGHT SIDE | Performed by: STUDENT IN AN ORGANIZED HEALTH CARE EDUCATION/TRAINING PROGRAM

## 2025-09-02 PROCEDURE — 70450 CT HEAD/BRAIN W/O DYE: CPT | Performed by: STUDENT IN AN ORGANIZED HEALTH CARE EDUCATION/TRAINING PROGRAM

## 2025-09-02 PROCEDURE — 72131 CT LUMBAR SPINE W/O DYE: CPT

## 2025-09-02 PROCEDURE — 2500000001 HC RX 250 WO HCPCS SELF ADMINISTERED DRUGS (ALT 637 FOR MEDICARE OP)

## 2025-09-02 PROCEDURE — 80048 BASIC METABOLIC PNL TOTAL CA: CPT

## 2025-09-02 PROCEDURE — 70450 CT HEAD/BRAIN W/O DYE: CPT

## 2025-09-02 PROCEDURE — 72125 CT NECK SPINE W/O DYE: CPT

## 2025-09-02 PROCEDURE — 96374 THER/PROPH/DIAG INJ IV PUSH: CPT

## 2025-09-02 PROCEDURE — 2500000004 HC RX 250 GENERAL PHARMACY W/ HCPCS (ALT 636 FOR OP/ED)

## 2025-09-02 PROCEDURE — 72125 CT NECK SPINE W/O DYE: CPT | Performed by: STUDENT IN AN ORGANIZED HEALTH CARE EDUCATION/TRAINING PROGRAM

## 2025-09-02 RX ORDER — ONDANSETRON HYDROCHLORIDE 2 MG/ML
4 INJECTION, SOLUTION INTRAVENOUS ONCE
Status: COMPLETED | OUTPATIENT
Start: 2025-09-02 | End: 2025-09-02

## 2025-09-02 RX ORDER — ACETAMINOPHEN 325 MG/1
975 TABLET ORAL ONCE
Status: COMPLETED | OUTPATIENT
Start: 2025-09-02 | End: 2025-09-02

## 2025-09-02 RX ADMIN — TETANUS TOXOID, REDUCED DIPHTHERIA TOXOID AND ACELLULAR PERTUSSIS VACCINE, ADSORBED 0.5 ML: 5; 2.5; 8; 8; 2.5 SUSPENSION INTRAMUSCULAR at 02:48

## 2025-09-02 RX ADMIN — ONDANSETRON 4 MG: 2 INJECTION, SOLUTION INTRAMUSCULAR; INTRAVENOUS at 01:28

## 2025-09-02 RX ADMIN — ACETAMINOPHEN 975 MG: 325 TABLET ORAL at 00:52

## 2025-09-02 ASSESSMENT — LIFESTYLE VARIABLES
EVER FELT BAD OR GUILTY ABOUT YOUR DRINKING: NO
EVER HAD A DRINK FIRST THING IN THE MORNING TO STEADY YOUR NERVES TO GET RID OF A HANGOVER: NO
HAVE PEOPLE ANNOYED YOU BY CRITICIZING YOUR DRINKING: NO
HAVE YOU EVER FELT YOU SHOULD CUT DOWN ON YOUR DRINKING: NO
TOTAL SCORE: 0

## 2025-09-02 ASSESSMENT — PAIN SCALES - GENERAL
PAINLEVEL_OUTOF10: 4
PAINLEVEL_OUTOF10: 6
PAINLEVEL_OUTOF10: 8

## 2025-09-02 ASSESSMENT — PAIN DESCRIPTION - LOCATION: LOCATION: HEAD

## 2025-09-02 ASSESSMENT — PAIN DESCRIPTION - PAIN TYPE: TYPE: ACUTE PAIN

## 2025-09-02 ASSESSMENT — PAIN - FUNCTIONAL ASSESSMENT
PAIN_FUNCTIONAL_ASSESSMENT: 0-10
PAIN_FUNCTIONAL_ASSESSMENT: 0-10

## (undated) DEVICE — CATHETER, URETERAL, CONE TIP, 8 FR, WOVEN, RT & LFT, 70 CM, STERILE

## (undated) DEVICE — NEEDLE, HYPODERMIC, PROEDGE, 22G X 1.5, BLACK

## (undated) DEVICE — Device

## (undated) DEVICE — IRRIGATOR, WOUND, HYDRO SURG PLUS, W/O TIP, DISP

## (undated) DEVICE — SUTURE, MONOCRYL, 4-0, 27 IN, PS-2, UNDYED

## (undated) DEVICE — GLOVE, SURGICAL, PROTEXIS PI BLUE W/NEUTHERA, 8.5, PF, LF

## (undated) DEVICE — SLEEVE, KII, Z-THREAD, 5X100CM

## (undated) DEVICE — GLOVE, SURGICAL, PROTEXIS PI , 8.0, PF, LF

## (undated) DEVICE — SOLUTION, IRRIGATION, X RX SODIUM CHL 0.9%, 1000ML BTL

## (undated) DEVICE — SUTURE, VICRYL, 0, 27 IN, UR-6, VIOLET

## (undated) DEVICE — GOWN, SURGICAL, ECLIPSE, FABRIC, 2XL, REINFORCED